# Patient Record
Sex: FEMALE | Race: OTHER | NOT HISPANIC OR LATINO | URBAN - METROPOLITAN AREA
[De-identification: names, ages, dates, MRNs, and addresses within clinical notes are randomized per-mention and may not be internally consistent; named-entity substitution may affect disease eponyms.]

---

## 2020-01-17 VITALS
HEART RATE: 85 BPM | OXYGEN SATURATION: 97 % | SYSTOLIC BLOOD PRESSURE: 211 MMHG | RESPIRATION RATE: 16 BRPM | WEIGHT: 152.34 LBS | DIASTOLIC BLOOD PRESSURE: 92 MMHG | HEIGHT: 61 IN | TEMPERATURE: 98 F

## 2020-01-17 RX ORDER — CHLORHEXIDINE GLUCONATE 213 G/1000ML
1 SOLUTION TOPICAL ONCE
Refills: 0 | Status: DISCONTINUED | OUTPATIENT
Start: 2020-01-21 | End: 2020-02-06

## 2020-01-17 NOTE — H&P ADULT - HISTORY OF PRESENT ILLNESS
***Poor Historian***  ***Pt to bring in all meds***    80 y/o F with PMHx significant for HTN, HLD, DM-II (on Insulin), CKD (pt is not sure of her Cr, was supposed to see a kidney specialist), prior hospitalizations @  in 8/2019 amd 10/2019 for hypertensive urgency, and syncopal episode on last admission as per pt.  Pt has been experiencing SSCP associated with dizziness and dyspnea on minimal exertion, just moving around the house.  Pt also admits to occasional LE edema and 2-3 pillows orthopnea.  Denies any n/v, diaphoresis, PND, palpitations.  Subsequently, she underwent a NST on 12/5/19 which revealed a small to moderate sized area of apical-septal and lateral wall ischemia, EF 58%.    In light of pt's risk factors, CCS class III anginal symptoms, and recent abnormal NST, she is referred for cardiac cath with possible intervention for suspected CAD. ***Poor Historian***      78 y/o F with PMHx significant for HTN, HLD, DM-II (on Insulin), CKD (her Cr varies from 4.8-5.2, has nephrologist outpatient), prior hospitalizations @  in 8/2019 and 10/2019 for hypertensive emergency, CHF exacerbation and syncopal episode on last admission as per pt. Pt 's BP was controlled and pt was diureses and discharged. Pt recently has been experiencing SSCP associated with dizziness and dyspnea on minimal exertion, just moving around the house.  Pt also admits to occasional LE edema and 2-3 pillows orthopnea but currently denies it. Denies any n/v, diaphoresis, PND, palpitations.  Subsequently, she underwent a NST on 12/5/19 which revealed a small to moderate sized area of apical-septal and lateral wall ischemia, EF 58%.    In light of pt's risk factors, CCS class III anginal symptoms, and recent abnormal NST, she is referred for cardiac cath with possible intervention for suspected CAD. ***Poor Historian***      78 y/o F with PMHx significant for HTN, HLD, DM-II (on Insulin), CKD (her Cr varies from 4.8-5.2, has nephrologist outpatient), prior hospitalizations @  in 8/2019 and 10/2019 for hypertensive emergency, CHF exacerbation and syncopal episode on last admission as per pt. Pt 's BP was controlled and pt was diureses and discharged. Pt recently has been experiencing SSCP associated with dizziness and dyspnea on minimal exertion, just moving around the house.  Pt also admits to occasional LE edema and 2-3 pillows orthopnea but currently denies it. Denies any n/v, diaphoresis, PND, palpitations.  Subsequently, she underwent a NST on 12/5/19 which revealed a small to moderate sized area of apical-septal and lateral wall ischemia, EF 58%.    In light of pt's risk factors, CCS class III anginal symptoms, and recent abnormal NST, she is referred for cardiac cath with possible intervention for suspected CAD.      Pt's Cr is 5.4 today, even though pt's nephrologist Dr. Merna Acosta cleared pt for cardiac cath with the plan of HD, pt wants to go for a second opinion with another nephrologist and if HD needed to arrange the placement of access site. Pt's BP on arrival is 220/97. Pt did not take her medications at home. Pt took in front of me her hydralazine 50 mg PO x1, amlodipine 5 mg PO x1, atenolol 25 mg PO x1. As per Dr. Kathleen the procedure was cancelled today due to pt's kidney function and her wish to go for second opinion. As per Dr. Kathleen pt will be sent home, pt was given additional 5 mg PO x1 of amlodipine and hydralazine 10 mg IV x1 with resolution of her BP. As per Dr. Kathleen pt will stop taking atenolol 25 mg bid and will start taking labetalol 300 mg bid. ***Poor Historian***      78 y/o F with PMHx significant for HTN, HLD, DM-II (on Insulin), CKD (her Cr varies from 4.8-5.2, has nephrologist outpatient), prior hospitalizations @  in 8/2019 and 10/2019 for hypertensive emergency, CHF exacerbation and syncopal episode on last admission as per pt. Pt 's BP was controlled and pt was diureses and discharged. Pt recently has been experiencing SSCP associated with dizziness and dyspnea on minimal exertion, just moving around the house.  Pt also admits to occasional LE edema and 2-3 pillows orthopnea but currently denies it. Denies any n/v, diaphoresis, PND, palpitations.  Subsequently, she underwent a NST on 12/5/19 which revealed a small to moderate sized area of apical-septal and lateral wall ischemia, EF 58%.    In light of pt's risk factors, CCS class III anginal symptoms, and recent abnormal NST, she is referred for cardiac cath with possible intervention for suspected CAD.      Pt's Cr is 5.4 today, even though pt's nephrologist Dr. Merna Acosta cleared pt for cardiac cath with the plan of HD, pt wants to go for a second opinion with another nephrologist and if HD needed to arrange the placement of access site. Pt's BP on arrival is 220/97. Pt did not take her medications at home. Pt took in front of me her hydralazine 50 mg PO x1, amlodipine 5 mg PO x1, atenolol 25 mg PO x1. As per Dr. Kathleen the procedure was cancelled today due to pt's kidney function and her wish to go for second opinion. As per Dr. Kathleen pt will be sent home, pt was given additional 5 mg PO x1 of amlodipine and hydralazine 10 mg IV x1 with resolution of her BP. As per Dr. Kathleen pt will stop taking atenolol 25 mg bid and will start taking labetalol 300 mg bid. On the discharge pt's BP is 170/80, pt additionally was instructed to stop taking bidil.

## 2020-01-17 NOTE — H&P ADULT - ASSESSMENT
78 y/o F with PMHx significant for HTN, HLD, DM-II (on Insulin), CKD (her Cr varies from 4.8-5.2, has nephrologist outpatient), prior hospitalizations @  in 8/2019 and 10/2019 for hypertensive emergency, CHF exacerbation and syncopal episode presented to [facility] for recommended cardiac cath in light of pt's risk factors, CCS 3 anginal symptoms and abnormal NST.    ASA III and Mallampati III    OF NOTE: 78 y/o F with PMHx significant for HTN, HLD, DM-II (on Insulin), CKD (her Cr varies from 4.8-5.2, has nephrologist outpatient), prior hospitalizations @  in 8/2019 and 10/2019 for hypertensive emergency, CHF exacerbation and syncopal episode presented to [facility] for recommended cardiac cath in light of pt's risk factors, CCS 3 anginal symptoms and abnormal NST.    ASA III and Mallampati III    OF NOTE:     Pt's Cr is 5.4 today, even though pt's nephrologist Dr. Merna Acosta cleared pt for cardiac cath with the plan of HD, pt wants to go for a second opinion with another nephrologist and if HD needed to arrange the placement of access site. Pt's BP on arrival is 220/97. Pt did not take her medications at home. Pt took in front of me her hydralazine 50 mg PO x1, amlodipine 5 mg PO x1, atenolol 25 mg PO x1. As per Dr. Kathleen the procedure was cancelled today due to pt's kidney function and her wish to go for second opinion. As per Dr. Kathleen pt will be sent home, pt was given additional 5 mg PO x1 of amlodipine and hydralazine 10 mg IV x1 with resolution of her BP. As per Dr. Kathleen pt will stop taking atenolol 25 mg bid and will start taking labetalol 300 mg bid. 78 y/o F with PMHx significant for HTN, HLD, DM-II (on Insulin), CKD (her Cr varies from 4.8-5.2, has nephrologist outpatient), prior hospitalizations @  in 8/2019 and 10/2019 for hypertensive emergency, CHF exacerbation and syncopal episode presented to [facility] for recommended cardiac cath in light of pt's risk factors, CCS 3 anginal symptoms and abnormal NST.    ASA III and Mallampati III    OF NOTE:     Pt's Cr is 5.4 today, even though pt's nephrologist Dr. Merna Acosta cleared pt for cardiac cath with the plan of HD, pt wants to go for a second opinion with another nephrologist and if HD needed to arrange the placement of access site. Pt's BP on arrival is 220/97. Pt did not take her medications at home. Pt took in front of me her hydralazine 50 mg PO x1, amlodipine 5 mg PO x1, atenolol 25 mg PO x1. As per Dr. Kathleen the procedure was cancelled today due to pt's kidney function and her wish to go for second opinion. As per Dr. Kathleen pt will be sent home, pt was given additional 5 mg PO x1 of amlodipine and hydralazine 10 mg IV x1 with resolution of her BP. As per Dr. Kathleen pt will stop taking atenolol 25 mg bid and will start taking labetalol 300 mg bid.  On the discharge pt's BP is 170/80, pt additionally was instructed to stop taking bidil.

## 2020-01-17 NOTE — H&P ADULT - NSICDXPASTMEDICALHX_GEN_ALL_CORE_FT
PAST MEDICAL HISTORY:  Asthma     CKD (chronic kidney disease)     DM type 2 (diabetes mellitus, type 2)     GERD (gastroesophageal reflux disease)     H/O vitamin D deficiency     HLD (hyperlipidemia)     HTN (hypertension)

## 2020-01-21 ENCOUNTER — OUTPATIENT (OUTPATIENT)
Dept: OUTPATIENT SERVICES | Facility: HOSPITAL | Age: 80
LOS: 1 days | End: 2020-01-21
Payer: MEDICARE

## 2020-01-21 DIAGNOSIS — Z98.49 CATARACT EXTRACTION STATUS, UNSPECIFIED EYE: Chronic | ICD-10-CM

## 2020-01-21 LAB
ALBUMIN SERPL ELPH-MCNC: 3.8 G/DL — SIGNIFICANT CHANGE UP (ref 3.3–5)
ALP SERPL-CCNC: 72 U/L — SIGNIFICANT CHANGE UP (ref 40–120)
ALT FLD-CCNC: 7 U/L — LOW (ref 10–45)
ANION GAP SERPL CALC-SCNC: 15 MMOL/L — SIGNIFICANT CHANGE UP (ref 5–17)
APTT BLD: 32.9 SEC — SIGNIFICANT CHANGE UP (ref 27.5–36.3)
AST SERPL-CCNC: 12 U/L — SIGNIFICANT CHANGE UP (ref 10–40)
BASOPHILS # BLD AUTO: 0.24 K/UL — HIGH (ref 0–0.2)
BASOPHILS NFR BLD AUTO: 1.7 % — SIGNIFICANT CHANGE UP (ref 0–2)
BILIRUB SERPL-MCNC: 0.3 MG/DL — SIGNIFICANT CHANGE UP (ref 0.2–1.2)
BUN SERPL-MCNC: 53 MG/DL — HIGH (ref 7–23)
CALCIUM SERPL-MCNC: 8.5 MG/DL — SIGNIFICANT CHANGE UP (ref 8.4–10.5)
CHLORIDE SERPL-SCNC: 105 MMOL/L — SIGNIFICANT CHANGE UP (ref 96–108)
CHOLEST SERPL-MCNC: 122 MG/DL — SIGNIFICANT CHANGE UP (ref 10–199)
CK MB CFR SERPL CALC: 2.2 NG/ML — SIGNIFICANT CHANGE UP (ref 0–6.7)
CK SERPL-CCNC: 74 U/L — SIGNIFICANT CHANGE UP (ref 25–170)
CO2 SERPL-SCNC: 20 MMOL/L — LOW (ref 22–31)
CREAT SERPL-MCNC: 5.15 MG/DL — HIGH (ref 0.5–1.3)
CRP SERPL-MCNC: 2.12 MG/DL — HIGH (ref 0–0.4)
EOSINOPHIL # BLD AUTO: 1.77 K/UL — HIGH (ref 0–0.5)
EOSINOPHIL NFR BLD AUTO: 12.3 % — HIGH (ref 0–6)
GLUCOSE SERPL-MCNC: 167 MG/DL — HIGH (ref 70–99)
HBA1C BLD-MCNC: 6.1 % — HIGH (ref 4–5.6)
HCT VFR BLD CALC: 28.2 % — LOW (ref 34.5–45)
HDLC SERPL-MCNC: 28 MG/DL — LOW
HGB BLD-MCNC: 8.8 G/DL — LOW (ref 11.5–15.5)
INR BLD: 1.03 — SIGNIFICANT CHANGE UP (ref 0.88–1.16)
LIPID PNL WITH DIRECT LDL SERPL: 72 MG/DL — SIGNIFICANT CHANGE UP
LYMPHOCYTES # BLD AUTO: 17.5 % — SIGNIFICANT CHANGE UP (ref 13–44)
LYMPHOCYTES # BLD AUTO: 2.51 K/UL — SIGNIFICANT CHANGE UP (ref 1–3.3)
MCHC RBC-ENTMCNC: 23.8 PG — LOW (ref 27–34)
MCHC RBC-ENTMCNC: 31.2 GM/DL — LOW (ref 32–36)
MCV RBC AUTO: 76.4 FL — LOW (ref 80–100)
MONOCYTES # BLD AUTO: 0.26 K/UL — SIGNIFICANT CHANGE UP (ref 0–0.9)
MONOCYTES NFR BLD AUTO: 1.8 % — LOW (ref 2–14)
NEUTROPHILS # BLD AUTO: 9.46 K/UL — HIGH (ref 1.8–7.4)
NEUTROPHILS NFR BLD AUTO: 65.8 % — SIGNIFICANT CHANGE UP (ref 43–77)
PLATELET # BLD AUTO: 334 K/UL — SIGNIFICANT CHANGE UP (ref 150–400)
POTASSIUM SERPL-MCNC: 5.4 MMOL/L — HIGH (ref 3.5–5.3)
POTASSIUM SERPL-SCNC: 5.4 MMOL/L — HIGH (ref 3.5–5.3)
PROT SERPL-MCNC: 7.3 G/DL — SIGNIFICANT CHANGE UP (ref 6–8.3)
PROTHROM AB SERPL-ACNC: 11.8 SEC — SIGNIFICANT CHANGE UP (ref 10–12.9)
RBC # BLD: 3.69 M/UL — LOW (ref 3.8–5.2)
RBC # FLD: 15.5 % — HIGH (ref 10.3–14.5)
SODIUM SERPL-SCNC: 140 MMOL/L — SIGNIFICANT CHANGE UP (ref 135–145)
TOTAL CHOLESTEROL/HDL RATIO MEASUREMENT: 4.4 RATIO — SIGNIFICANT CHANGE UP (ref 3.3–7.1)
TRIGL SERPL-MCNC: 111 MG/DL — SIGNIFICANT CHANGE UP (ref 10–149)
WBC # BLD: 14.37 K/UL — HIGH (ref 3.8–10.5)
WBC # FLD AUTO: 14.37 K/UL — HIGH (ref 3.8–10.5)

## 2020-01-21 PROCEDURE — 86140 C-REACTIVE PROTEIN: CPT

## 2020-01-21 PROCEDURE — 82248 BILIRUBIN DIRECT: CPT

## 2020-01-21 PROCEDURE — 85730 THROMBOPLASTIN TIME PARTIAL: CPT

## 2020-01-21 PROCEDURE — 85025 COMPLETE CBC W/AUTO DIFF WBC: CPT

## 2020-01-21 PROCEDURE — 93010 ELECTROCARDIOGRAM REPORT: CPT

## 2020-01-21 PROCEDURE — 80053 COMPREHEN METABOLIC PANEL: CPT

## 2020-01-21 PROCEDURE — 85610 PROTHROMBIN TIME: CPT

## 2020-01-21 PROCEDURE — 82553 CREATINE MB FRACTION: CPT

## 2020-01-21 PROCEDURE — 83036 HEMOGLOBIN GLYCOSYLATED A1C: CPT

## 2020-01-21 PROCEDURE — 82550 ASSAY OF CK (CPK): CPT

## 2020-01-21 PROCEDURE — 93005 ELECTROCARDIOGRAM TRACING: CPT

## 2020-01-21 PROCEDURE — 80061 LIPID PANEL: CPT

## 2020-01-21 RX ORDER — ERGOCALCIFEROL 1.25 MG/1
1 CAPSULE ORAL
Qty: 0 | Refills: 0 | DISCHARGE

## 2020-01-21 RX ORDER — CARVEDILOL PHOSPHATE 80 MG/1
1 CAPSULE, EXTENDED RELEASE ORAL
Qty: 0 | Refills: 0 | DISCHARGE

## 2020-01-21 RX ORDER — DEXTROSE 50 % IN WATER 50 %
12.5 SYRINGE (ML) INTRAVENOUS ONCE
Refills: 0 | Status: DISCONTINUED | OUTPATIENT
Start: 2020-01-21 | End: 2020-02-06

## 2020-01-21 RX ORDER — HYDRALAZINE HCL 50 MG
10 TABLET ORAL ONCE
Refills: 0 | Status: COMPLETED | OUTPATIENT
Start: 2020-01-21 | End: 2020-01-21

## 2020-01-21 RX ORDER — LABETALOL HCL 100 MG
1 TABLET ORAL
Qty: 60 | Refills: 1
Start: 2020-01-21 | End: 2020-03-20

## 2020-01-21 RX ORDER — ESOMEPRAZOLE MAGNESIUM 40 MG/1
1 CAPSULE, DELAYED RELEASE ORAL
Qty: 0 | Refills: 0 | DISCHARGE

## 2020-01-21 RX ORDER — AMLODIPINE BESYLATE 2.5 MG/1
5 TABLET ORAL ONCE
Refills: 0 | Status: COMPLETED | OUTPATIENT
Start: 2020-01-21 | End: 2020-01-21

## 2020-01-21 RX ORDER — ISOSORBIDE DINITRATE 5 MG/1
1 TABLET ORAL
Qty: 0 | Refills: 0 | DISCHARGE

## 2020-01-21 RX ORDER — DEXTROSE 50 % IN WATER 50 %
25 SYRINGE (ML) INTRAVENOUS ONCE
Refills: 0 | Status: DISCONTINUED | OUTPATIENT
Start: 2020-01-21 | End: 2020-02-06

## 2020-01-21 RX ORDER — FERROUS GLUCONATE 100 %
1 POWDER (GRAM) MISCELLANEOUS
Qty: 0 | Refills: 0 | DISCHARGE

## 2020-01-21 RX ORDER — CALCIUM ACETATE 667 MG
1 TABLET ORAL
Qty: 0 | Refills: 0 | DISCHARGE

## 2020-01-21 RX ORDER — DEXTROSE 50 % IN WATER 50 %
15 SYRINGE (ML) INTRAVENOUS ONCE
Refills: 0 | Status: DISCONTINUED | OUTPATIENT
Start: 2020-01-21 | End: 2020-02-06

## 2020-01-21 RX ORDER — SODIUM CHLORIDE 9 MG/ML
1000 INJECTION, SOLUTION INTRAVENOUS
Refills: 0 | Status: DISCONTINUED | OUTPATIENT
Start: 2020-01-21 | End: 2020-02-06

## 2020-01-21 RX ORDER — GLUCAGON INJECTION, SOLUTION 0.5 MG/.1ML
1 INJECTION, SOLUTION SUBCUTANEOUS ONCE
Refills: 0 | Status: DISCONTINUED | OUTPATIENT
Start: 2020-01-21 | End: 2020-02-06

## 2020-01-21 RX ORDER — FUROSEMIDE 40 MG
1 TABLET ORAL
Qty: 0 | Refills: 0 | DISCHARGE

## 2020-01-21 RX ORDER — INSULIN LISPRO 100/ML
VIAL (ML) SUBCUTANEOUS ONCE
Refills: 0 | Status: DISCONTINUED | OUTPATIENT
Start: 2020-01-21 | End: 2020-02-06

## 2020-01-21 RX ADMIN — AMLODIPINE BESYLATE 5 MILLIGRAM(S): 2.5 TABLET ORAL at 11:12

## 2020-01-21 RX ADMIN — Medication 10 MILLIGRAM(S): at 11:12

## 2020-01-23 DIAGNOSIS — I25.10 ATHEROSCLEROTIC HEART DISEASE OF NATIVE CORONARY ARTERY WITHOUT ANGINA PECTORIS: ICD-10-CM

## 2020-06-16 ENCOUNTER — INPATIENT (INPATIENT)
Facility: HOSPITAL | Age: 80
LOS: 6 days | Discharge: HOME | DRG: 246 | End: 2020-06-23
Attending: INTERNAL MEDICINE | Admitting: INTERNAL MEDICINE
Payer: COMMERCIAL

## 2020-06-16 VITALS
HEART RATE: 75 BPM | RESPIRATION RATE: 18 BRPM | TEMPERATURE: 99 F | DIASTOLIC BLOOD PRESSURE: 69 MMHG | OXYGEN SATURATION: 96 % | HEIGHT: 61 IN | SYSTOLIC BLOOD PRESSURE: 160 MMHG

## 2020-06-16 DIAGNOSIS — N18.6 END STAGE RENAL DISEASE: ICD-10-CM

## 2020-06-16 DIAGNOSIS — Z29.9 ENCOUNTER FOR PROPHYLACTIC MEASURES, UNSPECIFIED: ICD-10-CM

## 2020-06-16 DIAGNOSIS — E87.5 HYPERKALEMIA: ICD-10-CM

## 2020-06-16 DIAGNOSIS — I20.0 UNSTABLE ANGINA: ICD-10-CM

## 2020-06-16 DIAGNOSIS — Z98.49 CATARACT EXTRACTION STATUS, UNSPECIFIED EYE: Chronic | ICD-10-CM

## 2020-06-16 DIAGNOSIS — I10 ESSENTIAL (PRIMARY) HYPERTENSION: ICD-10-CM

## 2020-06-16 DIAGNOSIS — R09.89 OTHER SPECIFIED SYMPTOMS AND SIGNS INVOLVING THE CIRCULATORY AND RESPIRATORY SYSTEMS: ICD-10-CM

## 2020-06-16 DIAGNOSIS — R07.9 CHEST PAIN, UNSPECIFIED: ICD-10-CM

## 2020-06-16 DIAGNOSIS — I50.22 CHRONIC SYSTOLIC (CONGESTIVE) HEART FAILURE: ICD-10-CM

## 2020-06-16 DIAGNOSIS — E11.69 TYPE 2 DIABETES MELLITUS WITH OTHER SPECIFIED COMPLICATION: ICD-10-CM

## 2020-06-16 PROBLEM — J45.909 UNSPECIFIED ASTHMA, UNCOMPLICATED: Chronic | Status: ACTIVE | Noted: 2020-01-17

## 2020-06-16 PROBLEM — K21.9 GASTRO-ESOPHAGEAL REFLUX DISEASE WITHOUT ESOPHAGITIS: Chronic | Status: ACTIVE | Noted: 2020-01-17

## 2020-06-16 PROBLEM — E11.9 TYPE 2 DIABETES MELLITUS WITHOUT COMPLICATIONS: Chronic | Status: ACTIVE | Noted: 2020-01-17

## 2020-06-16 PROBLEM — E78.5 HYPERLIPIDEMIA, UNSPECIFIED: Chronic | Status: ACTIVE | Noted: 2020-01-17

## 2020-06-16 PROBLEM — Z86.39 PERSONAL HISTORY OF OTHER ENDOCRINE, NUTRITIONAL AND METABOLIC DISEASE: Chronic | Status: ACTIVE | Noted: 2020-01-17

## 2020-06-16 PROBLEM — N18.9 CHRONIC KIDNEY DISEASE, UNSPECIFIED: Chronic | Status: ACTIVE | Noted: 2020-01-17

## 2020-06-16 LAB
ANION GAP SERPL CALC-SCNC: 14 MMOL/L — SIGNIFICANT CHANGE UP (ref 5–17)
APPEARANCE UR: CLEAR — SIGNIFICANT CHANGE UP
APTT BLD: 28.1 SEC — SIGNIFICANT CHANGE UP (ref 27.5–36.3)
APTT BLD: >200 SEC — CRITICAL HIGH (ref 27.5–36.3)
BACTERIA # UR AUTO: ABNORMAL
BASE EXCESS BLDV CALC-SCNC: -0.7 MMOL/L — SIGNIFICANT CHANGE UP (ref -2–2)
BASOPHILS # BLD AUTO: 0 K/UL — SIGNIFICANT CHANGE UP (ref 0–0.2)
BASOPHILS NFR BLD AUTO: 0 % — SIGNIFICANT CHANGE UP (ref 0–2)
BILIRUB UR-MCNC: NEGATIVE — SIGNIFICANT CHANGE UP
BUN SERPL-MCNC: 45 MG/DL — HIGH (ref 7–23)
CA-I SERPL-SCNC: 1.13 MMOL/L — SIGNIFICANT CHANGE UP (ref 1.12–1.3)
CALCIUM SERPL-MCNC: 8.8 MG/DL — SIGNIFICANT CHANGE UP (ref 8.4–10.5)
CHLORIDE BLDV-SCNC: 106 MMOL/L — SIGNIFICANT CHANGE UP (ref 96–108)
CHLORIDE SERPL-SCNC: 102 MMOL/L — SIGNIFICANT CHANGE UP (ref 96–108)
CO2 BLDV-SCNC: 27 MMOL/L — SIGNIFICANT CHANGE UP (ref 22–30)
CO2 SERPL-SCNC: 20 MMOL/L — LOW (ref 22–31)
COLOR SPEC: SIGNIFICANT CHANGE UP
CREAT SERPL-MCNC: 5.84 MG/DL — HIGH (ref 0.5–1.3)
CRP SERPL-MCNC: 1.09 MG/DL — HIGH (ref 0–0.4)
D DIMER BLD IA.RAPID-MCNC: 981 NG/ML DDU — HIGH
DIFF PNL FLD: ABNORMAL
EOSINOPHIL # BLD AUTO: 1.19 K/UL — HIGH (ref 0–0.5)
EOSINOPHIL NFR BLD AUTO: 8 % — HIGH (ref 0–6)
EPI CELLS # UR: 4 /HPF — SIGNIFICANT CHANGE UP
FERRITIN SERPL-MCNC: 420 NG/ML — HIGH (ref 15–150)
GAS PNL BLDV: 133 MMOL/L — LOW (ref 135–145)
GAS PNL BLDV: SIGNIFICANT CHANGE UP
GAS PNL BLDV: SIGNIFICANT CHANGE UP
GIANT PLATELETS BLD QL SMEAR: PRESENT — SIGNIFICANT CHANGE UP
GLUCOSE BLDV-MCNC: 160 MG/DL — HIGH (ref 70–99)
GLUCOSE SERPL-MCNC: 116 MG/DL — HIGH (ref 70–99)
GLUCOSE UR QL: ABNORMAL
HCO3 BLDV-SCNC: 26 MMOL/L — SIGNIFICANT CHANGE UP (ref 21–29)
HCT VFR BLD CALC: 40.5 % — SIGNIFICANT CHANGE UP (ref 34.5–45)
HCT VFR BLDA CALC: 42 % — SIGNIFICANT CHANGE UP (ref 39–50)
HGB BLD CALC-MCNC: 13.5 G/DL — SIGNIFICANT CHANGE UP (ref 11.5–15.5)
HGB BLD-MCNC: 12.7 G/DL — SIGNIFICANT CHANGE UP (ref 11.5–15.5)
HYALINE CASTS # UR AUTO: 0 /LPF — SIGNIFICANT CHANGE UP (ref 0–2)
INR BLD: 0.99 RATIO — SIGNIFICANT CHANGE UP (ref 0.88–1.16)
INR BLD: 3.08 RATIO — HIGH (ref 0.88–1.16)
KETONES UR-MCNC: NEGATIVE — SIGNIFICANT CHANGE UP
LACTATE BLDV-MCNC: 1.1 MMOL/L — SIGNIFICANT CHANGE UP (ref 0.7–2)
LEUKOCYTE ESTERASE UR-ACNC: NEGATIVE — SIGNIFICANT CHANGE UP
LIDOCAIN IGE QN: 130 U/L — HIGH (ref 7–60)
LYMPHOCYTES # BLD AUTO: 17.7 % — SIGNIFICANT CHANGE UP (ref 13–44)
LYMPHOCYTES # BLD AUTO: 2.63 K/UL — SIGNIFICANT CHANGE UP (ref 1–3.3)
MANUAL SMEAR VERIFICATION: SIGNIFICANT CHANGE UP
MCHC RBC-ENTMCNC: 25.1 PG — LOW (ref 27–34)
MCHC RBC-ENTMCNC: 31.4 GM/DL — LOW (ref 32–36)
MCV RBC AUTO: 80 FL — SIGNIFICANT CHANGE UP (ref 80–100)
MONOCYTES # BLD AUTO: 0.65 K/UL — SIGNIFICANT CHANGE UP (ref 0–0.9)
MONOCYTES NFR BLD AUTO: 4.4 % — SIGNIFICANT CHANGE UP (ref 2–14)
NEUTROPHILS # BLD AUTO: 10.25 K/UL — HIGH (ref 1.8–7.4)
NEUTROPHILS NFR BLD AUTO: 69 % — SIGNIFICANT CHANGE UP (ref 43–77)
NITRITE UR-MCNC: NEGATIVE — SIGNIFICANT CHANGE UP
NT-PROBNP SERPL-SCNC: HIGH PG/ML (ref 0–300)
PCO2 BLDV: 52 MMHG — HIGH (ref 35–50)
PH BLDV: 7.31 — LOW (ref 7.35–7.45)
PH UR: 8 — SIGNIFICANT CHANGE UP (ref 5–8)
PLAT MORPH BLD: NORMAL — SIGNIFICANT CHANGE UP
PLATELET # BLD AUTO: 219 K/UL — SIGNIFICANT CHANGE UP (ref 150–400)
PO2 BLDV: 23 MMHG — LOW (ref 25–45)
POTASSIUM BLDV-SCNC: 5.7 MMOL/L — HIGH (ref 3.5–5.3)
POTASSIUM SERPL-MCNC: 5.5 MMOL/L — HIGH (ref 3.5–5.3)
POTASSIUM SERPL-SCNC: 5.5 MMOL/L — HIGH (ref 3.5–5.3)
PROCALCITONIN SERPL-MCNC: 0.32 NG/ML — HIGH (ref 0.02–0.1)
PROT UR-MCNC: ABNORMAL
PROTHROM AB SERPL-ACNC: 11.3 SEC — SIGNIFICANT CHANGE UP (ref 10–12.9)
PROTHROM AB SERPL-ACNC: 36.3 SEC — HIGH (ref 10–12.9)
RBC # BLD: 5.06 M/UL — SIGNIFICANT CHANGE UP (ref 3.8–5.2)
RBC # FLD: 14.9 % — HIGH (ref 10.3–14.5)
RBC BLD AUTO: SIGNIFICANT CHANGE UP
RBC CASTS # UR COMP ASSIST: 4 /HPF — SIGNIFICANT CHANGE UP (ref 0–4)
SAO2 % BLDV: 28 % — LOW (ref 67–88)
SARS-COV-2 RNA SPEC QL NAA+PROBE: SIGNIFICANT CHANGE UP
SODIUM SERPL-SCNC: 136 MMOL/L — SIGNIFICANT CHANGE UP (ref 135–145)
SP GR SPEC: 1.01 — SIGNIFICANT CHANGE UP (ref 1.01–1.02)
TROPONIN T, HIGH SENSITIVITY RESULT: 726 NG/L — HIGH (ref 0–51)
TROPONIN T, HIGH SENSITIVITY RESULT: 729 NG/L — HIGH (ref 0–51)
UROBILINOGEN FLD QL: NEGATIVE — SIGNIFICANT CHANGE UP
VARIANT LYMPHS # BLD: 0.9 % — SIGNIFICANT CHANGE UP (ref 0–6)
WBC # BLD: 14.86 K/UL — HIGH (ref 3.8–10.5)
WBC # FLD AUTO: 14.86 K/UL — HIGH (ref 3.8–10.5)
WBC UR QL: 4 /HPF — SIGNIFICANT CHANGE UP (ref 0–5)

## 2020-06-16 PROCEDURE — 93010 ELECTROCARDIOGRAM REPORT: CPT

## 2020-06-16 PROCEDURE — 99223 1ST HOSP IP/OBS HIGH 75: CPT

## 2020-06-16 PROCEDURE — 99285 EMERGENCY DEPT VISIT HI MDM: CPT

## 2020-06-16 PROCEDURE — 71045 X-RAY EXAM CHEST 1 VIEW: CPT | Mod: 26

## 2020-06-16 RX ORDER — GLUCAGON INJECTION, SOLUTION 0.5 MG/.1ML
1 INJECTION, SOLUTION SUBCUTANEOUS ONCE
Refills: 0 | Status: DISCONTINUED | OUTPATIENT
Start: 2020-06-16 | End: 2020-06-23

## 2020-06-16 RX ORDER — HEPARIN SODIUM 5000 [USP'U]/ML
3800 INJECTION INTRAVENOUS; SUBCUTANEOUS ONCE
Refills: 0 | Status: COMPLETED | OUTPATIENT
Start: 2020-06-16 | End: 2020-06-17

## 2020-06-16 RX ORDER — CHLORHEXIDINE GLUCONATE 213 G/1000ML
1 SOLUTION TOPICAL DAILY
Refills: 0 | Status: DISCONTINUED | OUTPATIENT
Start: 2020-06-16 | End: 2020-06-23

## 2020-06-16 RX ORDER — SODIUM ZIRCONIUM CYCLOSILICATE 10 G/10G
5 POWDER, FOR SUSPENSION ORAL ONCE
Refills: 0 | Status: COMPLETED | OUTPATIENT
Start: 2020-06-16 | End: 2020-06-16

## 2020-06-16 RX ORDER — DEXTROSE 50 % IN WATER 50 %
15 SYRINGE (ML) INTRAVENOUS ONCE
Refills: 0 | Status: DISCONTINUED | OUTPATIENT
Start: 2020-06-16 | End: 2020-06-23

## 2020-06-16 RX ORDER — DEXTROSE 50 % IN WATER 50 %
12.5 SYRINGE (ML) INTRAVENOUS ONCE
Refills: 0 | Status: DISCONTINUED | OUTPATIENT
Start: 2020-06-16 | End: 2020-06-23

## 2020-06-16 RX ORDER — CARVEDILOL PHOSPHATE 80 MG/1
25 CAPSULE, EXTENDED RELEASE ORAL EVERY 12 HOURS
Refills: 0 | Status: DISCONTINUED | OUTPATIENT
Start: 2020-06-16 | End: 2020-06-23

## 2020-06-16 RX ORDER — CALCIUM GLUCONATE 100 MG/ML
1 VIAL (ML) INTRAVENOUS ONCE
Refills: 0 | Status: COMPLETED | OUTPATIENT
Start: 2020-06-16 | End: 2020-06-16

## 2020-06-16 RX ORDER — DEXTROSE 50 % IN WATER 50 %
25 SYRINGE (ML) INTRAVENOUS ONCE
Refills: 0 | Status: DISCONTINUED | OUTPATIENT
Start: 2020-06-16 | End: 2020-06-23

## 2020-06-16 RX ORDER — ATENOLOL 25 MG/1
1 TABLET ORAL
Qty: 0 | Refills: 0 | DISCHARGE

## 2020-06-16 RX ORDER — HEPARIN SODIUM 5000 [USP'U]/ML
3800 INJECTION INTRAVENOUS; SUBCUTANEOUS EVERY 6 HOURS
Refills: 0 | Status: DISCONTINUED | OUTPATIENT
Start: 2020-06-16 | End: 2020-06-17

## 2020-06-16 RX ORDER — INSULIN HUMAN 100 [IU]/ML
5 INJECTION, SOLUTION SUBCUTANEOUS ONCE
Refills: 0 | Status: COMPLETED | OUTPATIENT
Start: 2020-06-16 | End: 2020-06-16

## 2020-06-16 RX ORDER — ATORVASTATIN CALCIUM 80 MG/1
80 TABLET, FILM COATED ORAL AT BEDTIME
Refills: 0 | Status: DISCONTINUED | OUTPATIENT
Start: 2020-06-16 | End: 2020-06-23

## 2020-06-16 RX ORDER — OLOPATADINE HYDROCHLORIDE 1 MG/ML
1 SOLUTION/ DROPS OPHTHALMIC
Qty: 0 | Refills: 0 | DISCHARGE

## 2020-06-16 RX ORDER — ASPIRIN/CALCIUM CARB/MAGNESIUM 324 MG
324 TABLET ORAL ONCE
Refills: 0 | Status: COMPLETED | OUTPATIENT
Start: 2020-06-16 | End: 2020-06-16

## 2020-06-16 RX ORDER — PIOGLITAZONE HYDROCHLORIDE 15 MG/1
1 TABLET ORAL
Qty: 0 | Refills: 0 | DISCHARGE

## 2020-06-16 RX ORDER — INSULIN GLARGINE 100 [IU]/ML
8 INJECTION, SOLUTION SUBCUTANEOUS
Qty: 0 | Refills: 0 | DISCHARGE

## 2020-06-16 RX ORDER — DEXTROSE 50 % IN WATER 50 %
50 SYRINGE (ML) INTRAVENOUS ONCE
Refills: 0 | Status: COMPLETED | OUTPATIENT
Start: 2020-06-16 | End: 2020-06-16

## 2020-06-16 RX ORDER — HEPARIN SODIUM 5000 [USP'U]/ML
5000 INJECTION INTRAVENOUS; SUBCUTANEOUS EVERY 12 HOURS
Refills: 0 | Status: DISCONTINUED | OUTPATIENT
Start: 2020-06-16 | End: 2020-06-16

## 2020-06-16 RX ORDER — HYDRALAZINE HYDROCHLORIDE AND ISOSORBIDE DINITRATE 37.5; 2 MG/1; MG/1
2 TABLET, FILM COATED ORAL
Qty: 0 | Refills: 0 | DISCHARGE

## 2020-06-16 RX ORDER — INSULIN LISPRO 100/ML
VIAL (ML) SUBCUTANEOUS
Refills: 0 | Status: DISCONTINUED | OUTPATIENT
Start: 2020-06-16 | End: 2020-06-23

## 2020-06-16 RX ORDER — HEPARIN SODIUM 5000 [USP'U]/ML
INJECTION INTRAVENOUS; SUBCUTANEOUS
Qty: 25000 | Refills: 0 | Status: DISCONTINUED | OUTPATIENT
Start: 2020-06-16 | End: 2020-06-17

## 2020-06-16 RX ORDER — SIMVASTATIN 20 MG/1
1 TABLET, FILM COATED ORAL
Qty: 0 | Refills: 0 | DISCHARGE

## 2020-06-16 RX ORDER — ASPIRIN/CALCIUM CARB/MAGNESIUM 324 MG
81 TABLET ORAL DAILY
Refills: 0 | Status: DISCONTINUED | OUTPATIENT
Start: 2020-06-16 | End: 2020-06-23

## 2020-06-16 RX ORDER — INSULIN LISPRO 100/ML
VIAL (ML) SUBCUTANEOUS AT BEDTIME
Refills: 0 | Status: DISCONTINUED | OUTPATIENT
Start: 2020-06-16 | End: 2020-06-23

## 2020-06-16 RX ORDER — SEVELAMER CARBONATE 2400 MG/1
800 POWDER, FOR SUSPENSION ORAL THREE TIMES A DAY
Refills: 0 | Status: DISCONTINUED | OUTPATIENT
Start: 2020-06-16 | End: 2020-06-17

## 2020-06-16 RX ORDER — SODIUM CHLORIDE 9 MG/ML
1000 INJECTION, SOLUTION INTRAVENOUS
Refills: 0 | Status: DISCONTINUED | OUTPATIENT
Start: 2020-06-16 | End: 2020-06-23

## 2020-06-16 RX ORDER — FERROUS GLUCONATE 100 %
1 POWDER (GRAM) MISCELLANEOUS
Qty: 0 | Refills: 0 | DISCHARGE

## 2020-06-16 RX ORDER — DOXERCALCIFEROL 2.5 UG/1
2 CAPSULE ORAL
Refills: 0 | Status: DISCONTINUED | OUTPATIENT
Start: 2020-06-16 | End: 2020-06-23

## 2020-06-16 RX ADMIN — Medication 50 MILLILITER(S): at 13:26

## 2020-06-16 RX ADMIN — DOXERCALCIFEROL 2 MICROGRAM(S): 2.5 CAPSULE ORAL at 19:44

## 2020-06-16 RX ADMIN — Medication 100 GRAM(S): at 13:26

## 2020-06-16 RX ADMIN — SODIUM ZIRCONIUM CYCLOSILICATE 5 GRAM(S): 10 POWDER, FOR SUSPENSION ORAL at 13:26

## 2020-06-16 RX ADMIN — SEVELAMER CARBONATE 800 MILLIGRAM(S): 2400 POWDER, FOR SUSPENSION ORAL at 21:43

## 2020-06-16 RX ADMIN — CARVEDILOL PHOSPHATE 25 MILLIGRAM(S): 80 CAPSULE, EXTENDED RELEASE ORAL at 21:44

## 2020-06-16 RX ADMIN — Medication 324 MILLIGRAM(S): at 13:47

## 2020-06-16 RX ADMIN — INSULIN HUMAN 5 UNIT(S): 100 INJECTION, SOLUTION SUBCUTANEOUS at 13:30

## 2020-06-16 NOTE — ED PROVIDER NOTE - CARE PLAN
Principal Discharge DX:	Chest pain  Secondary Diagnosis:	OHRN (dyspnea on exertion)  Secondary Diagnosis:	Hyperkalemia  Secondary Diagnosis:	Dialysis patient

## 2020-06-16 NOTE — H&P ADULT - PROBLEM SELECTOR PLAN 4
Reportedly mildly reduced EF. Based on E-Rxs, pt was prescribed coreg and lisinopril in June  -Cont. Coreg with hold parameters for now  -Will hold lisinopril for now  -Consider TTE  -Unclear if on diuretic  -Need med rec [facility] pharmacy in [place] [phone]

## 2020-06-16 NOTE — CONSULT NOTE ADULT - SUBJECTIVE AND OBJECTIVE BOX
PATIENT SEEN AND EXAMINED. FULL CONSULT TO FOLLOW. [facility] NEPHROLOGY- CONSULTATION NOTE    79y Female with history of below presents with chest pain. Nephrology consulted for ESRD status.    Patient known to our practice for h/o ESRD on HD TTS at [facility] on [facility]. Last HD on  as an outpatient via [facility]. Patient reports chest pain started during HD on Saturday and continued until today for which patient presented to ER for further management.     REVIEW OF SYSTEMS:  Gen: no changes in weight  HEENT: no rhinorrhea  Neck: no sore throat  Cards: + chest pain  Resp: + dyspnea  GI: no nausea or vomiting or diarrhea  : no dysuria or hematuria  Vascular: no LE edema  Derm: no rashes  Neuro: no numbness/tingling    No Known Allergies      Home Medications Reviewed  Hospital Medications:   MEDICATIONS  (STANDING):  chlorhexidine 2% Cloths 1 Application(s) Topical daily  doxercalciferol Injectable 2 MICROGram(s) IV Push <User Schedule>      PAST MEDICAL & SURGICAL HISTORY:  GERD (gastroesophageal reflux disease)  H/O vitamin D deficiency  Asthma  CKD (chronic kidney disease)  DM type 2 (diabetes mellitus, type 2)  HLD (hyperlipidemia)  HTN (hypertension)  History of cataract surgery      FAMILY HISTORY:  No pertinent family history in first degree relatives      SOCIAL HISTORY:  Denies toxic substance use     VITALS:  T(F): 98.6 (20 @ 11:14), Max: 98.6 (20 @ 10:35)  HR: 76 (20 @ 11:14)  BP: 171/79 (20 @ 11:14)  RR: 18 (20 @ 11:14)  SpO2: 97% (20 @ 11:14)  Wt(kg): --    Height (cm): 154.94 ( @ 10:35)  Weight (kg): 62.8 ( @ 14:30)  BMI (kg/m2): 26.2 ( @ 14:30)  BSA (m2): 1.62 ( @ 14:30)    PHYSICAL EXAM:  Gen: NAD, calm  HEENT: MMM  Neck: no JVD  Cards: RRR, +S1/S2, no M/G/R  Resp: Decreased BS @ bases B/L  GI: soft, NT/ND, NABS  : no CVA tenderness  Vascular: no LE edema B/L, RIJ TDC C/D/I  Derm: no rashes  Neuro: non-focal    LABS:      135  |  101  |  45<H>  ----------------------------<  165<H>  6.1<H>   |  19<L>  |  5.69<H>    Ca    9.0      2020 11:14    TPro  7.5  /  Alb  3.8  /  TBili  0.5  /  DBili      /  AST  29  /  ALT  9<L>  /  AlkPhos  83      Creatinine Trend: 5.69 <--                        12.7   14.86 )-----------( 219      ( 2020 11:15 )             40.5     Urine Studies:  Urinalysis Basic - ( 2020 12:46 )    Color: Light Yellow / Appearance: Clear / S.011 / pH:   Gluc:  / Ketone: Negative  / Bili: Negative / Urobili: Negative   Blood:  / Protein: 300 mg/dL / Nitrite: Negative   Leuk Esterase: Negative / RBC: 4 /hpf / WBC 4 /HPF   Sq Epi:  / Non Sq Epi: 4 /hpf / Bacteria: Few          RADIOLOGY & ADDITIONAL STUDIES:    < from: Xray Chest 1 View-PORTABLE IMMEDIATE (20 @ 11:16) >  IMPRESSION:   Lines and tubes as above.    Mild pulmonary edema with small bilateral pleural effusions.    < end of copied text >

## 2020-06-16 NOTE — CONSULT NOTE ADULT - SUBJECTIVE AND OBJECTIVE BOX
[facility] DIVISION OF KIDNEY DISEASES AND HYPERTENSION -- INITIAL CONSULT NOTE  --------------------------------------------------------------------------------  Darrell Baptiste   Nephrology Fellow  Pager NS: [phone]/ [facility]: [phone]  (After 5 pm or on weekends please page the on-call fellow)    HPI: Patient is a 79y old F ESRD on HD ( TTS), HTN, HLD, DM-II (on Insulin) presenting from home with CP/SOB. Patient gets HD at [facility] in [place], last HD was on 6/14. Has been experiencing intermittent, exertional, substernal chest pressure x 4 days, occasionally radiates to back, and associated w/ HORN and lightheadedness. Nephrologist: Dr. Merna Acosta. Nephrology consulted for ESRD management, labs reviewed, K 6.1, BNP 35k         PAST HISTORY  --------------------------------------------------------------------------------  PAST MEDICAL & SURGICAL HISTORY:  GERD (gastroesophageal reflux disease)  H/O vitamin D deficiency  Asthma  CKD (chronic kidney disease)  DM type 2 (diabetes mellitus, type 2)  HLD (hyperlipidemia)  HTN (hypertension)  History of cataract surgery    FAMILY HISTORY:  No pertinent family history in first degree relatives    PAST SOCIAL HISTORY:    ALLERGIES & MEDICATIONS  --------------------------------------------------------------------------------  Allergies    No Known Allergies    Intolerances      Standing Inpatient Medications    PRN Inpatient Medications      REVIEW OF SYSTEMS  --------------------------------------------------------------------------------  Gen: No lethargy  Respiratory: + dyspnea  CV: + chest pain  GI: No abdominal pain/ diarrhea   MSK: + LE edema  Neuro: No dizziness  Heme: No bleeding    All other systems were reviewed and are negative, except as noted.      VITALS/PHYSICAL EXAM  --------------------------------------------------------------------------------  T(C): 37 (06-16-20 @ 11:14), Max: 37 (06-16-20 @ 10:35)  HR: 76 (06-16-20 @ 11:14) (75 - 76)  BP: 171/79 (06-16-20 @ 11:14) (160/69 - 171/79)  RR: 18 (06-16-20 @ 11:14) (18 - 18)  SpO2: 97% (06-16-20 @ 11:14) (96% - 97%)  Wt(kg): --  Height (cm): 154.94 (06-16-20 @ 10:35)      Physical Exam:    	Gen: NAD, well-appearing  	HEENT: Anicteric   	Pulm: CTA B/L  	CV: RRR, S1S2; no rub  	Abd: +BS, soft, nontender/nondistended       	: No suprapubic tenderness  	MSK: Warm, no clubbing, intact strength; no edema  	Neuro: No focal deficits, AOX3, no asterixis       	Vascular Access:      LABS/STUDIES  --------------------------------------------------------------------------------              12.7   14.86 >-----------<  219      [06-16-20 @ 11:15]              40.5     135  |  101  |  45  ----------------------------<  165      [06-16-20 @ 11:14]  6.1   |  19  |  5.69        Ca     9.0     [06-16-20 @ 11:14]    TPro  7.5  /  Alb  3.8  /  TBili  0.5  /  DBili  x   /  AST  29  /  ALT  9   /  AlkPhos  83  [06-16-20 @ 11:14]    PT/INR: PT 10.9 , INR 0.96       [06-16-20 @ 11:15]  PTT: 30.4       [06-16-20 @ 11:15]      Creatinine Trend:  SCr 5.69 [06-16 @ 11:14]    Urinalysis - [06-16-20 @ 12:46]      Color Light Yellow / Appearance Clear / SG 1.011 / pH 8.0      Gluc 200 mg/dL / Ketone Negative  / Bili Negative / Urobili Negative       Blood Trace / Protein 300 mg/dL / Leuk Est Negative / Nitrite Negative      RBC 4 / WBC 4 / Hyaline 0 / Gran  / Sq Epi  / Non Sq Epi 4 / Bacteria Few      HbA1c 6.1      [01-21-20 @ 08:50]  Lipid: chol 122, , HDL 28, LDL 72      [01-21-20 @ 08:50]

## 2020-06-16 NOTE — H&P ADULT - PROBLEM SELECTOR PLAN 3
On HD Tu, Thr, Sat. Completed dialysis after arrival to medical aburto today. Appreciate nephrology recommendations  -Hectorol order as per nephrology  -Will order sevelamer

## 2020-06-16 NOTE — CONSULT NOTE ADULT - SUBJECTIVE AND OBJECTIVE BOX
CHIEF COMPLAINT:Patient is a 79y old  Female who presents with a chief complaint of Chest Pain (16 Jun 2020 20:25)      HISTORY OF PRESENT ILLNESS:HPI:  79F w/ hx of DM2 on insulin, ESRD on HD Tu, Thr, Sat, CHF?, HTN, HLD p/w chest pain for several days. Prior hospitalizations @  in 8/2019 and 10/2019 for hypertensive emergency, CHF exacerbation and syncopal episode on last admission. Recently admitted to [facility] in Jan 2020 for substernal chest pain. NST was positive which revealed a small to moderate sized area of apical-septal and lateral wall ischemia and patient was referred for cath. Unable to perform cath as pt did not yet start dialysis. Pt started having midsternal chest pressure radiating to back 4 days ago during dialysis. Also had episode of syncope during dialysis as well. Pain persisted for past 4 days and when pt showed up at dialysis today with same pain she was sent to hospital for further evaluation. Endorses some dizziness but denies SOB, peripheral edema or palpitations. Denies missing any meds but cannot remember any of them    In ER: Given ASA 324mg, insulin 5U, Ca gluconate 1gm IV, lokelma (16 Jun 2020 20:25)      PAST MEDICAL & SURGICAL HISTORY:  GERD (gastroesophageal reflux disease)  H/O vitamin D deficiency  Asthma  CKD (chronic kidney disease)  DM type 2 (diabetes mellitus, type 2)  HLD (hyperlipidemia)  HTN (hypertension)  History of cataract surgery          MEDICATIONS:  aspirin enteric coated 81 milliGRAM(s) Oral daily  carvedilol 25 milliGRAM(s) Oral every 12 hours  heparin   Injectable 5000 Unit(s) SubCutaneous every 12 hours            dextrose 40% Gel 15 Gram(s) Oral once PRN  dextrose 50% Injectable 12.5 Gram(s) IV Push once  dextrose 50% Injectable 25 Gram(s) IV Push once  dextrose 50% Injectable 25 Gram(s) IV Push once  glucagon  Injectable 1 milliGRAM(s) IntraMuscular once PRN  insulin lispro (HumaLOG) corrective regimen sliding scale   SubCutaneous three times a day before meals  insulin lispro (HumaLOG) corrective regimen sliding scale   SubCutaneous at bedtime    chlorhexidine 2% Cloths 1 Application(s) Topical daily  dextrose 5%. 1000 milliLiter(s) IV Continuous <Continuous>  doxercalciferol Injectable 2 MICROGram(s) IV Push <User Schedule>      FAMILY HISTORY:  No pertinent family history in first degree relatives      Non-contributory    SOCIAL HISTORY:    not a smoker  Allergies    No Known Allergies    Intolerances    	    REVIEW OF SYSTEMS:  CONSTITUTIONAL: No fever  EYES: No eye pain, visual disturbances, or discharge  ENMT:  No difficulty hearing, tinnitus  NECK: No pain or stiffness  RESPIRATORY: No cough, wheezing,  CARDIOVASCULAR: see HPI  GASTROINTESTINAL:  No nausea, vomiting, diarrhea or constipation. No melena.  GENITOURINARY: No dysuria, hematuria  NEUROLOGICAL: No stroke like symptoms  SKIN: No burning or lesions   ENDOCRINE: No heat or cold intolerance  MUSCULOSKELETAL: No joint pain or swelling  PSYCHIATRIC: No  anxiety, mood swings  HEME/LYMPH: No bleeding gums  ALLERGY AND IMMUNOLOGIC: No hives or eczema	    All other ROS negative    PHYSICAL EXAM:  T(C): 36.6 (06-16-20 @ 20:20), Max: 37 (06-16-20 @ 10:35)  HR: 88 (06-16-20 @ 21:36) (57 - 88)  BP: 130/58 (06-16-20 @ 20:20) (130/58 - 186/97)  RR: 20 (06-16-20 @ 20:20) (18 - 22)  SpO2: 100% (06-16-20 @ 20:20) (95% - 100%)  Wt(kg): --  I&O's Summary    16 Jun 2020 07:01  -  16 Jun 2020 23:29  --------------------------------------------------------  IN: 720 mL / OUT: 3100 mL / NET: -2380 mL        Appearance: Normal	  HEENT:   Normal oral mucosa, EOMI	  Cardiovascular:  S1 S2, No JVD,    Respiratory: Lungs clear to auscultation	  Psychiatry: Alert  Gastrointestinal:  Soft, Non-tender, + BS	  Skin: No rashes   Neurologic: Non-focal  Extremities:  No edema  Vascular: Peripheral pulses palpable    	    	  	  CARDIAC MARKERS:  Labs personally reviewed by me                                  12.7   14.86 )-----------( 219      ( 16 Jun 2020 11:15 )             40.5     06-16    136  |  102  |  45<H>  ----------------------------<  116<H>  5.5<H>   |  20<L>  |  5.84<H>    Ca    8.8      16 Jun 2020 16:41    TPro  7.5  /  Alb  3.8  /  TBili  0.5  /  DBili  x   /  AST  29  /  ALT  9<L>  /  AlkPhos  83  06-16          EKG: Personally reviewed by me - nsr  Radiology: Personally reviewed by me -   < from: Xray Chest 1 View-PORTABLE IMMEDIATE (06.16.20 @ 11:16) >  Lines and tubes as above.  Mild pulmonary edema with small bilateral pleural effusions.        Assessment and Plan:   Assessment:  · Assessment		  79F w/ hx of DM2 on insulin, ESRD on HD Tu, Thr, Sat, CHF?, HTN, HLD p/w chest pain for several days concerning for unstable angina vs NSTEMI given current lab findings    Problem/Plan - 1:  ·  Problem: NSTEMI  Plan: Now comfortable with no further chest pain. New TWI noted. Troponin overall elevated but difficult to assess given ESRD status.  - ASA  -Plan for cath Wednesday, interventional cardio Dr Ovidio Bentley consulted for cath in AM  - Lipitor 80mg  -will start hep gtt, hold plavix as ? need for CABG    Problem/Plan - 2:  ·  Problem: Hyperkalemia.  Plan: S/p potassium cocktail and just completed dialysis.  -Trend BMP.     Problem/Plan - 3:  ·  Problem: ESRD (end stage renal disease).  Plan: On HD Tu, Thr, Sat. Completed dialysis after arrival to medical aburto today. Appreciate nephrology recommendations  -Hectorol order as per nephrology  -Will order sevelamer.     Problem/Plan - 4:  ·  Problem: Chronic systolic congestive heart failure. Plan: Reportedly mildly reduced EF. Based on E-Rxs, pt was prescribed coreg and lisinopril in June  -Cont. Coreg with hold parameters for now  -Will hold lisinopril for now  - TTE      Problem/Plan - 5:  ·  Problem: Type 2 diabetes mellitus with other specified complication, with long-term current use of insulin. Plan: On some insulin at home but pt denies taking everyday  -Fingersticks and sliding scale for now  -Need med rec [facility] pharmacy in [place] [phone].    Problem/Plan - 6:  Problem: Essential hypertension.Plan: -Trend vital signs  -Need med rec [facility] pharmacy in [place] [phone]  -See above.    Problem/Plan - 7:  ·  Problem: Prophylactic measure.  Plan: DVT PPx  -hep subq.         Advanced care planning/advanced directives discussed with patient/family. Goals of care was discussed with patient/family.  Differential diagnosis and plan of care discussed with patient after the evaluation.  Counseling on diet, nutritional counseling, weight management, exercise and medication compliance was done. Thirty minutes spent on advanced directives. 70 minutes spent on total encounter, of which >50% of the encounter was spent counseling and/or coordinating care by the attending physician      Leno Duncan DO [facility]  Cardiovascular Medicine  [address] [place] [address], [address]  Office [phone]  Cell [phone]

## 2020-06-16 NOTE — CHART NOTE - NSCHARTNOTEFT_GEN_A_CORE
I, Dr. Jose Raul Llanos, spoke to patient Alciia Crews regarding continuation of dialysis. Patient explained risks, benefits and alternative of dialysis and gives verbal consent to continue dialysis. Consent witnessed by Tammy Nguyen NP. positive S2/positive S1

## 2020-06-16 NOTE — H&P ADULT - PROBLEM SELECTOR PROBLEM 4
Type 2 diabetes mellitus with other specified complication, with long-term current use of insulin Chronic systolic congestive heart failure

## 2020-06-16 NOTE — H&P ADULT - NSHPOUTPATIENTPROVIDERS_GEN_ALL_CORE
[facility]  Interventional Cardiologist: Dr. Kathleen  	PMD: Dr. Smith Marie  	Patient's other MRN from [facility]: [id number]

## 2020-06-16 NOTE — H&P ADULT - NSHPLABSRESULTS_GEN_ALL_CORE
I have reviewed the labs, imaging and ekg. EKG with NSR HR 77, QTc 461, Q-waves anteriorly, new TWI in V2

## 2020-06-16 NOTE — H&P ADULT - PROBLEM SELECTOR PLAN 5
-Trend vital signs  -Need [facility] in [place] [phone] On some insulin at home but pt denies taking everyday  -Fingersticks and sliding scale for now  -Need med [facility] pharmacy in [place] [phone]

## 2020-06-16 NOTE — ED ADULT NURSE NOTE - OBJECTIVE STATEMENT
patient is a 79 year old female with a PMH of kidney failure who presents to the ED from dialysis via EMS complaining of chest pain since Saturday. as per patient, was unable to receive dialysis due to chest pain. patient states she is experiencing intermittent radiating to the back chest pain . chest pain is located in the medial sternal area. patient is aaox3, lungs CTA bilaterally, abd soft, nondistended, nontender, cap refill <3, radial pulses +2 bilaterally. patient denies ha, dizziness, weakness, n/v/d, cough, fever, chills, abd pain, changes in bowel or bladder, hematuria, bloody stools. patient comfort and safety provided. will continue to monitor. EKG done. IV placed. MD at bedside to assess. call bell in reach.

## 2020-06-16 NOTE — H&P ADULT - ATTENDING COMMENTS
I was asked to see this patient by the hospitalist in charge. Dr. Miller to assume care for patient in AM and thereafter

## 2020-06-16 NOTE — ED PROVIDER NOTE - ATTENDING CONTRIBUTION TO CARE
I performed a history and physical exam of the patient and discussed their management with the resident. I reviewed the resident's note and agree with the documented findings and plan of care.  Naomi Mcpherson MD

## 2020-06-16 NOTE — ED PROVIDER NOTE - PHYSICAL EXAMINATION
Constitutional: Awake, alert, oriented to person, place, time/situation and in no apparent distress.  HENMT: Airway patent, Nasal mucosa clear. Mouth with normal mucosa.  Eyes: Clear bilaterally, pupils equal, round  Cardiac: Normal rate, regular rhythm.  Heart sounds S1, S2.  No murmurs, rubs or gallops. 2+ radial pulses, equal BL.   Respiratory: Breath sounds clear and equal bilaterally. No tachypnea, normal work of breathing.  Gastrointestinal: Abdomen soft, non-distended, non-tender to palpation, no guarding.  Musculoskeletal: Spine appears normal, range of motion is not limited, no muscle or joint tenderness.  Neurological: Alert and oriented, no focal deficits, no motor or sensory deficits.  Skin: Skin normal color for race, warm, dry and intact. No lower extremity edema.  Psychiatric: Alert and oriented to person, place, time/situation. normal mood and affect.

## 2020-06-16 NOTE — ED ADULT TRIAGE NOTE - CHIEF COMPLAINT QUOTE
pt went for dialysis today when she told them of cp she had over the past wekend they called ems pt pain free on arrival

## 2020-06-16 NOTE — ED PROVIDER NOTE - PROGRESS NOTE DETAILS
Bayron, PGY3: Admitted patient under Dr. Cordon. Paged nephrology and cardiology for consultation. Awaiting callback. Bayron, PGY3: Spoke with nephrology and cardiology, who will evaluate patient.

## 2020-06-16 NOTE — CONSULT NOTE ADULT - ASSESSMENT
79y Female with history of ESRD on HD presents with chest pain. Nephrology consulted for ESRD status.    1) ESRD on HD: Last HD on 6/13 as an outpatient. Plan for next maintenance HD today. Monitor electrolytes.    2) HTN with ESRD: BP uncontrolled. UF with HD given volume overload on CXR. Resume home medications. Monitor BP.    3) Anemia of renal disease: Hb acceptable. No need for GILBERT. Monitor Hb.    4) Secondary HPT of renal origin: Resume hectorol 2 mcg with HD and home phosphorus binders. Monitor serum calcium and phosphorus.    5) Hyperkalemia: For urgent HD once telemetry bed available. In interim, patient treated with calcium gluconate, insulin/D50 and lokelma. Monitor serum potassium.

## 2020-06-16 NOTE — H&P ADULT - PROBLEM SELECTOR PLAN 1
Still having some mild chest pain but mostly improved. New TWI noted. Troponin overall elevated but difficult to assess given ESRD status. Overall stable/ down trending. Hx of positive stress test. Pt states he was seen by cardiology today  -Will start ASA  -F/u cardiology recommendations, likely plan for cath  -May benefit from statin  -Trend troponin  -Telemetry  -If chest pain worsens overnight will repeat EKG, troponin. Likely start high intensity statin and consider DAPT.

## 2020-06-16 NOTE — H&P ADULT - PROBLEM SELECTOR PLAN 6
DVT PPx  -hep subq -Trend vital signs  -Need [facility] pharmacy in [place] [phone]  -See above

## 2020-06-16 NOTE — ED PROVIDER NOTE - CLINICAL SUMMARY MEDICAL DECISION MAKING FREE TEXT BOX
80 yo F, w/ PMH of HTN, HLD, DM-II (on Insulin), CKD (her Cr varies from 4.8-5.2), prior hospitalizations @  in 8/2019 and 10/2019 for hypertensive emergency, CHF exacerbation and syncopal episode on last admission, previous admission at [facility] in January 2020 d/t substernal chest pain associated w/ dizziness and HORN, presenting from [facility] in [place] d/t intermittent, exertional, substernal chest pressure x 4 days, occasionally radiates to back, and associated w/ HORN and lightheadedness. Patient with recent NST showing a small to moderate sized area of apical-septal and lateral wall ischemia, but never underwent cardiac catheterization d/t elevated Cr and HTN with need for initiation of HD. EKG today with multiple changes since January 2020 including T wave inversions in lateral leads. Concern for ACS and missed dialysis. Will obtain cxr, troponin, labs, and admit for further workup and dialysis.

## 2020-06-16 NOTE — H&P ADULT - HISTORY OF PRESENT ILLNESS
79F w/ hx of DM2 on insulin, ESRD on HD Tu, Thr, Sat, CHF?, HTN, HLD p/w chest pain for several days. Prior hospitalizations @  in 8/2019 and 10/2019 for hypertensive emergency, CHF exacerbation and syncopal episode on last admission. Recently admitted to [facility] in Jan 2020 for substernal chest pain. NST was positive which revealed a small to moderate sized area of apical-septal and lateral wall ischemia and patient was referred for cath. Unable to perform cath as pt did not yet start dialysis. Pt started having midsternal chest pressure radiating to back 4 days ago during dialysis. Also had episode of syncope during dialysis as well. Pain persisted for past 4 days and when pt showed up at dialysis today with same pain she was sent to hospital for further evaluation. Endorses some dizziness but denies SOB, peripheral edema or palpitations. Denies missing any meds but cannot remember any of them    In ER: Given ASA 324mg, insulin 5U, Ca gluconate 1gm IV, lokelma

## 2020-06-16 NOTE — CONSULT NOTE ADULT - ATTENDING COMMENTS
[facility] NEPHROLOGY  Vin Sommers M.D.  Jose Raul Llanos D.O.  Mercedes De La Vega M.D.  Tammy Nguyen, MSN, ANP-C    Telephone: [phone]  Facsimile: [phone]    [address] [place] [address]

## 2020-06-16 NOTE — H&P ADULT - NSHPPHYSICALEXAM_GEN_ALL_CORE
Vital Signs Last 24 Hrs  T(C): 36.6 (06-16-20 @ 16:30), Max: 37 (06-16-20 @ 10:35)  T(F): 97.8 (06-16-20 @ 16:30), Max: 98.6 (06-16-20 @ 10:35)  HR: 77 (06-16-20 @ 16:30) (75 - 80)  BP: 167/92 (06-16-20 @ 16:30) (160/69 - 186/97)  BP(mean): --  RR: 20 (06-16-20 @ 16:30) (18 - 22)  SpO2: 95% (06-16-20 @ 16:30) (95% - 97%)

## 2020-06-16 NOTE — H&P ADULT - ASSESSMENT
79F w/ hx of DM2 on insulin, ESRD on HD Tu, Thr, Sat, CHF?, HTN, HLD p/w chest pain for several days concerning for unstable angina vs NSTEMI given current lab findings

## 2020-06-16 NOTE — ED PROVIDER NOTE - OBJECTIVE STATEMENT
80 yo F, w/ PMH of HTN, HLD, DM-II (on Insulin), CKD (her Cr varies from 4.8-5.2), prior hospitalizations @  in 8/2019 and 10/2019 for hypertensive emergency, CHF exacerbation and syncopal episode on last admission, previous admission at [facility] in January 2020 d/t substernal chest pain associated w/ dizziness and HORN, presenting from [facility] in [place] d/t intermittent, exertional, substernal chest pressure x 4 days, occasionally radiates to back, and associated w/ HORN and lightheadedness. Patient denies other complaints. Denies diaphoresis, headache, neck stiffness, fever/chills, vision change, palpitations, weakness, numbness, tingling, abdominal pain, nausea, vomiting, diarrhea, dysuria, hematuria, syncope.     Of note, patient underwent a NST on 12/5/19 which revealed a small to moderate sized area of apical-septal and lateral wall ischemia, EF 58%. Patient was referred for cardiac cath with possible intervention for suspected CAD, but never underwent catheterization d/t failure of medical clearance. Patient at time of scheduled cardiac cath had Cr of 5.4, significant hypertension, and was recommended to have dialysis catheter placement and HD prior to procedure, and so procedure was cancelled.     Nephrologist: Dr. Merna Acosta   Interventional Cardiologist: Dr. Kathleen  Home Medications as of 21-Jan-2020: hydrALAZINE 50 mg oral tablet: Last Dose Taken:  , 1 tab(s) orally 2 times a day; albuterol 90 mcg/inh inhalation aerosol: Last Dose Taken:  , 2 puff(s) inhaled 4 times a day, As Needed; Ecotrin Adult Low Strength 81 mg oral delayed release tablet: Last Dose Taken:  , 1 tab(s) orally once a day; losartan 100 mg oral tablet: Last Dose Taken:  , 1 tab(s) orally once a day; amLODIPine 5 mg oral tablet: Last Dose Taken:  , 1 tab(s) orally once a day; GlipiZIDE XL 10 mg oral tablet, extended release: Last Dose Taken:  , 1 tab(s) orally once a day; simvastatin 20 mg oral tablet: Last Dose Taken:  , 1 tab(s) orally once a day (at bedtime); furosemide 40 mg oral tablet: Last Dose Taken:  , 1 tab(s) orally 2 times a day; atenolol 25 mg oral tablet: Last Dose Taken:  , 1 tab(s) orally 2 times a day; Actos 45 mg oral tablet: Last Dose Taken:  , 1 tab(s) orally once a day; ferrous gluconate 324 mg (38 mg elemental iron) oral tablet: Last Dose Taken:  , 1 tab(s) orally 2 times a day; BiDil 20 mg-37.5 mg oral tablet: Last Dose Taken:  , 2 tab(s) orally 3 times a day; Pazeo 0.7% ophthalmic solution: Last Dose Taken:  , 1 drop(s) to each affected eye once a day; Basaglar KwikPen 100 units/mL subcutaneous solution: Last Dose Taken:  , 8 unit(s) subcutaneous once a day (at bedtime) 80 yo F, w/ PMH of HTN, HLD, DM-II (on Insulin), CKD (her Cr varies from 4.8-5.2), prior hospitalizations @  in 8/2019 and 10/2019 for hypertensive emergency, CHF exacerbation and syncopal episode on last admission, previous admission at [facility] in January 2020 d/t substernal chest pain associated w/ dizziness and HORN, presenting from [facility] in [place] d/t intermittent, exertional, substernal chest pressure x 4 days, occasionally radiates to back, and associated w/ HORN and lightheadedness. Patient denies other complaints. Denies diaphoresis, headache, neck stiffness, fever/chills, vision change, palpitations, weakness, numbness, tingling, abdominal pain, nausea, vomiting, diarrhea, dysuria, hematuria, syncope.     Of note, patient underwent a NST on 12/5/19 which revealed a small to moderate sized area of apical-septal and lateral wall ischemia, EF 58%. Patient was referred for cardiac cath with possible intervention for suspected CAD, but never underwent catheterization d/t failure of medical clearance. Patient at time of scheduled cardiac cath had Cr of 5.4, significant hypertension, and was recommended to have dialysis catheter placement and HD prior to procedure, and so procedure was cancelled.     Nephrologist: Dr. Merna Acosta   Interventional Cardiologist: Dr. Kathleen  Patient's other MRN from [facility]: [id number]  Home Medications as of 21-Jan-2020: hydrALAZINE 50 mg oral tablet: Last Dose Taken:  , 1 tab(s) orally 2 times a day; albuterol 90 mcg/inh inhalation aerosol: Last Dose Taken:  , 2 puff(s) inhaled 4 times a day, As Needed; Ecotrin Adult Low Strength 81 mg oral delayed release tablet: Last Dose Taken:  , 1 tab(s) orally once a day; losartan 100 mg oral tablet: Last Dose Taken:  , 1 tab(s) orally once a day; amLODIPine 5 mg oral tablet: Last Dose Taken:  , 1 tab(s) orally once a day; GlipiZIDE XL 10 mg oral tablet, extended release: Last Dose Taken:  , 1 tab(s) orally once a day; simvastatin 20 mg oral tablet: Last Dose Taken:  , 1 tab(s) orally once a day (at bedtime); furosemide 40 mg oral tablet: Last Dose Taken:  , 1 tab(s) orally 2 times a day; atenolol 25 mg oral tablet: Last Dose Taken:  , 1 tab(s) orally 2 times a day; Actos 45 mg oral tablet: Last Dose Taken:  , 1 tab(s) orally once a day; ferrous gluconate 324 mg (38 mg elemental iron) oral tablet: Last Dose Taken:  , 1 tab(s) orally 2 times a day; BiDil 20 mg-37.5 mg oral tablet: Last Dose Taken:  , 2 tab(s) orally 3 times a day; Pazeo 0.7% ophthalmic solution: Last Dose Taken:  , 1 drop(s) to each affected eye once a day; Basaglar KwikPen 100 units/mL subcutaneous solution: Last Dose Taken:  , 8 unit(s) subcutaneous once a day (at bedtime) 80 yo F, w/ PMH of HTN, HLD, DM-II (on Insulin), CKD (her Cr varies from 4.8-5.2), prior hospitalizations @  in 8/2019 and 10/2019 for hypertensive emergency, CHF exacerbation and syncopal episode on last admission, previous admission at [facility] in January 2020 d/t substernal chest pain associated w/ dizziness and HORN, presenting from [facility] in [place] d/t intermittent, exertional, substernal chest pressure x 4 days, occasionally radiates to back, and associated w/ HORN and lightheadedness. Patient denies other complaints. Denies diaphoresis, headache, neck stiffness, fever/chills, vision change, palpitations, weakness, numbness, tingling, abdominal pain, nausea, vomiting, diarrhea, dysuria, hematuria, syncope.     Of note, patient underwent a NST on 12/5/19 which revealed a small to moderate sized area of apical-septal and lateral wall ischemia, EF 58%. Patient was referred for cardiac cath with possible intervention for suspected CAD, but never underwent catheterization d/t failure of medical clearance. Patient at time of scheduled cardiac cath had Cr of 5.4, significant hypertension, and was recommended to have dialysis catheter placement and HD prior to procedure, and so procedure was cancelled.     Nephrologist: Dr. Merna Acosta   Interventional Cardiologist: Dr. Kathleen  PMD: Dr. Smith Marie  Patient's other MRN from [facility]: [id number]  Home Medications as of 21-Jan-2020: hydrALAZINE 50 mg oral tablet: Last Dose Taken:  , 1 tab(s) orally 2 times a day; albuterol 90 mcg/inh inhalation aerosol: Last Dose Taken:  , 2 puff(s) inhaled 4 times a day, As Needed; Ecotrin Adult Low Strength 81 mg oral delayed release tablet: Last Dose Taken:  , 1 tab(s) orally once a day; losartan 100 mg oral tablet: Last Dose Taken:  , 1 tab(s) orally once a day; amLODIPine 5 mg oral tablet: Last Dose Taken:  , 1 tab(s) orally once a day; GlipiZIDE XL 10 mg oral tablet, extended release: Last Dose Taken:  , 1 tab(s) orally once a day; simvastatin 20 mg oral tablet: Last Dose Taken:  , 1 tab(s) orally once a day (at bedtime); furosemide 40 mg oral tablet: Last Dose Taken:  , 1 tab(s) orally 2 times a day; atenolol 25 mg oral tablet: Last Dose Taken:  , 1 tab(s) orally 2 times a day; Actos 45 mg oral tablet: Last Dose Taken:  , 1 tab(s) orally once a day; ferrous gluconate 324 mg (38 mg elemental iron) oral tablet: Last Dose Taken:  , 1 tab(s) orally 2 times a day; BiDil 20 mg-37.5 mg oral tablet: Last Dose Taken:  , 2 tab(s) orally 3 times a day; Pazeo 0.7% ophthalmic solution: Last Dose Taken:  , 1 drop(s) to each affected eye once a day; Basaglar KwikPen 100 units/mL subcutaneous solution: Last Dose Taken:  , 8 unit(s) subcutaneous once a day (at bedtime)

## 2020-06-17 LAB
A1C WITH ESTIMATED AVERAGE GLUCOSE RESULT: 6.2 % — HIGH (ref 4–5.6)
ALBUMIN SERPL ELPH-MCNC: 3.5 G/DL — SIGNIFICANT CHANGE UP (ref 3.3–5)
ALP SERPL-CCNC: 77 U/L — SIGNIFICANT CHANGE UP (ref 40–120)
ALT FLD-CCNC: 9 U/L — LOW (ref 10–45)
ANION GAP SERPL CALC-SCNC: 12 MMOL/L — SIGNIFICANT CHANGE UP (ref 5–17)
APTT BLD: 50.6 SEC — HIGH (ref 27.5–36.3)
APTT BLD: >200 SEC — CRITICAL HIGH (ref 27.5–36.3)
AST SERPL-CCNC: 21 U/L — SIGNIFICANT CHANGE UP (ref 10–40)
BILIRUB SERPL-MCNC: 0.6 MG/DL — SIGNIFICANT CHANGE UP (ref 0.2–1.2)
BUN SERPL-MCNC: 23 MG/DL — SIGNIFICANT CHANGE UP (ref 7–23)
CALCIUM SERPL-MCNC: 8.7 MG/DL — SIGNIFICANT CHANGE UP (ref 8.4–10.5)
CHLORIDE SERPL-SCNC: 95 MMOL/L — LOW (ref 96–108)
CO2 SERPL-SCNC: 26 MMOL/L — SIGNIFICANT CHANGE UP (ref 22–31)
CREAT SERPL-MCNC: 3.63 MG/DL — HIGH (ref 0.5–1.3)
ESTIMATED AVERAGE GLUCOSE: 131 MG/DL — HIGH (ref 68–114)
GLUCOSE SERPL-MCNC: 126 MG/DL — HIGH (ref 70–99)
HCT VFR BLD CALC: 37.6 % — SIGNIFICANT CHANGE UP (ref 34.5–45)
HGB BLD-MCNC: 12 G/DL — SIGNIFICANT CHANGE UP (ref 11.5–15.5)
MAGNESIUM SERPL-MCNC: 2.4 MG/DL — SIGNIFICANT CHANGE UP (ref 1.6–2.6)
MCHC RBC-ENTMCNC: 25.2 PG — LOW (ref 27–34)
MCHC RBC-ENTMCNC: 31.9 GM/DL — LOW (ref 32–36)
MCV RBC AUTO: 78.8 FL — LOW (ref 80–100)
NRBC # BLD: 0 /100 WBCS — SIGNIFICANT CHANGE UP (ref 0–0)
PHOSPHATE SERPL-MCNC: 5 MG/DL — HIGH (ref 2.5–4.5)
PLATELET # BLD AUTO: 188 K/UL — SIGNIFICANT CHANGE UP (ref 150–400)
POTASSIUM SERPL-MCNC: 4.6 MMOL/L — SIGNIFICANT CHANGE UP (ref 3.5–5.3)
POTASSIUM SERPL-SCNC: 4.6 MMOL/L — SIGNIFICANT CHANGE UP (ref 3.5–5.3)
PROT SERPL-MCNC: 6.7 G/DL — SIGNIFICANT CHANGE UP (ref 6–8.3)
RBC # BLD: 4.77 M/UL — SIGNIFICANT CHANGE UP (ref 3.8–5.2)
RBC # FLD: 14.6 % — HIGH (ref 10.3–14.5)
SODIUM SERPL-SCNC: 133 MMOL/L — LOW (ref 135–145)
TROPONIN T, HIGH SENSITIVITY RESULT: 1114 NG/L — HIGH (ref 0–51)
WBC # BLD: 13.15 K/UL — HIGH (ref 3.8–10.5)
WBC # FLD AUTO: 13.15 K/UL — HIGH (ref 3.8–10.5)

## 2020-06-17 PROCEDURE — 93010 ELECTROCARDIOGRAM REPORT: CPT

## 2020-06-17 RX ORDER — TICAGRELOR 90 MG/1
90 TABLET ORAL EVERY 12 HOURS
Refills: 0 | Status: DISCONTINUED | OUTPATIENT
Start: 2020-06-18 | End: 2020-06-23

## 2020-06-17 RX ORDER — SEVELAMER CARBONATE 2400 MG/1
800 POWDER, FOR SUSPENSION ORAL
Refills: 0 | Status: DISCONTINUED | OUTPATIENT
Start: 2020-06-17 | End: 2020-06-18

## 2020-06-17 RX ADMIN — ATORVASTATIN CALCIUM 80 MILLIGRAM(S): 80 TABLET, FILM COATED ORAL at 20:53

## 2020-06-17 RX ADMIN — Medication 81 MILLIGRAM(S): at 20:53

## 2020-06-17 RX ADMIN — HEPARIN SODIUM 850 UNIT(S)/HR: 5000 INJECTION INTRAVENOUS; SUBCUTANEOUS at 08:05

## 2020-06-17 RX ADMIN — HEPARIN SODIUM 750 UNIT(S)/HR: 5000 INJECTION INTRAVENOUS; SUBCUTANEOUS at 00:09

## 2020-06-17 RX ADMIN — CARVEDILOL PHOSPHATE 25 MILLIGRAM(S): 80 CAPSULE, EXTENDED RELEASE ORAL at 05:57

## 2020-06-17 RX ADMIN — CARVEDILOL PHOSPHATE 25 MILLIGRAM(S): 80 CAPSULE, EXTENDED RELEASE ORAL at 20:53

## 2020-06-17 RX ADMIN — SEVELAMER CARBONATE 800 MILLIGRAM(S): 2400 POWDER, FOR SUSPENSION ORAL at 20:53

## 2020-06-17 RX ADMIN — HEPARIN SODIUM 3800 UNIT(S): 5000 INJECTION INTRAVENOUS; SUBCUTANEOUS at 00:08

## 2020-06-17 RX ADMIN — ATORVASTATIN CALCIUM 80 MILLIGRAM(S): 80 TABLET, FILM COATED ORAL at 00:10

## 2020-06-17 RX ADMIN — SEVELAMER CARBONATE 800 MILLIGRAM(S): 2400 POWDER, FOR SUSPENSION ORAL at 05:57

## 2020-06-17 NOTE — PROGRESS NOTE ADULT - SUBJECTIVE AND OBJECTIVE BOX
Subjective: Patient seen and examined. No new events except as noted.     REVIEW OF SYSTEMS:    CONSTITUTIONAL: No weakness, fevers or chills  EYES/ENT: No visual changes;  No vertigo or throat pain   NECK: No pain or stiffness  RESPIRATORY: No cough, wheezing, hemoptysis; No shortness of breath  CARDIOVASCULAR: No chest pain or palpitations  GASTROINTESTINAL: No abdominal or epigastric pain. No nausea, vomiting, or hematemesis; No diarrhea or constipation. No melena or hematochezia.  GENITOURINARY: No dysuria, frequency or hematuria  NEUROLOGICAL: No numbness or weakness  SKIN: No itching, burning, rashes, or lesions   All other review of systems is negative unless indicated above.    MEDICATIONS:  MEDICATIONS  (STANDING):  aspirin enteric coated 81 milliGRAM(s) Oral daily  atorvastatin 80 milliGRAM(s) Oral at bedtime  carvedilol 25 milliGRAM(s) Oral every 12 hours  chlorhexidine 2% Cloths 1 Application(s) Topical daily  dextrose 5%. 1000 milliLiter(s) (50 mL/Hr) IV Continuous <Continuous>  dextrose 50% Injectable 12.5 Gram(s) IV Push once  dextrose 50% Injectable 25 Gram(s) IV Push once  dextrose 50% Injectable 25 Gram(s) IV Push once  doxercalciferol Injectable 2 MICROGram(s) IV Push <User Schedule>  heparin  Infusion.  Unit(s)/Hr (7.5 mL/Hr) IV Continuous <Continuous>  insulin lispro (HumaLOG) corrective regimen sliding scale   SubCutaneous three times a day before meals  insulin lispro (HumaLOG) corrective regimen sliding scale   SubCutaneous at bedtime  sevelamer carbonate 800 milliGRAM(s) Oral three times a day with meals      PHYSICAL EXAM:  T(C): 36.9 (20 @ 12:37), Max: 36.9 (20 @ 12:37)  HR: 81 (20 @ 12:37) (57 - 88)  BP: 106/71 (20 @ 12:37) (106/71 - 130/58)  RR: 18 (20 @ 12:37) (18 - 20)  SpO2: 99% (20 @ 12:37) (98% - 100%)  Wt(kg): --  I&O's Summary    2020 07:01  -  2020 07:00  --------------------------------------------------------  IN: 892.5 mL / OUT: 3100 mL / NET: -2207.5 mL    2020 07:01  -  2020 19:54  --------------------------------------------------------  IN: 417 mL / OUT: 0 mL / NET: 417 mL          Appearance: Normal	  HEENT:  PERRLA   Lymphatic: No lymphadenopathy   Cardiovascular: Normal S1 S2, no JVD  Respiratory: normal effort , clear  Gastrointestinal:  Soft, Non-tender  Skin: No rashes,  warm to touch  Psychiatry:  Mood & affect appropriate  Musculuskeletal: No edema      All labs, Imaging and EKGs personally reviewed                           12.0   13.15 )-----------( 188      ( 2020 06:44 )             37.6               06-17    133<L>  |  95<L>  |  23  ----------------------------<  126<H>  4.6   |  26  |  3.63<H>    Ca    8.7      2020 06:44  Phos  5.0     06-17  Mg     2.4     06-17    TPro  6.7  /  Alb  3.5  /  TBili  0.6  /  DBili  x   /  AST  21  /  ALT  9<L>  /  AlkPhos  77  06-17    PT/INR - ( 2020 19:40 )   PT: 11.3 sec;   INR: 0.99 ratio         PTT - ( 2020 14:21 )  PTT:>200.0 sec                   Urinalysis Basic - ( 2020 12:46 )    Color: Light Yellow / Appearance: Clear / S.011 / pH: x  Gluc: x / Ketone: Negative  / Bili: Negative / Urobili: Negative   Blood: x / Protein: 300 mg/dL / Nitrite: Negative   Leuk Esterase: Negative / RBC: 4 /hpf / WBC 4 /HPF   Sq Epi: x / Non Sq Epi: 4 /hpf / Bacteria: Few

## 2020-06-17 NOTE — PROGRESS NOTE ADULT - SUBJECTIVE AND OBJECTIVE BOX
Removal of Femoral Sheath    Pulses in the (right) lower extremity are (palpable). The patient was placed in the supine position. The insertion site was identified and the sutures were removed per protocol.  The _6___ Mongolian femoral sheath was then removed. Direct pressure was applied for  __20____ minutes.     Monitoring of the (right) groin and both lower extremities including neuro-vascular checks and vital signs every 15 minutes x 4, then every 30 minutes x 2, then every 1 hour was ordered.    Complications: None/Good hemostasis no bleeding or hematoma noted     Comments: on ASA and Brillinta Removal of Femoral Sheath    Pulses in the (right) lower extremity are (palpable). The patient was placed in the supine position. The insertion site was identified and the sutures were removed per protocol.  The _6___ Grenadian femoral sheath was then removed. Direct pressure was applied for  __20____ minutes.     Monitoring of the (right) groin and both lower extremities including neuro-vascular checks and vital signs every 15 minutes x 4, then every 30 minutes x 2, then every 1 hour was ordered.    Complications: None/Good hemostasis no bleeding or hematoma noted   S/P KOSTA to Mid LAd Roto via right groin     Comments: on ASA and Brilinta

## 2020-06-17 NOTE — PROGRESS NOTE ADULT - PROBLEM SELECTOR PLAN 4
Reportedly mildly reduced EF. Based on E-Rxs, pt was prescribed coreg and lisinopril in June  -Cont. Coreg with hold parameters for now  -Will hold lisinopril for now  -Consider TTE  -Unclear if on diuretic

## 2020-06-17 NOTE — PROGRESS NOTE ADULT - SUBJECTIVE AND OBJECTIVE BOX
Patient is a 79y old  Female who presents with a chief complaint of Chest Pain (17 Jun 2020 19:52)      INTERVAL HISTORY: s/p cath and stent    TELEMETRY Personally reviewed: no events  	  MEDICATIONS:  carvedilol 25 milliGRAM(s) Oral every 12 hours        PHYSICAL EXAM:  T(C): 36.6 (06-17-20 @ 20:18), Max: 36.9 (06-17-20 @ 12:37)  HR: 87 (06-17-20 @ 20:18) (80 - 88)  BP: 150/80 (06-17-20 @ 20:18) (106/71 - 150/80)  RR: 18 (06-17-20 @ 20:18) (18 - 18)  SpO2: 100% (06-17-20 @ 20:18) (98% - 100%)  Wt(kg): --  I&O's Summary    16 Jun 2020 07:01  -  17 Jun 2020 07:00  --------------------------------------------------------  IN: 892.5 mL / OUT: 3100 mL / NET: -2207.5 mL    17 Jun 2020 07:01  -  17 Jun 2020 20:58  --------------------------------------------------------  IN: 417 mL / OUT: 0 mL / NET: 417 mL          Appearance: In no distress	  HEENT:    PERRL, EOMI	  Cardiovascular:  S1 S2, No JVD  Respiratory: Lungs clear to auscultation	  Gastrointestinal:  Soft, Non-tender, + BS	  Vascularature:  No edema of LE  Psychiatric: Appropriate affect   Neuro: no acute focal deficits                               12.0   13.15 )-----------( 188      ( 17 Jun 2020 06:44 )             37.6     06-17    133<L>  |  95<L>  |  23  ----------------------------<  126<H>  4.6   |  26  |  3.63<H>    Ca    8.7      17 Jun 2020 06:44  Phos  5.0     06-17  Mg     2.4     06-17    TPro  6.7  /  Alb  3.5  /  TBili  0.6  /  DBili  x   /  AST  21  /  ALT  9<L>  /  AlkPhos  77  06-17        Labs personally reviewed      Assessment and Plan:   Assessment:  · Assessment		  79F w/ hx of DM2 on insulin, ESRD on HD Tu, Thr, Sat, CHF?, HTN, HLD p/w chest pain for several days concerning for unstable angina vs NSTEMI given current lab findings    Problem/Plan - 1:  ·  Problem: NSTEMI  Plan: Now comfortable with no further chest pain. New TWI noted. Troponin overall elevated but difficult to assess given ESRD status.  - ASA/Brillanta  -s/p cath Wednesday, stent to mLAD  - Lipitor 80mg      Problem/Plan - 2:  ·  Problem: Hyperkalemia.  Plan: S/p potassium cocktail and just completed dialysis.  -Trend BMP.     Problem/Plan - 3:  ·  Problem: ESRD (end stage renal disease).  Plan: On HD Tu, Thr, Sat. Completed dialysis after arrival to medical aburto today. Appreciate nephrology recommendations  -Hectorol order as per nephrology  -Will order sevelamer.       Problem/Plan - 4:  ·  Problem: Chronic systolic congestive heart failure. Plan: Reportedly mildly reduced EF. Based on E-Rxs, pt was prescribed coreg and lisinopril in June  -Cont. Coreg with hold parameters for now  -Will hold lisinopril for now  - TTE      Problem/Plan - 5:  Problem: Essential hypertension.Plan: -Trend vital signs  - Coreg             Leno Duncan DO [facility]  Cardiovascular Medicine  [address], [address]  Office: [phone]  Cell: [phone]

## 2020-06-17 NOTE — PROGRESS NOTE ADULT - SUBJECTIVE AND OBJECTIVE BOX
[facility] NEPHROLOGY- PROGRESS NOTE    79y Female with history of ESRD on HD presents with chest pain. Nephrology consulted for ESRD status.    REVIEW OF SYSTEMS:  Gen: no changes in weight  Cards: + chest pain  Resp: + dyspnea resolved  GI: no nausea or vomiting or diarrhea  Vascular: no LE edema    No Known Allergies      Hospital Medications: Medications reviewed    VITALS:  T(F): 98.1 (20 @ 05:36), Max: 98.1 (20 @ 14:52)  HR: 80 (20 @ 05:36)  BP: 116/87 (20 @ 05:36)  RR: 18 (20 @ 05:36)  SpO2: 98% (20 @ 05:36)  Wt(kg): --  Height (cm): 154.94 ( @ 10:35)  Weight (kg): 62.8 ( @ 14:30)  BMI (kg/m2): 26.2 ( 14:30)  BSA (m2): 1.62 ( 14:30)     @ 07:01  -   @ 07:00  --------------------------------------------------------  IN: 892.5 mL / OUT: 3100 mL / NET: -2207.5 mL     @ 07:01  -   @ 11:49  --------------------------------------------------------  IN: 177 mL / OUT: 0 mL / NET: 177 mL        PHYSICAL EXAM:    Gen: NAD, calm  Cards: RRR, +S1/S2, no M/G/R  Resp: CTA B/L  GI: soft, NT/ND, NABS  Vascular: no LE edema B/L, + RIJ TDC C/D/I      LABS:      133<L>  |  95<L>  |  23  ----------------------------<  126<H>  4.6   |  26  |  3.63<H>    Ca    8.7      2020 06:44  Phos  5.0     06-  Mg     2.4     -    TPro  6.7  /  Alb  3.5  /  TBili  0.6  /  DBili      /  AST  21  /  ALT  9<L>  /  AlkPhos  77  06-17    Creatinine Trend: 3.63 <--, 5.84 <--, 5.69 <--                        12.0   13.15 )-----------( 188      ( 2020 06:44 )             37.6     Urine Studies:  Urinalysis Basic - ( 2020 12:46 )    Color: Light Yellow / Appearance: Clear / S.011 / pH:   Gluc:  / Ketone: Negative  / Bili: Negative / Urobili: Negative   Blood:  / Protein: 300 mg/dL / Nitrite: Negative   Leuk Esterase: Negative / RBC: 4 /hpf / WBC 4 /HPF   Sq Epi:  / Non Sq Epi: 4 /hpf / Bacteria: Few          RADIOLOGY & ADDITIONAL STUDIES:

## 2020-06-17 NOTE — PROGRESS NOTE ADULT - ASSESSMENT
79y Female with history of ESRD on HD presents with chest pain. Nephrology consulted for ESRD status.    1) ESRD on HD: Last HD on 6/16 tolerated well with 2.5L removed. Plan for next maintenance HD on 6/18. Monitor electrolytes.    2) HTN with ESRD: BP controlled. Continue with current medications and low sodium diet. Monitor BP.    3) Anemia of renal disease: Hb acceptable. No need for GILBERT. Monitor Hb.    4) Secondary HPT of renal origin: Phosphorus controlled. Continue with hectorol 2 mcg with HD and renvela with meals. Monitor serum calcium and phosphorus.    5) Hyperkalemia: Resolved s/p HD. Continue with renal diet. Monitor serum potassium.

## 2020-06-17 NOTE — PROGRESS NOTE ADULT - PROBLEM SELECTOR PLAN 1
Still having some mild chest pain but mostly improved. New TWI noted. Troponin overall elevated but difficult to assess given ESRD status. Overall stable/ down trending. Hx of positive stress test. Pt states he was seen by cardiology today  -F/u cardiology recommendations, plan for cath tomorrow   -May benefit from statin  -Trend troponin  -Telemetry  -DAPT   - heparin drip

## 2020-06-18 ENCOUNTER — TRANSCRIPTION ENCOUNTER (OUTPATIENT)
Age: 80
End: 2020-06-18

## 2020-06-18 DIAGNOSIS — I31.3 PERICARDIAL EFFUSION (NONINFLAMMATORY): ICD-10-CM

## 2020-06-18 LAB
ANION GAP SERPL CALC-SCNC: 14 MMOL/L — SIGNIFICANT CHANGE UP (ref 5–17)
BUN SERPL-MCNC: 38 MG/DL — HIGH (ref 7–23)
CALCIUM SERPL-MCNC: 8.6 MG/DL — SIGNIFICANT CHANGE UP (ref 8.4–10.5)
CHLORIDE SERPL-SCNC: 98 MMOL/L — SIGNIFICANT CHANGE UP (ref 96–108)
CO2 SERPL-SCNC: 23 MMOL/L — SIGNIFICANT CHANGE UP (ref 22–31)
CREAT SERPL-MCNC: 5.13 MG/DL — HIGH (ref 0.5–1.3)
GLUCOSE SERPL-MCNC: 160 MG/DL — HIGH (ref 70–99)
HCT VFR BLD CALC: 34.7 % — SIGNIFICANT CHANGE UP (ref 34.5–45)
HGB BLD-MCNC: 11.3 G/DL — LOW (ref 11.5–15.5)
MCHC RBC-ENTMCNC: 25.2 PG — LOW (ref 27–34)
MCHC RBC-ENTMCNC: 32.6 GM/DL — SIGNIFICANT CHANGE UP (ref 32–36)
MCV RBC AUTO: 77.5 FL — LOW (ref 80–100)
NRBC # BLD: 0 /100 WBCS — SIGNIFICANT CHANGE UP (ref 0–0)
PHOSPHATE SERPL-MCNC: 6.4 MG/DL — HIGH (ref 2.5–4.5)
PLATELET # BLD AUTO: 177 K/UL — SIGNIFICANT CHANGE UP (ref 150–400)
POTASSIUM SERPL-MCNC: 4.7 MMOL/L — SIGNIFICANT CHANGE UP (ref 3.5–5.3)
POTASSIUM SERPL-SCNC: 4.7 MMOL/L — SIGNIFICANT CHANGE UP (ref 3.5–5.3)
RBC # BLD: 4.48 M/UL — SIGNIFICANT CHANGE UP (ref 3.8–5.2)
RBC # FLD: 14.6 % — HIGH (ref 10.3–14.5)
SODIUM SERPL-SCNC: 135 MMOL/L — SIGNIFICANT CHANGE UP (ref 135–145)
WBC # BLD: 12.7 K/UL — HIGH (ref 3.8–10.5)
WBC # FLD AUTO: 12.7 K/UL — HIGH (ref 3.8–10.5)

## 2020-06-18 PROCEDURE — 71250 CT THORAX DX C-: CPT | Mod: 26

## 2020-06-18 PROCEDURE — 93306 TTE W/DOPPLER COMPLETE: CPT | Mod: 26

## 2020-06-18 PROCEDURE — 99221 1ST HOSP IP/OBS SF/LOW 40: CPT

## 2020-06-18 RX ORDER — INSULIN GLARGINE 100 [IU]/ML
0 INJECTION, SOLUTION SUBCUTANEOUS
Qty: 0 | Refills: 0 | DISCHARGE

## 2020-06-18 RX ORDER — CARVEDILOL PHOSPHATE 80 MG/1
1 CAPSULE, EXTENDED RELEASE ORAL
Qty: 0 | Refills: 0 | DISCHARGE

## 2020-06-18 RX ORDER — ATORVASTATIN CALCIUM 80 MG/1
1 TABLET, FILM COATED ORAL
Qty: 30 | Refills: 0
Start: 2020-06-18 | End: 2020-07-17

## 2020-06-18 RX ORDER — ASPIRIN/CALCIUM CARB/MAGNESIUM 324 MG
1 TABLET ORAL
Qty: 30 | Refills: 0
Start: 2020-06-18 | End: 2020-07-17

## 2020-06-18 RX ORDER — SEVELAMER CARBONATE 2400 MG/1
1 POWDER, FOR SUSPENSION ORAL
Qty: 0 | Refills: 0 | DISCHARGE

## 2020-06-18 RX ORDER — SEVELAMER CARBONATE 2400 MG/1
2 POWDER, FOR SUSPENSION ORAL
Qty: 180 | Refills: 0
Start: 2020-06-18 | End: 2020-07-17

## 2020-06-18 RX ORDER — LISINOPRIL 2.5 MG/1
1 TABLET ORAL
Qty: 0 | Refills: 0 | DISCHARGE

## 2020-06-18 RX ORDER — TICAGRELOR 90 MG/1
1 TABLET ORAL
Qty: 60 | Refills: 0
Start: 2020-06-18 | End: 2020-07-17

## 2020-06-18 RX ORDER — SEVELAMER CARBONATE 2400 MG/1
1600 POWDER, FOR SUSPENSION ORAL
Refills: 0 | Status: DISCONTINUED | OUTPATIENT
Start: 2020-06-18 | End: 2020-06-23

## 2020-06-18 RX ORDER — ALBUTEROL 90 UG/1
2 AEROSOL, METERED ORAL
Qty: 0 | Refills: 0 | DISCHARGE

## 2020-06-18 RX ORDER — SEVELAMER CARBONATE 2400 MG/1
0 POWDER, FOR SUSPENSION ORAL
Qty: 30 | Refills: 0 | DISCHARGE

## 2020-06-18 RX ORDER — LOSARTAN POTASSIUM 100 MG/1
1 TABLET, FILM COATED ORAL
Qty: 0 | Refills: 0 | DISCHARGE

## 2020-06-18 RX ORDER — FUROSEMIDE 40 MG
1 TABLET ORAL
Qty: 0 | Refills: 0 | DISCHARGE

## 2020-06-18 RX ORDER — AMLODIPINE BESYLATE 2.5 MG/1
1 TABLET ORAL
Qty: 0 | Refills: 0 | DISCHARGE

## 2020-06-18 RX ORDER — HYDRALAZINE HCL 50 MG
1 TABLET ORAL
Qty: 0 | Refills: 0 | DISCHARGE

## 2020-06-18 RX ORDER — ASPIRIN/CALCIUM CARB/MAGNESIUM 324 MG
1 TABLET ORAL
Qty: 0 | Refills: 0 | DISCHARGE

## 2020-06-18 RX ORDER — SITAGLIPTIN 50 MG/1
1 TABLET, FILM COATED ORAL
Qty: 0 | Refills: 0 | DISCHARGE

## 2020-06-18 RX ORDER — CARVEDILOL PHOSPHATE 80 MG/1
1 CAPSULE, EXTENDED RELEASE ORAL
Qty: 0 | Refills: 0 | DISCHARGE
Start: 2020-06-18

## 2020-06-18 RX ORDER — LISINOPRIL 2.5 MG/1
0 TABLET ORAL
Qty: 30 | Refills: 0 | DISCHARGE

## 2020-06-18 RX ADMIN — Medication 81 MILLIGRAM(S): at 14:32

## 2020-06-18 RX ADMIN — TICAGRELOR 90 MILLIGRAM(S): 90 TABLET ORAL at 17:12

## 2020-06-18 RX ADMIN — Medication 2: at 09:38

## 2020-06-18 RX ADMIN — CARVEDILOL PHOSPHATE 25 MILLIGRAM(S): 80 CAPSULE, EXTENDED RELEASE ORAL at 05:11

## 2020-06-18 RX ADMIN — Medication 2: at 17:11

## 2020-06-18 RX ADMIN — SEVELAMER CARBONATE 1600 MILLIGRAM(S): 2400 POWDER, FOR SUSPENSION ORAL at 17:11

## 2020-06-18 RX ADMIN — DOXERCALCIFEROL 2 MICROGRAM(S): 2.5 CAPSULE ORAL at 10:21

## 2020-06-18 RX ADMIN — SEVELAMER CARBONATE 800 MILLIGRAM(S): 2400 POWDER, FOR SUSPENSION ORAL at 09:39

## 2020-06-18 RX ADMIN — TICAGRELOR 90 MILLIGRAM(S): 90 TABLET ORAL at 05:11

## 2020-06-18 RX ADMIN — SEVELAMER CARBONATE 1600 MILLIGRAM(S): 2400 POWDER, FOR SUSPENSION ORAL at 14:32

## 2020-06-18 RX ADMIN — ATORVASTATIN CALCIUM 80 MILLIGRAM(S): 80 TABLET, FILM COATED ORAL at 22:58

## 2020-06-18 RX ADMIN — CHLORHEXIDINE GLUCONATE 1 APPLICATION(S): 213 SOLUTION TOPICAL at 14:37

## 2020-06-18 NOTE — DISCHARGE NOTE PROVIDER - NSDCMRMEDTOKEN_GEN_ALL_CORE_FT
albuterol 90 mcg/inh inhalation aerosol: 2 puff(s) inhaled 4 times a day, As Needed    aspirin 81 mg oral delayed release tablet: 1 tab(s) orally once a day  atorvastatin 80 mg oral tablet: 1 tab(s) orally once a day (at bedtime)   carvedilol 25 mg oral tablet: 1 tab(s) orally every 12 hours  sevelamer carbonate 800 mg oral tablet: 2 tab(s) orally 3 times a day (with meals)  ticagrelor 90 mg oral tablet: 1 tab(s) orally every 12 hours  Vitamin D2 50,000 intl units (1.25 mg) oral capsule: 1 cap(s) orally once a week    MED AS PER PHARMACY ONLY, PATIENT UNAWARE OF MED LIST albuterol 90 mcg/inh inhalation aerosol: 2 puff(s) inhaled 4 times a day, As Needed    aspirin 81 mg oral delayed release tablet: 1 tab(s) orally once a day  atorvastatin 80 mg oral tablet: 1 tab(s) orally once a day (at bedtime)   carvedilol 25 mg oral tablet: 1 tab(s) orally every 12 hours  Januvia 100 mg oral tablet: 1 tab(s) orally once a day  lisinopril 2.5 mg oral tablet: 1 tab(s) orally once a day  sevelamer carbonate 800 mg oral tablet: 2 tab(s) orally 3 times a day (with meals)  ticagrelor 90 mg oral tablet: 1 tab(s) orally every 12 hours  Vitamin D2 50,000 intl units (1.25 mg) oral capsule: 1 cap(s) orally once a week    MED AS PER PHARMACY ONLY, PATIENT UNAWARE OF MED LIST

## 2020-06-18 NOTE — CONSULT NOTE ADULT - SUBJECTIVE AND OBJECTIVE BOX
History of Present Illness:  79F w/ PMHx of DM2 on insulin, ESRD on HD TTS, CHF?, HTN, HLD p/w chest pain for several days. Pt described pain as midsternal chest pressure radiating to the back that started 4 days ago while at dialysis. Pt also had syncopal episode during dialysis. Prior hospitalizations @  in 8/2019 and 10/2019 for hypertensive emergency, CHF exacerbation and syncopal episode on last admission. Recently admitted to [facility] in Jan 2020 for substernal chest pain. NST was positive which revealed a small to moderate sized area of apical-septal and lateral wall ischemia and patient was referred for cath. Unable to perform cath as pt did not yet start dialysis. Endorses some dizziness but denies SOB, peripheral edema or palpitations. Denies missing any meds but cannot remember any of them    Past Medical History  GERD (gastroesophageal reflux disease)  H/O vitamin D deficiency  Asthma  CKD (chronic kidney disease)  DM type 2 (diabetes mellitus, type 2)  HLD (hyperlipidemia)  HTN (hypertension)      Past Surgical History  History of cataract surgery      MEDICATIONS  (STANDING):  aspirin enteric coated 81 milliGRAM(s) Oral daily  atorvastatin 80 milliGRAM(s) Oral at bedtime  carvedilol 25 milliGRAM(s) Oral every 12 hours  chlorhexidine 2% Cloths 1 Application(s) Topical daily  dextrose 5%. 1000 milliLiter(s) (50 mL/Hr) IV Continuous <Continuous>  dextrose 50% Injectable 12.5 Gram(s) IV Push once  dextrose 50% Injectable 25 Gram(s) IV Push once  dextrose 50% Injectable 25 Gram(s) IV Push once  doxercalciferol Injectable 2 MICROGram(s) IV Push <User Schedule>  insulin lispro (HumaLOG) corrective regimen sliding scale   SubCutaneous three times a day before meals  insulin lispro (HumaLOG) corrective regimen sliding scale   SubCutaneous at bedtime  sevelamer carbonate 1600 milliGRAM(s) Oral three times a day with meals  ticagrelor 90 milliGRAM(s) Oral every 12 hours    MEDICATIONS  (PRN):  dextrose 40% Gel 15 Gram(s) Oral once PRN Blood Glucose LESS THAN 70 milliGRAM(s)/deciliter  glucagon  Injectable 1 milliGRAM(s) IntraMuscular once PRN Glucose LESS THAN 70 milligrams/deciliter    Antiplatelet therapy:                           Last dose/amt:    Allergies: No Known Allergies      SOCIAL HISTORY:  Smoker: [ ] Yes  [ ] No        PACK YEARS:                         WHEN QUIT?  ETOH use: [ ] Yes  [ ] No              FREQUENCY / QUANTITY:  Ilicit Drug use:  [ ] Yes  [ ] No  Occupation:  Live with:  Assist device use:    Relevant Family History  FAMILY HISTORY:  No pertinent family history in first degree relatives      Review of Systems  GENERAL:  Fevers[] chills[] sweats[] fatigue[] weight loss[] weight gain []                                        NEURO:  parathesias[] seizures []  syncope []  confusion []                                                                                  EYES: glasses[]  blurry vision[]  discharge[] pain[] glaucoma []                                                                            ENMT:  difficulty hearing []  vertigo[]  dysphagia[] epistaxis[] recent dental work []                                      CV:  chest pain[] palpitations[] HORN [] diaphoresis [] edema[]                                                                                             RESPIRATORY:  wheezing[] SOB[] cough [] sputum[] hemoptysis[]                                                                    GI:  nausea[]  vomiting []  diarrhea[] constipation [] melena []                                                                        : hematuria[ ]  dysuria[ ] urgency[] incontinence[]                                                                                              MUSKULOSKELETAL:  arthritis[ ]  joint swelling [ ] muscle weakness [ ]                                                                  SKIN/BREAST:  rash[ ] itching [ ]  hair loss[ ] masses[ ]                                                                                                PSYCH:  dementia [ ] depresion [ ] anxiety[ ]                                                                                                                  HEME/LYMPH:  bruises easily[ ] enlarged lymph nodes[ ] tender lymph nodes[ ]                                                 ENDOCRINE:  cold intolerance[ ] heat intolerance[ ] polydipsia[ ]                                                                              PHYSICAL EXAM  Vital Signs Last 24 Hrs  T(C): 36.4 (18 Jun 2020 14:02), Max: 36.8 (18 Jun 2020 10:13)  T(F): 97.6 (18 Jun 2020 14:02), Max: 98.2 (18 Jun 2020 10:13)  HR: 98 (18 Jun 2020 17:15) (72 - 98)  BP: 102/71 (18 Jun 2020 17:15) (102/71 - 155/78)  BP(mean): --  RR: 17 (18 Jun 2020 14:02) (16 - 18)  SpO2: 98% (18 Jun 2020 14:02) (96% - 100%)    General: Well nourished, well developed, no acute distress.                                                         Neuro: Normal exam oriented to person/place & time with no focal motor or sensory  deficits.                    Eyes: Normal exam of conjunctiva & lids, pupils equally reactive.   ENT: Normal exam of nasal/oral mucosa with absence of cyanosis.   Neck: Normal exam of jugular veins, trachea & thyroid.   Chest: Normal lung exam with good air movement absence of wheezes, rales, or rhonchi:                                                                          CV:  Auscultation: normal [ ] S3[ ] S4[ ] Irregular [ ] Rub[ ] Clicks[ ]  Murmurs none:[ ]systolic [ ]  diastolic [ ] holosystolic [ ]  Carotids: No Bruits[ ] Other____________ Abdominal Aorta: normal [ ] nonpalpable[ ]                                                                         GI: Normal exam of abdomen, liver & spleen with no noted masses or tenderness.                                                                                              Extremities: Normal no evidence of cyanosis or deformity Edema: none[ ]trace[ ]1+[ ]2+[ ]3+[ ]4+[ ]  Lower Extremity Pulses: Right[ ] Left[ ]Varicosities[ ]  SKIN : Normal exam to inspection & palation.                                                           LABS:                        11.3   12.70 )-----------( 177      ( 18 Jun 2020 05:15 )             34.7     06-18    135  |  98  |  38<H>  ----------------------------<  160<H>  4.7   |  23  |  5.13<H>    Ca    8.6      18 Jun 2020 05:15  Phos  6.4     06-18  Mg     2.4     06-17    TPro  6.7  /  Alb  3.5  /  TBili  0.6  /  DBili  x   /  AST  21  /  ALT  9<L>  /  AlkPhos  77  06-17    PT/INR - ( 16 Jun 2020 19:40 )   PT: 11.3 sec;   INR: 0.99 ratio         PTT - ( 17 Jun 2020 14:21 )  PTT:>200.0 sec            Cardiac Cath:    TTE / SHARI:    Assessment:  79y Female presents with Chest pain      Plan: History of Present Illness:  79F w/ PMHx of DM2 on insulin, ESRD on HD TTS, CHF?, HTN, HLD p/w chest pain for several days. Pt described pain as midsternal chest pressure radiating to the back that started 4 days ago while at dialysis. Pt also had syncopal episode during dialysis. Prior hospitalizations @  in 8/2019 and 10/2019 for hypertensive emergency, CHF exacerbation and syncopal episode on last admission. Recently admitted to [facility] in Jan 2020 for substernal chest pain. NST was positive which revealed a small to moderate sized area of apical-septal and lateral wall ischemia and patient was referred for cath. Unable to perform cath as pt did not yet start dialysis. Endorses some dizziness but denies SOB, peripheral edema or palpitations. Denies missing any meds but cannot remember any of them. LHC on 6/17 w/ stent placed to pLAD. TTE 6/18 showed small pericardial effusion measuring 0.9cm posterior to the left ventricle and 1.1cm anterior to right ventricle as well as an echogenic mass adherent to the right ventricle that likely represents organized pericardial effusion or thrombus. No evidence of right atrial or right ventricular collapse. CT surgery consulted for evaluation of echogenic mass.       Past Medical History  GERD (gastroesophageal reflux disease)  H/O vitamin D deficiency  Asthma  CKD (chronic kidney disease)  DM type 2 (diabetes mellitus, type 2)  HLD (hyperlipidemia)  HTN (hypertension)      Past Surgical History  History of cataract surgery      MEDICATIONS  (STANDING):  aspirin enteric coated 81 milliGRAM(s) Oral daily  atorvastatin 80 milliGRAM(s) Oral at bedtime  carvedilol 25 milliGRAM(s) Oral every 12 hours  chlorhexidine 2% Cloths 1 Application(s) Topical daily  dextrose 5%. 1000 milliLiter(s) (50 mL/Hr) IV Continuous <Continuous>  dextrose 50% Injectable 12.5 Gram(s) IV Push once  dextrose 50% Injectable 25 Gram(s) IV Push once  dextrose 50% Injectable 25 Gram(s) IV Push once  doxercalciferol Injectable 2 MICROGram(s) IV Push <User Schedule>  insulin lispro (HumaLOG) corrective regimen sliding scale   SubCutaneous three times a day before meals  insulin lispro (HumaLOG) corrective regimen sliding scale   SubCutaneous at bedtime  sevelamer carbonate 1600 milliGRAM(s) Oral three times a day with meals  ticagrelor 90 milliGRAM(s) Oral every 12 hours    MEDICATIONS  (PRN):  dextrose 40% Gel 15 Gram(s) Oral once PRN Blood Glucose LESS THAN 70 milliGRAM(s)/deciliter  glucagon  Injectable 1 milliGRAM(s) IntraMuscular once PRN Glucose LESS THAN 70 milligrams/deciliter    Antiplatelet therapy: Brilinta 90 mg BID           Last dose/amt: 90 mg 6/18 at 1712    Allergies: No Known Allergies      SOCIAL HISTORY:  Smoker: No  ETOH use: No  Illicit Drug use:  No  Lives with: alone  Assist device use: cane    Relevant Family History  FAMILY HISTORY:  No pertinent family history in first degree relatives      Review of Systems  GENERAL:  Fevers[] chills[] sweats[] fatigue[] weight loss[] weight gain []                                        NEURO:  paresthesias[] seizures []  syncope [x]  confusion [] dizziness [x]                                                                                 EYES: glasses[]  blurry vision[]  discharge[] pain[] glaucoma []                                                                            ENMT:  difficulty hearing []  vertigo[]  dysphagia[] epistaxis[] recent dental work []                                      CV:  chest pain[x] palpitations[] HORN [] diaphoresis [] edema[]                                                                                             RESPIRATORY:  wheezing[] SOB[] cough [] sputum[] hemoptysis[]                                                                    GI:  nausea[]  vomiting []  diarrhea[] constipation [] melena []                                                                        : hematuria[ ]  dysuria[ ] urgency[] incontinence[]                                                                                              MUSCULOSKELETAL:  arthritis[ ]  joint swelling [ ] muscle weakness [ ]                                                                  SKIN/BREAST:  rash[ ] itching [ ]  hair loss[ ] masses[ ]                                                                                                PSYCH:  dementia [ ] depression [ ] anxiety[ ]                                                                                                                  HEME/LYMPH:  bruises easily[ ] enlarged lymph nodes[ ] tender lymph nodes[ ]                                                 ENDOCRINE:  cold intolerance[ ] heat intolerance[ ] polydipsia[ ]                                                                              PHYSICAL EXAM  Vital Signs Last 24 Hrs  T(C): 36.4 (18 Jun 2020 14:02), Max: 36.8 (18 Jun 2020 10:13)  T(F): 97.6 (18 Jun 2020 14:02), Max: 98.2 (18 Jun 2020 10:13)  HR: 98 (18 Jun 2020 17:15) (72 - 98)  BP: 102/71 (18 Jun 2020 17:15) (102/71 - 155/78)  RR: 17 (18 Jun 2020 14:02) (16 - 18)  SpO2: 98% (18 Jun 2020 14:02) (96% - 100%)    General: Well nourished, well developed, no acute distress.                                                         Neuro: Normal exam oriented to person/place & time with no focal motor or sensory deficits.                    Eyes: Normal exam of conjunctiva & lids, pupils equally reactive.   ENT: Normal exam of nasal/oral mucosa with absence of cyanosis.   Neck: Normal exam of jugular veins, trachea & thyroid.   Chest: R anterior chest wall w/ temporary dialysis catheter - dressing in place, CDI. Normal lung exam with good air movement absence of wheezes, rales, or rhonchi:                                                                          CV:  Auscultation: normal [ x ] S3[ ] S4[ ] Irregular [ ] Rub[ ] Clicks[ ]  Murmurs none:[ x ]systolic [ ]  diastolic [ ] holosystolic [ ]  Carotids: No Bruits[ x ] Abdominal Aorta: normal [ x ] nonpalpable[ ]                                                                         GI: Normal exam of abdomen, liver & spleen with no noted masses or tenderness.                                                                                              Extremities: Normal no evidence of cyanosis or deformity Edema: none[ x ]trace[ ]1+[ ]2+[ ]3+[ ]4+[ ]  Lower Extremity Pulses: Right[ x ] Left[ x ] Varicosities: none  SKIN : Warm and well perfused. Normal exam to inspection & palpation.                                                           LABS:                        11.3   12.70 )-----------( 177      ( 18 Jun 2020 05:15 )             34.7     06-18    135  |  98  |  38<H>  ----------------------------<  160<H>  4.7   |  23  |  5.13<H>    Ca    8.6      18 Jun 2020 05:15  Phos  6.4     06-18  Mg     2.4     06-17    TPro  6.7  /  Alb  3.5  /  TBili  0.6  /  DBili  x   /  AST  21  /  ALT  9<L>  /  AlkPhos  77  06-17    PT/INR - ( 16 Jun 2020 19:40 )   PT: 11.3 sec;   INR: 0.99 ratio      PTT - ( 17 Jun 2020 14:21 )  PTT:>200.0 sec      Cardiac Cath 6/17: stent placed to pLAD    TTE 6/18: 1. Posterior mitral annular calcification, otherwise normal mitral valve. Minimal mitral regurgitation. 2. Calcified trileaflet aortic valve with decreased opening. Peak transaortic valve gradient equals 6 mm Hg, estimated aortic valve area equals 1.4 sqcm (by continuity equation), aortic valve velocity time integral equals 19 cm, consistent with moderate aortic stenosis. Minimal aortic regurgitation. 3. Endocardial visualization enhanced with intravenous injection of Ultrasonic Enhancing Agent (Definity). Severe segmental left ventricular systolic dysfunction. No left ventricular thrombus. The apical lateral wall, and the mid anterior wall are hypokinetic. The mid anterior septum, the apical septum, and the mid inferoseptum are akinetic. 4. Severe reversible diastolic dysfunction (Stage III). 5. Normal right ventricular size and function. 6. Thickened pericardium with small pericardial effusion measuring 0.9cm posterior to the left ventricle and 1.1cm anterior to right ventricle.  There is an echogenic mass adherent to the right ventricle that likely represents organized pericardial effusion or thrombus.  No evidence of right atrial or right ventricular collapse.

## 2020-06-18 NOTE — PROGRESS NOTE ADULT - SUBJECTIVE AND OBJECTIVE BOX
Subjective: Patient seen and examined. No new events except as noted.     REVIEW OF SYSTEMS:    CONSTITUTIONAL: No weakness, fevers or chills  EYES/ENT: No visual changes;  No vertigo or throat pain   NECK: No pain or stiffness  RESPIRATORY: No cough, wheezing, hemoptysis; No shortness of breath  CARDIOVASCULAR: No chest pain or palpitations  GASTROINTESTINAL: No abdominal or epigastric pain. No nausea, vomiting, or hematemesis; No diarrhea or constipation. No melena or hematochezia.  GENITOURINARY: No dysuria, frequency or hematuria  NEUROLOGICAL: No numbness or weakness  SKIN: No itching, burning, rashes, or lesions   All other review of systems is negative unless indicated above.    MEDICATIONS:  MEDICATIONS  (STANDING):  aspirin enteric coated 81 milliGRAM(s) Oral daily  atorvastatin 80 milliGRAM(s) Oral at bedtime  carvedilol 25 milliGRAM(s) Oral every 12 hours  chlorhexidine 2% Cloths 1 Application(s) Topical daily  dextrose 5%. 1000 milliLiter(s) (50 mL/Hr) IV Continuous <Continuous>  dextrose 50% Injectable 12.5 Gram(s) IV Push once  dextrose 50% Injectable 25 Gram(s) IV Push once  dextrose 50% Injectable 25 Gram(s) IV Push once  doxercalciferol Injectable 2 MICROGram(s) IV Push <User Schedule>  insulin lispro (HumaLOG) corrective regimen sliding scale   SubCutaneous three times a day before meals  insulin lispro (HumaLOG) corrective regimen sliding scale   SubCutaneous at bedtime  sevelamer carbonate 1600 milliGRAM(s) Oral three times a day with meals  ticagrelor 90 milliGRAM(s) Oral every 12 hours      PHYSICAL EXAM:  T(C): 36.4 (06-18-20 @ 14:02), Max: 36.8 (06-18-20 @ 10:13)  HR: 98 (06-18-20 @ 17:15) (72 - 98)  BP: 102/71 (06-18-20 @ 17:15) (102/71 - 155/78)  RR: 17 (06-18-20 @ 14:02) (16 - 18)  SpO2: 98% (06-18-20 @ 14:02) (96% - 100%)  Wt(kg): --  I&O's Summary    17 Jun 2020 07:01  -  18 Jun 2020 07:00  --------------------------------------------------------  IN: 597 mL / OUT: 0 mL / NET: 597 mL    18 Jun 2020 07:01  -  18 Jun 2020 19:21  --------------------------------------------------------  IN: 1500 mL / OUT: 2900 mL / NET: -1400 mL          Appearance: Normal	  HEENT:  PERRLA   Lymphatic: No lymphadenopathy   Cardiovascular: Normal S1 S2, no JVD  Respiratory: normal effort , clear  Gastrointestinal:  Soft, Non-tender  Skin: No rashes,  warm to touch  Psychiatry:  Mood & affect appropriate  Musculuskeletal: No edema      All labs, Imaging and EKGs personally reviewed                           11.3   12.70 )-----------( 177      ( 18 Jun 2020 05:15 )             34.7               06-18    135  |  98  |  38<H>  ----------------------------<  160<H>  4.7   |  23  |  5.13<H>    Ca    8.6      18 Jun 2020 05:15  Phos  6.4     06-18  Mg     2.4     06-17    TPro  6.7  /  Alb  3.5  /  TBili  0.6  /  DBili  x   /  AST  21  /  ALT  9<L>  /  AlkPhos  77  06-17    PT/INR - ( 16 Jun 2020 19:40 )   PT: 11.3 sec;   INR: 0.99 ratio         PTT - ( 17 Jun 2020 14:21 )  PTT:>200.0 sec                     < from: Cardiac Cath Lab - Adult (06.17.20 @ 15:17) >  CORONARY VESSELS: The coronary circulation is right dominant.  LM:   --  LM: Normal.  LAD:   --  Mid LAD: There was a 95 % stenosis. The lesion was heavily  calcified.  CX:   --  Circumflex: Angiography showed minor luminal irregularities with  no flow limiting lesions.  RCA:   --  Mid RCA: There was a 40 % stenosis.  COMPLICATIONS: There were no complications.  DIAGNOSTIC IMPRESSIONS: Initial NSTEMI presentation.  Severe calcified stenosis of the proximal-mid LAD.  Pt s/p successful rotational athertectomy followed by KOSTA placement to the  LAD.  DIAGNOSTIC RECOMMENDATIONS: Continue aspirin and brilinta daily.  Continue routine post-cath care.  Resume hemodialysis per nephrology.  INTERVENTIONAL IMPRESSIONS: Initial NSTEMI presentation.  Severe calcified stenosis of the proximal-mid LAD.  Pt s/p successful rotational athertectomy followed by KOSTA placement to the  LAD.  INTERVENTIONAL RECOMMENDATIONS: Continue aspirin and brilinta daily.  Continue routine post-cath care.  Resume hemodialysis per nephrology.  DISPOSITION: The patient left the catheterization laboratory in stable  condition.

## 2020-06-18 NOTE — DISCHARGE NOTE PROVIDER - HOSPITAL COURSE
79F w/ hx of DM2 on insulin, ESRD on HD Tu, Thr, Sat, CHF?, HTN, HLD p/w chest pain for several days concerning for unstable angina vs NSTEMI given current lab findings        ·  Problem: Unstable angina.  Plan: Still having some mild chest pain but mostly improved. New TWI noted. Troponin overall elevated but difficult to assess given ESRD status. Overall stable/ down trending. Hx of positive stress test. Pt states he was seen by cardiology today    -s/p Cath with PCI.            ·  Problem: Hyperkalemia.  Plan: S/p potassium cocktail and just completed dialysis.    -Trend BMP.         ·  Problem: ESRD (end stage renal disease).  Plan: On HD Tu, Thr, Sat. Completed dialysis after arrival to [facility] today. Appreciate nephrology recommendations                ·  Problem: Chronic systolic congestive heart failure.  Plan: Reportedly mildly reduced EF. Based on E-Rxs, pt was prescribed coreg and lisinopril in June    -Cont. Coreg with hold parameters for now    -Will hold lisinopril for now    .         ·  Problem: Type 2 diabetes mellitus with other specified complication, with long-term current use of insulin.  Plan: On some insulin at home but pt denies taking everyday    -Fingersticks and sliding scale for now.             Problem: Essential hypertension. Plan: -Trend vital signs.        Pt stable dc home with outpatient follow up with PMD and Cardiologist. 79F w/ hx of DM2 on insulin, ESRD on HD Tu, Thr, Sat, CHF?, HTN, HLD p/w chest pain for several days concerning for unstable angina vs NSTEMI given current lab findings. EKG with new TWI. Seen and evaluated by Cards. s/p LHC, s/p PCI to mLAD. Echo shows severe segmental LV dysfunction with pericardial effusion with early signs of tamponade. Repeated echo shows small pericardial effusion. Per CTSx, no plan for tap. Of note, patient is positive for orthostatics. Volume removal with HD and Cardiac meds adjusted. Now resolved. Patient remains stable for DC home with close follow up with PCP and Cards as per Dr. Miller

## 2020-06-18 NOTE — DISCHARGE NOTE PROVIDER - NSDCCPCAREPLAN_GEN_ALL_CORE_FT
PRINCIPAL DISCHARGE DIAGNOSIS  Diagnosis: Chest pain  Assessment and Plan of Treatment:   HOME CARE INSTRUCTIONS  For the next few days, avoid physical activities that bring on chest pain. Continue physical activities as directed.  Do not smoke.  Avoid drinking alcohol.   Only take over-the-counter or prescription medicine for pain, discomfort, or fever as directed by your caregiver.  Follow your caregiver's suggestions for further testing if your chest pain does not go away.  Keep any follow-up appointments you made. If you do not go to an appointment, you could develop lasting (chronic) problems with pain. If there is any problem keeping an appointment, you must call to reschedule.   SEEK MEDICAL CARE IF:  You think you are having problems from the medicine you are taking. Read your medicine instructions carefully.  Your chest pain does not go away, even after treatment.  You develop a rash with blisters on your chest.  SEEK IMMEDIATE MEDICAL CARE IF:  You have increased chest pain or pain that spreads to your arm, neck, jaw, back, or abdomen.   You develop shortness of breath, an increasing cough, or you are coughing up blood.  You have severe back or abdominal pain, feel nauseous, or vomit.  You develop severe weakness, fainting, or chills.  You have a fever.  THIS IS AN EMERGENCY. Do not wait to see if the pain will go away. Get medical help at once. Call your local emergency services (_____________________). Do not drive yourself to the hospital.        SECONDARY DISCHARGE DIAGNOSES  Diagnosis: Dialysis patient  Assessment and Plan of Treatment: Avoid taking (NSAIDs) - (ex: Ibuprofen, Advil, Celebrex, Naprosyn)  Avoid taking any nephrotoxic agents (can harm kidneys) - Intravenous contrast for diagnostic testing, combination cold medications.  Have all medications adjusted for your renal function by your Health Care Provider.  Blood pressure control is important.  Take all medication as prescribed.      Diagnosis: Essential hypertension  Assessment and Plan of Treatment: Low salt diet  Activity as tolerated.  Take all medication as prescribed.  Follow up with your medical doctor for routine blood pressure monitoring at your next visit.  Notify your doctor if you have any of the following symptoms:   Dizziness, Lightheadedness, Blurry vision, Headache, Chest pain, Shortness of breath      Diagnosis: Chronic systolic congestive heart failure  Assessment and Plan of Treatment: Weigh yourself daily.  If you gain 3lbs in 3 days, or 5lbs in a week call your Health Care Provider.  Do not eat or drink foods containing more than 2000mg of salt (sodium) in your diet every day.  Call your Health Care Provider if you have any swelling or increased swelling in your feet, ankles, and/or stomach.  Take all of your medication as directed.  If you become dizzy call your Health Care Provider.      Diagnosis: Type 2 diabetes mellitus with other specified complication, with long-term current use of insulin  Assessment and Plan of Treatment: HgA1C this admission 6.2.  Make sure you get your HgA1c checked every three months.  If you take oral diabetes medications, check your blood glucose two times a day.  If you take insulin, check your blood glucose before meals and at bedtime.  It's important not to skip any meals.  Keep a log of your blood glucose results and always take it with you to your doctor appointments.  Keep a list of your current medications including injectables and over the counter medications and bring this medication list with you to all your doctor appointments.  If you have not seen your opthalmologist this year call for appointment.  Check your feet daily for redness, sores, or openings. Do not self treat. If no improvement in two days call your primary care physician for an appointment.  Low blood sugar (hypoglycemia) is a blood sugar below 70mg/dl. Check your blood sugar if you feel signs/symptoms of hypoglycemia. If your blood sugar is below 70 take 15 grams of carbohydrates (ex 4 oz of apple juice, 3-4 glucosr tablets, or 4-6 oz of regular soda) wait 15 minutes and repeat blood sugar to make sure it comes up above 70.  If your blood sugar is above 70 and you are due for a meal, have a meal.  If you are not due for a meal have a snack.  This snack helps keeps your blood sugar at a safe range. PRINCIPAL DISCHARGE DIAGNOSIS  Diagnosis: Chest pain  Assessment and Plan of Treatment: HOME CARE INSTRUCTIONS  For the next few days, avoid physical activities that bring on chest pain. Continue physical activities as directed.  Do not Informed pt of the various negative side effects of smoking including risk of COPD, Lung Ca etc  Strongly recommended that pt stops smoking and pt given various options of smoking cessasion tools such as NRT's and other pharmacotherapies.  Avoid drinking alcohol.   Only take over-the-counter or prescription medicine for pain, discomfort, or fever as directed by your caregiver.  Follow your caregiver's suggestions for further testing if your chest pain does not go away.  Keep any follow-up appointments you made. If you do not go to an appointment, you could develop lasting (chronic) problems with pain. If there is any problem keeping an appointment, you must call to reschedule.   SEEK MEDICAL CARE IF:  You think you are having problems from the medicine you are taking. Read your medicine instructions carefully.  Your chest pain does not go away, even after treatment.  You develop a rash with blisters on your chest.  SEEK IMMEDIATE MEDICAL CARE IF:  You have increased chest pain or pain that spreads to your arm, neck, jaw, back, or abdomen.   You develop shortness of breath, an increasing cough, or you are coughing up blood.  You have severe back or abdominal pain, feel nauseous, or vomit.  You develop severe weakness, fainting, or chills.  You have a fever.  THIS IS AN EMERGENCY. Do not wait to see if the pain will go away. Get medical help at once. Call your local emergency services . Do not drive yourself to the hospital.        SECONDARY DISCHARGE DIAGNOSES  Diagnosis: Pericardial effusion  Assessment and Plan of Treatment: Pericardial effusion, sometimes referred to as "fluid around the heart," is the abnormal build-up of excess fluid that develops between the pericardium, the lining of the heart, and the heart itself. The seriousness of the condition depends on the primary cause, size and rate of growth of the effusion — and whether it can be treated effectively. Causes that can be treated or controlled, such as an infection due to a virus or heart failure, allows the patient to be effectively treated and remain free of pericardial effusions.  .  Pericardial effusion caused by other conditions, such as cancer, is very serious and should be diagnosed and treated promptly.  Additionally, rapid fluid accumulation in the pericardium can cause cardiac tamponade, a severe compression of the heart that impairs its ability to function.  -  Symptoms of pericardial effusion include:   - Chest pressure or pain   - Shortness of breath   - Nausea   - Abdominal fullness   - Difficulty in swallowing  .  Symptoms that pericardial effusion is causing cardiac tamponade include:   - Blue tinge to the lips and skin   - Shock   - Change in mental status  .  Cardiac tamponade is a severe compression of the heart that impairs its ability to function. Cardiac tamponade resulting from pericardial effusion can be life-threatening and is a medical emergency, requiring urgent drainage of the fluid. Please follow up with Cardiology for further management.      Diagnosis: Type 2 diabetes mellitus with other specified complication, with long-term current use of insulin  Assessment and Plan of Treatment: HgA1C this admission 6.2.  Make sure you get your HgA1c checked every three months.  If you take oral diabetes medications, check your blood glucose two times a day.  If you take insulin, check your blood glucose before meals and at bedtime.  It's important not to skip any meals.  Keep a log of your blood glucose results and always take it with you to your doctor appointments.  Keep a list of your current medications including injectables and over the counter medications and bring this medication list with you to all your doctor appointments.  If you have not seen your opthalmologist this year call for appointment.  Check your feet daily for redness, sores, or openings. Do not self treat. If no improvement in two days call your primary care physician for an appointment.  Low blood sugar (hypoglycemia) is a blood sugar below 70mg/dl. Check your blood sugar if you feel signs/symptoms of hypoglycemia. If your blood sugar is below 70 take 15 grams of carbohydrates (ex 4 oz of apple juice, 3-4 glucosr tablets, or 4-6 oz of regular soda) wait 15 minutes and repeat blood sugar to make sure it comes up above 70.  If your blood sugar is above 70 and you are due for a meal, have a meal.  If you are not due for a meal have a snack.  This snack helps keeps your blood sugar at a safe range.      Diagnosis: Essential hypertension  Assessment and Plan of Treatment: Low salt diet  Activity as tolerated.  Take all medication as prescribed.  Follow up with your medical doctor for routine blood pressure monitoring at your next visit.  Notify your doctor if you have any of the following symptoms:   Dizziness, Lightheadedness, Blurry vision, Headache, Chest pain, Shortness of breath      Diagnosis: Chronic systolic congestive heart failure  Assessment and Plan of Treatment: Weigh yourself daily.  If you gain 3lbs in 3 days, or 5lbs in a week call your Health Care Provider.  Do not eat or drink foods containing more than 2000mg of salt (sodium) in your diet every day.  Call your Health Care Provider if you have any swelling or increased swelling in your feet, ankles, and/or stomach.  Take all of your medication as directed.  If you become dizzy call your Health Care Provider.      Diagnosis: Dialysis patient  Assessment and Plan of Treatment: Avoid taking (NSAIDs) - (ex: Ibuprofen, Advil, Celebrex, Naprosyn)  Avoid taking any nephrotoxic agents (can harm kidneys) - Intravenous contrast for diagnostic testing, combination cold medications.  Have all medications adjusted for your renal function by your Health Care Provider.  Blood pressure control is important.  Take all medication as prescribed.

## 2020-06-18 NOTE — PROGRESS NOTE ADULT - ASSESSMENT
79y Female with history of ESRD on HD presents with chest pain. Nephrology consulted for ESRD status.    1) ESRD on HD: Last HD on 6/16 tolerated well with 2.5L removed. Plan for next maintenance HD today. Monitor electrolytes.    2) HTN with ESRD: BP low normal. Continue with current medications and low sodium diet. Monitor BP.    3) Anemia of renal disease: Hb acceptable. No need for GILBERT. Monitor Hb.    4) Secondary HPT of renal origin: Phosphorus uncontrolled. Continue with hectorol 2 mcg with HD and increase renvela to 2 tabs with meals. Monitor serum calcium and phosphorus.    5) Hyperkalemia: Resolved s/p HD. Continue with renal diet. Monitor serum potassium.

## 2020-06-18 NOTE — PROGRESS NOTE ADULT - SUBJECTIVE AND OBJECTIVE BOX
Patient is a 79y old  Female who presents with a chief complaint of Chest Pain (18 Jun 2020 18:17)      INTERVAL HISTORY:  feels well    TELEMETRY Personally reviewed: no events  	  MEDICATIONS:  carvedilol 25 milliGRAM(s) Oral every 12 hours        PHYSICAL EXAM:  T(C): 37 (06-18-20 @ 19:35), Max: 37 (06-18-20 @ 19:35)  HR: 95 (06-18-20 @ 19:35) (72 - 98)  BP: 113/87 (06-18-20 @ 19:35) (102/71 - 129/74)  RR: 18 (06-18-20 @ 19:35) (16 - 18)  SpO2: 98% (06-18-20 @ 19:35) (97% - 99%)  Wt(kg): --  I&O's Summary    17 Jun 2020 07:01  -  18 Jun 2020 07:00  --------------------------------------------------------  IN: 597 mL / OUT: 0 mL / NET: 597 mL    18 Jun 2020 07:01  -  19 Jun 2020 00:10  --------------------------------------------------------  IN: 1740 mL / OUT: 2900 mL / NET: -1160 mL          Appearance: In no distress	  HEENT:    PERRL, EOMI	  Cardiovascular:  S1 S2, No JVD  Respiratory: Lungs clear to auscultation	  Gastrointestinal:  Soft, Non-tender, + BS	  Vascularature:  No edema of LE  Psychiatric: Appropriate affect   Neuro: no acute focal deficits                               11.3   12.70 )-----------( 177      ( 18 Jun 2020 05:15 )             34.7     06-18    135  |  98  |  38<H>  ----------------------------<  160<H>  4.7   |  23  |  5.13<H>    Ca    8.6      18 Jun 2020 05:15  Phos  6.4     06-18  Mg     2.4     06-17    TPro  6.7  /  Alb  3.5  /  TBili  0.6  /  DBili  x   /  AST  21  /  ALT  9<L>  /  AlkPhos  77  06-17        Labs personally reviewed      Assessment and Plan:   Assessment:  · Assessment		  79F w/ hx of DM2 on insulin, ESRD on HD Tu, Thr, Sat, CHF?, HTN, HLD p/w chest pain for several days concerning for unstable angina vs NSTEMI given current lab findings    Problem/Plan - 1:  ·  Problem: NSTEMI  Plan: Now comfortable with no further chest pain. New TWI noted. Troponin overall elevated but difficult to assess given ESRD status.  - ASA/Brillanta  -s/p cath Wednesday, stent to mLAD  - Lipitor 80mg      Problem/Plan - 2:  ·  Problem: Pericadial effusion with ?clot.  Plan: will obtain CT chest. Repeat limited TTE to assess for enlargement. CTS recs appreciated  -Trend BMP.     Problem/Plan - 3:  ·  Problem: ESRD (end stage renal disease).  Plan: On HD Tu, Thr, Sat. Completed dialysis after arrival to [facility] today. Appreciate nephrology recommendations  -Hectorol order as per nephrology  -Will order sevelamer.       Problem/Plan - 4:  ·  Problem: Chronic systolic congestive heart failure. Plan: Reportedly mildly reduced EF. Based on E-Rxs, pt was prescribed coreg and lisinopril in June  -Cont. Coreg with hold parameters for now  -Will hold lisinopril for now  - TTE      Problem/Plan - 5:  Problem: Essential hypertension. Plan: -Trend vital signs  - Coreg               Leno Duncan DO [facility]  Cardiovascular Medicine  [address], [address]  Office: [phone]  Cell: [phone]

## 2020-06-18 NOTE — PROGRESS NOTE ADULT - ASSESSMENT
79F w/ hx of DM2 on insulin, ESRD on HD Tu, Thr, Sat, CHF?, HTN, HLD p/w chest pain for several days concerning for unstable angina vs NSTEMI given current lab findings    Problem/Plan - 1:  ·  Problem: Unstable angina.  Plan: Still having some mild chest pain but mostly improved. New TWI noted.  -F/u cardiology recommendations, S/P Cath with stent placed  - pending echo   -Trend troponin  -Telemetry  -DAPT   - statin    Problem/Plan - 2:  ·  Problem: Hyperkalemia.  Plan: S/p potassium cocktail and just completed dialysis.  -Trend BMP.     Problem/Plan - 3:  ·  Problem: ESRD (end stage renal disease).  Plan: On HD Tu, Thr, Sat. Completed dialysis after arrival to [facility] today. Appreciate nephrology recommendations  -Hectorol order as per nephrology  -Will order sevelamer.     Problem/Plan - 4:  ·  Problem: Chronic systolic congestive heart failure.  Plan: Reportedly mildly reduced EF. Based on E-Rxs, pt was prescribed coreg and lisinopril in June  -Cont. Coreg with hold parameters for now  -Will hold lisinopril for now  -TTE      Problem/Plan - 5:  ·  Problem: Type 2 diabetes mellitus with other specified complication, with long-term current use of insulin.  Plan: On some insulin at home but pt denies taking everyday  -Fingersticks and sliding scale for now.     Problem/Plan - 6:  Problem: Essential hypertension. Plan: -Trend vital signs.    Problem/Plan - 7:  ·  Problem: Prophylactic measure.  Plan: DVT PPx  -hep subq.

## 2020-06-18 NOTE — DISCHARGE NOTE NURSING/CASE MANAGEMENT/SOCIAL WORK - PATIENT PORTAL LINK FT
You can access the FollowMyHealth Patient Portal offered by [facility] by registering at the following website: [email or web address]. By joining [employer]’s FollowMyHealth portal, you will also be able to view your health information using other applications (apps) compatible with our system.

## 2020-06-18 NOTE — DISCHARGE NOTE NURSING/CASE MANAGEMENT/SOCIAL WORK - NSDCCRNAME_GEN_ALL_CORE_FT
[employer] Medicaid Home Attendant [phone], requested to resume 6/19. [facility]- Medicaid Home Attendant [phone], requested to resume 6/19. Extended [facility]- Medicaid Home Attendant [phone], requested to resume 6/22. Extended [facility]- Medicaid Home Attendant [phone], requested to resume 6/24.

## 2020-06-18 NOTE — DISCHARGE NOTE NURSING/CASE MANAGEMENT/SOCIAL WORK - NSDCPEPT PROEDHF_GEN_ALL_CORE
Monitor weight daily/Low salt diet/Activities as tolerated/Report signs and symptoms to primary care provider/Call primary care provider for follow up after discharge

## 2020-06-18 NOTE — CONSULT NOTE ADULT - ASSESSMENT
79F w/ PMHx of DM2 on insulin, ESRD on HD TTS, CHF?, HTN, HLD p/w midsternal chest pressure radiating to the back x 4 days a/w syncopal episode during dialysis. LHC on 6/17 w/ stent placed to pLAD. TTE 6/18 showed small pericardial effusion measuring 0.9cm posterior to the left ventricle and 1.1cm anterior to right ventricle as well as an echogenic mass adherent to the right ventricle that likely represents organized pericardial effusion or thrombus. No evidence of right atrial or right ventricular collapse. CT surgery consulted for evaluation of echogenic mass.     6/18: Pt seen and evaluated - in NAD, warm and well perfused. Dr. Mcgowan to review all labs an radiographic imaging. Recommending CT chest w/o contrast to further assess pericardial effusion. Recommendations to follow.

## 2020-06-18 NOTE — PROGRESS NOTE ADULT - SUBJECTIVE AND OBJECTIVE BOX
[facility] NEPHROLOGY- PROGRESS NOTE    79y Female with history of ESRD on HD presents with chest pain. Nephrology consulted for ESRD status.    REVIEW OF SYSTEMS:  Gen: no changes in weight  Cards: + chest pain improved  Resp: + dyspnea resolved  GI: no nausea or vomiting or diarrhea  Vascular: no LE edema    No Known Allergies      Hospital Medications: Medications reviewed      VITALS:  T(F): 98.2 (20 @ 10:13), Max: 98.5 (20 @ 12:37)  HR: 78 (20 @ 10:13)  BP: 116/60 (20 @ 10:13)  RR: 17 (20 @ 10:13)  SpO2: 97% (20 @ 10:13)  Wt(kg): --     @ 07:01  -   @ 07:00  --------------------------------------------------------  IN: 597 mL / OUT: 0 mL / NET: 597 mL        PHYSICAL EXAM:    Gen: NAD, calm  Cards: RRR, +S1/S2, no M/G/R  Resp: CTA B/L  GI: soft, NT/ND, NABS  Vascular: no LE edema B/L, + RIJ TDC C/D/I        LABS:      135  |  98  |  38<H>  ----------------------------<  160<H>  4.7   |  23  |  5.13<H>    Ca    8.6      2020 05:15  Phos  6.4       Mg     2.4         TPro  6.7  /  Alb  3.5  /  TBili  0.6  /  DBili      /  AST  21  /  ALT  9<L>  /  AlkPhos  77      Creatinine Trend: 5.13 <--, 3.63 <--, 5.84 <--, 5.69 <--                        11.3   12.70 )-----------( 177      ( 2020 05:15 )             34.7     Urine Studies:  Urinalysis Basic - ( 2020 12:46 )    Color: Light Yellow / Appearance: Clear / S.011 / pH:   Gluc:  / Ketone: Negative  / Bili: Negative / Urobili: Negative   Blood:  / Protein: 300 mg/dL / Nitrite: Negative   Leuk Esterase: Negative / RBC: 4 /hpf / WBC 4 /HPF   Sq Epi:  / Non Sq Epi: 4 /hpf / Bacteria: Few

## 2020-06-18 NOTE — DISCHARGE NOTE PROVIDER - CARE PROVIDER_API CALL
Leno Duncan  CARDIOVASCULAR DISEASE  [address] [place] [address]  Phone: [phone]  Fax: [phone]  Follow Up Time:

## 2020-06-18 NOTE — CONSULT NOTE ADULT - PROBLEM SELECTOR RECOMMENDATION 9
TTE 6/18 showed small pericardial effusion  0.9cm posterior to LV and 1.1cm anterior to RV as well as an echogenic mass adherent to the RV that likely represents organized pericardial effusion or thrombus. No evidence of right atrial or right ventricular collapse.   Dr. Mcgowan to review all labs and pertinent radiographic imaging  CT chest w/o contrast pending to further assess pericardial effusion   Further recommendations to follow

## 2020-06-18 NOTE — DISCHARGE NOTE PROVIDER - NSDCCAREPROVSEEN_GEN_ALL_CORE_FT
Stef Miller Stef Miller, Leno  [facility], Team  [facility] Cardiology, CATH Service

## 2020-06-18 NOTE — DISCHARGE NOTE PROVIDER - NSDCFUADDAPPT_GEN_ALL_CORE_FT
Follow up with your PMD and Cardiologist as an outpatient.  Continue with your dialysis schedule as per your nephrologist. Follow up with your PMD and Cardiologist in a week as an outpatient for further management  Continue with your dialysis schedule as per your nephrologist. Follow up with your PMD and Cardiologist in a week as an outpatient for further management including repeat blood work. Continue your home diabetes medications and discuss with your PCP for further adjustment/management. Continue with your dialysis schedule as per your nephrologist.

## 2020-06-19 LAB
ANION GAP SERPL CALC-SCNC: 14 MMOL/L — SIGNIFICANT CHANGE UP (ref 5–17)
BUN SERPL-MCNC: 22 MG/DL — SIGNIFICANT CHANGE UP (ref 7–23)
CALCIUM SERPL-MCNC: 9.3 MG/DL — SIGNIFICANT CHANGE UP (ref 8.4–10.5)
CHLORIDE SERPL-SCNC: 97 MMOL/L — SIGNIFICANT CHANGE UP (ref 96–108)
CO2 SERPL-SCNC: 25 MMOL/L — SIGNIFICANT CHANGE UP (ref 22–31)
CREAT SERPL-MCNC: 3.78 MG/DL — HIGH (ref 0.5–1.3)
CULTURE RESULTS: SIGNIFICANT CHANGE UP
GLUCOSE SERPL-MCNC: 155 MG/DL — HIGH (ref 70–99)
HCT VFR BLD CALC: 39.2 % — SIGNIFICANT CHANGE UP (ref 34.5–45)
HGB BLD-MCNC: 12.2 G/DL — SIGNIFICANT CHANGE UP (ref 11.5–15.5)
MCHC RBC-ENTMCNC: 24.5 PG — LOW (ref 27–34)
MCHC RBC-ENTMCNC: 31.1 GM/DL — LOW (ref 32–36)
MCV RBC AUTO: 78.7 FL — LOW (ref 80–100)
NRBC # BLD: 0 /100 WBCS — SIGNIFICANT CHANGE UP (ref 0–0)
PLATELET # BLD AUTO: 202 K/UL — SIGNIFICANT CHANGE UP (ref 150–400)
POTASSIUM SERPL-MCNC: 4.1 MMOL/L — SIGNIFICANT CHANGE UP (ref 3.5–5.3)
POTASSIUM SERPL-SCNC: 4.1 MMOL/L — SIGNIFICANT CHANGE UP (ref 3.5–5.3)
RBC # BLD: 4.98 M/UL — SIGNIFICANT CHANGE UP (ref 3.8–5.2)
RBC # FLD: 14.5 % — SIGNIFICANT CHANGE UP (ref 10.3–14.5)
SODIUM SERPL-SCNC: 136 MMOL/L — SIGNIFICANT CHANGE UP (ref 135–145)
SPECIMEN SOURCE: SIGNIFICANT CHANGE UP
WBC # BLD: 12.71 K/UL — HIGH (ref 3.8–10.5)
WBC # FLD AUTO: 12.71 K/UL — HIGH (ref 3.8–10.5)

## 2020-06-19 PROCEDURE — 93306 TTE W/DOPPLER COMPLETE: CPT | Mod: 26

## 2020-06-19 RX ORDER — LISINOPRIL 2.5 MG/1
2.5 TABLET ORAL DAILY
Refills: 0 | Status: DISCONTINUED | OUTPATIENT
Start: 2020-06-19 | End: 2020-06-23

## 2020-06-19 RX ADMIN — Medication 81 MILLIGRAM(S): at 12:52

## 2020-06-19 RX ADMIN — Medication 2: at 17:17

## 2020-06-19 RX ADMIN — TICAGRELOR 90 MILLIGRAM(S): 90 TABLET ORAL at 05:20

## 2020-06-19 RX ADMIN — TICAGRELOR 90 MILLIGRAM(S): 90 TABLET ORAL at 17:17

## 2020-06-19 RX ADMIN — SEVELAMER CARBONATE 1600 MILLIGRAM(S): 2400 POWDER, FOR SUSPENSION ORAL at 12:52

## 2020-06-19 RX ADMIN — Medication 2: at 09:06

## 2020-06-19 RX ADMIN — SEVELAMER CARBONATE 1600 MILLIGRAM(S): 2400 POWDER, FOR SUSPENSION ORAL at 17:17

## 2020-06-19 RX ADMIN — SEVELAMER CARBONATE 1600 MILLIGRAM(S): 2400 POWDER, FOR SUSPENSION ORAL at 09:06

## 2020-06-19 RX ADMIN — ATORVASTATIN CALCIUM 80 MILLIGRAM(S): 80 TABLET, FILM COATED ORAL at 22:35

## 2020-06-19 RX ADMIN — CARVEDILOL PHOSPHATE 25 MILLIGRAM(S): 80 CAPSULE, EXTENDED RELEASE ORAL at 17:17

## 2020-06-19 RX ADMIN — LISINOPRIL 2.5 MILLIGRAM(S): 2.5 TABLET ORAL at 19:09

## 2020-06-19 RX ADMIN — Medication 2: at 12:52

## 2020-06-19 NOTE — PROGRESS NOTE ADULT - SUBJECTIVE AND OBJECTIVE BOX
Subjective: Patient seen and examined. No new events except as noted.   doing okay   no chest pain     REVIEW OF SYSTEMS:    CONSTITUTIONAL: No weakness, fevers or chills  EYES/ENT: No visual changes;  No vertigo or throat pain   NECK: No pain or stiffness  RESPIRATORY: No cough, wheezing, hemoptysis; No shortness of breath  CARDIOVASCULAR: No chest pain or palpitations  GASTROINTESTINAL: No abdominal or epigastric pain. No nausea, vomiting, or hematemesis; No diarrhea or constipation. No melena or hematochezia.  GENITOURINARY: No dysuria, frequency or hematuria  NEUROLOGICAL: No numbness or weakness  SKIN: No itching, burning, rashes, or lesions   All other review of systems is negative unless indicated above.    MEDICATIONS:  MEDICATIONS  (STANDING):  aspirin enteric coated 81 milliGRAM(s) Oral daily  atorvastatin 80 milliGRAM(s) Oral at bedtime  carvedilol 25 milliGRAM(s) Oral every 12 hours  chlorhexidine 2% Cloths 1 Application(s) Topical daily  dextrose 5%. 1000 milliLiter(s) (50 mL/Hr) IV Continuous <Continuous>  dextrose 50% Injectable 12.5 Gram(s) IV Push once  dextrose 50% Injectable 25 Gram(s) IV Push once  dextrose 50% Injectable 25 Gram(s) IV Push once  doxercalciferol Injectable 2 MICROGram(s) IV Push <User Schedule>  insulin lispro (HumaLOG) corrective regimen sliding scale   SubCutaneous three times a day before meals  insulin lispro (HumaLOG) corrective regimen sliding scale   SubCutaneous at bedtime  sevelamer carbonate 1600 milliGRAM(s) Oral three times a day with meals  ticagrelor 90 milliGRAM(s) Oral every 12 hours      PHYSICAL EXAM:  T(C): 36.8 (06-19-20 @ 04:47), Max: 37 (06-18-20 @ 19:35)  HR: 88 (06-19-20 @ 04:47) (72 - 98)  BP: 98/63 (06-19-20 @ 04:47) (98/63 - 117/73)  RR: 18 (06-19-20 @ 04:47) (16 - 18)  SpO2: 96% (06-19-20 @ 04:47) (96% - 99%)  Wt(kg): --  I&O's Summary    18 Jun 2020 07:01 - 19 Jun 2020 07:00  --------------------------------------------------------  IN: 1740 mL / OUT: 2900 mL / NET: -1160 mL          Appearance: Normal	  HEENT:  PERRLA   Lymphatic: No lymphadenopathy   Cardiovascular: Normal S1 S2, no JVD  Respiratory: normal effort , clear  Gastrointestinal:  Soft, Non-tender  Skin: No rashes,  warm to touch  Psychiatry:  Mood & affect appropriate  Musculuskeletal: No edema      All labs, Imaging and EKGs personally reviewed                           12.2   12.71 )-----------( 202      ( 19 Jun 2020 05:53 )             39.2               06-19    136  |  97  |  22  ----------------------------<  155<H>  4.1   |  25  |  3.78<H>    Ca    9.3      19 Jun 2020 05:53  Phos  6.4     06-18      PTT - ( 17 Jun 2020 14:21 )  PTT:>200.0 sec         < from: TTE with Doppler (w/Cont) (06.18.20 @ 14:44) >  Conclusions:  1. Posterior mitral annular calcification, otherwise normal  mitral valve. Minimal mitral regurgitation.  2. Calcified trileaflet aortic valve with decreased  opening. Peak transaortic valve gradient equals 6 mm Hg,  estimated aortic valve area equals 1.4 sqcm (by continuity  equation), aortic valve velocity time integral equals 19  cm, consistent with moderate aortic stenosis. Minimal  aortic regurgitation.  3. Endocardial visualization enhanced with intravenous  injection of Ultrasonic Enhancing Agent (Definity). Severe  segmental left ventricular systolic dysfunction. No left  ventricular thrombus. The apical lateral wall, and the mid  anterior wall are hypokinetic. The mid anterior septum, the  apical septum, and the mid inferoseptum are akinetic.  4. Severe reversible diastolic dysfunction (Stage III).  5. Normal right ventricular size and function.  6. Thickened pericardium with small pericardial effusion  measuring 0.9cm posterior to the left ventricle and 1.1cm  anterior to right ventricle.  There is an echogenic mass  adherent to the right ventricle that likely represents  organized pericardial effusion or thrombus.  No evidence of  right atrial or right ventricular collapse.    < from: CT Chest No Cont (06.18.20 @ 19:24) >  IMPRESSION:   Small pericardial effusion appears similar just above fluid density.    A few right lung nodules measure up to 4 mm in the right upper lobe.    Age indeterminate compression fracture L2 with retropulsion of fragmentswithin the canal.. MRI spine may be helpful for complete evaluation.    < end of copied text >

## 2020-06-19 NOTE — PROGRESS NOTE ADULT - ASSESSMENT
79F w/ hx of DM2 on insulin, ESRD on HD Tu, Thr, Sat, CHF?, HTN, HLD p/w chest pain for several days concerning for unstable angina vs NSTEMI given current lab findings    Problem/Plan - 1:  ·  Problem: Unstable angina.  Plan: Still having some mild chest pain but mostly improved. New TWI noted.  -F/u cardiology recommendations, S/P Cath with stent placed  - Echo noted   -Trend troponin  -Telemetry  -DAPT   - statin  - CT surg eval appreciated, for pericardia effusion and echogenic mass R ventricles   - CT chest nted, awaiting for CT surg recs   - F/U outpatient regarding pulmonary nodules seen on CT     Problem/Plan - 2:  ·  Problem: Hyperkalemia.  Plan: S/p potassium cocktail and just completed dialysis.  -Trend BMP.     Problem/Plan - 3:  ·  Problem: ESRD (end stage renal disease).  Plan: On HD Tu, Thr, Sat.    Appreciate nephrology recommendations  -Hectorol order as per nephrology      Problem/Plan - 4:  ·  Problem: Chronic systolic congestive heart failure.  Plan: Reportedly mildly reduced EF. Based on E-Rxs, pt was prescribed coreg and lisinopril in June  diastolic dysfunction  -Cont. Coreg with hold parameters for now  -TTE      Problem/Plan - 5:  ·  Problem: Type 2 diabetes mellitus with other specified complication, with long-term current use of insulin.  Plan: On some insulin at home but pt denies taking everyday  -Fingersticks and sliding scale for now.     Problem/Plan - 6:  Problem: Essential hypertension. Plan: -Trend vital signs.    Problem/Plan - 7:  ·  Problem: Prophylactic measure.  Plan: DVT PPx  -hep subq.

## 2020-06-19 NOTE — PROGRESS NOTE ADULT - SUBJECTIVE AND OBJECTIVE BOX
Patient is a 79y old  Female who presents with a chief complaint of Chest Pain (19 Jun 2020 14:52)      INTERVAL HISTORY: feels ok       	  MEDICATIONS:  carvedilol 25 milliGRAM(s) Oral every 12 hours        PHYSICAL EXAM:  T(C): 36.7 (06-19-20 @ 13:12), Max: 37 (06-18-20 @ 19:35)  HR: 86 (06-19-20 @ 13:12) (86 - 98)  BP: 135/83 (06-19-20 @ 13:12) (98/63 - 135/83)  RR: 18 (06-19-20 @ 13:12) (18 - 18)  SpO2: 96% (06-19-20 @ 13:12) (96% - 98%)  Wt(kg): --  I&O's Summary    18 Jun 2020 07:01  -  19 Jun 2020 07:00  --------------------------------------------------------  IN: 1740 mL / OUT: 2900 mL / NET: -1160 mL    19 Jun 2020 07:01  -  19 Jun 2020 16:32  --------------------------------------------------------  IN: 600 mL / OUT: 0 mL / NET: 600 mL          Appearance: In no distress	  HEENT:    PERRL, EOMI	  Cardiovascular:  S1 S2, No JVD  Respiratory: Lungs clear to auscultation	  Gastrointestinal:  Soft, Non-tender, + BS	  Vascularature:  No edema of LE  Psychiatric: Appropriate affect   Neuro: no acute focal deficits                               12.2   12.71 )-----------( 202      ( 19 Jun 2020 05:53 )             39.2     06-19    136  |  97  |  22  ----------------------------<  155<H>  4.1   |  25  |  3.78<H>    Ca    9.3      19 Jun 2020 05:53  Phos  6.4     06-18          Labs personally reviewed    < from: TTE with Doppler (w/Cont) (06.18.20 @ 14:44) >  Conclusions:  1. Posterior mitral annular calcification, otherwise normal  mitral valve. Minimal mitral regurgitation.  2. Calcified trileaflet aortic valve with decreased  opening. Peak transaortic valve gradient equals 6 mm Hg,  estimated aortic valve area equals 1.4 sqcm (by continuity  equation), aortic valve velocity time integral equals 19  cm, consistent with moderate aortic stenosis. Minimal  aortic regurgitation.  3. Endocardial visualization enhanced with intravenous  injection of Ultrasonic Enhancing Agent (Definity). Severe  segmental left ventricular systolic dysfunction. No left  ventricular thrombus. The apical lateral wall, and the mid  anterior wall are hypokinetic. The mid anterior septum, the  apical septum, and the mid inferoseptum are akinetic.  4. Severe reversible diastolic dysfunction (Stage III).  5. Normal right ventricular size and function.  6. Thickened pericardium with small pericardial effusion  measuring 0.9cm posterior to the left ventricle and 1.1cm  anterior to right ventricle.  There is an echogenic mass  adherent to the right ventricle that likely represents  organized pericardial effusion or thrombus.  No evidence of  right atrial or right ventricular collapse.  *** No previous Echo exam.    < end of copied text >      < from: Transthoracic Echocardiogram (06.19.20 @ 10:33) >  Conclusions:  Limited TTE to evaluate the pericardium.  1. Thickened pericardium with small-moderate pericardial  effusion predominantly seen adjacent to the right heart and  posterior and lateral to the left heart. The effusion  measures approximately 1.0 cm posterior to the LV and 1.1  cm inferior to the RA. On apical images, there is early  diastolic inversion of the RV apex and, on subcostal  images, there is inversion of the RA free wall in atrial  diastole. The IVC is small and collapsible. Findings  suggestive of echocardiographic evidence of increased  pericardial pressure/early pericardial tamponade  physiology.  *** Compared with echocardiogram of 6/18/2020, results are  similar on today's study.    < end of copied text >    Assessment and Plan:   Assessment:  · Assessment		  79F w/ hx of DM2 on insulin, ESRD on HD Tu, Thr, Sat, CHF?, HTN, HLD p/w chest pain for several days concerning for unstable angina vs NSTEMI given current lab findings    Problem/Plan - 1:  ·  Problem: NSTEMI  Plan: Now comfortable with no further chest pain. New TWI noted. Troponin overall elevated but difficult to assess given ESRD status.  - ASA/Brillanta  -s/p cath Wednesday, stent to mLAD  - Lipitor 80mg      Problem/Plan - 2:  ·  Problem: Pericadial effusion with ?clot.  Plan: will obtain CT chest. Repeat limited TTE with early signs of tamponade as per report. CTS recs appreciated  - Will await CTS further recs    Problem/Plan - 3:  ·  Problem: ESRD (end stage renal disease).  Plan: On HD Tu, Thr, Sat. Completed dialysis after arrival to medical [facility] today. Appreciate nephrology recommendations  -Hectorol order as per nephrology  -Will order sevelamer.       Problem/Plan - 4:  ·  Problem: Chronic systolic congestive heart failure. Plan:   -Cont. Coreg, add Lisinopril 2.5mg daily  - Severe segmental left ventricular systolic dysfunction on TTE here      Problem/Plan - 5:  Problem: Essential hypertension. Plan: -Trend vital signs  - Coreg, ACE                 Leno Duncan DO [facility]  Cardiovascular Medicine  [address] [place] [address], [address]  Office: [phone]  Cell: [phone]

## 2020-06-19 NOTE — PROGRESS NOTE ADULT - ASSESSMENT
79y Female with history of ESRD on HD presents with chest pain. Nephrology consulted for ESRD status.    1) ESRD on HD: Last HD on 6/18 tolerated well with 2L removed. Plan for next maintenance HD on 6/20. Monitor electrolytes.    2) HTN with ESRD: BP low normal. Continue with current medications and low sodium diet. Monitor BP.    3) Anemia of renal disease: Hb acceptable. No need for GILBERT. Monitor Hb.    4) Secondary HPT of renal origin: Phosphorus uncontrolled for which renvela increased to 2 tabs with meals. Continue with hectorol 2 mcg with HD. Monitor serum calcium and phosphorus.    5) Hyperkalemia: Resolved s/p HD. Continue with renal diet. Monitor serum potassium.

## 2020-06-19 NOTE — PROGRESS NOTE ADULT - SUBJECTIVE AND OBJECTIVE BOX
[facility] NEPHROLOGY- PROGRESS NOTE    79y Female with history of ESRD on HD presents with chest pain. Nephrology consulted for ESRD status.    REVIEW OF SYSTEMS:  Gen: no changes in weight  Cards: + chest pain resolved  Resp: + dyspnea resolved  GI: no nausea or vomiting or diarrhea  Vascular: no LE edema    No Known Allergies      Hospital Medications: Medications reviewed      VITALS:  T(F): 98 (20 @ 13:12), Max: 98.6 (20 @ 19:35)  HR: 86 (20 @ 13:12)  BP: 135/83 (20 @ 13:12)  RR: 18 (20 @ 13:12)  SpO2: 96% (20 @ 13:12)  Wt(kg): --     @ 07:01  -   @ 07:00  --------------------------------------------------------  IN: 1740 mL / OUT: 2900 mL / NET: -1160 mL     @ 07:01  -   @ 14:53  --------------------------------------------------------  IN: 600 mL / OUT: 0 mL / NET: 600 mL        PHYSICAL EXAM:    Gen: NAD, calm  Cards: RRR, +S1/S2, no M/G/R  Resp: CTA B/L  GI: soft, NT/ND, NABS  Vascular: no LE edema B/L, + RIJ TDC C/D/I        LABS:      136  |  97  |  22  ----------------------------<  155<H>  4.1   |  25  |  3.78<H>    Ca    9.3      2020 05:53  Phos  6.4     18      Creatinine Trend: 3.78 <--, 5.13 <--, 3.63 <--, 5.84 <--, 5.69 <--                        12.2   12.71 )-----------( 202      ( 2020 05:53 )             39.2     Urine Studies:  Urinalysis Basic - ( 2020 12:46 )    Color: Light Yellow / Appearance: Clear / S.011 / pH:   Gluc:  / Ketone: Negative  / Bili: Negative / Urobili: Negative   Blood:  / Protein: 300 mg/dL / Nitrite: Negative   Leuk Esterase: Negative / RBC: 4 /hpf / WBC 4 /HPF   Sq Epi:  / Non Sq Epi: 4 /hpf / Bacteria: Few          < from: CT Chest No Cont (20 @ 19:24) >  IMPRESSION:   Small pericardial effusion appears similar just above fluid density.    A few right lung nodules measure up to 4 mm in the right upper lobe.    Age indeterminate compression fracture L2 with retropulsion of fragmentswithin the canal.. MRI spine may be helpful for complete evaluation.    < end of copied text >

## 2020-06-19 NOTE — CHART NOTE - NSCHARTNOTEFT_GEN_A_CORE
R femoral access site : dressing c/d/i , palpable pulse, no hematoma noted.    Rest of cardiac plan per primary cardiology team

## 2020-06-20 LAB
ANION GAP SERPL CALC-SCNC: 15 MMOL/L — SIGNIFICANT CHANGE UP (ref 5–17)
BUN SERPL-MCNC: 41 MG/DL — HIGH (ref 7–23)
CALCIUM SERPL-MCNC: 8.7 MG/DL — SIGNIFICANT CHANGE UP (ref 8.4–10.5)
CHLORIDE SERPL-SCNC: 96 MMOL/L — SIGNIFICANT CHANGE UP (ref 96–108)
CO2 SERPL-SCNC: 24 MMOL/L — SIGNIFICANT CHANGE UP (ref 22–31)
CREAT SERPL-MCNC: 5.39 MG/DL — HIGH (ref 0.5–1.3)
GLUCOSE SERPL-MCNC: 180 MG/DL — HIGH (ref 70–99)
HCT VFR BLD CALC: 34.5 % — SIGNIFICANT CHANGE UP (ref 34.5–45)
HGB BLD-MCNC: 11.1 G/DL — LOW (ref 11.5–15.5)
MCHC RBC-ENTMCNC: 25.1 PG — LOW (ref 27–34)
MCHC RBC-ENTMCNC: 32.2 GM/DL — SIGNIFICANT CHANGE UP (ref 32–36)
MCV RBC AUTO: 78.1 FL — LOW (ref 80–100)
NRBC # BLD: 0 /100 WBCS — SIGNIFICANT CHANGE UP (ref 0–0)
PHOSPHATE SERPL-MCNC: 5.3 MG/DL — HIGH (ref 2.5–4.5)
PLATELET # BLD AUTO: 211 K/UL — SIGNIFICANT CHANGE UP (ref 150–400)
POTASSIUM SERPL-MCNC: 4.4 MMOL/L — SIGNIFICANT CHANGE UP (ref 3.5–5.3)
POTASSIUM SERPL-SCNC: 4.4 MMOL/L — SIGNIFICANT CHANGE UP (ref 3.5–5.3)
RBC # BLD: 4.42 M/UL — SIGNIFICANT CHANGE UP (ref 3.8–5.2)
RBC # FLD: 14.3 % — SIGNIFICANT CHANGE UP (ref 10.3–14.5)
SODIUM SERPL-SCNC: 135 MMOL/L — SIGNIFICANT CHANGE UP (ref 135–145)
WBC # BLD: 12.36 K/UL — HIGH (ref 3.8–10.5)
WBC # FLD AUTO: 12.36 K/UL — HIGH (ref 3.8–10.5)

## 2020-06-20 RX ADMIN — DOXERCALCIFEROL 2 MICROGRAM(S): 2.5 CAPSULE ORAL at 11:10

## 2020-06-20 RX ADMIN — Medication 81 MILLIGRAM(S): at 12:47

## 2020-06-20 RX ADMIN — TICAGRELOR 90 MILLIGRAM(S): 90 TABLET ORAL at 05:41

## 2020-06-20 RX ADMIN — LISINOPRIL 2.5 MILLIGRAM(S): 2.5 TABLET ORAL at 05:41

## 2020-06-20 RX ADMIN — SEVELAMER CARBONATE 1600 MILLIGRAM(S): 2400 POWDER, FOR SUSPENSION ORAL at 09:00

## 2020-06-20 RX ADMIN — SEVELAMER CARBONATE 1600 MILLIGRAM(S): 2400 POWDER, FOR SUSPENSION ORAL at 18:12

## 2020-06-20 RX ADMIN — TICAGRELOR 90 MILLIGRAM(S): 90 TABLET ORAL at 17:49

## 2020-06-20 RX ADMIN — CARVEDILOL PHOSPHATE 25 MILLIGRAM(S): 80 CAPSULE, EXTENDED RELEASE ORAL at 05:41

## 2020-06-20 RX ADMIN — SEVELAMER CARBONATE 1600 MILLIGRAM(S): 2400 POWDER, FOR SUSPENSION ORAL at 12:47

## 2020-06-20 RX ADMIN — CHLORHEXIDINE GLUCONATE 1 APPLICATION(S): 213 SOLUTION TOPICAL at 12:48

## 2020-06-20 RX ADMIN — CARVEDILOL PHOSPHATE 25 MILLIGRAM(S): 80 CAPSULE, EXTENDED RELEASE ORAL at 17:49

## 2020-06-20 RX ADMIN — Medication 2: at 08:59

## 2020-06-20 RX ADMIN — ATORVASTATIN CALCIUM 80 MILLIGRAM(S): 80 TABLET, FILM COATED ORAL at 20:33

## 2020-06-20 NOTE — PROGRESS NOTE ADULT - SUBJECTIVE AND OBJECTIVE BOX
[facility] NEPHROLOGY- PROGRESS NOTE    79y Female with history of ESRD on HD presents with chest pain. Nephrology consulted for ESRD status.    Pt seen on dialysis.  Hypotension today limiting UF with HD.    REVIEW OF SYSTEMS:  Gen: no changes in weight  Cards: + chest pain resolved  Resp: + dyspnea resolved  GI: no nausea or vomiting or diarrhea  Vascular: no LE edema    No Known Allergies      Hospital Medications: Medications reviewed      VITALS:  T(F): 98 (20 @ 09:10), Max: 98.5 (20 @ 20:33)  HR: 84 (20 @ 09:10)  BP: 124/75 (20 @ 09:10)  RR: 18 (20 @ 09:10)  SpO2: 98% (20 @ 09:10)  Wt(kg): --     @ 07:01  -   @ 07:00  --------------------------------------------------------  IN: 960 mL / OUT: 0 mL / NET: 960 mL        PHYSICAL EXAM:  Gen: NAD, calm  Cards: RRR, +S1/S2, no M/G/R  Resp: CTA B/L  GI: soft, NT/ND, NABS  Vascular: no LE edema B/L, + RIJ TDC C/D/I      LABS:      135  |  96  |  41<H>  ----------------------------<  180<H>  4.4   |  24  |  5.39<H>    Ca    8.7      2020 05:26  Phos  5.3     06-20      Creatinine Trend: 5.39 <--, 3.78 <--, 5.13 <--, 3.63 <--, 5.84 <--, 5.69 <--                        11.1   12.36 )-----------( 211      ( 2020 05:26 )             34.5     Urine Studies:  Urinalysis Basic - ( 2020 12:46 )    Color: Light Yellow / Appearance: Clear / S.011 / pH:   Gluc:  / Ketone: Negative  / Bili: Negative / Urobili: Negative   Blood:  / Protein: 300 mg/dL / Nitrite: Negative   Leuk Esterase: Negative / RBC: 4 /hpf / WBC 4 /HPF   Sq Epi:  / Non Sq Epi: 4 /hpf / Bacteria: Few

## 2020-06-20 NOTE — PROGRESS NOTE ADULT - ASSESSMENT
79F w/ hx of DM2 on insulin, ESRD on HD Tu, Thr, Sat, CHF?, HTN, HLD p/w chest pain for several days concerning for unstable angina vs NSTEMI given current lab findings    Problem/Plan - 1:  ·  Problem: Unstable angina.  Plan: Still having some mild chest pain but mostly improved. New TWI noted.  -F/u cardiology recommendations, S/P Cath with stent placed  - Echo noted   -Trend troponin  -Telemetry  -DAPT   - statin  - CT surg eval appreciated, for pericardia effusion and echogenic mass R ventricles   - CT chest nted, awaiting for CT surg recs   - F/U outpatient regarding pulmonary nodules seen on CT     Problem/Plan - 2:  ·  Problem: Hyperkalemia.  Plan: S/p potassium cocktail and just completed dialysis.  -Trend BMP.     Problem/Plan - 3:  ·  Problem: ESRD (end stage renal disease).  Plan: On HD Tu, Thr, Sat.    Appreciate nephrology recommendations  -Hectorol order as per nephrology      Problem/Plan - 4:  ·  Problem: Chronic systolic congestive heart failure.  Plan: Reportedly mildly reduced EF. Based on E-Rxs, pt was prescribed coreg and lisinopril in June  diastolic dysfunction  -Cont. Coreg with hold parameters for now  -TTE noted  - CT surg eval apprecoated   repeat Echo tomorrow then D/C planing if stable       Problem/Plan - 5:  ·  Problem: Type 2 diabetes mellitus with other specified complication, with long-term current use of insulin.  Plan: On some insulin at home but pt denies taking everyday  -Fingersticks and sliding scale for now.     Problem/Plan - 6:  Problem: Essential hypertension. Plan: -Trend vital signs.    Problem/Plan - 7:  ·  Problem: Prophylactic measure.  Plan: DVT PPx  -hep subq.

## 2020-06-20 NOTE — PROGRESS NOTE ADULT - SUBJECTIVE AND OBJECTIVE BOX
Subjective: Patient seen and examined. No new events except as noted.   doing okay  HD today     REVIEW OF SYSTEMS:    CONSTITUTIONAL: No weakness, fevers or chills  EYES/ENT: No visual changes;  No vertigo or throat pain   NECK: No pain or stiffness  RESPIRATORY: No cough, wheezing, hemoptysis; No shortness of breath  CARDIOVASCULAR: No chest pain or palpitations  GASTROINTESTINAL: No abdominal or epigastric pain. No nausea, vomiting, or hematemesis; No diarrhea or constipation. No melena or hematochezia.  GENITOURINARY: No dysuria, frequency or hematuria  NEUROLOGICAL: No numbness or weakness  SKIN: No itching, burning, rashes, or lesions   All other review of systems is negative unless indicated above.    MEDICATIONS:  MEDICATIONS  (STANDING):  aspirin enteric coated 81 milliGRAM(s) Oral daily  atorvastatin 80 milliGRAM(s) Oral at bedtime  carvedilol 25 milliGRAM(s) Oral every 12 hours  chlorhexidine 2% Cloths 1 Application(s) Topical daily  dextrose 5%. 1000 milliLiter(s) (50 mL/Hr) IV Continuous <Continuous>  dextrose 50% Injectable 12.5 Gram(s) IV Push once  dextrose 50% Injectable 25 Gram(s) IV Push once  dextrose 50% Injectable 25 Gram(s) IV Push once  doxercalciferol Injectable 2 MICROGram(s) IV Push <User Schedule>  insulin lispro (HumaLOG) corrective regimen sliding scale   SubCutaneous three times a day before meals  insulin lispro (HumaLOG) corrective regimen sliding scale   SubCutaneous at bedtime  lisinopril 2.5 milliGRAM(s) Oral daily  sevelamer carbonate 1600 milliGRAM(s) Oral three times a day with meals  ticagrelor 90 milliGRAM(s) Oral every 12 hours      PHYSICAL EXAM:  T(C): 36.3 (06-20-20 @ 12:45), Max: 36.9 (06-19-20 @ 20:33)  HR: 69 (06-20-20 @ 12:45) (69 - 85)  BP: 148/79 (06-20-20 @ 12:45) (91/54 - 148/79)  RR: 18 (06-20-20 @ 12:45) (16 - 18)  SpO2: 100% (06-20-20 @ 12:45) (98% - 100%)  Wt(kg): --  I&O's Summary    19 Jun 2020 07:01  -  20 Jun 2020 07:00  --------------------------------------------------------  IN: 960 mL / OUT: 0 mL / NET: 960 mL    20 Jun 2020 07:01  -  20 Jun 2020 16:19  --------------------------------------------------------  IN: 900 mL / OUT: 2400 mL / NET: -1500 mL          Appearance: Normal	  HEENT:  PERRLA   Lymphatic: No lymphadenopathy   Cardiovascular: Normal S1 S2, no JVD  Respiratory: normal effort , clear  Gastrointestinal:  Soft, Non-tender  Skin: No rashes,  warm to touch  Psychiatry:  Mood & affect appropriate  Musculuskeletal: No edema      All labs, Imaging and EKGs personally reviewed                           11.1 12.36 )-----------( 211      ( 20 Jun 2020 05:26 )             34.5               06-20    135  |  96  |  41<H>  ----------------------------<  180<H>  4.4   |  24  |  5.39<H>    Ca    8.7      20 Jun 2020 05:26  Phos  5.3     06-20    < from: Transthoracic Echocardiogram (06.19.20 @ 10:33) >  Conclusions:  Limited TTE to evaluate the pericardium.  1. Thickened pericardium with small-moderate pericardial  effusion predominantly seen adjacent to the right heart and  posterior and lateral to the left heart. The effusion  measures approximately 1.0 cm posterior to the LV and 1.1  cm inferior to the RA. On apical images, there is early  diastolic inversion of the RV apex and, on subcostal  images, there is inversion of the RA free wall in atrial  diastole. The IVC is small and collapsible. Findings  suggestive of echocardiographic evidence of increased  pericardial pressure/early pericardial tamponade  physiology.

## 2020-06-20 NOTE — PROGRESS NOTE ADULT - ASSESSMENT
79y Female with history of ESRD on HD presents with chest pain. Nephrology consulted for ESRD status.    1) ESRD on HD: Last HD on 6/18 tolerated well with 2L removed.  Today, hypotension on dialysis limiting UF.  Pt appears euvolemic, however.  Will reduce UF goal. Monitor electrolytes.    2) HTN with ESRD: BP low normal. Continue with current medications and low sodium diet. Monitor BP.    3) Anemia of renal disease: Hb acceptable. No need for GILBERT. Monitor Hb.    4) Secondary HPT of renal origin: Phosphorus uncontrolled for which renvela increased to 2 tabs with meals. Continue with hectorol 2 mcg with HD. Monitor serum calcium and phosphorus.    5) Hyperkalemia: Resolved s/p HD. Continue with renal diet. Monitor serum potassium.

## 2020-06-20 NOTE — CHART NOTE - NSCHARTNOTEFT_GEN_A_CORE
Cardiothoracic Surgery    Films and echo reviewed by Dr Mcgowan.  Echo stable pericardial effusion.  Hemodynamically stable.  No CTS intervention at this time.  FU with cardiology  Reconsult prn

## 2020-06-21 LAB
ANION GAP SERPL CALC-SCNC: 14 MMOL/L — SIGNIFICANT CHANGE UP (ref 5–17)
BUN SERPL-MCNC: 26 MG/DL — HIGH (ref 7–23)
CALCIUM SERPL-MCNC: 8.6 MG/DL — SIGNIFICANT CHANGE UP (ref 8.4–10.5)
CHLORIDE SERPL-SCNC: 93 MMOL/L — LOW (ref 96–108)
CO2 SERPL-SCNC: 24 MMOL/L — SIGNIFICANT CHANGE UP (ref 22–31)
CREAT SERPL-MCNC: 4.16 MG/DL — HIGH (ref 0.5–1.3)
GLUCOSE SERPL-MCNC: 169 MG/DL — HIGH (ref 70–99)
HCT VFR BLD CALC: 33.5 % — LOW (ref 34.5–45)
HGB BLD-MCNC: 11.1 G/DL — LOW (ref 11.5–15.5)
MCHC RBC-ENTMCNC: 25.5 PG — LOW (ref 27–34)
MCHC RBC-ENTMCNC: 33.1 GM/DL — SIGNIFICANT CHANGE UP (ref 32–36)
MCV RBC AUTO: 76.8 FL — LOW (ref 80–100)
NRBC # BLD: 0 /100 WBCS — SIGNIFICANT CHANGE UP (ref 0–0)
PLATELET # BLD AUTO: 191 K/UL — SIGNIFICANT CHANGE UP (ref 150–400)
POTASSIUM SERPL-MCNC: 4.2 MMOL/L — SIGNIFICANT CHANGE UP (ref 3.5–5.3)
POTASSIUM SERPL-SCNC: 4.2 MMOL/L — SIGNIFICANT CHANGE UP (ref 3.5–5.3)
RBC # BLD: 4.36 M/UL — SIGNIFICANT CHANGE UP (ref 3.8–5.2)
RBC # FLD: 14.1 % — SIGNIFICANT CHANGE UP (ref 10.3–14.5)
SODIUM SERPL-SCNC: 131 MMOL/L — LOW (ref 135–145)
WBC # BLD: 11.18 K/UL — HIGH (ref 3.8–10.5)
WBC # FLD AUTO: 11.18 K/UL — HIGH (ref 3.8–10.5)

## 2020-06-21 PROCEDURE — 93306 TTE W/DOPPLER COMPLETE: CPT | Mod: 26

## 2020-06-21 RX ADMIN — Medication 81 MILLIGRAM(S): at 12:53

## 2020-06-21 RX ADMIN — ATORVASTATIN CALCIUM 80 MILLIGRAM(S): 80 TABLET, FILM COATED ORAL at 19:13

## 2020-06-21 RX ADMIN — SEVELAMER CARBONATE 1600 MILLIGRAM(S): 2400 POWDER, FOR SUSPENSION ORAL at 07:58

## 2020-06-21 RX ADMIN — LISINOPRIL 2.5 MILLIGRAM(S): 2.5 TABLET ORAL at 04:47

## 2020-06-21 RX ADMIN — TICAGRELOR 90 MILLIGRAM(S): 90 TABLET ORAL at 04:47

## 2020-06-21 RX ADMIN — CARVEDILOL PHOSPHATE 25 MILLIGRAM(S): 80 CAPSULE, EXTENDED RELEASE ORAL at 16:36

## 2020-06-21 RX ADMIN — TICAGRELOR 90 MILLIGRAM(S): 90 TABLET ORAL at 16:36

## 2020-06-21 RX ADMIN — SEVELAMER CARBONATE 1600 MILLIGRAM(S): 2400 POWDER, FOR SUSPENSION ORAL at 12:53

## 2020-06-21 RX ADMIN — CARVEDILOL PHOSPHATE 25 MILLIGRAM(S): 80 CAPSULE, EXTENDED RELEASE ORAL at 04:47

## 2020-06-21 RX ADMIN — Medication 4: at 07:58

## 2020-06-21 RX ADMIN — SEVELAMER CARBONATE 1600 MILLIGRAM(S): 2400 POWDER, FOR SUSPENSION ORAL at 16:36

## 2020-06-21 RX ADMIN — CHLORHEXIDINE GLUCONATE 1 APPLICATION(S): 213 SOLUTION TOPICAL at 10:48

## 2020-06-21 NOTE — PROGRESS NOTE ADULT - SUBJECTIVE AND OBJECTIVE BOX
[facility] NEPHROLOGY- PROGRESS NOTE    79y Female with history of ESRD on HD presents with chest pain. Nephrology consulted for ESRD status.    Pt feels well, no complaints.    She reports having felt well at dialysis yesterday, despite hypotension.    REVIEW OF SYSTEMS:  Gen: no changes in weight  Cards: no further CP  Resp: no further SOB   GI: no nausea or vomiting or diarrhea  Vascular: no LE edema    No Known Allergies      Hospital Medications: Medications reviewed      VITALS:  T(F): 98.4 (20 @ 04:44), Max: 98.4 (20 @ 04:44)  HR: 76 (20 @ 04:44)  BP: 113/70 (20 @ 04:44)  RR: 18 (20 @ 04:44)  SpO2: 98% (20 @ 04:44)  Wt(kg): --     @ 07:01  -   @ 07:00  --------------------------------------------------------  IN: 1140 mL / OUT: 2400 mL / NET: -1260 mL      PHYSICAL EXAM:  Gen: NAD, calm  Cards: RRR, +S1/S2, no M/G/R  Resp: CTA B/L  GI: soft, NT/ND, NABS  Vascular: no LE edema B/L, + RIJ TDC C/D/I      LABS:      131<L>  |  93<L>  |  26<H>  ----------------------------<  169<H>  4.2   |  24  |  4.16<H>    Ca    8.6      2020 06:00  Phos  5.3     20      Creatinine Trend: 4.16 <--, 5.39 <--, 3.78 <--, 5.13 <--, 3.63 <--, 5.84 <--, 5.69 <--                        11.1   11.18 )-----------( 191      ( 2020 06:00 )             33.5     Urine Studies:  Urinalysis Basic - ( 2020 12:46 )    Color: Light Yellow / Appearance: Clear / S.011 / pH:   Gluc:  / Ketone: Negative  / Bili: Negative / Urobili: Negative   Blood:  / Protein: 300 mg/dL / Nitrite: Negative   Leuk Esterase: Negative / RBC: 4 /hpf / WBC 4 /HPF   Sq Epi:  / Non Sq Epi: 4 /hpf / Bacteria: Few

## 2020-06-21 NOTE — PROGRESS NOTE ADULT - SUBJECTIVE AND OBJECTIVE BOX
Patient is a 79y old  Female who presents with a chief complaint of Chest Pain (21 Jun 2020 13:49)      INTERVAL HISTORY: feels ok    TELEMETRY Personally reviewed: no events  	  MEDICATIONS:  carvedilol 25 milliGRAM(s) Oral every 12 hours  lisinopril 2.5 milliGRAM(s) Oral daily        PHYSICAL EXAM:  T(C): 36.9 (06-21-20 @ 04:44), Max: 36.9 (06-21-20 @ 04:44)  HR: 76 (06-21-20 @ 04:44) (76 - 78)  BP: 113/70 (06-21-20 @ 04:44) (113/70 - 135/75)  RR: 18 (06-21-20 @ 04:44) (18 - 18)  SpO2: 98% (06-21-20 @ 04:44) (98% - 99%)  Wt(kg): --  I&O's Summary    20 Jun 2020 07:01  -  21 Jun 2020 07:00  --------------------------------------------------------  IN: 1140 mL / OUT: 2400 mL / NET: -1260 mL    21 Jun 2020 07:01  -  21 Jun 2020 15:27  --------------------------------------------------------  IN: 360 mL / OUT: 0 mL / NET: 360 mL          Appearance: In no distress	  HEENT:    PERRL, EOMI	  Cardiovascular:  S1 S2, No JVD  Respiratory: Lungs clear to auscultation	  Gastrointestinal:  Soft, Non-tender, + BS	  Vascularature:  No edema of LE  Psychiatric: Appropriate affect   Neuro: no acute focal deficits                               11.1   11.18 )-----------( 191      ( 21 Jun 2020 06:00 )             33.5     06-21    131<L>  |  93<L>  |  26<H>  ----------------------------<  169<H>  4.2   |  24  |  4.16<H>    Ca    8.6      21 Jun 2020 06:00  Phos  5.3     06-20          Labs personally reviewed      from: Transthoracic Echocardiogram (06.19.20 @ 10:33) >  Conclusions:  Limited TTE to evaluate the pericardium.  1. Thickened pericardium with small-moderate pericardial  effusion predominantly seen adjacent to the right heart and  posterior and lateral to the left heart. The effusion  measures approximately 1.0 cm posterior to the LV and 1.1  cm inferior to the RA. On apical images, there is early  diastolic inversion of the RV apex and, on subcostal  images, there is inversion of the RA free wall in atrial  diastole. The IVC is small and collapsible. Findings  suggestive of echocardiographic evidence of increased  pericardial pressure/early pericardial tamponade  physiology.  *** Compared with echocardiogram of 6/18/2020, results are  similar on today's study.    < end of copied text >    Assessment and Plan:   Assessment:  · Assessment		  79F w/ hx of DM2 on insulin, ESRD on HD Tu, Thr, Sat, CHF?, HTN, HLD p/w chest pain for several days concerning for unstable angina vs NSTEMI given current lab findings    Problem/Plan - 1:  ·  Problem: NSTEMI  Plan: Now comfortable with no further chest pain. New TWI noted. Troponin overall elevated but difficult to assess given ESRD status.  - ASA/Brillanta  -s/p cath Wednesday, stent to mLAD  - Lipitor 80mg      Problem/Plan - 2:  ·  Problem: Pericadial effusion with ?clot.  Plan: will obtain CT chest. Repeat limited TTE with early signs of tamponade as per report. CTS recs appreciated  - Repeat TTE and if no change will d/c home with outpt follow up     Problem/Plan - 3:  ·  Problem: ESRD (end stage renal disease).  Plan: On HD Tu, Thr, Sat. Completed dialysis after arrival to medical [facility] today. Appreciate nephrology recommendations        Problem/Plan - 4:  ·  Problem: Chronic systolic congestive heart failure. Plan:   -Cont. Coreg, add Lisinopril 2.5mg daily  - Severe segmental left ventricular systolic dysfunction on TTE here      Problem/Plan - 5:  Problem: Essential hypertension. Plan: -Trend vital signs  - Coreg, ACE           Leno Duncan DO [facility]  Cardiovascular Medicine  [address], [address]  Office: [phone]  Cell: [phone]

## 2020-06-21 NOTE — PROGRESS NOTE ADULT - ASSESSMENT
79y Female with history of ESRD on HD presents with chest pain. Nephrology consulted for ESRD status.    1) ESRD on HD: Last HD on 6/19, hypotension on dialysis limiting UF.  Pt appears euvolemic, however.  Will reduce UF goal. Next HD 6/23. Monitor electrolytes.    2) HTN with ESRD: BP low normal. Continue with current medications and low sodium diet. Monitor BP.    3) Anemia of renal disease: Hb acceptable. No need for GILBERT. Monitor Hb.    4) Secondary HPT of renal origin: Phosphorus uncontrolled for which renvela increased to 2 tabs with meals. Continue with hectorol 2 mcg with HD. Monitor serum calcium and phosphorus.    5) Hyperkalemia: Resolved s/p HD. Continue with renal diet. Monitor serum potassium.

## 2020-06-21 NOTE — PROGRESS NOTE ADULT - ASSESSMENT
79F w/ hx of DM2 on insulin, ESRD on HD Tu, Thr, Sat, CHF?, HTN, HLD p/w chest pain for several days concerning for unstable angina vs NSTEMI given current lab findings    Problem/Plan - 1:  ·  Problem: Unstable angina.  Plan: Still having some mild chest pain but mostly improved. New TWI noted.  -F/u cardiology recommendations, S/P Cath with stent placed  - Echo noted   -Trend troponin  -Telemetry  -DAPT   - statin  - CT surg eval appreciated, for pericardia effusion and echogenic mass R ventricles   - CT chest nted, awaiting for CT surg recs   - F/U outpatient regarding pulmonary nodules seen on CT   - positive orthostatics   - Volume control     Problem/Plan - 2:  ·  Problem: Hyperkalemia.  Plan: S/p potassium cocktail and just completed dialysis.  -Trend BMP.     Problem/Plan - 3:  ·  Problem: ESRD (end stage renal disease).  Plan: On HD Tu, Thr, Sat.    Appreciate nephrology recommendations  -Hectorol order as per nephrology      Problem/Plan - 4:  ·  Problem: Chronic systolic congestive heart failure.  Plan: Reportedly mildly reduced EF. Based on E-Rxs, pt was prescribed coreg and lisinopril in June  diastolic dysfunction  -Cont. Coreg with hold parameters for now  -TTE noted  - CT surg eval apprecoated   - Repeat echo noted       Problem/Plan - 5:  ·  Problem: Type 2 diabetes mellitus with other specified complication, with long-term current use of insulin.  Plan: On some insulin at home but pt denies taking everyday  -Fingersticks and sliding scale for now.     Problem/Plan - 6:  Problem: Essential hypertension. Plan: -Trend vital signs.    Problem/Plan - 7:  ·  Problem: Prophylactic measure.  Plan: DVT PPx  -hep subq.

## 2020-06-21 NOTE — PROGRESS NOTE ADULT - SUBJECTIVE AND OBJECTIVE BOX
Subjective: Patient seen and examined. No new events except as noted.   dizziness on stanidng     REVIEW OF SYSTEMS:    CONSTITUTIONAL: No weakness, fevers or chills  EYES/ENT: No visual changes;  No vertigo or throat pain   NECK: No pain or stiffness  RESPIRATORY: No cough, wheezing, hemoptysis; No shortness of breath  CARDIOVASCULAR: No chest pain or palpitations  GASTROINTESTINAL: No abdominal or epigastric pain. No nausea, vomiting, or hematemesis; No diarrhea or constipation. No melena or hematochezia.  GENITOURINARY: No dysuria, frequency or hematuria  NEUROLOGICAL: No numbness or weakness  SKIN: No itching, burning, rashes, or lesions   All other review of systems is negative unless indicated above.    MEDICATIONS:  MEDICATIONS  (STANDING):  aspirin enteric coated 81 milliGRAM(s) Oral daily  atorvastatin 80 milliGRAM(s) Oral at bedtime  carvedilol 25 milliGRAM(s) Oral every 12 hours  chlorhexidine 2% Cloths 1 Application(s) Topical daily  dextrose 5%. 1000 milliLiter(s) (50 mL/Hr) IV Continuous <Continuous>  dextrose 50% Injectable 12.5 Gram(s) IV Push once  dextrose 50% Injectable 25 Gram(s) IV Push once  dextrose 50% Injectable 25 Gram(s) IV Push once  doxercalciferol Injectable 2 MICROGram(s) IV Push <User Schedule>  insulin lispro (HumaLOG) corrective regimen sliding scale   SubCutaneous three times a day before meals  insulin lispro (HumaLOG) corrective regimen sliding scale   SubCutaneous at bedtime  lisinopril 2.5 milliGRAM(s) Oral daily  sevelamer carbonate 1600 milliGRAM(s) Oral three times a day with meals  ticagrelor 90 milliGRAM(s) Oral every 12 hours      PHYSICAL EXAM:  T(C): 36.6 (06-21-20 @ 21:44), Max: 36.9 (06-21-20 @ 04:44)  HR: 75 (06-21-20 @ 16:35) (75 - 76)  BP: 144/80 (06-21-20 @ 16:35) (113/70 - 144/80)  RR: 18 (06-21-20 @ 21:44) (18 - 18)  SpO2: 96% (06-21-20 @ 21:44) (96% - 98%)  Wt(kg): --  I&O's Summary    20 Jun 2020 07:01  -  21 Jun 2020 07:00  --------------------------------------------------------  IN: 1140 mL / OUT: 2400 mL / NET: -1260 mL    21 Jun 2020 07:01  -  21 Jun 2020 22:39  --------------------------------------------------------  IN: 600 mL / OUT: 0 mL / NET: 600 mL          Appearance: Normal	  HEENT:  PERRLA   Lymphatic: No lymphadenopathy   Cardiovascular: Normal S1 S2, no JVD  Respiratory: normal effort , clear  Gastrointestinal:  Soft, Non-tender  Skin: No rashes,  warm to touch  Psychiatry:  Mood & affect appropriate  Musculuskeletal: No edema      All labs, Imaging and EKGs personally reviewed                           11.1 11.18 )-----------( 191      ( 21 Jun 2020 06:00 )             33.5               06-21    131<L>  |  93<L>  |  26<H>  ----------------------------<  169<H>  4.2   |  24  |  4.16<H>    Ca    8.6      21 Jun 2020 06:00  Phos  5.3     06-20           < from: Transthoracic Echocardiogram (06.21.20 @ 10:02) >  CONCLUSIONS:  Limited repeat study to re-evaluate pericardial effusion.  Small pericardial effusion posterior to the left ventricle,  lateral to the left ventricle, and adjacent to the RV free  wall.

## 2020-06-22 LAB
ANION GAP SERPL CALC-SCNC: 15 MMOL/L — SIGNIFICANT CHANGE UP (ref 5–17)
BUN SERPL-MCNC: 46 MG/DL — HIGH (ref 7–23)
CALCIUM SERPL-MCNC: 8.6 MG/DL — SIGNIFICANT CHANGE UP (ref 8.4–10.5)
CHLORIDE SERPL-SCNC: 92 MMOL/L — LOW (ref 96–108)
CO2 SERPL-SCNC: 22 MMOL/L — SIGNIFICANT CHANGE UP (ref 22–31)
CREAT SERPL-MCNC: 5.51 MG/DL — HIGH (ref 0.5–1.3)
GLUCOSE SERPL-MCNC: 160 MG/DL — HIGH (ref 70–99)
HCT VFR BLD CALC: 32.1 % — LOW (ref 34.5–45)
HGB BLD-MCNC: 10.4 G/DL — LOW (ref 11.5–15.5)
MCHC RBC-ENTMCNC: 24.5 PG — LOW (ref 27–34)
MCHC RBC-ENTMCNC: 32.4 GM/DL — SIGNIFICANT CHANGE UP (ref 32–36)
MCV RBC AUTO: 75.7 FL — LOW (ref 80–100)
NRBC # BLD: 0 /100 WBCS — SIGNIFICANT CHANGE UP (ref 0–0)
PLATELET # BLD AUTO: 207 K/UL — SIGNIFICANT CHANGE UP (ref 150–400)
POTASSIUM SERPL-MCNC: 4.1 MMOL/L — SIGNIFICANT CHANGE UP (ref 3.5–5.3)
POTASSIUM SERPL-SCNC: 4.1 MMOL/L — SIGNIFICANT CHANGE UP (ref 3.5–5.3)
RBC # BLD: 4.24 M/UL — SIGNIFICANT CHANGE UP (ref 3.8–5.2)
RBC # FLD: 13.7 % — SIGNIFICANT CHANGE UP (ref 10.3–14.5)
SODIUM SERPL-SCNC: 129 MMOL/L — LOW (ref 135–145)
WBC # BLD: 10.77 K/UL — HIGH (ref 3.8–10.5)
WBC # FLD AUTO: 10.77 K/UL — HIGH (ref 3.8–10.5)

## 2020-06-22 RX ADMIN — TICAGRELOR 90 MILLIGRAM(S): 90 TABLET ORAL at 05:25

## 2020-06-22 RX ADMIN — SEVELAMER CARBONATE 1600 MILLIGRAM(S): 2400 POWDER, FOR SUSPENSION ORAL at 12:13

## 2020-06-22 RX ADMIN — Medication 2: at 08:04

## 2020-06-22 RX ADMIN — Medication 2: at 12:13

## 2020-06-22 RX ADMIN — TICAGRELOR 90 MILLIGRAM(S): 90 TABLET ORAL at 16:53

## 2020-06-22 RX ADMIN — CHLORHEXIDINE GLUCONATE 1 APPLICATION(S): 213 SOLUTION TOPICAL at 10:42

## 2020-06-22 RX ADMIN — SEVELAMER CARBONATE 1600 MILLIGRAM(S): 2400 POWDER, FOR SUSPENSION ORAL at 16:53

## 2020-06-22 RX ADMIN — SEVELAMER CARBONATE 1600 MILLIGRAM(S): 2400 POWDER, FOR SUSPENSION ORAL at 08:04

## 2020-06-22 RX ADMIN — Medication 2: at 17:25

## 2020-06-22 RX ADMIN — CARVEDILOL PHOSPHATE 25 MILLIGRAM(S): 80 CAPSULE, EXTENDED RELEASE ORAL at 16:53

## 2020-06-22 RX ADMIN — CARVEDILOL PHOSPHATE 25 MILLIGRAM(S): 80 CAPSULE, EXTENDED RELEASE ORAL at 05:25

## 2020-06-22 RX ADMIN — ATORVASTATIN CALCIUM 80 MILLIGRAM(S): 80 TABLET, FILM COATED ORAL at 19:28

## 2020-06-22 RX ADMIN — Medication 81 MILLIGRAM(S): at 12:13

## 2020-06-22 RX ADMIN — LISINOPRIL 2.5 MILLIGRAM(S): 2.5 TABLET ORAL at 05:25

## 2020-06-22 NOTE — PROGRESS NOTE ADULT - SUBJECTIVE AND OBJECTIVE BOX
[facility] NEPHROLOGY- PROGRESS NOTE    79y Female with history of ESRD on HD presents with chest pain. Nephrology consulted for ESRD status.    REVIEW OF SYSTEMS:  Gen: no changes in weight  Cards: no chest pain  Resp: no dyspnea  GI: no nausea or vomiting or diarrhea  Vascular: no LE edema    No Known Allergies      Hospital Medications: Medications reviewed      VITALS:  T(F): 98.2 (20 @ 12:49), Max: 98.2 (20 @ 12:49)  HR: 70 (20 @ 12:49)  BP: 127/75 (20 @ 12:49)  RR: 15 (20 @ 12:49)  SpO2: 98% (20 @ 12:49)  Wt(kg): --     @ 07:01  -   @ 07:00  --------------------------------------------------------  IN: 600 mL / OUT: 0 mL / NET: 600 mL      PHYSICAL EXAM:  Gen: NAD, calm  Cards: RRR, +S1/S2, no M/G/R  Resp: CTA B/L  GI: soft, NT/ND, NABS  Vascular: no LE edema B/L, + RIJ TDC C/D/I      LABS:      129<L>  |  92<L>  |  46<H>  ----------------------------<  160<H>  4.1   |  22  |  5.51<H>    Ca    8.6      2020 05:08      Creatinine Trend: 5.51 <--, 4.16 <--, 5.39 <--, 3.78 <--, 5.13 <--, 3.63 <--, 5.84 <--, 5.69 <--                        10.4   10.77 )-----------( 207      ( 2020 05:07 )             32.1     Urine Studies:  Urinalysis Basic - ( 2020 12:46 )    Color: Light Yellow / Appearance: Clear / S.011 / pH:   Gluc:  / Ketone: Negative  / Bili: Negative / Urobili: Negative   Blood:  / Protein: 300 mg/dL / Nitrite: Negative   Leuk Esterase: Negative / RBC: 4 /hpf / WBC 4 /HPF   Sq Epi:  / Non Sq Epi: 4 /hpf / Bacteria: Few

## 2020-06-22 NOTE — DIETITIAN INITIAL EVALUATION ADULT. - PERTINENT MEDS FT
Home Medications:  albuterol 90 mcg/inh inhalation aerosol: 2 puff(s) inhaled 4 times a day, As Needed   (18 Jun 2020 11:00)  carvedilol 25 mg oral tablet: 1 tab(s) orally every 12 hours (18 Jun 2020 11:00)  Vitamin D2 50,000 intl units (1.25 mg) oral capsule: 1 cap(s) orally once a week    MED AS PER PHARMACY ONLY, PATIENT UNAWARE OF MED LIST (16 Jun 2020 15:36)  MEDICATIONS  (STANDING):  aspirin enteric coated 81 milliGRAM(s) Oral daily  atorvastatin 80 milliGRAM(s) Oral at bedtime  carvedilol 25 milliGRAM(s) Oral every 12 hours  chlorhexidine 2% Cloths 1 Application(s) Topical daily  dextrose 5%. 1000 milliLiter(s) (50 mL/Hr) IV Continuous <Continuous>  dextrose 50% Injectable 12.5 Gram(s) IV Push once  dextrose 50% Injectable 25 Gram(s) IV Push once  dextrose 50% Injectable 25 Gram(s) IV Push once  doxercalciferol Injectable 2 MICROGram(s) IV Push <User Schedule>  insulin lispro (HumaLOG) corrective regimen sliding scale   SubCutaneous three times a day before meals  insulin lispro (HumaLOG) corrective regimen sliding scale   SubCutaneous at bedtime  lisinopril 2.5 milliGRAM(s) Oral daily  sevelamer carbonate 1600 milliGRAM(s) Oral three times a day with meals  ticagrelor 90 milliGRAM(s) Oral every 12 hours

## 2020-06-22 NOTE — PROGRESS NOTE ADULT - ASSESSMENT
79y Female with history of ESRD on HD presents with chest pain. Nephrology consulted for ESRD status.    1) ESRD on HD: Last HD on 6/20 with intradialytic hypotension and 1.5L removed. Plan for next maintenance HD on 6/23. Monitor electrolytes.    2) HTN with ESRD: BP controlled. Continue with current medications and low sodium diet. Monitor BP.    3) Anemia of renal disease: Hb acceptable. GILBERT if Hb decreases further. Monitor Hb.    4) Secondary HPT of renal origin: Phosphorus uncontrolled for which renvela increased to 2 tabs with meals. Continue with hectorol 2 mcg with HD. Monitor serum calcium and phosphorus.    5) Hyperkalemia: Resolved s/p HD. Continue with renal diet. Monitor serum potassium.

## 2020-06-22 NOTE — DIETITIAN INITIAL EVALUATION ADULT. - PROBLEM SELECTOR PLAN 5
On some insulin at home but pt denies taking everyday  -Fingersticks and sliding scale for now  -[place] [facility] [place] [phone]

## 2020-06-22 NOTE — DIETITIAN INITIAL EVALUATION ADULT. - PROBLEM SELECTOR PLAN 4
Reportedly mildly reduced EF. Based on E-Rxs, pt was prescribed coreg and lisinopril in June  -Cont. Coreg with hold parameters for now  -Will hold lisinopril for now  -Consider TTE  -Unclear if on diuretic  -Need med rec [facility] in [place] [phone]

## 2020-06-22 NOTE — PROGRESS NOTE ADULT - SUBJECTIVE AND OBJECTIVE BOX
Subjective: Patient seen and examined. No new events except as noted.   positive orthostatics   symptomatic durign HD and standing up     REVIEW OF SYSTEMS:    CONSTITUTIONAL: No weakness, fevers or chills  EYES/ENT: No visual changes;  No vertigo or throat pain   NECK: No pain or stiffness  RESPIRATORY: No cough, wheezing, hemoptysis; No shortness of breath  CARDIOVASCULAR: No chest pain or palpitations  GASTROINTESTINAL: No abdominal or epigastric pain. No nausea, vomiting, or hematemesis; No diarrhea or constipation. No melena or hematochezia.  GENITOURINARY: No dysuria, frequency or hematuria  NEUROLOGICAL: No numbness or weakness  SKIN: No itching, burning, rashes, or lesions   All other review of systems is negative unless indicated above.    MEDICATIONS:  MEDICATIONS  (STANDING):  aspirin enteric coated 81 milliGRAM(s) Oral daily  atorvastatin 80 milliGRAM(s) Oral at bedtime  carvedilol 25 milliGRAM(s) Oral every 12 hours  chlorhexidine 2% Cloths 1 Application(s) Topical daily  dextrose 5%. 1000 milliLiter(s) (50 mL/Hr) IV Continuous <Continuous>  dextrose 50% Injectable 12.5 Gram(s) IV Push once  dextrose 50% Injectable 25 Gram(s) IV Push once  dextrose 50% Injectable 25 Gram(s) IV Push once  doxercalciferol Injectable 2 MICROGram(s) IV Push <User Schedule>  insulin lispro (HumaLOG) corrective regimen sliding scale   SubCutaneous three times a day before meals  insulin lispro (HumaLOG) corrective regimen sliding scale   SubCutaneous at bedtime  lisinopril 2.5 milliGRAM(s) Oral daily  sevelamer carbonate 1600 milliGRAM(s) Oral three times a day with meals  ticagrelor 90 milliGRAM(s) Oral every 12 hours      PHYSICAL EXAM:  T(C): 36.7 (06-22-20 @ 04:19), Max: 36.7 (06-22-20 @ 04:19)  HR: 70 (06-22-20 @ 04:19) (70 - 75)  BP: 126/76 (06-22-20 @ 04:19) (126/76 - 144/80)  RR: 16 (06-22-20 @ 04:19) (16 - 18)  SpO2: 99% (06-22-20 @ 04:19) (96% - 99%)  Wt(kg): --  I&O's Summary    21 Jun 2020 07:01  -  22 Jun 2020 07:00  --------------------------------------------------------  IN: 600 mL / OUT: 0 mL / NET: 600 mL          Appearance: Normal	  HEENT:  PERRLA   Lymphatic: No lymphadenopathy   Cardiovascular: Normal S1 S2, no JVD  Respiratory: normal effort , clear  Gastrointestinal:  Soft, Non-tender  Skin: No rashes,  warm to touch  Psychiatry:  Mood & affect appropriate  Musculuskeletal: No edema      All labs, Imaging and EKGs personally reviewed                           10.4   10.77 )-----------( 207      ( 22 Jun 2020 05:07 )             32.1               06-22    129<L>  |  92<L>  |  46<H>  ----------------------------<  160<H>  4.1   |  22  |  5.51<H>    Ca    8.6      22 Jun 2020 05:08    < from: Transthoracic Echocardiogram (06.21.20 @ 10:02) >  CONCLUSIONS:  Limited repeat study to re-evaluate pericardial effusion.  Small pericardial effusion posterior to the left ventricle,  lateral to the left ventricle, and adjacent to the RV free  wall.

## 2020-06-22 NOTE — DIETITIAN INITIAL EVALUATION ADULT. - OTHER INFO
Patient seen for routine length of stay assessment.  Patient found resting in bed, pleasant.  Complains of no appetite.  Admits that she is a small eater in generally.  Patient reports that she started HD in April.  Was hospitalized in [facility] two times and then started HD.  Since being in hospital and starting HD, lost about 11 pounds.  Visits with RDN at HD and able to tell this RDN what she needs to avoid.  Watches intake of potassium, phosphorous, and sodium.  Also avoids sweets due to hx of diabetes.  Current A1c 6.2 and very well controlled.  Supplements with Vit D for deficiency.

## 2020-06-22 NOTE — PROGRESS NOTE ADULT - ASSESSMENT
79F w/ hx of DM2 on insulin, ESRD on HD Tu, Thr, Sat, CHF?, HTN, HLD p/w chest pain for several days concerning for unstable angina vs NSTEMI given current lab findings    Problem/Plan - 1:  ·  Problem: Unstable angina.  Plan: Still having some mild chest pain but mostly improved. New TWI noted.  -F/u cardiology recommendations, S/P Cath with stent placed  - Echo noted   -Trend troponin  -Telemetry  -DAPT   - statin  - CT surg eval appreciated, for pericardia effusion and echogenic mass R ventricles   - CT chest nted, awaiting for CT surg recs   - F/U outpatient regarding pulmonary nodules seen on CT   - positive orthostatics, may benefit from low dose Midodrine  - Volume control       Problem/Plan - 2:  ·  Problem: Hyperkalemia.  Plan: S/p potassium cocktail and just completed dialysis.  -Trend BMP.     Problem/Plan - 3:  ·  Problem: ESRD (end stage renal disease).  Plan: On HD Tu, Thr, Sat.    Appreciate nephrology recommendations  -Hectorol order as per nephrology      Problem/Plan - 4:  ·  Problem: Chronic systolic congestive heart failure.  Plan: Reportedly mildly reduced EF. Based on E-Rxs, pt was prescribed coreg and lisinopril in June  diastolic dysfunction  -Cont. Coreg with hold parameters for now  -TTE noted  - CT surg eval apprecoated   - Repeat echo noted       Problem/Plan - 5:  ·  Problem: Type 2 diabetes mellitus with other specified complication, with long-term current use of insulin.  Plan: On some insulin at home but pt denies taking everyday  -Fingersticks and sliding scale for now.     Problem/Plan - 6:  Problem: Essential hypertension. Plan: -Trend vital signs.    Problem/Plan - 7:  ·  Problem: Prophylactic measure.  Plan: DVT PPx  -hep subq.

## 2020-06-22 NOTE — DIETITIAN INITIAL EVALUATION ADULT. - ADD RECOMMEND
Consider adding Consistent CHO to (renal) diet, Monitor diet tolerance, po intake, GI tolerance, weight trends, labs, and skin integrity, add and Vit D3 RDN remains available for follow up

## 2020-06-22 NOTE — PROGRESS NOTE ADULT - SUBJECTIVE AND OBJECTIVE BOX
Patient is a 79y old  Female who presents with a chief complaint of Chest Pain (22 Jun 2020 12:58)      INTERVAL HISTORY: feels ok    TELEMETRY Personally reviewed: no events  	  MEDICATIONS:  carvedilol 25 milliGRAM(s) Oral every 12 hours  lisinopril 2.5 milliGRAM(s) Oral daily        PHYSICAL EXAM:  T(C): 36.8 (06-22-20 @ 20:37), Max: 36.8 (06-22-20 @ 12:49)  HR: 67 (06-22-20 @ 20:37) (67 - 71)  BP: 138/78 (06-22-20 @ 20:37) (126/76 - 154/82)  RR: 18 (06-22-20 @ 20:37) (15 - 18)  SpO2: 97% (06-22-20 @ 20:37) (97% - 99%)  Wt(kg): --  I&O's Summary    21 Jun 2020 07:01  -  22 Jun 2020 07:00  --------------------------------------------------------  IN: 600 mL / OUT: 0 mL / NET: 600 mL    22 Jun 2020 07:01  -  22 Jun 2020 22:30  --------------------------------------------------------  IN: 960 mL / OUT: 0 mL / NET: 960 mL          Appearance: In no distress	  HEENT:    PERRL, EOMI	  Cardiovascular:  S1 S2, No JVD  Respiratory: Lungs clear to auscultation	  Gastrointestinal:  Soft, Non-tender, + BS	  Vascularature:  No edema of LE  Psychiatric: Appropriate affect   Neuro: no acute focal deficits                               10.4   10.77 )-----------( 207      ( 22 Jun 2020 05:07 )             32.1     06-22    129<L>  |  92<L>  |  46<H>  ----------------------------<  160<H>  4.1   |  22  |  5.51<H>    Ca    8.6      22 Jun 2020 05:08          Labs personally reviewed      from: Transthoracic Echocardiogram (06.19.20 @ 10:33) >  Conclusions:  Limited TTE to evaluate the pericardium.  1. Thickened pericardium with small-moderate pericardial  effusion predominantly seen adjacent to the right heart and  posterior and lateral to the left heart. The effusion  measures approximately 1.0 cm posterior to the LV and 1.1  cm inferior to the RA. On apical images, there is early  diastolic inversion of the RV apex and, on subcostal  images, there is inversion of the RA free wall in atrial  diastole. The IVC is small and collapsible. Findings  suggestive of echocardiographic evidence of increased  pericardial pressure/early pericardial tamponade  physiology.  *** Compared with echocardiogram of 6/18/2020, results are  similar on today's study.    < end of copied text >    Assessment and Plan:   Assessment:  · Assessment		  79F w/ hx of DM2 on insulin, ESRD on HD Tu, Thr, Sat, CHF?, HTN, HLD p/w chest pain for several days concerning for unstable angina vs NSTEMI given current lab findings    Problem/Plan - 1:  ·  Problem: NSTEMI  Plan: Now comfortable with no further chest pain. New TWI noted. Troponin overall elevated but difficult to assess given ESRD status.  - ASA/Brillanta  -s/p cath Wednesday, stent to mLAD  - Lipitor 80mg      Problem/Plan - 2:  ·  Problem: Pericadial effusion with ?clot.  Plan: will obtain CT chest. Repeat limited TTE with early signs of tamponade as per report. CTS recs appreciated  - Repeat TTE and if no change will d/c home with outpt follow up     Problem/Plan - 3:  ·  Problem: ESRD (end stage renal disease).  Plan: On HD Tu, Thr, Sat. Completed dialysis after arrival to medical abur[facility] today. Appreciate nephrology recommendations        Problem/Plan - 4:  ·  Problem: Chronic systolic congestive heart failure. Plan:   -Cont. Coreg, add Lisinopril 2.5mg daily  - Severe segmental left ventricular systolic dysfunction on TTE here      Problem/Plan - 5:  Problem: Essential hypertension. Plan: -Trend vital signs  - Coreg, ACE     Discharge planning with close outpt follow up next week       Leno Duncan DO [facility]  Cardiovascular Medicine  [address], [address]  Office: [phone]  Cell: [phone]          Leno Duncan DO [facility]  [address], [address]  Office: [phone]  Cell: [phone]

## 2020-06-22 NOTE — DIETITIAN INITIAL EVALUATION ADULT. - ENERGY NEEDS
HT 61 inches, .7 pounds,  pounds, BMI 26.2,  pounds.  Dxd with Unstable angina vs. NSTEM, CHF  Skin intact.  Well nourished. HT 61 inches, .7 pounds,  pounds, BMI 26.2,  pounds.  Dxd with Unstable angina vs. NSTEM, CHF  Skin intact.  Well nourished.  Fluids per team

## 2020-06-23 VITALS
TEMPERATURE: 98 F | WEIGHT: 132.28 LBS | SYSTOLIC BLOOD PRESSURE: 104 MMHG | OXYGEN SATURATION: 98 % | HEART RATE: 71 BPM | RESPIRATION RATE: 18 BRPM | DIASTOLIC BLOOD PRESSURE: 40 MMHG

## 2020-06-23 LAB
ANION GAP SERPL CALC-SCNC: 18 MMOL/L — HIGH (ref 5–17)
BASOPHILS # BLD AUTO: 0.1 K/UL — SIGNIFICANT CHANGE UP (ref 0–0.2)
BASOPHILS NFR BLD AUTO: 0.9 % — SIGNIFICANT CHANGE UP (ref 0–2)
BUN SERPL-MCNC: 56 MG/DL — HIGH (ref 7–23)
CALCIUM SERPL-MCNC: 8.6 MG/DL — SIGNIFICANT CHANGE UP (ref 8.4–10.5)
CHLORIDE SERPL-SCNC: 93 MMOL/L — LOW (ref 96–108)
CO2 SERPL-SCNC: 21 MMOL/L — LOW (ref 22–31)
CREAT SERPL-MCNC: 6.42 MG/DL — HIGH (ref 0.5–1.3)
EOSINOPHIL # BLD AUTO: 1.76 K/UL — HIGH (ref 0–0.5)
EOSINOPHIL NFR BLD AUTO: 16.4 % — HIGH (ref 0–6)
GLUCOSE SERPL-MCNC: 166 MG/DL — HIGH (ref 70–99)
HCT VFR BLD CALC: 33.5 % — LOW (ref 34.5–45)
HGB BLD-MCNC: 10.9 G/DL — LOW (ref 11.5–15.5)
IMM GRANULOCYTES NFR BLD AUTO: 0.3 % — SIGNIFICANT CHANGE UP (ref 0–1.5)
LYMPHOCYTES # BLD AUTO: 2.52 K/UL — SIGNIFICANT CHANGE UP (ref 1–3.3)
LYMPHOCYTES # BLD AUTO: 23.4 % — SIGNIFICANT CHANGE UP (ref 13–44)
MCHC RBC-ENTMCNC: 24.9 PG — LOW (ref 27–34)
MCHC RBC-ENTMCNC: 32.5 GM/DL — SIGNIFICANT CHANGE UP (ref 32–36)
MCV RBC AUTO: 76.7 FL — LOW (ref 80–100)
MONOCYTES # BLD AUTO: 0.84 K/UL — SIGNIFICANT CHANGE UP (ref 0–0.9)
MONOCYTES NFR BLD AUTO: 7.8 % — SIGNIFICANT CHANGE UP (ref 2–14)
NEUTROPHILS # BLD AUTO: 5.51 K/UL — SIGNIFICANT CHANGE UP (ref 1.8–7.4)
NEUTROPHILS NFR BLD AUTO: 51.2 % — SIGNIFICANT CHANGE UP (ref 43–77)
NRBC # BLD: 0 /100 WBCS — SIGNIFICANT CHANGE UP (ref 0–0)
PHOSPHATE SERPL-MCNC: 5.6 MG/DL — HIGH (ref 2.5–4.5)
PLATELET # BLD AUTO: 221 K/UL — SIGNIFICANT CHANGE UP (ref 150–400)
POTASSIUM SERPL-MCNC: 4.5 MMOL/L — SIGNIFICANT CHANGE UP (ref 3.5–5.3)
POTASSIUM SERPL-SCNC: 4.5 MMOL/L — SIGNIFICANT CHANGE UP (ref 3.5–5.3)
RBC # BLD: 4.37 M/UL — SIGNIFICANT CHANGE UP (ref 3.8–5.2)
RBC # FLD: 13.7 % — SIGNIFICANT CHANGE UP (ref 10.3–14.5)
SODIUM SERPL-SCNC: 132 MMOL/L — LOW (ref 135–145)
WBC # BLD: 10.76 K/UL — HIGH (ref 3.8–10.5)
WBC # FLD AUTO: 10.76 K/UL — HIGH (ref 3.8–10.5)

## 2020-06-23 PROCEDURE — 87070 CULTURE OTHR SPECIMN AEROBIC: CPT

## 2020-06-23 PROCEDURE — 99152 MOD SED SAME PHYS/QHP 5/>YRS: CPT

## 2020-06-23 PROCEDURE — C9606: CPT | Mod: LD

## 2020-06-23 PROCEDURE — 83690 ASSAY OF LIPASE: CPT

## 2020-06-23 PROCEDURE — C1887: CPT

## 2020-06-23 PROCEDURE — 83036 HEMOGLOBIN GLYCOSYLATED A1C: CPT

## 2020-06-23 PROCEDURE — 85014 HEMATOCRIT: CPT

## 2020-06-23 PROCEDURE — 82947 ASSAY GLUCOSE BLOOD QUANT: CPT

## 2020-06-23 PROCEDURE — 85610 PROTHROMBIN TIME: CPT

## 2020-06-23 PROCEDURE — 84132 ASSAY OF SERUM POTASSIUM: CPT

## 2020-06-23 PROCEDURE — 93005 ELECTROCARDIOGRAM TRACING: CPT

## 2020-06-23 PROCEDURE — C1725: CPT

## 2020-06-23 PROCEDURE — C1894: CPT

## 2020-06-23 PROCEDURE — 80048 BASIC METABOLIC PNL TOTAL CA: CPT

## 2020-06-23 PROCEDURE — 96374 THER/PROPH/DIAG INJ IV PUSH: CPT

## 2020-06-23 PROCEDURE — 99285 EMERGENCY DEPT VISIT HI MDM: CPT | Mod: 25

## 2020-06-23 PROCEDURE — C1769: CPT

## 2020-06-23 PROCEDURE — 84295 ASSAY OF SERUM SODIUM: CPT

## 2020-06-23 PROCEDURE — 82435 ASSAY OF BLOOD CHLORIDE: CPT

## 2020-06-23 PROCEDURE — 83605 ASSAY OF LACTIC ACID: CPT

## 2020-06-23 PROCEDURE — 93458 L HRT ARTERY/VENTRICLE ANGIO: CPT | Mod: 59

## 2020-06-23 PROCEDURE — 99153 MOD SED SAME PHYS/QHP EA: CPT

## 2020-06-23 PROCEDURE — C1753: CPT

## 2020-06-23 PROCEDURE — 71250 CT THORAX DX C-: CPT

## 2020-06-23 PROCEDURE — 83735 ASSAY OF MAGNESIUM: CPT

## 2020-06-23 PROCEDURE — 81001 URINALYSIS AUTO W/SCOPE: CPT

## 2020-06-23 PROCEDURE — 85379 FIBRIN DEGRADATION QUANT: CPT

## 2020-06-23 PROCEDURE — 83880 ASSAY OF NATRIURETIC PEPTIDE: CPT

## 2020-06-23 PROCEDURE — 93306 TTE W/DOPPLER COMPLETE: CPT

## 2020-06-23 PROCEDURE — 82728 ASSAY OF FERRITIN: CPT

## 2020-06-23 PROCEDURE — 84484 ASSAY OF TROPONIN QUANT: CPT

## 2020-06-23 PROCEDURE — 85730 THROMBOPLASTIN TIME PARTIAL: CPT

## 2020-06-23 PROCEDURE — 99261: CPT

## 2020-06-23 PROCEDURE — C1724: CPT

## 2020-06-23 PROCEDURE — 85027 COMPLETE CBC AUTOMATED: CPT

## 2020-06-23 PROCEDURE — C8929: CPT

## 2020-06-23 PROCEDURE — 84100 ASSAY OF PHOSPHORUS: CPT

## 2020-06-23 PROCEDURE — 84145 PROCALCITONIN (PCT): CPT

## 2020-06-23 PROCEDURE — C1874: CPT

## 2020-06-23 PROCEDURE — 92978 ENDOLUMINL IVUS OCT C 1ST: CPT | Mod: LD

## 2020-06-23 PROCEDURE — 82330 ASSAY OF CALCIUM: CPT

## 2020-06-23 PROCEDURE — 80053 COMPREHEN METABOLIC PANEL: CPT

## 2020-06-23 PROCEDURE — 71045 X-RAY EXAM CHEST 1 VIEW: CPT

## 2020-06-23 PROCEDURE — 87635 SARS-COV-2 COVID-19 AMP PRB: CPT

## 2020-06-23 PROCEDURE — 82803 BLOOD GASES ANY COMBINATION: CPT

## 2020-06-23 PROCEDURE — 86140 C-REACTIVE PROTEIN: CPT

## 2020-06-23 PROCEDURE — 96375 TX/PRO/DX INJ NEW DRUG ADDON: CPT

## 2020-06-23 PROCEDURE — 82962 GLUCOSE BLOOD TEST: CPT

## 2020-06-23 RX ORDER — LISINOPRIL 2.5 MG/1
1 TABLET ORAL
Qty: 0 | Refills: 0 | DISCHARGE
Start: 2020-06-23

## 2020-06-23 RX ADMIN — CHLORHEXIDINE GLUCONATE 1 APPLICATION(S): 213 SOLUTION TOPICAL at 11:53

## 2020-06-23 RX ADMIN — Medication 81 MILLIGRAM(S): at 11:48

## 2020-06-23 RX ADMIN — SEVELAMER CARBONATE 1600 MILLIGRAM(S): 2400 POWDER, FOR SUSPENSION ORAL at 08:43

## 2020-06-23 RX ADMIN — CARVEDILOL PHOSPHATE 25 MILLIGRAM(S): 80 CAPSULE, EXTENDED RELEASE ORAL at 05:05

## 2020-06-23 RX ADMIN — TICAGRELOR 90 MILLIGRAM(S): 90 TABLET ORAL at 05:05

## 2020-06-23 RX ADMIN — LISINOPRIL 2.5 MILLIGRAM(S): 2.5 TABLET ORAL at 05:05

## 2020-06-23 RX ADMIN — Medication 6: at 08:42

## 2020-06-23 RX ADMIN — SEVELAMER CARBONATE 1600 MILLIGRAM(S): 2400 POWDER, FOR SUSPENSION ORAL at 11:49

## 2020-06-23 RX ADMIN — DOXERCALCIFEROL 2 MICROGRAM(S): 2.5 CAPSULE ORAL at 12:33

## 2020-06-23 NOTE — PROGRESS NOTE ADULT - ASSESSMENT
79F w/ hx of DM2 on insulin, ESRD on HD Tu, Thr, Sat, CHF?, HTN, HLD p/w chest pain for several days concerning for unstable angina vs NSTEMI given current lab findings    Problem/Plan - 1:  ·  Problem: Unstable angina.  Plan: Still having some mild chest pain but mostly improved. New TWI noted.  -F/u cardiology recommendations, S/P Cath with stent placed  - Echo noted   -Trend troponin  -Telemetry  -DAPT   - statin  - CT surg eval appreciated, for pericardia effusion and echogenic mass R ventricles   - CT chest nted, awaiting for CT surg recs   - F/U outpatient regarding pulmonary nodules seen on CT   - positive orthostatics, improved  - Volume control   - D/C planing after HD        Problem/Plan - 2:  ·  Problem: Hyperkalemia.  Plan: S/p potassium cocktail and just completed dialysis.  -Trend BMP.     Problem/Plan - 3:  ·  Problem: ESRD (end stage renal disease).  Plan: On HD Tu, Thr, Sat.    Appreciate nephrology recommendations  -Hectorol order as per nephrology      Problem/Plan - 4:  ·  Problem: Chronic systolic congestive heart failure.  Plan: Reportedly mildly reduced EF. Based on E-Rxs, pt was prescribed coreg and lisinopril in June  diastolic dysfunction  -Cont. Coreg with hold parameters for now  -TTE noted  - CT surg eval apprecoated   - Repeat echo noted       Problem/Plan - 5:  ·  Problem: Type 2 diabetes mellitus with other specified complication, with long-term current use of insulin.  Plan: On some insulin at home but pt denies taking everyday  -Fingersticks and sliding scale for now.     Problem/Plan - 6:  Problem: Essential hypertension. Plan: -Trend vital signs.    Problem/Plan - 7:  ·  Problem: Prophylactic measure.  Plan: DVT PPx  -hep subq.

## 2020-06-23 NOTE — PROGRESS NOTE ADULT - SUBJECTIVE AND OBJECTIVE BOX
Subjective: Patient seen and examined. No new events except as noted.   doing well   orthostatics positive     REVIEW OF SYSTEMS:    CONSTITUTIONAL: No weakness, fevers or chills  EYES/ENT: No visual changes;  No vertigo or throat pain   NECK: No pain or stiffness  RESPIRATORY: No cough, wheezing, hemoptysis; No shortness of breath  CARDIOVASCULAR: No chest pain or palpitations  GASTROINTESTINAL: No abdominal or epigastric pain. No nausea, vomiting, or hematemesis; No diarrhea or constipation. No melena or hematochezia.  GENITOURINARY: No dysuria, frequency or hematuria  NEUROLOGICAL: No numbness or weakness  SKIN: No itching, burning, rashes, or lesions   All other review of systems is negative unless indicated above.    MEDICATIONS:  MEDICATIONS  (STANDING):  aspirin enteric coated 81 milliGRAM(s) Oral daily  atorvastatin 80 milliGRAM(s) Oral at bedtime  carvedilol 25 milliGRAM(s) Oral every 12 hours  chlorhexidine 2% Cloths 1 Application(s) Topical daily  dextrose 5%. 1000 milliLiter(s) (50 mL/Hr) IV Continuous <Continuous>  dextrose 50% Injectable 12.5 Gram(s) IV Push once  dextrose 50% Injectable 25 Gram(s) IV Push once  dextrose 50% Injectable 25 Gram(s) IV Push once  doxercalciferol Injectable 2 MICROGram(s) IV Push <User Schedule>  insulin lispro (HumaLOG) corrective regimen sliding scale   SubCutaneous three times a day before meals  insulin lispro (HumaLOG) corrective regimen sliding scale   SubCutaneous at bedtime  lisinopril 2.5 milliGRAM(s) Oral daily  sevelamer carbonate 1600 milliGRAM(s) Oral three times a day with meals  ticagrelor 90 milliGRAM(s) Oral every 12 hours      PHYSICAL EXAM:  T(C): 36.4 (06-23-20 @ 04:20), Max: 36.8 (06-22-20 @ 12:49)  HR: 72 (06-23-20 @ 04:20) (67 - 72)  BP: 145/81 (06-23-20 @ 04:20) (127/75 - 154/82)  RR: 16 (06-23-20 @ 04:20) (15 - 18)  SpO2: 98% (06-23-20 @ 04:20) (97% - 98%)  Wt(kg): --  I&O's Summary    22 Jun 2020 07:01  -  23 Jun 2020 07:00  --------------------------------------------------------  IN: 960 mL / OUT: 0 mL / NET: 960 mL    23 Jun 2020 07:01  -  23 Jun 2020 12:24  --------------------------------------------------------  IN: 600 mL / OUT: 0 mL / NET: 600 mL          Appearance: Normal	  HEENT:  PERRLA   Lymphatic: No lymphadenopathy   Cardiovascular: Normal S1 S2, no JVD  Respiratory: normal effort , clear  Gastrointestinal:  Soft, Non-tender  Skin: No rashes,  warm to touch  Psychiatry:  Mood & affect appropriate  Musculuskeletal: No edema      All labs, Imaging and EKGs personally reviewed                             10.9   10.76 )-----------( 221      ( 23 Jun 2020 05:39 )             33.5               06-23    132<L>  |  93<L>  |  56<H>  ----------------------------<  166<H>  4.5   |  21<L>  |  6.42<H>    Ca    8.6      23 Jun 2020 05:39  Phos  5.6     06-23

## 2020-06-23 NOTE — PROGRESS NOTE ADULT - ATTENDING COMMENTS
[facility] NEPHROLOGY  Vin Sommers M.D.  Jose Raul Llanos D.O.  Mercedes De La Vega M.D.  Tammy Nguyen, MSN, ANP-C    Telephone: [phone]  Facsimile: [phone]    [address] [place] [address]
[facility] NEPHROLOGY  Vin Sommers M.D.  Jose Raul Llanos D.O.  Mercedes De La Vega M.D.  Tammy Nguyen, MSN, ANP-C    Telephone: [phone]  Facsimile: [phone]    [address] [place] [address]
[facility] NEPHROLOGY  Vin Sommers M.D.  Jose Raul Llanos D.O.  Mercedes De La Vega M.D.  Tammy Nguyen, MSN, ANP-C    Telephone: [phone]  Facsimile: [phone]    [address] [place] [address]
[facility] NEPHROLOGY  Vin Sommers M.D.  Jose Raul Llanos D.O.  Mercedes De La Vega M.D.  Tammy Nguyen, MSN, ANP-C    Telephone: [phone]  Facsimile: [phone]    [address] [place] [address]
[facility] NEPHROLOGY  Vin Sommers M.D.  Jose Raul Llanos D.O.  Mercedes De La Vega M.D.  Tammy Nguyen, MSN, ANP-C    Telephone: [phone]  Facsimile: [phone]    [address] [place] [address]
[facility] NEPHROLOGY  Vin Sommers M.D.  Jose Raul Llanos D.O.  Mercedes De La Vega M.D.  Tammy Nguyen, MSN, ANP-C    Telephone: [phone]  Facsimile: [phone]    [address] [place] [address]
[facility] NEPHROLOGY  Vin Sommers M.D.  Jose Raul Llanos D.O.  Mercedes De La Vega M.D.  Tammy Nguyen, MSN, ANP-C    Telephone: [phone]  Facsimile: [phone]    [address] [place] [address]
Differential diagnosis and plan of care discussed with patient after the evaluation.   Advanced care planning was discussed with patient for total of thirty mins.  Advanced care planning forms were reviewed and discussed.  Pain assessed and judicious use of narcotics when appropriate was discussed.  Importance of Fall prevention discussed.  Counseling on Smoking and Alcohol cessation was offered when appropriate.  Counseling on Diet, exercise, and medication compliance was done.

## 2020-06-23 NOTE — PROGRESS NOTE ADULT - SUBJECTIVE AND OBJECTIVE BOX
Patient is a 79y old  Female who presents with a chief complaint of Chest Pain (23 Jun 2020 14:37)      INTERVAL HISTORY: feels ok    TELEMETRY Personally reviewed: no events  	  MEDICATIONS:  carvedilol 25 milliGRAM(s) Oral every 12 hours  lisinopril 2.5 milliGRAM(s) Oral daily        PHYSICAL EXAM:  T(C): 36.7 (06-23-20 @ 15:30), Max: 36.8 (06-23-20 @ 12:00)  HR: 71 (06-23-20 @ 15:30) (69 - 72)  BP: 104/40 (06-23-20 @ 15:30) (104/40 - 151/87)  RR: 18 (06-23-20 @ 15:30) (16 - 18)  SpO2: 98% (06-23-20 @ 15:30) (98% - 98%)  Wt(kg): --  I&O's Summary    22 Jun 2020 07:01  -  23 Jun 2020 07:00  --------------------------------------------------------  IN: 960 mL / OUT: 0 mL / NET: 960 mL    23 Jun 2020 07:01  -  23 Jun 2020 21:21  --------------------------------------------------------  IN: 600 mL / OUT: 1500 mL / NET: -900 mL          Appearance: In no distress	  HEENT:    PERRL, EOMI	  Cardiovascular:  S1 S2, No JVD  Respiratory: Lungs clear to auscultation	  Gastrointestinal:  Soft, Non-tender, + BS	  Vascularature:  No edema of LE  Psychiatric: Appropriate affect   Neuro: no acute focal deficits                               10.9   10.76 )-----------( 221      ( 23 Jun 2020 05:39 )             33.5     06-23    132<L>  |  93<L>  |  56<H>  ----------------------------<  166<H>  4.5   |  21<L>  |  6.42<H>    Ca    8.6      23 Jun 2020 05:39  Phos  5.6     06-23          Labs personally reviewed      Assessment and Plan:   Assessment:  · Assessment		  79F w/ hx of DM2 on insulin, ESRD on HD Tu, Thr, Sat, CHF?, HTN, HLD p/w chest pain for several days concerning for unstable angina vs NSTEMI given current lab findings    Problem/Plan - 1:  ·  Problem: NSTEMI  Plan: Now comfortable with no further chest pain. New TWI noted. Troponin overall elevated but difficult to assess given ESRD status.  - ASA/Brillanta  -s/p cath Wednesday, stent to mLAD  - Lipitor 80mg      Problem/Plan - 2:  ·  Problem: Pericadial effusion with ?clot.  Plan: will obtain CT chest. Repeat limited TTE with early signs of tamponade as per report. CTS recs appreciated  - Repeat TTE and if no change will d/c home with outpt follow up     Problem/Plan - 3:  ·  Problem: ESRD (end stage renal disease).  Plan: On HD Tu, Thr, Sat. Completed dialysis after arrival to medical aburto today. Appreciate nephrology recommendations    Problem/Plan - 4:  ·  Problem: Chronic systolic congestive heart failure. Plan:   -Cont. Coreg, add Lisinopril 2.5mg daily  - Severe segmental left ventricular systolic dysfunction on TTE here      Problem/Plan - 5:  Problem: Essential hypertension. Plan: -Trend vital signs  - Coreg, ACE     Discharge planning with close outpt follow up next week       Leno Duncan DO [facility]  Cardiovascular Medicine  [address] [facility], [address]  Office: [phone]  Cell: [phone]        Leno Duncan DO [facility]  Cardiovascular Medicine  [address], [address]  Office: [phone]  Cell: [phone]

## 2020-06-23 NOTE — PROGRESS NOTE ADULT - SUBJECTIVE AND OBJECTIVE BOX
[facility] NEPHROLOGY- PROGRESS NOTE    79y Female with history of ESRD on HD presents with chest pain. Nephrology consulted for ESRD status.    REVIEW OF SYSTEMS:  Gen: no changes in weight  Cards: no chest pain  Resp: no dyspnea  GI: no nausea or vomiting or diarrhea  Vascular: no LE edema    No Known Allergies      Hospital Medications: Medications reviewed      VITALS:  T(F): 97.7 (06-23-20 @ 12:48), Max: 98.3 (06-23-20 @ 12:00)  HR: 69 (06-23-20 @ 12:48)  BP: 135/78 (06-23-20 @ 12:48)  RR: 18 (06-23-20 @ 12:48)  SpO2: 98% (06-23-20 @ 12:48)  Wt(kg): --    06-22 @ 07:01  -  06-23 @ 07:00  --------------------------------------------------------  IN: 960 mL / OUT: 0 mL / NET: 960 mL    06-23 @ 07:01  -  06-23 @ 14:38  --------------------------------------------------------  IN: 600 mL / OUT: 0 mL / NET: 600 mL        PHYSICAL EXAM:  Gen: NAD, calm  Cards: RRR, +S1/S2, no M/G/R  Resp: CTA B/L  GI: soft, NT/ND, NABS  Vascular: no LE edema B/L, + RIJ TDC C/D/I      LABS:  06-23    132<L>  |  93<L>  |  56<H>  ----------------------------<  166<H>  4.5   |  21<L>  |  6.42<H>    Ca    8.6      23 Jun 2020 05:39  Phos  5.6     06-23      Creatinine Trend: 6.42 <--, 5.51 <--, 4.16 <--, 5.39 <--, 3.78 <--, 5.13 <--, 3.63 <--, 5.84 <--                        10.9   10.76 )-----------( 221      ( 23 Jun 2020 05:39 )             33.5     Urine Studies:

## 2020-06-23 NOTE — PROGRESS NOTE ADULT - REASON FOR ADMISSION
Chest Pain

## 2020-06-23 NOTE — PROGRESS NOTE ADULT - ASSESSMENT
79y Female with history of ESRD on HD presents with chest pain. Nephrology consulted for ESRD status.    1) ESRD on HD: Last HD on 6/20 with intradialytic hypotension and 1.5L removed. Plan for next maintenance HD today prior to discharge. Monitor electrolytes.    2) HTN with ESRD: BP controlled. Continue with current medications and low sodium diet. Monitor BP.    3) Anemia of renal disease: Hb acceptable. No need for GILBERT. Monitor Hb.    4) Secondary HPT of renal origin: Phosphorus improving. Continue with renvela 2 tabs with meals and hectorol 2 mcg with HD. Monitor serum calcium and phosphorus.    5) Hyperkalemia: Resolved s/p HD. Continue with renal diet. Monitor serum potassium.

## 2020-07-21 PROBLEM — Z00.00 ENCOUNTER FOR PREVENTIVE HEALTH EXAMINATION: Status: ACTIVE | Noted: 2020-07-21

## 2020-08-05 ENCOUNTER — APPOINTMENT (OUTPATIENT)
Dept: CV DIAGNOSITCS | Facility: HOSPITAL | Age: 80
End: 2020-08-05

## 2020-08-05 ENCOUNTER — OUTPATIENT (OUTPATIENT)
Dept: OUTPATIENT SERVICES | Facility: HOSPITAL | Age: 80
LOS: 1 days | End: 2020-08-05
Payer: COMMERCIAL

## 2020-08-05 DIAGNOSIS — Z98.49 CATARACT EXTRACTION STATUS, UNSPECIFIED EYE: Chronic | ICD-10-CM

## 2020-08-05 DIAGNOSIS — I31.3 PERICARDIAL EFFUSION (NONINFLAMMATORY): ICD-10-CM

## 2020-08-05 PROCEDURE — 93306 TTE W/DOPPLER COMPLETE: CPT | Mod: 26

## 2020-08-05 PROCEDURE — 93306 TTE W/DOPPLER COMPLETE: CPT

## 2022-08-31 NOTE — H&P ADULT - NEGATIVE RESPIRATORY AND THORAX SYMPTOMS
Regimen: IV venofer    Dr. Gibson is supervising provider today.    Nursing Assessment: A focused nursing assessment  was performed and the patient reports the following: Fatigue/Weakness: YES    Pre-Treatment: - Treatment consent signed  - Patient has valid pre-authorization  - VS completed  - Height and weight verified  - Premed orders are verified prior to administration  - Treatment parameters verified in patient protocol  - Patient is identified by first & last name, Date of birth that has been verified with the patient chairside.    Treatment: Refer to [facility] and MAR for line assessment and medication administration, Blood return confirmed before, during and after treatment administered and Infusion pump used for non-vesicant drugs    Post Treatment: Treatment tolerated well; no adverse reaction    Education: No new instructions needed    Next appointment scheduled:   Future Appointments   Date Time Provider Department Center   9/13/2022  1:15 PM Timothy Zelaya MD [id number] [facility]   10/3/2022 10:30 AM [facility]   10/5/2022  1:00 PM TIFFANI Mejia [id number] [facility]   11/29/2022 10:40 AM Tanner Amaro MD NBNEUR NB   3/29/2023  1:30 PM [facility]       Patient instructed to call the office with any questions or concerns.    Patient Discharged: patient discharged to home per self, ambulatory      
no cough/no pleuritic chest pain/no wheezing/no dyspnea

## 2023-05-30 ENCOUNTER — INPATIENT (INPATIENT)
Facility: HOSPITAL | Age: 83
LOS: 5 days | Discharge: HOME | DRG: 420 | End: 2023-06-05
Attending: INTERNAL MEDICINE | Admitting: INTERNAL MEDICINE
Payer: COMMERCIAL

## 2023-05-30 VITALS
TEMPERATURE: 98 F | SYSTOLIC BLOOD PRESSURE: 195 MMHG | RESPIRATION RATE: 20 BRPM | WEIGHT: 127.87 LBS | DIASTOLIC BLOOD PRESSURE: 73 MMHG | HEART RATE: 60 BPM | OXYGEN SATURATION: 97 % | HEIGHT: 61 IN

## 2023-05-30 DIAGNOSIS — R17 UNSPECIFIED JAUNDICE: ICD-10-CM

## 2023-05-30 LAB
ALBUMIN SERPL ELPH-MCNC: 2.5 G/DL — LOW (ref 3.5–5)
ALP SERPL-CCNC: 277 U/L — HIGH (ref 40–120)
ALT FLD-CCNC: 217 U/L DA — HIGH (ref 10–60)
ANION GAP SERPL CALC-SCNC: 3 MMOL/L — LOW (ref 5–17)
APTT BLD: 37 SEC — HIGH (ref 27.5–35.5)
AST SERPL-CCNC: 287 U/L — HIGH (ref 10–40)
BASOPHILS # BLD AUTO: 0.1 K/UL — SIGNIFICANT CHANGE UP (ref 0–0.2)
BASOPHILS NFR BLD AUTO: 1.2 % — SIGNIFICANT CHANGE UP (ref 0–2)
BILIRUB DIRECT SERPL-MCNC: 5.2 MG/DL — HIGH (ref 0–0.3)
BILIRUB SERPL-MCNC: 6.4 MG/DL — HIGH (ref 0.2–1.2)
BUN SERPL-MCNC: 25 MG/DL — HIGH (ref 7–18)
CALCIUM SERPL-MCNC: 8.9 MG/DL — SIGNIFICANT CHANGE UP (ref 8.4–10.5)
CHLORIDE SERPL-SCNC: 99 MMOL/L — SIGNIFICANT CHANGE UP (ref 96–108)
CO2 SERPL-SCNC: 35 MMOL/L — HIGH (ref 22–31)
CREAT SERPL-MCNC: 3.2 MG/DL — HIGH (ref 0.5–1.3)
EGFR: 14 ML/MIN/1.73M2 — LOW
EOSINOPHIL # BLD AUTO: 1.17 K/UL — HIGH (ref 0–0.5)
EOSINOPHIL NFR BLD AUTO: 13.7 % — HIGH (ref 0–6)
GLUCOSE SERPL-MCNC: 138 MG/DL — HIGH (ref 70–99)
HCT VFR BLD CALC: 28.8 % — LOW (ref 34.5–45)
HGB BLD-MCNC: 10.2 G/DL — LOW (ref 11.5–15.5)
IMM GRANULOCYTES NFR BLD AUTO: 0.2 % — SIGNIFICANT CHANGE UP (ref 0–0.9)
INR BLD: 1.12 RATIO — SIGNIFICANT CHANGE UP (ref 0.88–1.16)
LYMPHOCYTES # BLD AUTO: 2.11 K/UL — SIGNIFICANT CHANGE UP (ref 1–3.3)
LYMPHOCYTES # BLD AUTO: 24.6 % — SIGNIFICANT CHANGE UP (ref 13–44)
MCHC RBC-ENTMCNC: 27.3 PG — SIGNIFICANT CHANGE UP (ref 27–34)
MCHC RBC-ENTMCNC: 35.4 GM/DL — SIGNIFICANT CHANGE UP (ref 32–36)
MCV RBC AUTO: 77 FL — LOW (ref 80–100)
MONOCYTES # BLD AUTO: 1.13 K/UL — HIGH (ref 0–0.9)
MONOCYTES NFR BLD AUTO: 13.2 % — SIGNIFICANT CHANGE UP (ref 2–14)
NEUTROPHILS # BLD AUTO: 4.04 K/UL — SIGNIFICANT CHANGE UP (ref 1.8–7.4)
NEUTROPHILS NFR BLD AUTO: 47.1 % — SIGNIFICANT CHANGE UP (ref 43–77)
NRBC # BLD: 0 /100 WBCS — SIGNIFICANT CHANGE UP (ref 0–0)
PLATELET # BLD AUTO: 132 K/UL — LOW (ref 150–400)
POTASSIUM SERPL-MCNC: 4.2 MMOL/L — SIGNIFICANT CHANGE UP (ref 3.5–5.3)
POTASSIUM SERPL-SCNC: 4.2 MMOL/L — SIGNIFICANT CHANGE UP (ref 3.5–5.3)
PROT SERPL-MCNC: 6.9 G/DL — SIGNIFICANT CHANGE UP (ref 6–8.3)
PROTHROM AB SERPL-ACNC: 13.3 SEC — SIGNIFICANT CHANGE UP (ref 10.5–13.4)
RBC # BLD: 3.74 M/UL — LOW (ref 3.8–5.2)
RBC # FLD: 17.9 % — HIGH (ref 10.3–14.5)
SODIUM SERPL-SCNC: 137 MMOL/L — SIGNIFICANT CHANGE UP (ref 135–145)
WBC # BLD: 8.57 K/UL — SIGNIFICANT CHANGE UP (ref 3.8–10.5)
WBC # FLD AUTO: 8.57 K/UL — SIGNIFICANT CHANGE UP (ref 3.8–10.5)

## 2023-05-30 PROCEDURE — 99223 1ST HOSP IP/OBS HIGH 75: CPT

## 2023-05-30 PROCEDURE — 74176 CT ABD & PELVIS W/O CONTRAST: CPT | Mod: 26,MA

## 2023-05-30 PROCEDURE — 76705 ECHO EXAM OF ABDOMEN: CPT | Mod: 26

## 2023-05-30 PROCEDURE — 99285 EMERGENCY DEPT VISIT HI MDM: CPT

## 2023-05-30 NOTE — H&P ADULT - NSHPPHYSICALEXAM_GEN_ALL_CORE
LOS: 1d    VITALS:   T(C): 36.7 (05-30-23 @ 22:07), Max: 36.7 (05-30-23 @ 12:56)  HR: 69 (05-30-23 @ 22:07) (60 - 70)  BP: 164/62 (05-30-23 @ 22:07) (164/62 - 195/73)  RR: 18 (05-30-23 @ 22:07) (18 - 20)  SpO2: 94% (05-30-23 @ 22:07) (94% - 97%)    GENERAL: NAD, lying in bed comfortably  HEAD:  Atraumatic, Normocephalic  EYES: +scleral icterus  ENT: Moist mucous membranes  NECK: Supple, No JVD  CHEST/LUNG: Clear to auscultation bilaterally; No rales, rhonchi, wheezing, or rubs. Unlabored respirations  HEART: Regular rate and rhythm; No murmurs, rubs, or gallops  ABDOMEN: BSx4; Soft, nontender, nondistended  EXTREMITIES:  2+ Peripheral Pulses, brisk capillary refill. No clubbing, cyanosis, or edema  NERVOUS SYSTEM:  A&Ox3, no focal deficits   SKIN: No rashes or lesions

## 2023-05-30 NOTE — PATIENT PROFILE ADULT - FALL HARM RISK - HARM RISK INTERVENTIONS

## 2023-05-30 NOTE — H&P ADULT - PROBLEM SELECTOR PLAN 2
ESRD on HD (Tue, Th, Sat)  Last HD session on Tuesday  follows with Dr. Mercado, from [facility] on [address] [facility]  Nephro consult ESRD on HD (Tue, Th, Sat)  Last HD session on Tuesday  follows with Dr. Mercado, from [facility] on [address]  Nephro consulted Dr. Cano

## 2023-05-30 NOTE — ED ADULT NURSE NOTE - ED STAT RN HANDOFF DETAILS
Pt is alert and oriented x3. No sign of acute distress noted. Pt is transferred to ED holding stable condition.

## 2023-05-30 NOTE — ED ADULT NURSE NOTE - PRIMARY CARE PROVIDER
DIMENSION 6    Outgoing call to LIZETTE Cancino ([employer]  with [facility]).  and Barak discussed the tentative plan for the meeting.  pulled ct into call per request of Barak. Barak probed ct on who she wanted to attend the meeting, what she wanted to eat at the meeting, and what dates she felt would be appropriate for the meeting. Barak requested writer and ct identify 3-5 life domains to focus on for the meeting and email her the choices.   trena de la cruz

## 2023-05-30 NOTE — H&P ADULT - ATTENDING COMMENTS
82 year old woman with PMH of ESRD on HD- last session earlier today 82 year old woman with PMH of ESRD on HD- last session earlier today, sent in on account of yellow eyes noticed by her nephrologist in HD over the last 2 weeks.  Pt did have pruritus but denies abdominal pain,  pale colored stool nor dark urine but  barely makes urine now.    Labs   H- 10.2  MCV - 77    Elevated eosinophil - 1.17/ 14%  HCO3 - 35  BUN/Cr - 25/3.2  Alb - 2.5  TB - 6.4 / DB - 5.2  ALP /AST/ALT - 277/287/217    CT abd  Gallstones and gallbladder wall thickening. Correlation with sonography   recommended.    US RUQ  Cholelithiasis without evidence of cholecystitis or biliary obstruction.    Impression   - Painless obstructive jaundice   With elevated direct >> indirect bilirubin   reactive transaminases  Imaging studies suggest etiology likely from a "passed" gallstone   vs acute viral hepatitis  - Plan   Admit to medicine   Serial monitoring of  TB and DB and liver enzymes  GI consult   Hold off on additional imaging if bilirubin levels falls  Follow up viral hepatitis assay     - ESRD on HD   resume HD   Nephrology consult if he is still here by thursday     Med reconciliation and resume necessary meds; hold OHA, insulin therapy.    Other plans as above

## 2023-05-30 NOTE — ED PROVIDER NOTE - CLINICAL SUMMARY MEDICAL DECISION MAKING FREE TEXT BOX
Patient presenting for jaundice. will obtain lab, assess bili. ct and us. assess for pancreatic vs hepatic source of jaundice. ed obs and reassess

## 2023-05-30 NOTE — ED ADULT NURSE NOTE - NSFALLHARMRISKINTERV_ED_ALL_ED

## 2023-05-30 NOTE — H&P ADULT - PROBLEM SELECTOR PLAN 1
Pt w/ painless jaundice  CT A/P Gallstones and gallbladder wall thickening. Patchy opacities right middle lobe and left lower lobe may be infectious or inflammatory.  RUQ u/s: Cholelithiasis without evidence of cholecystitis or biliary obstruction.  , ,   T Bili 6.4, Direct bili 5.2  f/u hepatitis panel  f/u daily CMP  Hepatology consult Pt w/ painless jaundice  CT A/P Gallstones and gallbladder wall thickening. Patchy opacities right middle lobe and left lower lobe may be infectious or inflammatory.  RUQ u/s: Cholelithiasis without evidence of cholecystitis or biliary obstruction.  , ,   T Bili 6.4, Direct bili 5.2  f/u hepatitis panel  f/u daily CMP  GI consulted Pt w/ painless jaundice  CT A/P Gallstones and gallbladder wall thickening. Patchy opacities right middle lobe and left lower lobe may be infectious or inflammatory.  RUQ u/s: Cholelithiasis without evidence of cholecystitis or biliary obstruction.  , ,   T Bili 6.4, Direct bili 5.2  f/u hepatitis panel  f/u daily CMP  GI consulted Dr. Patel

## 2023-05-30 NOTE — ED PROVIDER NOTE - OBJECTIVE STATEMENT
82 y.o w/ pmh of esrd, completely dialysis today, told to come in for jaundice. patient endorses fatigue. denies abd pain, fever, n, v, cp, sob.

## 2023-05-30 NOTE — H&P ADULT - NSICDXPASTMEDICALHX_GEN_ALL_CORE_FT
PAST MEDICAL HISTORY:  DM (diabetes mellitus)     ESRD on dialysis     HLD (hyperlipidemia)     HTN (hypertension)

## 2023-05-30 NOTE — H&P ADULT - HISTORY OF PRESENT ILLNESS
This is a 81 yo F from home, ambulates with cane, with pmhx of ESRD on HD (T, Th, Sat), DM, HTN, HLD that presents to the ED after being sent from her nephrologist for jaundice. Patient states that she went for HD today, and when she saw her nephrologist, who recommended her to come to ED. She hasn't noticed her eyes being yellow, and thus not sure when it started. She endorses diffuse itching that occured a few  weeks ago, endorses fatigue, and unwanted weight loss, however, denies any night sweats, also mentions feeling fatigued.  Denies any chest pain, SOB, N/V/D or abdominal pain.

## 2023-05-30 NOTE — H&P ADULT - NSHPREVIEWOFSYSTEMS_GEN_ALL_CORE
REVIEW OF SYSTEMS:    CONSTITUTIONAL: +weakness. No fevers or chills  EYES/ENT: No visual changes;  No vertigo or throat pain   NECK: No pain or stiffness  RESPIRATORY: No cough, wheezing, hemoptysis; No shortness of breath  CARDIOVASCULAR: No chest pain or palpitations  GASTROINTESTINAL: No abdominal or epigastric pain. No nausea, vomiting, or hematemesis; No diarrhea or constipation. No melena or hematochezia.  GENITOURINARY: No dysuria, frequency or hematuria  NEUROLOGICAL: No numbness  SKIN: +diffuse itching

## 2023-05-30 NOTE — H&P ADULT - ASSESSMENT
This is a 81 yo F from home, ambulates with cane, with pmhx of ESRD on HD (T, Th, Sat), DM, HTN, HLD that presents to the ED after being sent from her nephrologist for jaundice, admitted for painless jaundice      PRIMARY TEAM TO CONFIRM MEDREC IN AM, MEDREC PERFORMED VIA SURESCRIPTS

## 2023-05-30 NOTE — ED ADULT NURSE NOTE - OBJECTIVE STATEMENT
pt is sent from the dialysis center for yellow screla , pt states she had her complete treatment today  left arm a/v fistula

## 2023-05-31 DIAGNOSIS — R17 UNSPECIFIED JAUNDICE: ICD-10-CM

## 2023-05-31 DIAGNOSIS — Z29.9 ENCOUNTER FOR PROPHYLACTIC MEASURES, UNSPECIFIED: ICD-10-CM

## 2023-05-31 DIAGNOSIS — E11.9 TYPE 2 DIABETES MELLITUS WITHOUT COMPLICATIONS: ICD-10-CM

## 2023-05-31 DIAGNOSIS — I10 ESSENTIAL (PRIMARY) HYPERTENSION: ICD-10-CM

## 2023-05-31 DIAGNOSIS — N18.6 END STAGE RENAL DISEASE: ICD-10-CM

## 2023-05-31 DIAGNOSIS — E78.5 HYPERLIPIDEMIA, UNSPECIFIED: ICD-10-CM

## 2023-05-31 LAB
ALBUMIN SERPL ELPH-MCNC: 2.3 G/DL — LOW (ref 3.5–5)
ALP SERPL-CCNC: 233 U/L — HIGH (ref 40–120)
ALT FLD-CCNC: 169 U/L DA — HIGH (ref 10–60)
ANION GAP SERPL CALC-SCNC: 6 MMOL/L — SIGNIFICANT CHANGE UP (ref 5–17)
AST SERPL-CCNC: 213 U/L — HIGH (ref 10–40)
BILIRUB DIRECT SERPL-MCNC: 4.3 MG/DL — HIGH (ref 0–0.3)
BILIRUB SERPL-MCNC: 5.3 MG/DL — HIGH (ref 0.2–1.2)
BUN SERPL-MCNC: 35 MG/DL — HIGH (ref 7–18)
CALCIUM SERPL-MCNC: 8.5 MG/DL — SIGNIFICANT CHANGE UP (ref 8.4–10.5)
CHLORIDE SERPL-SCNC: 100 MMOL/L — SIGNIFICANT CHANGE UP (ref 96–108)
CO2 SERPL-SCNC: 32 MMOL/L — HIGH (ref 22–31)
CREAT SERPL-MCNC: 4.19 MG/DL — HIGH (ref 0.5–1.3)
EGFR: 10 ML/MIN/1.73M2 — LOW
GGT SERPL-CCNC: 617 U/L — HIGH (ref 8–40)
GLUCOSE BLDC GLUCOMTR-MCNC: 117 MG/DL — HIGH (ref 70–99)
GLUCOSE BLDC GLUCOMTR-MCNC: 86 MG/DL — SIGNIFICANT CHANGE UP (ref 70–99)
GLUCOSE SERPL-MCNC: 83 MG/DL — SIGNIFICANT CHANGE UP (ref 70–99)
HAPTOGLOB SERPL-MCNC: <20 MG/DL — LOW (ref 34–200)
HAV IGM SER-ACNC: SIGNIFICANT CHANGE UP
HBV CORE IGM SER-ACNC: SIGNIFICANT CHANGE UP
HBV SURFACE AG SER-ACNC: SIGNIFICANT CHANGE UP
HCT VFR BLD CALC: 27.1 % — LOW (ref 34.5–45)
HCV AB S/CO SERPL IA: 12.07 S/CO — HIGH (ref 0–0.99)
HCV AB SERPL-IMP: REACTIVE
HCV RNA FLD QL NAA+PROBE: SIGNIFICANT CHANGE UP
HCV RNA SPEC QL PROBE+SIG AMP: DETECTED
HGB BLD-MCNC: 9.3 G/DL — LOW (ref 11.5–15.5)
LDH SERPL L TO P-CCNC: 234 U/L — HIGH (ref 120–225)
MAGNESIUM SERPL-MCNC: 2.7 MG/DL — HIGH (ref 1.6–2.6)
MCHC RBC-ENTMCNC: 26.8 PG — LOW (ref 27–34)
MCHC RBC-ENTMCNC: 34.3 GM/DL — SIGNIFICANT CHANGE UP (ref 32–36)
MCV RBC AUTO: 78.1 FL — LOW (ref 80–100)
NRBC # BLD: 0 /100 WBCS — SIGNIFICANT CHANGE UP (ref 0–0)
PHOSPHATE SERPL-MCNC: 5 MG/DL — HIGH (ref 2.5–4.5)
PLATELET # BLD AUTO: 132 K/UL — LOW (ref 150–400)
POTASSIUM SERPL-MCNC: 4.1 MMOL/L — SIGNIFICANT CHANGE UP (ref 3.5–5.3)
POTASSIUM SERPL-SCNC: 4.1 MMOL/L — SIGNIFICANT CHANGE UP (ref 3.5–5.3)
PROT SERPL-MCNC: 6.1 G/DL — SIGNIFICANT CHANGE UP (ref 6–8.3)
RBC # BLD: 3.47 M/UL — LOW (ref 3.8–5.2)
RBC # FLD: 16.9 % — HIGH (ref 10.3–14.5)
SODIUM SERPL-SCNC: 138 MMOL/L — SIGNIFICANT CHANGE UP (ref 135–145)
WBC # BLD: 9.02 K/UL — SIGNIFICANT CHANGE UP (ref 3.8–10.5)
WBC # FLD AUTO: 9.02 K/UL — SIGNIFICANT CHANGE UP (ref 3.8–10.5)

## 2023-05-31 PROCEDURE — 99233 SBSQ HOSP IP/OBS HIGH 50: CPT

## 2023-05-31 PROCEDURE — 74181 MRI ABDOMEN W/O CONTRAST: CPT | Mod: 26

## 2023-05-31 PROCEDURE — 99222 1ST HOSP IP/OBS MODERATE 55: CPT

## 2023-05-31 RX ORDER — DEXTROSE 50 % IN WATER 50 %
15 SYRINGE (ML) INTRAVENOUS ONCE
Refills: 0 | Status: DISCONTINUED | OUTPATIENT
Start: 2023-05-31 | End: 2023-06-05

## 2023-05-31 RX ORDER — SODIUM CHLORIDE 9 MG/ML
1000 INJECTION, SOLUTION INTRAVENOUS
Refills: 0 | Status: DISCONTINUED | OUTPATIENT
Start: 2023-05-31 | End: 2023-06-05

## 2023-05-31 RX ORDER — CARVEDILOL PHOSPHATE 80 MG/1
25 CAPSULE, EXTENDED RELEASE ORAL EVERY 12 HOURS
Refills: 0 | Status: DISCONTINUED | OUTPATIENT
Start: 2023-05-31 | End: 2023-06-01

## 2023-05-31 RX ORDER — DEXTROSE 50 % IN WATER 50 %
25 SYRINGE (ML) INTRAVENOUS ONCE
Refills: 0 | Status: DISCONTINUED | OUTPATIENT
Start: 2023-05-31 | End: 2023-06-05

## 2023-05-31 RX ORDER — ERYTHROPOIETIN 10000 [IU]/ML
10000 INJECTION, SOLUTION INTRAVENOUS; SUBCUTANEOUS ONCE
Refills: 0 | Status: DISCONTINUED | OUTPATIENT
Start: 2023-06-01 | End: 2023-06-02

## 2023-05-31 RX ORDER — SEVELAMER CARBONATE 2400 MG/1
800 POWDER, FOR SUSPENSION ORAL THREE TIMES A DAY
Refills: 0 | Status: DISCONTINUED | OUTPATIENT
Start: 2023-05-31 | End: 2023-05-31

## 2023-05-31 RX ORDER — ATORVASTATIN CALCIUM 80 MG/1
80 TABLET, FILM COATED ORAL AT BEDTIME
Refills: 0 | Status: DISCONTINUED | OUTPATIENT
Start: 2023-05-31 | End: 2023-05-31

## 2023-05-31 RX ORDER — MIRTAZAPINE 45 MG/1
7.5 TABLET, ORALLY DISINTEGRATING ORAL DAILY
Refills: 0 | Status: DISCONTINUED | OUTPATIENT
Start: 2023-05-31 | End: 2023-06-05

## 2023-05-31 RX ORDER — INSULIN LISPRO 100/ML
VIAL (ML) SUBCUTANEOUS
Refills: 0 | Status: DISCONTINUED | OUTPATIENT
Start: 2023-05-31 | End: 2023-06-05

## 2023-05-31 RX ORDER — HEPARIN SODIUM 5000 [USP'U]/ML
5000 INJECTION INTRAVENOUS; SUBCUTANEOUS EVERY 8 HOURS
Refills: 0 | Status: DISCONTINUED | OUTPATIENT
Start: 2023-05-31 | End: 2023-06-01

## 2023-05-31 RX ORDER — GLUCAGON INJECTION, SOLUTION 0.5 MG/.1ML
1 INJECTION, SOLUTION SUBCUTANEOUS ONCE
Refills: 0 | Status: DISCONTINUED | OUTPATIENT
Start: 2023-05-31 | End: 2023-06-05

## 2023-05-31 RX ORDER — LOSARTAN POTASSIUM 100 MG/1
100 TABLET, FILM COATED ORAL DAILY
Refills: 0 | Status: DISCONTINUED | OUTPATIENT
Start: 2023-05-31 | End: 2023-06-05

## 2023-05-31 RX ORDER — CARVEDILOL PHOSPHATE 80 MG/1
25 CAPSULE, EXTENDED RELEASE ORAL EVERY 12 HOURS
Refills: 0 | Status: DISCONTINUED | OUTPATIENT
Start: 2023-05-31 | End: 2023-05-31

## 2023-05-31 RX ORDER — DEXTROSE 50 % IN WATER 50 %
12.5 SYRINGE (ML) INTRAVENOUS ONCE
Refills: 0 | Status: DISCONTINUED | OUTPATIENT
Start: 2023-05-31 | End: 2023-06-05

## 2023-05-31 RX ORDER — SEVELAMER CARBONATE 2400 MG/1
800 POWDER, FOR SUSPENSION ORAL
Refills: 0 | Status: DISCONTINUED | OUTPATIENT
Start: 2023-05-31 | End: 2023-06-02

## 2023-05-31 RX ORDER — INSULIN LISPRO 100/ML
VIAL (ML) SUBCUTANEOUS AT BEDTIME
Refills: 0 | Status: DISCONTINUED | OUTPATIENT
Start: 2023-05-31 | End: 2023-06-05

## 2023-05-31 RX ADMIN — CARVEDILOL PHOSPHATE 25 MILLIGRAM(S): 80 CAPSULE, EXTENDED RELEASE ORAL at 17:37

## 2023-05-31 RX ADMIN — LOSARTAN POTASSIUM 100 MILLIGRAM(S): 100 TABLET, FILM COATED ORAL at 05:33

## 2023-05-31 RX ADMIN — CARVEDILOL PHOSPHATE 25 MILLIGRAM(S): 80 CAPSULE, EXTENDED RELEASE ORAL at 05:34

## 2023-05-31 RX ADMIN — MIRTAZAPINE 7.5 MILLIGRAM(S): 45 TABLET, ORALLY DISINTEGRATING ORAL at 13:40

## 2023-05-31 RX ADMIN — HEPARIN SODIUM 5000 UNIT(S): 5000 INJECTION INTRAVENOUS; SUBCUTANEOUS at 22:46

## 2023-05-31 RX ADMIN — HEPARIN SODIUM 5000 UNIT(S): 5000 INJECTION INTRAVENOUS; SUBCUTANEOUS at 13:40

## 2023-05-31 RX ADMIN — SEVELAMER CARBONATE 800 MILLIGRAM(S): 2400 POWDER, FOR SUSPENSION ORAL at 05:34

## 2023-05-31 RX ADMIN — SEVELAMER CARBONATE 800 MILLIGRAM(S): 2400 POWDER, FOR SUSPENSION ORAL at 17:37

## 2023-05-31 RX ADMIN — HEPARIN SODIUM 5000 UNIT(S): 5000 INJECTION INTRAVENOUS; SUBCUTANEOUS at 05:33

## 2023-05-31 NOTE — CONSULT NOTE ADULT - ASSESSMENT
This is a 83 yo F from home, ambulates with cane, with pmhx of ESRD on HD (T, Th, Sat), DM, HTN, HLD that presents to the ED after being sent from her nephrologist for jaundice. Nephrology consult called for ESRD This is a 83 yo F from home, ambulates with cane, with pmhx of ESRD on HD (T, Th, Sat), DM, HTN, HLD that presents to the ED after being sent from her nephrologist for jaundice. Nephrology consult called for ESRD    Assessment:  1) ESRD on HD TTS Schedule via LUE AVF  2) Anemia of chronic kidney disease  3) Renal osteodystrophy  4) Hypertensive Nephropathy  5) Dm type II with chronic nephropathy/neuropathy  6) Jaundice    Recommend:  Strict I/o  Avoid nephrotoxic agents  Last HD on 5/30 at her HD unit   Defer HD today  Next anticipated IHD on 6/1  Work up for jaundice per primary/GI/hepatology team  Monitor LFTs  Adjust antibiotics as per renal dose adjustment with e GFR <10 ml/min  Check PTH, Vitamin D 1,25 level, Phos, calcium level  Continue Phos binders  Check iron studies/Hgb/Hct  EPO/Venofer with HD as per ordered  Volume status and electrolytes noted  No urgent need for RRT/HD today  BP medications with holding parameters  Optimal DM control with insulin therapy  Will follow

## 2023-05-31 NOTE — CONSULT NOTE ADULT - ASSESSMENT
82 YOF sent to ED by nephrologist s/p HD yesterday for jaundice.  Denies pain or anorexia.  No history of EGD or colonoscopy.  Last abdominal surgery was 52 years ago.    ·	MRCP  ·	Tentative ERCP on 6/2  Clear liquid diet  Hold AC/VTE 24H prior to procedure  NPO p MN  Preop AM labs     This note and its recommendations herein are preliminary until such time as cosigned by an attending.    GI will continue to follow.  Thank you for the consult!  82 YOF sent to ED by nephrologist s/p HD yesterday for jaundice.  Denies pain or anorexia.  No history of EGD or colonoscopy.  Last abdominal surgery was 52 years ago.    ·	CA-19  ·	MRCP  ·	Tentative ERCP on 6/2  NPO pMN  Hold AC/VTE 24H prior to procedure  Preop AM labs   100 mg Indomethacin on call to Endo  400 mg Cipro on call to Endo    This note and its recommendations herein are preliminary until such time as cosigned by an attending.    GI will continue to follow.  Thank you for the consult!

## 2023-05-31 NOTE — CONSULT NOTE ADULT - SUBJECTIVE AND OBJECTIVE BOX
Cedric Cano MD (Nephrology)  [address], [facility],  [address],  [facility]  TEl: [phone]  Cell: [phone]    Patient is a 82y old  Female who presents with a chief complaint of Jaundice (30 May 2023 22:51)      HPI:  This is a 81 yo F from home, ambulates with cane, with pmhx of ESRD on HD (T, Th, Sat), DM, HTN, HLD that presents to the ED after being sent from her nephrologist for jaundice. Patient states that she went for HD today, and when she saw her nephrologist, who recommended her to come to ED. She hasn't noticed her eyes being yellow, and thus not sure when it started. She endorses diffuse itching that occured a few  weeks ago, endorses fatigue, and unwanted weight loss, however, denies any night sweats, also mentions feeling fatigued.  Denies any chest pain, SOB, N/V/D or abdominal pain. (30 May 2023 22:51)      PAST MEDICAL & SURGICAL HISTORY:  ESRD on dialysis      HLD (hyperlipidemia)      DM (diabetes mellitus)      HTN (hypertension)            Allergies:  No Known Allergies      Home Medications:   atorvastatin 80 milliGRAM(s) Oral at bedtime  carvedilol 25 milliGRAM(s) Oral every 12 hours  heparin   Injectable 5000 Unit(s) SubCutaneous every 8 hours  losartan 100 milliGRAM(s) Oral daily  mirtazapine 7.5 milliGRAM(s) Oral daily  sevelamer carbonate 800 milliGRAM(s) Oral three times a day      Hospital Medications:   MEDICATIONS  (STANDING):  atorvastatin 80 milliGRAM(s) Oral at bedtime  carvedilol 25 milliGRAM(s) Oral every 12 hours  heparin   Injectable 5000 Unit(s) SubCutaneous every 8 hours  losartan 100 milliGRAM(s) Oral daily  mirtazapine 7.5 milliGRAM(s) Oral daily  sevelamer carbonate 800 milliGRAM(s) Oral three times a day      SOCIAL HISTORY:  Denies ETOh, Smoking,     Family History:  FAMILY HISTORY:      ROS:  CONSTITUTIONAL: No fever or chills.  HEENT: No headche, visual disturbances, hearing impairment.  RESPIRATORY: No shortness of breath, cough, wheezing or hemoptysis  CARDIOVASCULAR: No Chest pain, shortness of breath, palpitations, dizziness, syncope, orthopnea, PND or leg swelling.  GASTROINTESTINAL: No abdominal pain, nausea, vomiting, diarrhea, hematemesis or blood per rectum.  GENITOURINARY: No urinary frequency, urgency, gross hematuria, dysuria, foamy urine, flank or supra pubic pain, no prior history of kidney stones.  NEUROLOGICAL: No headache,  focal limb weakness, tingling or numbness sensation or seizure like activity  SKIN: No rash or skin lesion  MUSCULOSKELETAL: No leg pain, calf pain or swelling  Psych: Denies suicidal or homicidal ideation    VITALS:  T(F): 97.8 (05-31-23 @ 07:35), Max: 98.1 (05-30-23 @ 22:07)  HR: 55 (05-31-23 @ 07:35)  BP: 159/61 (05-31-23 @ 07:35)  RR: 17 (05-31-23 @ 07:35)  SpO2: 96% (05-31-23 @ 07:35)  Wt(kg): --    Height (cm): 154.9 (05-30 @ 12:56)  Weight (kg): 58 (05-30 @ 12:56)  BMI (kg/m2): 24.2 (05-30 @ 12:56)  BSA (m2): 1.56 (05-30 @ 12:56)  CAPILLARY BLOOD GLUCOSE            PHYSICAL EXAM:  GENERAL: Alert, awake, oriented x3   HEENT: DONNIE, EOMI, neck supple, no JVP, no carotid bruits, no palpable thyromegaly or lymphadenopathy, no carotid bruits  CHEST/LUNG: Bilateral clear breath sounds, no rales, no crepitations, no wheezing  HEART: Regular rate and rhythm, BRIANA II/VI at LPSB, no gallops, no rub   ABDOMEN: Soft, nontender, non distended, bowel sounds present, no palpable organomegaly, no guarding, no rigidity, no rebound tenderness.  : No flank or supra pubic tenderness.  EXTREMITIES: Peripheral pulses are palpable, no pedal edema, no calf tenderness  Musculoskeletal: No joint or spinal tenderness  Neurology: AAOx3, no focal neurological deficit, Motor and sensory systems are intact.  SKIN: No rash or skin lesion    LABS:  05-31    138  |  100  |  35<H>  ----------------------------<  83  4.1   |  32<H>  |  4.19<H>    Ca    8.5      31 May 2023 05:12  Phos  5.0     05-31  Mg     2.7     05-31    TPro  6.1  /  Alb  2.3<L>  /  TBili  5.3<H>  /  DBili  4.3<H>  /  AST  213<H>  /  ALT  169<H>  /  AlkPhos  233<H>  05-31    Creatinine Trend: 4.19 <--, 3.20 <--                        9.3    9.02  )-----------( 132      ( 31 May 2023 05:12 )             27.1     Urine Studies:        RADIOLOGY & ADDITIONAL STUDIES:  rad< from: US Hepatic & Pancreatic (05.30.23 @ 19:39) >    ACC: [id number] EXAM:  US LIVER AND PANCREAS   ORDERED BY: DOMINIQUE DE LA ROSA     PROCEDURE DATE:  05/30/2023          INTERPRETATION:  CLINICAL INFORMATION: Hyperbilirubinemia. Abnormal   appearance of gallbladder on CT.    COMPARISON: CT abdomen pelvis dated 5/30/2023    TECHNIQUE: Sonography of the right upper quadrant.    FINDINGS:    Liver: Within normal limits.  Bile ducts: Normal caliber. Common bile duct measures 4 mm.  Gallbladder: Gallstones. No mural thickening. No pericholecystic fluid.   No sonographic Butler sign.  Pancreas: Visualized portions are within normal limits.  Right kidney: 6.9 cm. Thin echogenic cortex. No hydronephrosis..  Ascites: None.  IVC: Visualized portions are within normal limits.    IMPRESSION:    Cholelithiasis without evidence of cholecystitis or biliary obstruction.    Atrophic right kidney.        --- End of Report ---            JASPER SOMERS MD; Attending Radiologist  This document has been electronically signed. May 30 2023  7:51PM    < end of copied text >    EKG findings reviewed.    Imaging Personally Reviewed:  [x] YES  [ ] NO    Consultant(s) and primary physician Notes Reviewed:  [x] YES  [ ] NO    Care Discussed with Primary team/ Consultants/Other Providers [x] YES  [ ] NO           Cedric Cano MD (Nephrology)  [address], [facility],  [address],  [facility]  TEl: [phone]  Cell: [phone]    Patient is a 82y old  Female who presents with a chief complaint of Jaundice (30 May 2023 22:51)      HPI:  This is a 83 yo F from home, ambulates with cane, with pmhx of ESRD on HD (T, Th, Sat), DM, HTN, HLD that presents to the ED after being sent from her nephrologist for jaundice. Patient states that she went for HD today, and when she saw her nephrologist, who recommended her to come to ED. She hasn't noticed her eyes being yellow, and thus not sure when it started. She endorses diffuse itching that occurred a few  weeks ago, endorses fatigue, and unwanted weight loss, however, denies any night sweats, also mentions feeling fatigued.  Denies any chest pain, SOB, N/V/D or abdominal pain. (30 May 2023 22:51)      PAST MEDICAL & SURGICAL HISTORY:  ESRD on dialysis      HLD (hyperlipidemia)      DM (diabetes mellitus)      HTN (hypertension)            Allergies:  No Known Allergies      Home Medications:   atorvastatin 80 milliGRAM(s) Oral at bedtime  carvedilol 25 milliGRAM(s) Oral every 12 hours  heparin   Injectable 5000 Unit(s) SubCutaneous every 8 hours  losartan 100 milliGRAM(s) Oral daily  mirtazapine 7.5 milliGRAM(s) Oral daily  sevelamer carbonate 800 milliGRAM(s) Oral three times a day      Hospital Medications:   MEDICATIONS  (STANDING):  atorvastatin 80 milliGRAM(s) Oral at bedtime  carvedilol 25 milliGRAM(s) Oral every 12 hours  heparin   Injectable 5000 Unit(s) SubCutaneous every 8 hours  losartan 100 milliGRAM(s) Oral daily  mirtazapine 7.5 milliGRAM(s) Oral daily  sevelamer carbonate 800 milliGRAM(s) Oral three times a day      SOCIAL HISTORY:  Denies ETOh, Smoking,     Family History:  FAMILY HISTORY:      ROS:  CONSTITUTIONAL: No fever or chills.  HEENT: No headache or dizziness, Yellow eyes  RESPIRATORY: No shortness of breath, cough, wheezing or hemoptysis  CARDIOVASCULAR: No Chest pain or SOB  GASTROINTESTINAL: No abdominal pain, nausea, vomiting, diarrhea, hematemesis or blood per rectum.  GENITOURINARY: No flank or supra pubic pain  NEUROLOGICAL: No headache,  focal limb weakness, tingling or numbness sensation  SKIN: No rash or skin lesion  MUSCULOSKELETAL: No leg pain, calf pain or swelling  Psych: Denies suicidal or homicidal ideation    VITALS:  T(F): 97.8 (05-31-23 @ 07:35), Max: 98.1 (05-30-23 @ 22:07)  HR: 55 (05-31-23 @ 07:35)  BP: 159/61 (05-31-23 @ 07:35)  RR: 17 (05-31-23 @ 07:35)  SpO2: 96% (05-31-23 @ 07:35)  Wt(kg): --    Height (cm): 154.9 (05-30 @ 12:56)  Weight (kg): 58 (05-30 @ 12:56)  BMI (kg/m2): 24.2 (05-30 @ 12:56)  BSA (m2): 1.56 (05-30 @ 12:56)  CAPILLARY BLOOD GLUCOSE            PHYSICAL EXAM:  GENERAL: Alert, awake, oriented x3   HEENT: DONNIE, EOMI, neck supple, no JVP, bilateral scleral icterus  CHEST/LUNG: Bilateral clear breath sounds, no rales, no crepitations, no wheezing  HEART: Regular rate and rhythm, BRIANA II/VI at LPSB, no gallops, no rub   ABDOMEN: Soft, nontender, non distended, bowel sounds present, no palpable organomegaly, no guarding, no rigidity, no rebound tenderness.  : No flank or supra pubic tenderness.  EXTREMITIES: Peripheral pulses are palpable, no pedal edema, no calf tenderness  Musculoskeletal: No joint or spinal tenderness, LUE AVF present  Neurology: AAOx3, no focal neurological deficit  SKIN: No rash or skin lesion    LABS:  05-31    138  |  100  |  35<H>  ----------------------------<  83  4.1   |  32<H>  |  4.19<H>    Ca    8.5      31 May 2023 05:12  Phos  5.0     05-31  Mg     2.7     05-31    TPro  6.1  /  Alb  2.3<L>  /  TBili  5.3<H>  /  DBili  4.3<H>  /  AST  213<H>  /  ALT  169<H>  /  AlkPhos  233<H>  05-31    Creatinine Trend: 4.19 <--, 3.20 <--                        9.3    9.02  )-----------( 132      ( 31 May 2023 05:12 )             27.1     Urine Studies:        RADIOLOGY & ADDITIONAL STUDIES:  rad< from: US Hepatic & Pancreatic (05.30.23 @ 19:39) >    ACC: [id number] EXAM:  US LIVER AND PANCREAS   ORDERED BY: DOMINIQUE DE LA ROSA     PROCEDURE DATE:  05/30/2023          INTERPRETATION:  CLINICAL INFORMATION: Hyperbilirubinemia. Abnormal   appearance of gallbladder on CT.    COMPARISON: CT abdomen pelvis dated 5/30/2023    TECHNIQUE: Sonography of the right upper quadrant.    FINDINGS:    Liver: Within normal limits.  Bile ducts: Normal caliber. Common bile duct measures 4 mm.  Gallbladder: Gallstones. No mural thickening. No pericholecystic fluid.   No sonographic Butler sign.  Pancreas: Visualized portions are within normal limits.  Right kidney: 6.9 cm. Thin echogenic cortex. No hydronephrosis..  Ascites: None.  IVC: Visualized portions are within normal limits.    IMPRESSION:    Cholelithiasis without evidence of cholecystitis or biliary obstruction.    Atrophic right kidney.        --- End of Report ---            JASPER SOMERS MD; Attending Radiologist  This document has been electronically signed. May 30 2023  7:51PM    < end of copied text >    EKG findings reviewed.    Imaging Personally Reviewed:  [x] YES  [ ] NO    Consultant(s) and primary physician Notes Reviewed:  [x] YES  [ ] NO    Care Discussed with Primary team/ Consultants/Other Providers [x] YES  [ ] NO

## 2023-05-31 NOTE — CONSULT NOTE ADULT - SUBJECTIVE AND OBJECTIVE BOX
[facility]    Patient is a 82y old  Female who presents with a chief complaint of Jaundice (30 May 2023 22:51)    HPI:  This is a 83 yo F from home, ambulates with cane, with pmhx of ESRD on HD (T, Th, Sat), DM, HTN, HLD that presents to the ED after being sent from her nephrologist for jaundice. Patient states that she went for HD today, and when she saw her nephrologist, who recommended her to come to ED. She hasn't noticed her eyes being yellow, and thus not sure when it started. She endorses diffuse itching that occured a few  weeks ago, endorses fatigue, and unwanted weight loss, however, denies any night sweats, also mentions feeling fatigued.  Denies any chest pain, SOB, N/V/D or abdominal pain. (30 May 2023 22:51)      GI HPI:  Resting comfortably in bed.  Pt states she had HD yesterday prior to transport to ED.  Had normal BM last night.  Tolerating PO diet, denies pain.  No history of EGD or colonoscopy.  Last abdominal surgery was 52 years ago.        PMH/PSH:  PAST MEDICAL & SURGICAL HISTORY:  ESRD on dialysis      HLD (hyperlipidemia)      DM (diabetes mellitus)      HTN (hypertension)        FH:  FAMILY HISTORY:      MEDS:  MEDICATIONS  (STANDING):  atorvastatin 80 milliGRAM(s) Oral at bedtime  carvedilol 25 milliGRAM(s) Oral every 12 hours  heparin   Injectable 5000 Unit(s) SubCutaneous every 8 hours  losartan 100 milliGRAM(s) Oral daily  mirtazapine 7.5 milliGRAM(s) Oral daily  sevelamer carbonate 800 milliGRAM(s) Oral three times a day    MEDICATIONS  (PRN):    Allergies    No Known Allergies    Intolerances            CONSTITUTIONAL:  No weight loss, fever, chills, weakness or fatigue.  HEENT:  Eyes:  No visual loss, blurred vision, double vision or yellow sclerae. Ears, Nose, Throat:  No hearing loss, sneezing, congestion, runny nose or sore throat.  SKIN:  No rash or itching.  CARDIOVASCULAR:  No chest pain, chest pressure or chest discomfort. No palpitations or edema.  RESPIRATORY:  No shortness of breath, cough or sputum.  GASTROINTESTINAL:  SEE HPI  GENITOURINARY:  No dysuria, hematuria, urinary frequency  NEUROLOGICAL:  No headache, dizziness, syncope, paralysis, ataxia, numbness or tingling in the extremities. No change in bowel or bladder control.  MUSCULOSKELETAL:  No muscle, back pain, joint pain or stiffness.  HEMATOLOGIC:  No anemia, bleeding or bruising.  LYMPHATICS:  No enlarged nodes. No history of splenectomy.  PSYCHIATRIC:  No history of depression or anxiety.  ENDOCRINOLOGIC:  No reports of sweating, cold or heat intolerance. No polyuria or polydipsia.      ______________________________________________________________________  PHYSICAL EXAM:  T(C): 36.6 (05-31-23 @ 07:35), Max: 36.7 (05-30-23 @ 12:56)  HR: 55 (05-31-23 @ 07:35)  BP: 159/61 (05-31-23 @ 07:35)  RR: 17 (05-31-23 @ 07:35)  SpO2: 96% (05-31-23 @ 07:35)  Wt(kg): --      GEN: NAD, normocephalic  CVS: S1S2+  CHEST: clear to auscultation  ABD: soft , nontender, nondistended, bowel sounds present  EXTR: no cyanosis, no clubbing, no edema  NEURO: Awake and alert; oriented x3  SKIN:  warm;  no icteric    ______________________________________________________________________  LABS:                        9.3    9.02  )-----------( 132      ( 31 May 2023 05:12 )             27.1     05-31    138  |  100  |  35<H>  ----------------------------<  83  4.1   |  32<H>  |  4.19<H>    Ca    8.5      31 May 2023 05:12  Phos  5.0     05-31  Mg     2.7     05-31    TPro  6.1  /  Alb  2.3<L>  /  TBili  5.3<H>  /  DBili  4.3<H>  /  AST  213<H>  /  ALT  169<H>  /  AlkPhos  233<H>  05-31    LIVER FUNCTIONS - ( 31 May 2023 05:12 )  Alb: 2.3 g/dL / Pro: 6.1 g/dL / ALK PHOS: 233 U/L / ALT: 169 U/L DA / AST: 213 U/L / GGT: x           PT/INR - ( 30 May 2023 15:02 )   PT: 13.3 sec;   INR: 1.12 ratio         PTT - ( 30 May 2023 15:02 )  PTT:37.0 sec  ____________________________________________    IMAGING:    < from: CT Abdomen and Pelvis No Cont (05.30.23 @ 18:23) >  IMPRESSION:  Limited evaluation of the pancreas without intravenous contrast.    Gallstones and gallbladder wall thickening. Correlation with sonography   recommended.    Patchy opacities rightmiddle lobe and left lower lobe may be infectious   or inflammatory.    < end of copied text >    < from: US Hepatic & Pancreatic (05.30.23 @ 19:39) >  IMPRESSION:    Cholelithiasis without evidence of cholecystitis or biliary obstruction.    Atrophic right kidney.    < end of copied text >           [facility]    Patient is a 82y old  Female who presents with a chief complaint of Jaundice (30 May 2023 22:51)    HPI:  This is a 83 yo F from home, ambulates with cane, with pmhx of ESRD on HD (T, Th, Sat), DM, HTN, HLD that presents to the ED after being sent from her nephrologist for jaundice. Patient states that she went for HD today, and when she saw her nephrologist, who recommended her to come to ED. She hasn't noticed her eyes being yellow, and thus not sure when it started. She endorses diffuse itching that occured a few  weeks ago, endorses fatigue, and unwanted weight loss, however, denies any night sweats, also mentions feeling fatigued.  Denies any chest pain, SOB, N/V/D or abdominal pain. (30 May 2023 22:51)      GI HPI:  Resting comfortably in bed.  Pt states she had HD yesterday prior to transport to ED.  Had normal BM last night.  Tolerating PO diet, denies pain.  No history of EGD or colonoscopy.  Last abdominal surgery was 52 years ago.        PMH/PSH:  PAST MEDICAL & SURGICAL HISTORY:  ESRD on dialysis      HLD (hyperlipidemia)      DM (diabetes mellitus)      HTN (hypertension)        FH:  FAMILY HISTORY:      MEDS:  MEDICATIONS  (STANDING):  atorvastatin 80 milliGRAM(s) Oral at bedtime  carvedilol 25 milliGRAM(s) Oral every 12 hours  heparin   Injectable 5000 Unit(s) SubCutaneous every 8 hours  losartan 100 milliGRAM(s) Oral daily  mirtazapine 7.5 milliGRAM(s) Oral daily  sevelamer carbonate 800 milliGRAM(s) Oral three times a day    MEDICATIONS  (PRN):    Allergies    No Known Allergies    Intolerances            CONSTITUTIONAL:  No weight loss, fever, chills, weakness or fatigue.  HEENT:  Eyes:  No visual loss, blurred vision, double vision or yellow sclerae. Ears, Nose, Throat:  No hearing loss, sneezing, congestion, runny nose or sore throat.  SKIN:  No rash or itching.  CARDIOVASCULAR:  No chest pain, chest pressure or chest discomfort. No palpitations or edema.  RESPIRATORY:  No shortness of breath, cough or sputum.  GASTROINTESTINAL:  SEE HPI  GENITOURINARY:  No dysuria, hematuria, urinary frequency  NEUROLOGICAL:  No headache, dizziness, syncope, paralysis, ataxia, numbness or tingling in the extremities. No change in bowel or bladder control.  MUSCULOSKELETAL:  No muscle, back pain, joint pain or stiffness.  HEMATOLOGIC:  No anemia, bleeding or bruising.  LYMPHATICS:  No enlarged nodes. No history of splenectomy.  PSYCHIATRIC:  No history of depression or anxiety.  ENDOCRINOLOGIC:  No reports of sweating, cold or heat intolerance. No polyuria or polydipsia.      ______________________________________________________________________  PHYSICAL EXAM:  T(C): 36.6 (05-31-23 @ 07:35), Max: 36.7 (05-30-23 @ 12:56)  HR: 55 (05-31-23 @ 07:35)  BP: 159/61 (05-31-23 @ 07:35)  RR: 17 (05-31-23 @ 07:35)  SpO2: 96% (05-31-23 @ 07:35)  Wt(kg): --      GEN: NAD, normocephalic  CVS: S1S2+  CHEST: clear to auscultation  ABD: soft , nontender, nondistended, bowel sounds present  EXTR: no cyanosis, no clubbing, no edema  NEURO: Awake and alert; oriented x3  SKIN:  warm; + icteric    ______________________________________________________________________  LABS:                        9.3    9.02  )-----------( 132      ( 31 May 2023 05:12 )             27.1     05-31    138  |  100  |  35<H>  ----------------------------<  83  4.1   |  32<H>  |  4.19<H>    Ca    8.5      31 May 2023 05:12  Phos  5.0     05-31  Mg     2.7     05-31    TPro  6.1  /  Alb  2.3<L>  /  TBili  5.3<H>  /  DBili  4.3<H>  /  AST  213<H>  /  ALT  169<H>  /  AlkPhos  233<H>  05-31    LIVER FUNCTIONS - ( 31 May 2023 05:12 )  Alb: 2.3 g/dL / Pro: 6.1 g/dL / ALK PHOS: 233 U/L / ALT: 169 U/L DA / AST: 213 U/L / GGT: x           PT/INR - ( 30 May 2023 15:02 )   PT: 13.3 sec;   INR: 1.12 ratio         PTT - ( 30 May 2023 15:02 )  PTT:37.0 sec  ____________________________________________    IMAGING:    < from: CT Abdomen and Pelvis No Cont (05.30.23 @ 18:23) >  IMPRESSION:  Limited evaluation of the pancreas without intravenous contrast.    Gallstones and gallbladder wall thickening. Correlation with sonography   recommended.    Patchy opacities rightmiddle lobe and left lower lobe may be infectious   or inflammatory.    < end of copied text >    < from: US Hepatic & Pancreatic (05.30.23 @ 19:39) >  IMPRESSION:    Cholelithiasis without evidence of cholecystitis or biliary obstruction.    Atrophic right kidney.    < end of copied text >

## 2023-05-31 NOTE — PROGRESS NOTE ADULT - ASSESSMENT
Patient is a 82 year old Female from home, ambulates with cane, with PMHx of ESRD on HD (T, Th, Sat), DM, HTN, HLD Patient presented to the ED after being sent from her nephrologist for jaundice. CT abdomen       Patient is a 82 year old Female from home, ambulates with cane, with PMHx of ESRD on HD (T, Th, Sat), DM, HTN, HLD Patient presented to the ED after being sent from her nephrologist for jaundice. CT abdomen and pelvis resulting gallstones and gallbladder wall thickening. GI consulted recommending MRCP and possible ERCP to follow. pending MRCP.

## 2023-05-31 NOTE — PROGRESS NOTE ADULT - PROBLEM SELECTOR PLAN 3
On lisinopril, losartan, and coreg  c/w losartan and coreg for now On lisinopril, losartan, and coreg  c/w losartan and coreg

## 2023-05-31 NOTE — PHARMACOTHERAPY INTERVENTION NOTE - COMMENTS
Patient's primary pharmacy on record ([facility], [place] [phone]) was contacted and the outside medication list was updated based on their prescription history.

## 2023-05-31 NOTE — PROGRESS NOTE ADULT - PROBLEM SELECTOR PLAN 1
Pt w/ painless jaundice  CT A/P Gallstones and gallbladder wall thickening. Patchy opacities right middle lobe and left lower lobe may be infectious or inflammatory.  RUQ u/s: Cholelithiasis without evidence of cholecystitis or biliary obstruction.  , ,   T Bili 6.4, Direct bili 5.2  f/u hepatitis panel  f/u daily CMP  GI consulted Dr. Patel Pt w/ painless jaundice  CT A/P Gallstones and gallbladder wall thickening. Patchy opacities right middle lobe and left lower lobe may be infectious or inflammatory.  RUQ u/s: Cholelithiasis without evidence of cholecystitis or biliary obstruction.  pending MRCP  start clear liquid diet 6/1  ERCP 6/2- pre-op labd, NPO after midnight  GI Dr. Patel Pt w/ painless jaundice  CT A/P Gallstones and gallbladder wall thickening. Patchy opacities right middle lobe and left lower lobe may be infectious or inflammatory.  RUQ u/s: Cholelithiasis without evidence of cholecystitis or biliary obstruction.  pending MRCP  start clear liquid diet 6/1  ERCP 6/2- pre-op labs, NPO after midnight  GI Dr. Patel

## 2023-05-31 NOTE — PROGRESS NOTE ADULT - SUBJECTIVE AND OBJECTIVE BOX
NP Note discussed with  Primary Attending    Patient is a 82y old  Female who presents with a chief complaint of Jaundice (31 May 2023 10:39)      INTERVAL HPI/OVERNIGHT EVENTS: no new complaints    MEDICATIONS  (STANDING):  atorvastatin 80 milliGRAM(s) Oral at bedtime  carvedilol 25 milliGRAM(s) Oral every 12 hours  heparin   Injectable 5000 Unit(s) SubCutaneous every 8 hours  losartan 100 milliGRAM(s) Oral daily  mirtazapine 7.5 milliGRAM(s) Oral daily  sevelamer carbonate 800 milliGRAM(s) Oral three times a day with meals    MEDICATIONS  (PRN):      __________________________________________________  REVIEW OF SYSTEMS:    CONSTITUTIONAL: No fever,   EYES: no acute visual disturbances  NECK: No pain or stiffness  RESPIRATORY: No cough; No shortness of breath  CARDIOVASCULAR: No chest pain, no palpitations  GASTROINTESTINAL: No pain. No nausea or vomiting; No diarrhea   NEUROLOGICAL: No headache or numbness, no tremors  MUSCULOSKELETAL: No joint pain, no muscle pain  GENITOURINARY: no dysuria, no frequency, no hesitancy  PSYCHIATRY: no depression , no anxiety  ALL OTHER  ROS negative        Vital Signs Last 24 Hrs  T(C): 36.9 (31 May 2023 11:00), Max: 36.9 (31 May 2023 11:00)  T(F): 98.5 (31 May 2023 11:00), Max: 98.5 (31 May 2023 11:00)  HR: 64 (31 May 2023 11:00) (55 - 70)  BP: 161/63 (31 May 2023 11:00) (159/61 - 188/63)  BP(mean): 91 (31 May 2023 05:14) (91 - 105)  RR: 17 (31 May 2023 11:00) (17 - 18)  SpO2: 96% (31 May 2023 11:00) (94% - 96%)    Parameters below as of 31 May 2023 11:00  Patient On (Oxygen Delivery Method): room air        ________________________________________________  PHYSICAL EXAM:  GENERAL: NAD  HEENT: icteric, Normocephalic;  conjunctivae and sclerae clear; moist mucous membranes;   NECK : supple  CHEST/LUNG: Clear to auscultation bilaterally with good air entry   HEART: S1 S2  regular; no murmurs, gallops or rubs  ABDOMEN: Soft, Nontender, Nondistended; Bowel sounds present  EXTREMITIES: no cyanosis; no edema; no calf tenderness  SKIN: warm and dry; no rash  NERVOUS SYSTEM:  Awake and alert; Oriented  to place, person and time ; no new deficits    _________________________________________________  LABS:                        9.3    9.02  )-----------( 132      ( 31 May 2023 05:12 )             27.1     05-31    138  |  100  |  35<H>  ----------------------------<  83  4.1   |  32<H>  |  4.19<H>    Ca    8.5      31 May 2023 05:12  Phos  5.0     05-31  Mg     2.7     05-31    TPro  6.1  /  Alb  2.3<L>  /  TBili  5.3<H>  /  DBili  4.3<H>  /  AST  213<H>  /  ALT  169<H>  /  AlkPhos  233<H>  05-31    PT/INR - ( 30 May 2023 15:02 )   PT: 13.3 sec;   INR: 1.12 ratio         PTT - ( 30 May 2023 15:02 )  PTT:37.0 sec    CAPILLARY BLOOD GLUCOSE      RADIOLOGY & ADDITIONAL TESTS:  < from: US Hepatic & Pancreatic (05.30.23 @ 19:39) >    ACC: [id number] EXAM:  US LIVER AND PANCREAS   ORDERED BY: DOMINIQUE DE LA ROSA     PROCEDURE DATE:  05/30/2023          INTERPRETATION:  CLINICAL INFORMATION: Hyperbilirubinemia. Abnormal   appearance of gallbladder on CT.    COMPARISON: CT abdomen pelvis dated 5/30/2023    TECHNIQUE: Sonography of the right upper quadrant.    FINDINGS:    Liver: Within normal limits.  Bile ducts: Normal caliber. Common bile duct measures 4 mm.  Gallbladder: Gallstones. No mural thickening. No pericholecystic fluid.   No sonographic Butler sign.  Pancreas: Visualized portions are within normal limits.  Right kidney: 6.9 cm. Thin echogenic cortex. No hydronephrosis..  Ascites: None.  IVC: Visualized portions are within normal limits.    IMPRESSION:    Cholelithiasis without evidence of cholecystitis or biliary obstruction.    Atrophic right kidney.        --- End of Report ---    < end of copied text >  < from: CT Abdomen and Pelvis No Cont (05.30.23 @ 18:23) >  FINDINGS:  LOWER CHEST: Faint patchy opacities right middle lobe may be infectious   or inflammatory. Cardiomegaly. 4 mm nodule right lower lobe. Patchy   opacities left lower lobe may be infectious or inflammatory.    LIVER: Enlargement of the lateral segment relative to the medial segment   which is nonspecific but may be seen in the setting of early cirrhosis.  BILE DUCTS: Normal caliber.  GALLBLADDER: Gallstones and gallbladder wall thickening.  SPLEEN: Within normal limits.  PANCREAS: Within normal limits.  ADRENALS: Within normal limits.  KIDNEYS/URETERS: Mildly atrophic kidneys.    BLADDER: Relatively collapsed but may be nonetheless thickened.  REPRODUCTIVE ORGANS: Uterus and adnexa within normal limits.    BOWEL: No bowel obstruction. Appendix is normal. Colonic diverticulosis.  PERITONEUM: Trace amount of perihepatic ascites and pelvic ascites.  VESSELS: Atherosclerotic changes.  RETROPERITONEUM/LYMPH NODES: There are peripancreatic, periceliac and   gastrohepatic ligament lymph nodes measuring up to 0.9 cm.  ABDOMINAL WALL: Within normal limits.  BONES: Severe compression deformity of the L1 vertebral body.    IMPRESSION:  Limited evaluation of the pancreas without intravenous contrast.    Gallstones and gallbladder wall thickening. Correlation with sonography   recommended.    Patchy opacities rightmiddle lobe and left lower lobe may be infectious   or inflammatory.        --- End of Report ---    < end of copied text >    Imaging  Reviewed:  YES    Consultant(s) Notes Reviewed:   YES      Plan of care was discussed with patient and /or primary care giver; all questions and concerns were addressed

## 2023-05-31 NOTE — PROGRESS NOTE ADULT - NS ATTEND AMEND GEN_ALL_CORE FT
Patient seen and examined earlier this afternoon in ED with daughter at bedside      82 year old woman with PMH of ESRD on HD- last session earlier today, sent in on account of yellow eyes noticed by her nephrologist in HD over the last 2 weeks.  Pt did have pruritus but denies abdominal pain,  pale colored stool nor dark urine but  barely makes urine now.    Vital Signs Last 24 Hrs  T(C): 37.1 (31 May 2023 15:54), Max: 37.1 (31 May 2023 15:54)  T(F): 98.7 (31 May 2023 15:54), Max: 98.7 (31 May 2023 15:54)  HR: 61 (31 May 2023 15:54) (55 - 70)  BP: 176/67 (31 May 2023 15:54) (159/61 - 177/62)  BP(mean): 91 (31 May 2023 05:14) (91 - 105)  RR: 18 (31 May 2023 15:54) (17 - 18)  SpO2: 96% (31 May 2023 15:54) (94% - 96%): room air    P/E:  Psych: AAO x3  Neuro: No gross focal deficits; Power and sensation intact  CVS: S1S2 present, regular, no edema  Resp: BLAE+, No wheeze or Rhonchi  GI: Soft, BS+, Non tender, non distended  Extr: No  calf tenderness B/L Lower extremities  Skin: Warm and moist without any rashes          Labs                         9.3    9.02  )-----------( 132      ( 31 May 2023 05:12 )             27.1   05-31  138  |  100  |  35<H>  ----------------------------<  83  4.1   |  32<H>  |  4.19<H>    Ca    8.5      31 May 2023 05:12  Phos  5.0     05-31  Mg     2.7     05-31  TPro  6.1  /  Alb  2.3<L>  /  TBili  5.3<H>  /  DBili  4.3<H>  /  AST  213<H>  /  ALT  169<H>  /  AlkPhos  233<H>  05-31 5/30/33  TB - 6.4 / DB - 5.2  ALP /AST/ALT - 277/287/217    CT Abdomen and Pelvis No Cont (05.30.23 @ 18:23)     IMPRESSION: Limited evaluation of the pancreas without intravenous contrast.  Gallstones and gallbladder wall thickening. Correlation with sonography recommended.  Patchy opacities right middle lobe and left lower lobe may be infectious or inflammatory.    US Hepatic & Pancreatic (05.30.23 @ 19:39)   IMPRESSION: Cholelithiasis without evidence of cholecystitis or biliary obstruction. Atrophic right kidney.    A/P:  Obstructive Jaundice with Cholelithiasis concern for Choledocholithiasis  Less likely concern for Malignancy  Possible Viral Hepatitis although less likely  Transaminitis due to Hepatobiliary obstruction  ESRD on HD  Type 2 DM  HTN    Plan:  IV Fluid hydration  GI consult appreciated; Agree with MRCP  I spoke with MRI tech to see if we can get MRCP withoout contast tonight as patient needs HD tomorrow   If MRCP shows evidence of stone Patient may neeed ERCP which likely neeed to be scheduled Friday  Nephrology Consult: Discussed with Dr. Cano for HD in AM Patient seen and examined earlier this afternoon in ED with daughter at bedside      82 year old woman with PMH of ESRD on HD- last session earlier today, sent in on account of yellow eyes noticed by her nephrologist in HD over the last 2 weeks.  Pt did have pruritus but denies abdominal pain,  pale colored stool nor dark urine but  barely makes urine now.    Vital Signs Last 24 Hrs  T(C): 37.1 (31 May 2023 15:54), Max: 37.1 (31 May 2023 15:54)  T(F): 98.7 (31 May 2023 15:54), Max: 98.7 (31 May 2023 15:54)  HR: 61 (31 May 2023 15:54) (55 - 70)  BP: 176/67 (31 May 2023 15:54) (159/61 - 177/62)  BP(mean): 91 (31 May 2023 05:14) (91 - 105)  RR: 18 (31 May 2023 15:54) (17 - 18)  SpO2: 96% (31 May 2023 15:54) (94% - 96%): room air    P/E:  Psych: AAO x3  Neuro: No gross focal deficits; Power and sensation intact  CVS: S1S2 present, regular, no edema  Resp: BLAE+, No wheeze or Rhonchi  GI: Soft, BS+, Non tender, non distended  Extr: No  calf tenderness B/L Lower extremities  Skin: Warm and moist without any rashes          Labs                         9.3    9.02  )-----------( 132      ( 31 May 2023 05:12 )             27.1   05-31  138  |  100  |  35<H>  ----------------------------<  83  4.1   |  32<H>  |  4.19<H>    Ca    8.5      31 May 2023 05:12  Phos  5.0     05-31  Mg     2.7     05-31  TPro  6.1  /  Alb  2.3<L>  /  TBili  5.3<H>  /  DBili  4.3<H>  /  AST  213<H>  /  ALT  169<H>  /  AlkPhos  233<H>  05-31 5/30/33  TB - 6.4 / DB - 5.2  ALP /AST/ALT - 277/287/217    CT Abdomen and Pelvis No Cont (05.30.23 @ 18:23)     IMPRESSION: Limited evaluation of the pancreas without intravenous contrast.  Gallstones and gallbladder wall thickening. Correlation with sonography recommended.  Patchy opacities right middle lobe and left lower lobe may be infectious or inflammatory.    US Hepatic & Pancreatic (05.30.23 @ 19:39)   IMPRESSION: Cholelithiasis without evidence of cholecystitis or biliary obstruction. Atrophic right kidney.    A/P:  Obstructive Jaundice with Cholelithiasis concern for Choledocholithiasis  Less likely concern for Malignancy  Possible Viral Hepatitis although less likely  Transaminitis due to Hepatobiliary obstruction  ESRD on HD  Type 2 DM  HTN    Plan:  IV Fluid hydration  GI consult appreciated; Agree with MRCP  I spoke with MRI tech to see if we can get MRCP without contrast tonight as patient needs HD tomorrow   If MRCP shows evidence of stone Patient may need ERCP which likely need to be scheduled Friday  Nephrology Consult: Discussed with Dr. Cano for HD in AM  Continue anti HTN: Losartan and Coreg  DVT ppx    Discussed with Daughter at bedside  Discussed with ACP and ED Hold RN

## 2023-05-31 NOTE — PROGRESS NOTE ADULT - PROBLEM SELECTOR PLAN 2
ESRD on HD (Tue, Th, Sat)  Last HD session on Tuesday  follows with Dr. Mercado, from [facility] on [id number]  Nephro consulted Dr. Cano ESRD on HD (Tue, Th, Sat)  Last HD session on 5/30  follows with Dr. Mercado, from [facility] on [id number] [address]  Nephro consulted Dr. Cano ESRD on HD (Tue, Th, Sat)  Last HD session on 5/30  follows with Dr. Mercado, from [facility] on [id number] [address]  Nephro Dr. Cano

## 2023-06-01 LAB
A1C WITH ESTIMATED AVERAGE GLUCOSE RESULT: 4.5 % — SIGNIFICANT CHANGE UP (ref 4–5.6)
ALBUMIN SERPL ELPH-MCNC: 2.4 G/DL — LOW (ref 3.5–5)
ALP SERPL-CCNC: 208 U/L — HIGH (ref 40–120)
ALT FLD-CCNC: 144 U/L DA — HIGH (ref 10–60)
ANION GAP SERPL CALC-SCNC: 9 MMOL/L — SIGNIFICANT CHANGE UP (ref 5–17)
AST SERPL-CCNC: 147 U/L — HIGH (ref 10–40)
BILIRUB SERPL-MCNC: 4.9 MG/DL — HIGH (ref 0.2–1.2)
BUN SERPL-MCNC: 47 MG/DL — HIGH (ref 7–18)
CALCIUM SERPL-MCNC: 8.4 MG/DL — SIGNIFICANT CHANGE UP (ref 8.4–10.5)
CALCIUM SERPL-MCNC: 9 MG/DL — SIGNIFICANT CHANGE UP (ref 8.4–10.5)
CANCER AG19-9 SERPL-ACNC: 2274 U/ML — HIGH
CHLORIDE SERPL-SCNC: 98 MMOL/L — SIGNIFICANT CHANGE UP (ref 96–108)
CO2 SERPL-SCNC: 27 MMOL/L — SIGNIFICANT CHANGE UP (ref 22–31)
CREAT SERPL-MCNC: 5.36 MG/DL — HIGH (ref 0.5–1.3)
EGFR: 8 ML/MIN/1.73M2 — LOW
ESTIMATED AVERAGE GLUCOSE: 82 MG/DL — SIGNIFICANT CHANGE UP (ref 68–114)
FERRITIN SERPL-MCNC: 3325 NG/ML — HIGH (ref 15–150)
GLUCOSE BLDC GLUCOMTR-MCNC: 103 MG/DL — HIGH (ref 70–99)
GLUCOSE BLDC GLUCOMTR-MCNC: 144 MG/DL — HIGH (ref 70–99)
GLUCOSE BLDC GLUCOMTR-MCNC: 75 MG/DL — SIGNIFICANT CHANGE UP (ref 70–99)
GLUCOSE BLDC GLUCOMTR-MCNC: 90 MG/DL — SIGNIFICANT CHANGE UP (ref 70–99)
GLUCOSE SERPL-MCNC: 91 MG/DL — SIGNIFICANT CHANGE UP (ref 70–99)
HCT VFR BLD CALC: 27.7 % — LOW (ref 34.5–45)
HGB BLD-MCNC: 9.4 G/DL — LOW (ref 11.5–15.5)
IRON SATN MFR SERPL: 182 UG/DL — HIGH (ref 40–150)
IRON SATN MFR SERPL: 97 % — HIGH (ref 15–50)
MAGNESIUM SERPL-MCNC: 2.8 MG/DL — HIGH (ref 1.6–2.6)
MCHC RBC-ENTMCNC: 27.2 PG — SIGNIFICANT CHANGE UP (ref 27–34)
MCHC RBC-ENTMCNC: 33.9 GM/DL — SIGNIFICANT CHANGE UP (ref 32–36)
MCV RBC AUTO: 80.1 FL — SIGNIFICANT CHANGE UP (ref 80–100)
MRSA PCR RESULT.: SIGNIFICANT CHANGE UP
NRBC # BLD: 0 /100 WBCS — SIGNIFICANT CHANGE UP (ref 0–0)
PHOSPHATE SERPL-MCNC: 6 MG/DL — HIGH (ref 2.5–4.5)
PLATELET # BLD AUTO: 125 K/UL — LOW (ref 150–400)
POTASSIUM SERPL-MCNC: 4.6 MMOL/L — SIGNIFICANT CHANGE UP (ref 3.5–5.3)
POTASSIUM SERPL-SCNC: 4.6 MMOL/L — SIGNIFICANT CHANGE UP (ref 3.5–5.3)
PROT SERPL-MCNC: 6.3 G/DL — SIGNIFICANT CHANGE UP (ref 6–8.3)
PTH-INTACT FLD-MCNC: 216 PG/ML — HIGH (ref 15–65)
RBC # BLD: 3.46 M/UL — LOW (ref 3.8–5.2)
RBC # FLD: 15.9 % — HIGH (ref 10.3–14.5)
S AUREUS DNA NOSE QL NAA+PROBE: DETECTED
SODIUM SERPL-SCNC: 134 MMOL/L — LOW (ref 135–145)
TIBC SERPL-MCNC: 187 UG/DL — LOW (ref 250–450)
UIBC SERPL-MCNC: 5 UG/DL — LOW (ref 110–370)
VIT D25+D1,25 OH+D1,25 PNL SERPL-MCNC: 12.7 PG/ML — LOW (ref 19.9–79.3)
WBC # BLD: 8.77 K/UL — SIGNIFICANT CHANGE UP (ref 3.8–10.5)
WBC # FLD AUTO: 8.77 K/UL — SIGNIFICANT CHANGE UP (ref 3.8–10.5)

## 2023-06-01 PROCEDURE — 99233 SBSQ HOSP IP/OBS HIGH 50: CPT

## 2023-06-01 PROCEDURE — 99223 1ST HOSP IP/OBS HIGH 75: CPT

## 2023-06-01 PROCEDURE — 99232 SBSQ HOSP IP/OBS MODERATE 35: CPT

## 2023-06-01 RX ORDER — CHLORHEXIDINE GLUCONATE 213 G/1000ML
1 SOLUTION TOPICAL
Refills: 0 | Status: DISCONTINUED | OUTPATIENT
Start: 2023-06-01 | End: 2023-06-05

## 2023-06-01 RX ORDER — CARVEDILOL PHOSPHATE 80 MG/1
12.5 CAPSULE, EXTENDED RELEASE ORAL EVERY 12 HOURS
Refills: 0 | Status: DISCONTINUED | OUTPATIENT
Start: 2023-06-01 | End: 2023-06-03

## 2023-06-01 RX ADMIN — HEPARIN SODIUM 5000 UNIT(S): 5000 INJECTION INTRAVENOUS; SUBCUTANEOUS at 06:22

## 2023-06-01 RX ADMIN — CARVEDILOL PHOSPHATE 12.5 MILLIGRAM(S): 80 CAPSULE, EXTENDED RELEASE ORAL at 17:58

## 2023-06-01 RX ADMIN — SEVELAMER CARBONATE 800 MILLIGRAM(S): 2400 POWDER, FOR SUSPENSION ORAL at 18:29

## 2023-06-01 RX ADMIN — CHLORHEXIDINE GLUCONATE 1 APPLICATION(S): 213 SOLUTION TOPICAL at 17:59

## 2023-06-01 RX ADMIN — MIRTAZAPINE 7.5 MILLIGRAM(S): 45 TABLET, ORALLY DISINTEGRATING ORAL at 12:06

## 2023-06-01 RX ADMIN — SEVELAMER CARBONATE 800 MILLIGRAM(S): 2400 POWDER, FOR SUSPENSION ORAL at 14:29

## 2023-06-01 NOTE — PROGRESS NOTE ADULT - PROBLEM SELECTOR PLAN 1
Pt w/ painless jaundice  CT A/P Gallstones and gallbladder wall thickening. Patchy opacities right middle lobe and left lower lobe may be infectious or inflammatory.  RUQ u/s: Cholelithiasis without evidence of cholecystitis or biliary obstruction.  MRCP noted above  full liquid diet  ERCP 6/2- pre-op labs, NPO after midnight  GI Dr. Patel

## 2023-06-01 NOTE — PROGRESS NOTE ADULT - PROBLEM SELECTOR PLAN 2
ESRD on HD (Tue, Th, Sat)  Last HD session on 5/30  follows with Dr. Mercado, from [facility] on [id number] [address]  Nephro Dr. Cano

## 2023-06-01 NOTE — PROGRESS NOTE ADULT - SUBJECTIVE AND OBJECTIVE BOX
GI PROGRESS NOTE    Patient is a 82y old  Female who presents with a chief complaint of Jaundice (01 Jun 2023 09:48)      HPI:  This is a 83 yo F from home, ambulates with cane, with pmhx of ESRD on HD (T, Th, Sat), DM, HTN, HLD that presents to the ED after being sent from her nephrologist for jaundice. Patient states that she went for HD today, and when she saw her nephrologist, who recommended her to come to ED. She hasn't noticed her eyes being yellow, and thus not sure when it started. She endorses diffuse itching that occured a few  weeks ago, endorses fatigue, and unwanted weight loss, however, denies any night sweats, also mentions feeling fatigued.  Denies any chest pain, SOB, N/V/D or abdominal pain. (30 May 2023 22:51)      GI HPI:      ______________________________________________________________________  PMH/PSH:  PAST MEDICAL & SURGICAL HISTORY:  ESRD on dialysis      HLD (hyperlipidemia)      DM (diabetes mellitus)      HTN (hypertension)        ______________________________________________________________________  MEDS:  MEDICATIONS  (STANDING):  carvedilol 12.5 milliGRAM(s) Oral every 12 hours  chlorhexidine 2% Cloths 1 Application(s) Topical <User Schedule>  dextrose 5%. 1000 milliLiter(s) (50 mL/Hr) IV Continuous <Continuous>  dextrose 5%. 1000 milliLiter(s) (100 mL/Hr) IV Continuous <Continuous>  dextrose 50% Injectable 25 Gram(s) IV Push once  dextrose 50% Injectable 25 Gram(s) IV Push once  dextrose 50% Injectable 12.5 Gram(s) IV Push once  epoetin ranjan-epbx (RETACRIT) Injectable 68641 Unit(s) IV Push once  glucagon  Injectable 1 milliGRAM(s) IntraMuscular once  insulin lispro (ADMELOG) corrective regimen sliding scale   SubCutaneous three times a day before meals  insulin lispro (ADMELOG) corrective regimen sliding scale   SubCutaneous at bedtime  losartan 100 milliGRAM(s) Oral daily  mirtazapine 7.5 milliGRAM(s) Oral daily  sevelamer carbonate 800 milliGRAM(s) Oral three times a day with meals    MEDICATIONS  (PRN):  dextrose Oral Gel 15 Gram(s) Oral once PRN Blood Glucose LESS THAN 70 milliGRAM(s)/deciliter    ______________________________________________________________________  ALL:   Allergies    No Known Allergies    Intolerances      ______________________________________________________________________  SH: Social History:    ______________________________________________________________________  FH:  FAMILY HISTORY:    ______________________________________________________________________  ROS:    CONSTITUTIONAL:  No weight loss, fever, chills, weakness or fatigue.    HEENT:  Eyes:  No visual loss, blurred vision, double vision or yellow sclerae. Ears, Nose, Throat:  No hearing loss, sneezing, congestion, runny nose or sore throat.    SKIN:  No rash or itching.    CARDIOVASCULAR:  No chest pain, chest pressure or chest discomfort. No palpitations or edema.    RESPIRATORY:  No shortness of breath, cough or sputum.    GASTROINTESTINAL:  SEE HPI    GENITOURINARY:  No dysuria, hematuria, urinary frequency    NEUROLOGICAL:  No headache, dizziness, syncope, paralysis, ataxia, numbness or tingling in the extremities. No change in bowel or bladder control.    MUSCULOSKELETAL:  No muscle, back pain, joint pain or stiffness.    HEMATOLOGIC:  No anemia, bleeding or bruising.    LYMPHATICS:  No enlarged nodes. No history of splenectomy.    PSYCHIATRIC:  No history of depression or anxiety.    ENDOCRINOLOGIC:  No reports of sweating, cold or heat intolerance. No polyuria or polydipsia.    ALLERGIES:  No history of asthma, hives, eczema or rhinitis.  ______________________________________________________________________  PHYSICAL EXAM:  T(C): 36.5 (06-01-23 @ 11:49), Max: 37.1 (05-31-23 @ 15:54)  HR: 53 (06-01-23 @ 11:49)  BP: 145/57 (06-01-23 @ 11:49)  RR: 18 (06-01-23 @ 11:49)  SpO2: 98% (06-01-23 @ 11:49)  Wt(kg): --      GEN: NAD   CVS- S1 S2  ABD: soft nontender, non distended, bowel sounds+  LUNGS: clear to auscultation  NEURO:  AAO x 3  Extremities: no cyanosis, no calf tenderness, no edema, no clubbing      ______________________________________________________________________  LABS:                        9.4    8.77  )-----------( 125      ( 01 Jun 2023 08:00 )             27.7     06-01    134<L>  |  98  |  47<H>  ----------------------------<  91  4.6   |  27  |  5.36<H>    Ca    8.4      01 Jun 2023 08:00  Phos  6.0     06-01  Mg     2.8     06-01    TPro  6.3  /  Alb  2.4<L>  /  TBili  4.9<H>  /  DBili  x   /  AST  147<H>  /  ALT  144<H>  /  AlkPhos  208<H>  06-01    LIVER FUNCTIONS - ( 01 Jun 2023 08:00 )  Alb: 2.4 g/dL / Pro: 6.3 g/dL / ALK PHOS: 208 U/L / ALT: 144 U/L DA / AST: 147 U/L / GGT: x           ______________________________________________________________________  IMAGING:     GI PROGRESS NOTE    Patient is a 82y old  Female who presents with a chief complaint of Jaundice (01 Jun 2023 09:48)      HPI:  This is a 81 yo F from home, ambulates with cane, with pmhx of ESRD on HD (T, Th, Sat), DM, HTN, HLD that presents to the ED after being sent from her nephrologist for jaundice. Patient states that she went for HD today, and when she saw her nephrologist, who recommended her to come to ED. She hasn't noticed her eyes being yellow, and thus not sure when it started. She endorses diffuse itching that occured a few  weeks ago, endorses fatigue, and unwanted weight loss, however, denies any night sweats, also mentions feeling fatigued.  Denies any chest pain, SOB, N/V/D or abdominal pain. (30 May 2023 22:51)      GI HPI:  Resting comfortably in bed.  Experience an episode of nausea and vomiting this morning in HD s/p breakfast. Denies pain.    ______________________________________________________________________  PMH/PSH:  PAST MEDICAL & SURGICAL HISTORY:  ESRD on dialysis      HLD (hyperlipidemia)      DM (diabetes mellitus)      HTN (hypertension)        ______________________________________________________________________  MEDS:  MEDICATIONS  (STANDING):  carvedilol 12.5 milliGRAM(s) Oral every 12 hours  chlorhexidine 2% Cloths 1 Application(s) Topical <User Schedule>  dextrose 5%. 1000 milliLiter(s) (50 mL/Hr) IV Continuous <Continuous>  dextrose 5%. 1000 milliLiter(s) (100 mL/Hr) IV Continuous <Continuous>  dextrose 50% Injectable 25 Gram(s) IV Push once  dextrose 50% Injectable 25 Gram(s) IV Push once  dextrose 50% Injectable 12.5 Gram(s) IV Push once  epoetin ranjan-epbx (RETACRIT) Injectable 23447 Unit(s) IV Push once  glucagon  Injectable 1 milliGRAM(s) IntraMuscular once  insulin lispro (ADMELOG) corrective regimen sliding scale   SubCutaneous three times a day before meals  insulin lispro (ADMELOG) corrective regimen sliding scale   SubCutaneous at bedtime  losartan 100 milliGRAM(s) Oral daily  mirtazapine 7.5 milliGRAM(s) Oral daily  sevelamer carbonate 800 milliGRAM(s) Oral three times a day with meals    MEDICATIONS  (PRN):  dextrose Oral Gel 15 Gram(s) Oral once PRN Blood Glucose LESS THAN 70 milliGRAM(s)/deciliter    ______________________________________________________________________  ALL:   Allergies    No Known Allergies    Intolerances      ______________________________________________________________________  SH: Social History:    ______________________________________________________________________  FH:  FAMILY HISTORY:    ______________________________________________________________________  ROS:    CONSTITUTIONAL:  No weight loss, fever, chills, weakness or fatigue.    HEENT:  Eyes:  No visual loss, blurred vision, double vision or yellow sclerae. Ears, Nose, Throat:  No hearing loss, sneezing, congestion, runny nose or sore throat.    SKIN:  No rash or itching.    CARDIOVASCULAR:  No chest pain, chest pressure or chest discomfort. No palpitations or edema.    RESPIRATORY:  No shortness of breath, cough or sputum.    GASTROINTESTINAL:  SEE HPI    GENITOURINARY:  No dysuria, hematuria, urinary frequency    NEUROLOGICAL:  No headache, dizziness, syncope, paralysis, ataxia, numbness or tingling in the extremities. No change in bowel or bladder control.    MUSCULOSKELETAL:  No muscle, back pain, joint pain or stiffness.    HEMATOLOGIC:  No anemia, bleeding or bruising.    LYMPHATICS:  No enlarged nodes. No history of splenectomy.    PSYCHIATRIC:  No history of depression or anxiety.    ENDOCRINOLOGIC:  No reports of sweating, cold or heat intolerance. No polyuria or polydipsia.    ALLERGIES:  No history of asthma, hives, eczema or rhinitis.  ______________________________________________________________________  PHYSICAL EXAM:  T(C): 36.5 (06-01-23 @ 11:49), Max: 37.1 (05-31-23 @ 15:54)  HR: 53 (06-01-23 @ 11:49)  BP: 145/57 (06-01-23 @ 11:49)  RR: 18 (06-01-23 @ 11:49)  SpO2: 98% (06-01-23 @ 11:49)  Wt(kg): --      GEN: NAD   CVS- S1 S2  ABD: soft nontender, non distended, bowel sounds+  LUNGS: clear to auscultation  NEURO:  AAO x 3  Extremities: no cyanosis, no calf tenderness, no edema, no clubbing      ______________________________________________________________________  LABS:                        9.4    8.77  )-----------( 125      ( 01 Jun 2023 08:00 )             27.7     06-01    134<L>  |  98  |  47<H>  ----------------------------<  91  4.6   |  27  |  5.36<H>    Ca    8.4      01 Jun 2023 08:00  Phos  6.0     06-01  Mg     2.8     06-01    TPro  6.3  /  Alb  2.4<L>  /  TBili  4.9<H>  /  DBili  x   /  AST  147<H>  /  ALT  144<H>  /  AlkPhos  208<H>  06-01    LIVER FUNCTIONS - ( 01 Jun 2023 08:00 )  Alb: 2.4 g/dL / Pro: 6.3 g/dL / ALK PHOS: 208 U/L / ALT: 144 U/L DA / AST: 147 U/L / GGT: x           ______________________________________________________________________  IMAGING:    < from: MR MRCP No Cont (05.31.23 @ 18:37) >  IMPRESSION:    No common bile duct stone.    Common bile duct upper limits of normal for caliber for age measuring 8   mm which tapers distally.    Contracted gallbladder with gallstones and small amount of   pericholecystic fluid; gallbladder wall thickening and edema which is a   nonspecific finding and can be secondary to underlying liver or   gallbladder disease; correlation is recommended with the patient's   symptoms.    Small amount of ascites.    Iron deposition in the liver and spleen.    < end of copied text >

## 2023-06-01 NOTE — PROGRESS NOTE ADULT - SUBJECTIVE AND OBJECTIVE BOX
Patient is a 82y old  Female who presents with a chief complaint of Jaundice (01 Jun 2023 14:02)      OVERNIGHT EVENTS:    REVIEW OF SYSTEMS:  RESPIRATORY: No cough, SOB  CARDIOVASCULAR: No chest pain, palpitations  GASTROINTESTINAL: No abdominal pain. No nausea, vomiting, or diarrhea  GENITOURINARY: No dysuria  NEUROLOGICAL: No HA  SKIN: jaundiced    T(C): 36.8 (06-01-23 @ 13:31), Max: 37.1 (05-31-23 @ 15:54)  HR: 59 (06-01-23 @ 13:31) (53 - 63)  BP: 158/42 (06-01-23 @ 13:31) (131/72 - 176/67)  RR: 18 (06-01-23 @ 13:31) (18 - 20)  SpO2: 96% (06-01-23 @ 13:31) (91% - 98%)  Wt(kg): --Vital Signs Last 24 Hrs  T(C): 36.8 (01 Jun 2023 13:31), Max: 37.1 (31 May 2023 15:54)  T(F): 98.3 (01 Jun 2023 13:31), Max: 98.7 (31 May 2023 15:54)  HR: 59 (01 Jun 2023 13:31) (53 - 63)  BP: 158/42 (01 Jun 2023 13:31) (131/72 - 176/67)  BP(mean): --  RR: 18 (01 Jun 2023 13:31) (18 - 20)  SpO2: 96% (01 Jun 2023 13:31) (91% - 98%)    Parameters below as of 01 Jun 2023 13:31  Patient On (Oxygen Delivery Method): room air          PHYSICAL EXAM:  GENERAL: NAD  CHEST/LUNG: Clear to auscultation bilaterally; No rales, rhonchi, wheezing, or rubs  HEART: S1, S2, Regular rate and rhythm  ABDOMEN: Soft, Nontender, Nondistended; Bowel sounds present  NEURO: Alert & Oriented X3    Consultant(s) Notes Reviewed:  [x ] YES  [ ] NO  Care Discussed with Consultants/Other Providers [ x] YES  [ ] NO  LABS:                        9.4    8.77  )-----------( 125      ( 01 Jun 2023 08:00 )             27.7     06-01    134<L>  |  98  |  47<H>  ----------------------------<  91  4.6   |  27  |  5.36<H>    Ca    8.4      01 Jun 2023 08:00  Phos  6.0     06-01  Mg     2.8     06-01    TPro  6.3  /  Alb  2.4<L>  /  TBili  4.9<H>  /  DBili  x   /  AST  147<H>  /  ALT  144<H>  /  AlkPhos  208<H>  06-01    PT/INR - ( 30 May 2023 15:02 )   PT: 13.3 sec;   INR: 1.12 ratio         PTT - ( 30 May 2023 15:02 )  PTT:37.0 sec  CAPILLARY BLOOD GLUCOSE      POCT Blood Glucose.: 144 mg/dL (01 Jun 2023 11:13)  POCT Blood Glucose.: 90 mg/dL (01 Jun 2023 07:36)  POCT Blood Glucose.: 86 mg/dL (31 May 2023 20:55)  POCT Blood Glucose.: 117 mg/dL (31 May 2023 16:53)          RADIOLOGY & ADDITIONAL TESTS:  Imaging Personally Reviewed:  [ ] YES  [ ] NO

## 2023-06-01 NOTE — PROGRESS NOTE ADULT - ATTENDING COMMENTS
Patient seen and examined this afternoon after return form HD      82 year old woman with PMH of ESRD on HD, sent in on account of yellow eyes noticed by her nephrologist in HD over the last 2 weeks with elevated Bilirubin on blood work. Pt did have pruritus but denies abdominal pain,  pale colored stool nor dark urine but  barely makes urine now.    Vital Signs Last 24 Hrs  T(C): 36.8 (01 Jun 2023 13:31), Max: 36.8 (31 May 2023 20:20)  T(F): 98.3 (01 Jun 2023 13:31), Max: 98.3 (31 May 2023 20:20)  HR: 59 (01 Jun 2023 13:31) (53 - 63)  BP: 158/42 (01 Jun 2023 13:31) (131/72 - 172/48)  RR: 18 (01 Jun 2023 13:31) (18 - 20)  SpO2: 96% (01 Jun 2023 13:31) (91% - 98%)  room air    P/E:  elderly female, slightly sleepy post HD  Psych: AAO x3  Neuro: No gross focal deficits; Power and sensation intact  CVS: S1S2 present, regular, no edema  Resp: BLAE+, No wheeze or Rhonchi  GI: Soft, BS+, Non tender, non distended  Extr: No  calf tenderness B/L Lower extremities  Skin: Warm and moist without any rashes    Labs:                        9.4    8.77  )-----------( 125      ( 01 Jun 2023 08:00 )             27.7   06-01    134<L>  |  98  |  47<H>  ----------------------------<  91  4.6   |  27  |  5.36<H>    Ca    8.4      01 Jun 2023 08:00  Phos  6.0     06-01  Mg     2.8     06-01    TPro  6.3  /  Alb  2.4<L>  /  TBili  4.9<H>  /  DBili  x   /  AST  147<H>  /  ALT  144<H>  /  AlkPhos  208<H>  06-01 5/30/33  TB - 6.4 / DB - 5.2  ALP /AST/ALT - 277/287/217    CT Abdomen and Pelvis No Cont (05.30.23 @ 18:23)     IMPRESSION: Limited evaluation of the pancreas without intravenous contrast.  Gallstones and gallbladder wall thickening. Correlation with sonography recommended.  Patchy opacities right middle lobe and left lower lobe may be infectious or inflammatory.    US Hepatic & Pancreatic (05.30.23 @ 19:39)   IMPRESSION: Cholelithiasis without evidence of cholecystitis or biliary obstruction. Atrophic right kidney.    A/P:  Obstructive Jaundice with Cholelithiasis concern for Choledocholithiasis  Less likely concern for Malignancy  Possible Viral Hepatitis although less likely  Transaminitis due to Hepatobiliary obstruction  Hepatitis C positive screen  ESRD on HD  Type 2 DM  HTN    Plan:  Continue IV Fluid hydration; resume while NPO tomorrow  GI consult appreciated; ERCP tomorrow; Indomethacin suppository 100mg and Cipro 400 mg IV once prior to Endo  Nephrology follow up: d/w Dr. De La Vega who will assume care AM  Get Hepatitis C Quantitative RNA; Hepatology consult Dr. Hay Patient seen and examined this afternoon after returning form HD    82 year old woman with PMH of ESRD on HD, sent in on account of yellow eyes noticed by her nephrologist in HD over the last 2 weeks with elevated Bilirubin on blood work. Pt reported having pruritus but denies abdominal pain, pale colored stool nor dark urine but  barely makes urine now.    Patient doing okay. was scheduled for ERCP tomorrow but noted to have Hepatitic C positive qualitative screening. Reportedly negative in HD labs last month. No fever, chest pain, SOB, nausea, vomit or abdominal pain presently.    Vital Signs Last 24 Hrs  T(C): 36.8 (01 Jun 2023 13:31), Max: 36.8 (31 May 2023 20:20)  T(F): 98.3 (01 Jun 2023 13:31), Max: 98.3 (31 May 2023 20:20)  HR: 59 (01 Jun 2023 13:31) (53 - 63)  BP: 158/42 (01 Jun 2023 13:31) (131/72 - 172/48)  RR: 18 (01 Jun 2023 13:31) (18 - 20)  SpO2: 96% (01 Jun 2023 13:31) (91% - 98%)  room air    P/E:  elderly female, slightly sleepy post HD, sclera icteric  Psych: AAO x3  Neuro: No gross focal deficits; Power and sensation intact  CVS: S1S2 present, regular, no edema  Resp: BLAE+, No wheeze or Rhonchi  GI: Soft, BS+, Non tender, non distended  Extr: No  calf tenderness B/L Lower extremities; Left arm AVF  Skin: Warm and moist without any rashes    Labs:                      9.4    8.77  )-----------( 125      ( 01 Jun 2023 08:00 )             27.7   06-01  134<L>  |  98  |  47<H>  ----------------------------<  91  4.6   |  27  |  5.36<H>    Ca    8.4      01 Jun 2023 08:00  Phos  6.0     06-01  Mg     2.8     06-01  TPro  6.3  /  Alb  2.4<L>  /  TBili  4.9<H>  /  DBili  x   /  AST  147<H>  /  ALT  144<H>  /  AlkPhos  208<H>  06-01 5/30/23  TB - 6.4 / DB - 5.2  ALP /AST/ALT - 277/287/217    CT Abdomen and Pelvis No Cont (05.30.23 @ 18:23)     IMPRESSION: Limited evaluation of the pancreas without intravenous contrast. Gallstones and gallbladder wall thickening. Correlation with sonography recommended. Patchy opacities right middle lobe and left lower lobe may be infectious or inflammatory.    US Hepatic & Pancreatic (05.30.23 @ 19:39)   IMPRESSION: Cholelithiasis without evidence of cholecystitis or biliary obstruction. Atrophic right kidney.    A/P:  Obstructive Jaundice with Cholelithiasis concern for Choledocholithiasis  Less likely concern for Malignancy  Hepatitis C positive screen  Possible Viral Hepatitis although less likely  Transaminitis due to Hepatobiliary obstruction resolving  ESRD on HD  Type 2 DM  HTN    Plan:  Continue IV Fluid hydration; resume while NPO tomorrow; Full Liquid diet today; NPO after Midnight  GI consult appreciated; ERCP was scheduled tomorrow; Indomethacin suppository 100mg and Cipro 400 mg IV once prior to Endo  Send HCV RNA quantitative  Hepatology Consult; d/w Dr. Hay; send genotype and markers as per GI/ Hepatology  Informed by NP late this evening, GI plan to do EUS with Liver biopsy given cirrhosis seen on CT as well as HCV positive  Nephrology follow up: d/w Dr. De La Vega who will assume care AM; Patient of Dr. De La Vega from HD unit  Send also AFP as well as Ca 19-9  Hold Heparin AM  Hold oral hypoglycemics; ISS; Fingerstick ACHS; Hold statin while LFTs elevated  Continue anti HTN Losartan/ Coreg with parameters  Labs AM    Discussed with patient  Discussed with Hepatology and GI  Discussed with JOEY Mosher who d/w family Patient seen and examined this afternoon after returning form HD    82 year old woman with PMH of ESRD on HD, sent in on account of yellow eyes noticed by her nephrologist in HD over the last 2 weeks with elevated Bilirubin on blood work. Pt reported having pruritus but denies abdominal pain, pale colored stool nor dark urine but  barely makes urine now.    Patient doing okay. was scheduled for ERCP tomorrow but noted to have Hepatitic C positive qualitative screening. Reportedly negative in HD labs last month. No fever, chest pain, SOB, nausea, vomit or abdominal pain presently.    Vital Signs Last 24 Hrs  T(C): 36.8 (01 Jun 2023 13:31), Max: 36.8 (31 May 2023 20:20)  T(F): 98.3 (01 Jun 2023 13:31), Max: 98.3 (31 May 2023 20:20)  HR: 59 (01 Jun 2023 13:31) (53 - 63)  BP: 158/42 (01 Jun 2023 13:31) (131/72 - 172/48)  RR: 18 (01 Jun 2023 13:31) (18 - 20)  SpO2: 96% (01 Jun 2023 13:31) (91% - 98%)  room air    P/E:  elderly female, slightly sleepy post HD, sclera icteric  Psych: AAO x3  Neuro: No gross focal deficits; Power and sensation intact  CVS: S1S2 present, regular, no edema  Resp: BLAE+, No wheeze or Rhonchi  GI: Soft, BS+, Non tender, non distended  Extr: No  calf tenderness B/L Lower extremities; Left arm AVF  Skin: Warm and moist without any rashes    Labs:                      9.4    8.77  )-----------( 125      ( 01 Jun 2023 08:00 )             27.7   06-01  134<L>  |  98  |  47<H>  ----------------------------<  91  4.6   |  27  |  5.36<H>    Ca    8.4      01 Jun 2023 08:00  Phos  6.0     06-01  Mg     2.8     06-01  TPro  6.3  /  Alb  2.4<L>  /  TBili  4.9<H>  /  DBili  x   /  AST  147<H>  /  ALT  144<H>  /  AlkPhos  208<H>  06-01 5/30/23  TB - 6.4 / DB - 5.2  ALP /AST/ALT - 277/287/217    CT Abdomen and Pelvis No Cont (05.30.23 @ 18:23)   IMPRESSION: Limited evaluation of the pancreas without intravenous contrast. Gallstones and gallbladder wall thickening. Correlation with sonography recommended. Patchy opacities right middle lobe and left lower lobe may be infectious or inflammatory.    US Hepatic & Pancreatic (05.30.23 @ 19:39)   IMPRESSION: Cholelithiasis without evidence of cholecystitis or biliary obstruction. Atrophic right kidney.    MR MRCP No Cont (05.31.23 @ 18:37)   IMPRESSION: No common bile duct stone. Common bile duct upper limits of normal for caliber for age measuring 8 mm which tapers distally.  Contracted gallbladder with gallstones and small amount of pericholecystic fluid; gallbladder wall thickening and edema which is a   nonspecific finding and can be secondary to underlying liver or gallbladder disease; correlation is recommended with the patient's symptoms.  Small amount of ascites. Iron deposition in the liver and spleen.    A/P:  Obstructive Jaundice with Cholelithiasis concern for Choledocholithiasis  Less likely concern for Malignancy  Hepatitis C positive screen  Possible Viral Hepatitis although less likely  Transaminitis due to Hepatobiliary obstruction resolving  ESRD on HD  Type 2 DM  HTN    Plan:  Continue IV Fluid hydration; resume while NPO tomorrow; Full Liquid diet today; NPO after Midnight  GI consult appreciated; ERCP was scheduled tomorrow; Indomethacin suppository 100mg and Cipro 400 mg IV once prior to Endo  Send HCV RNA quantitative  Hepatology Consult; d/w Dr. Hay; send genotype and markers as per GI/ Hepatology  Informed by NP late this evening, GI plan to do EUS with Liver biopsy given cirrhosis seen on CT as well as HCV positive  Nephrology follow up: d/w Dr. De La Vega who will assume care AM; Patient of Dr. De La Vega from HD unit  Send also AFP as well as Ca 19-9  Hold Heparin AM  Hold oral hypoglycemics; ISS; Fingerstick ACHS; Hold statin while LFTs elevated  Continue anti HTN Losartan/ Coreg with parameters  Labs AM    Discussed with patient  Discussed with Hepatology and GI  Discussed with JOEY Mosher who d/w family Patient seen and examined this afternoon after returning form HD    82 year old woman with PMH of ESRD on HD, sent in on account of yellow eyes noticed by her nephrologist in HD over the last 2 weeks with elevated Bilirubin on blood work. Pt reported having pruritus but denies abdominal pain, pale colored stool nor dark urine but  barely makes urine now.    Patient doing okay. was scheduled for ERCP tomorrow but noted to have Hepatitic C positive qualitative screening. Reportedly negative in HD labs last month. No fever, chest pain, SOB, nausea, vomit or abdominal pain presently.    Vital Signs Last 24 Hrs  T(C): 36.8 (01 Jun 2023 13:31), Max: 36.8 (31 May 2023 20:20)  T(F): 98.3 (01 Jun 2023 13:31), Max: 98.3 (31 May 2023 20:20)  HR: 59 (01 Jun 2023 13:31) (53 - 63)  BP: 158/42 (01 Jun 2023 13:31) (131/72 - 172/48)  RR: 18 (01 Jun 2023 13:31) (18 - 20)  SpO2: 96% (01 Jun 2023 13:31) (91% - 98%)  room air    P/E:  elderly female, slightly sleepy post HD, sclera icteric  Psych: AAO x3  Neuro: No gross focal deficits; Power and sensation intact  CVS: S1S2 present, regular, no edema  Resp: BLAE+, No wheeze or Rhonchi  GI: Soft, BS+, Non tender, non distended  Extr: No  calf tenderness B/L Lower extremities; Left arm AVF  Skin: Warm and moist without any rashes    Labs:                      9.4    8.77  )-----------( 125      ( 01 Jun 2023 08:00 )             27.7   06-01  134<L>  |  98  |  47<H>  ----------------------------<  91  4.6   |  27  |  5.36<H>    Ca    8.4      01 Jun 2023 08:00  Phos  6.0     06-01  Mg     2.8     06-01  TPro  6.3  /  Alb  2.4<L>  /  TBili  4.9<H>  /  DBili  x   /  AST  147<H>  /  ALT  144<H>  /  AlkPhos  208<H>  06-01 5/30/23  TB - 6.4 / DB - 5.2  ALP /AST/ALT - 277/287/217    CT Abdomen and Pelvis No Cont (05.30.23 @ 18:23)   IMPRESSION: Limited evaluation of the pancreas without intravenous contrast. Gallstones and gallbladder wall thickening. Correlation with sonography recommended. Patchy opacities right middle lobe and left lower lobe may be infectious or inflammatory.    US Hepatic & Pancreatic (05.30.23 @ 19:39)   IMPRESSION: Cholelithiasis without evidence of cholecystitis or biliary obstruction. Atrophic right kidney.    MR MRCP No Cont (05.31.23 @ 18:37)   IMPRESSION: No common bile duct stone. Common bile duct upper limits of normal for caliber for age measuring 8 mm which tapers distally.  Contracted gallbladder with gallstones and small amount of pericholecystic fluid; gallbladder wall thickening and edema which is a   nonspecific finding and can be secondary to underlying liver or gallbladder disease; correlation is recommended with the patient's symptoms.  Small amount of ascites. Iron deposition in the liver and spleen.    A/P:  Obstructive Jaundice with Cholelithiasis concern for Choledocholithiasis  Less likely concern for Malignancy  Hepatitis C positive screen  Possible Viral Hepatitis although less likely  Transaminitis due to Hepatobiliary obstruction resolving  ESRD on HD  Anemia Chronic disease plus Renal disease  Type 2 DM/ HTN controlled  Iron overload possibly related to iron with HD    Plan:  Continue IV Fluid hydration; resume while NPO tomorrow; Full Liquid diet today; NPO after Midnight  GI consult appreciated; ERCP was scheduled tomorrow; Indomethacin suppository 100mg and Cipro 400 mg IV once prior to Endo  Send HCV RNA quantitative  Hepatology Consult; d/w Dr. Hay; send genotype and markers as per GI/ Hepatology  Informed by NP late this evening, GI plan to do EUS with Liver biopsy given cirrhosis seen on CT as well as HCV positive  Nephrology follow up: d/w Dr. De La Vega who will assume care AM; Patient of Dr. De La Vega from HD unit  Send also AFP as well as Ca 19-9  Hold Heparin AM  Hold oral hypoglycemics; ISS; Fingerstick ACHS; Hold statin while LFTs elevated  Continue anti HTN Losartan/ Coreg with parameters  Labs AM    Discussed with patient  Discussed with Hepatology and GI  Discussed with JOEY Mosher who d/w family and updated plan

## 2023-06-01 NOTE — PROGRESS NOTE ADULT - ASSESSMENT
This is a 83 yo F from home, ambulates with cane, with pmhx of ESRD on HD (T, Th, Sat), DM, HTN, HLD that presents to the ED after being sent from her nephrologist for jaundice. Nephrology consult called for ESRD    Assessment:  1) ESRD on HD TTS Schedule via LUE AVF  2) Anemia of chronic kidney disease  3) Renal osteodystrophy  4) Hypertensive Nephropathy  5) Dm type II with chronic nephropathy/neuropathy  6) Jaundice/ABnormal LFTs likely biliary colic/CBD stone    Recommend:  Strict I/o  Avoid nephrotoxic agents  HD treatment as per her regular schedule  Await MRCP  Work up for jaundice per primary/GI/hepatology team  Monitor LFTs  NPO  Adjust antibiotics as per renal dose adjustment with e GFR <10 ml/min  PTH, Vitamin D 1,25 level, Phos, calcium level  Continue Phos binders  Iron studies/hgb/Hct reviewed  EPO/Venofer with HD as per ordered  Volume status and electrolytes noted  BP medications with holding parameters  Optimal DM control with insulin therapy  Will follow

## 2023-06-01 NOTE — PROGRESS NOTE ADULT - SUBJECTIVE AND OBJECTIVE BOX
Cedric Cano MD (Nephrology progress note)  [address], [facility],  [id number],  [id number]  TEl: [phone]  Cell: [phone]    Patient is a 82y Female seen and evaluated at bedside. Vital signs, laboratory data, imaging studies, consult notes reviewed done within past 24 hours. Overnight events noted. Patient seen on HD offers complains of mild epigastric/RUQ pain and nausea. LFTs elevated.     Allergies    No Known Allergies    Intolerances        ROS:  CONSTITUTIONAL: No fever or chills.  HEENT: No headaches or visual disturbances.  RESPIRATORY: No shortness of breath, cough, hemoptysis or wheezing  CARDIOVASCULAR: No Chest pain or SOB  GASTROINTESTINAL: Mild EPigasrtric/RUQ pain but denies nausea/vomiting  GENITOURINARY: No urinary frequency, urgency, gross hematuria, dysuria, flank or supra pubic pain, difficulty passing urine.  NEUROLOGICAL: No headache, focal limb weakness, tingling or numbness or seizure like activity  SKIN: No skin rash or lesion  MUSCULOSKELETAL: No leg pain, calf pain or swelling    VITALS:    T(C): 36.2 (06-01-23 @ 08:06), Max: 37.1 (05-31-23 @ 15:54)  HR: 55 (06-01-23 @ 08:06) (55 - 64)  BP: 131/72 (06-01-23 @ 08:06) (131/72 - 176/67)  RR: 18 (06-01-23 @ 08:06) (17 - 20)  SpO2: 98% (06-01-23 @ 08:06) (91% - 98%)  CAPILLARY BLOOD GLUCOSE      POCT Blood Glucose.: 90 mg/dL (01 Jun 2023 07:36)  POCT Blood Glucose.: 86 mg/dL (31 May 2023 20:55)  POCT Blood Glucose.: 117 mg/dL (31 May 2023 16:53)      MEDICATIONS  (STANDING):  carvedilol 12.5 milliGRAM(s) Oral every 12 hours  dextrose 5%. 1000 milliLiter(s) (50 mL/Hr) IV Continuous <Continuous>  dextrose 5%. 1000 milliLiter(s) (100 mL/Hr) IV Continuous <Continuous>  dextrose 50% Injectable 25 Gram(s) IV Push once  dextrose 50% Injectable 25 Gram(s) IV Push once  dextrose 50% Injectable 12.5 Gram(s) IV Push once  epoetin ranjan-epbx (RETACRIT) Injectable 05684 Unit(s) IV Push once  glucagon  Injectable 1 milliGRAM(s) IntraMuscular once  insulin lispro (ADMELOG) corrective regimen sliding scale   SubCutaneous three times a day before meals  insulin lispro (ADMELOG) corrective regimen sliding scale   SubCutaneous at bedtime  losartan 100 milliGRAM(s) Oral daily  mirtazapine 7.5 milliGRAM(s) Oral daily  sevelamer carbonate 800 milliGRAM(s) Oral three times a day with meals    MEDICATIONS  (PRN):  dextrose Oral Gel 15 Gram(s) Oral once PRN Blood Glucose LESS THAN 70 milliGRAM(s)/deciliter      PHYSICAL EXAM:  GENERAL: Alert, awake and oriented x3 in mild distress due to epigastric pain  HEENT: DONNIE, EOMI, neck supple, no JVP  CHEST/LUNG: Bilateral clear breath sounds, no rales, no crepitations, no rhonchi, no wheezing  HEART: Regular rate and rhythm, BRIANA II/VI at LPSB, no gallops, no rub   ABDOMEN: Soft, mild epigastric/RUQ tenderness, bowel sounds present, no palpable organomegaly  : No flank or supra pubic tenderness.  EXTREMITIES: Peripheral pulses are palpable, no pedal edema  Neurology: AAOx3, no focal neurological deficit  SKIN: No rash or skin lesion  Musculoskeletal: No joint deformity or spinal tenderness.      Vascular ACCESS: LUE AVF present    LABS:                        9.4    8.77  )-----------( 125      ( 01 Jun 2023 08:00 )             27.7     06-01    134<L>  |  98  |  47<H>  ----------------------------<  91  4.6   |  27  |  5.36<H>    Ca    8.4      01 Jun 2023 08:00  Phos  6.0     06-01  Mg     2.8     06-01    TPro  6.3  /  Alb  2.4<L>  /  TBili  4.9<H>  /  DBili  x   /  AST  147<H>  /  ALT  144<H>  /  AlkPhos  208<H>  06-01      PT/INR - ( 30 May 2023 15:02 )   PT: 13.3 sec;   INR: 1.12 ratio         PTT - ( 30 May 2023 15:02 )  PTT:37.0 sec          RADIOLOGY & ADDITIONAL STUDIES:  rad< from: US Hepatic & Pancreatic (05.30.23 @ 19:39) >    ACC: [id number] EXAM:  US LIVER AND PANCREAS   ORDERED BY: DOMINIQUE DE LA ROSA     PROCEDURE DATE:  05/30/2023          INTERPRETATION:  CLINICAL INFORMATION: Hyperbilirubinemia. Abnormal   appearance of gallbladder on CT.    COMPARISON: CT abdomen pelvis dated 5/30/2023    TECHNIQUE: Sonography of the right upper quadrant.    FINDINGS:    Liver: Within normal limits.  Bile ducts: Normal caliber. Common bile duct measures 4 mm.  Gallbladder: Gallstones. No mural thickening. No pericholecystic fluid.   No sonographic Butler sign.  Pancreas: Visualized portions are within normal limits.  Right kidney: 6.9 cm. Thin echogenic cortex. No hydronephrosis..  Ascites: None.  IVC: Visualized portions are within normal limits.    IMPRESSION:    Cholelithiasis without evidence of cholecystitis or biliary obstruction.    Atrophic right kidney.        --- End of Report ---            JASPER SOMERS MD; Attending Radiologist  This document has been electronically signed. May 30 2023  7:51PM    < end of copied text >    Imaging Personally Reviewed:  [x] YES  [ ] NO    Consultant(s) Notes Reviewed:  [x] YES  [ ] NO    Care Discussed with Primary team/ Other Providers [x] YES  [ ] NO

## 2023-06-01 NOTE — CONSULT NOTE ADULT - ASSESSMENT
82y Female with Hx of DM, HTN, HLD, and ESRD on HD (TTS, followed by Dr. De La Vega) was sent to [facility] ER on 5/30/23 b/o new onset jaundice, and being evaluated by GI and hepatology was consulted b/o newly diagnosed hepatitis C, with reported negative test in 9/2022 and w/o any risk factors beyond HD. She was also found to have cholelithiasis w/o evidence of obstruction or choledocholithiasis, but CBD at ULN. Mr/MRCP suggested hepatic /splenic iron overload. CT on admission farrukh the possibility of signs of early cirrhosis.     # Hepatitis C ab and RNA pos  - Duration? Reported neg test 9/2022.  On other hand CT showed subtle signs of early cirrhosis?  - Please, send Hep C genotype, viral load, Fibrosure.  - Please, send HBcAb total and HBsAb, HIV and Hep A IgG (to check immunity).  - Scheduled for EUS and EUS guided liver biopsy per GI for tomorrow.  - Will need to follow outpatient for treatment.   - Given the painless jaundice, the possibility of early cirrhosis (could be multifactorial, has also metabolic risk factors), would need HCC screening prior any DAA, as efficacy decreased in presence of active cancer. Prior CT and MRI were non contrast, would need to coordinate with her HD. Please, send AFP, CA 19-9, CEA.     # Hepatic and splenic iron overload?  - Quantification not available inpatient.   - Possible secondary iron overload in a HD patient  - Can send HFE mutation    # Jaundice  # Abnormal liver enzymes  # Cholelithiasis  - Pending EUS w/ liver biopsy   - F/u GI recommendations  - Hep viruses, iron studies as above. Can also send AI workup for completeness.    Thank you for consult  Will continue to monitor  D/w primary team          82y Female with Hx of DM, HTN, HLD, and ESRD on HD (TTS, followed by Dr. De La Vega) was sent to [facility] ER on 5/30/23 b/o new onset jaundice, and being evaluated by GI and hepatology was consulted b/o newly diagnosed hepatitis C, with reported negative test in 9/2022 and w/o any risk factors beyond HD. She was also found to have cholelithiasis w/o evidence of obstruction or choledocholithiasis, but CBD at ULN. Mr/MRCP suggested hepatic /splenic iron overload. CT on admission farrukh the possibility of signs of early cirrhosis.     # Hepatitis C ab and RNA pos  - Duration? Reported neg test 9/2022, but antibody is also positive this time, not only the RNA. On other hand CT showed subtle signs of early cirrhosis?  - Please, send Hep C genotype, viral load, Fibrosure.  - Please, send HBcAb total and HBsAb, HIV and Hep A IgG (to check immunity).  - Scheduled for EUS and EUS guided liver biopsy per GI for tomorrow.  - Will need to follow outpatient for treatment.   - Given the painless jaundice, the possibility of early cirrhosis (could be multifactorial, has also metabolic risk factors), would need HCC screening prior any DAA, as efficacy decreased in presence of active cancer. Prior CT and MRI were non contrast, would need to coordinate with her HD. Please, send AFP, CA 19-9, CEA.   - Discussed transmission modes, natural Hx, treatment options, meaning of SVR, potential complications. Advised that her partner needs to be tested too.     # Hepatic and splenic iron overload?  - Quantification not available inpatient.   - Possible secondary iron overload in a HD patient  - Can send HFE mutation    # Jaundice  # Abnormal liver enzymes  # Cholelithiasis  - Pending EUS w/ liver biopsy   - F/u GI recommendations  - Hep viruses, iron studies as above. Can also send AI workup for completeness.    Thank you for consult  Will continue to monitor  D/w primary team, and patient daughter

## 2023-06-01 NOTE — PROGRESS NOTE ADULT - SUBJECTIVE AND OBJECTIVE BOX
Cedric Cano MD (Nephrology dialysis Note)  [address], [facility],  [address],  [id number]  TEl: [phone]  Cell: [phone]      Patient was seen and evaluated on dialysis.   HR: 55 (06-01-23 @ 08:06)  BP: 131/72 (06-01-23 @ 08:06)  Wt(kg): --  06-01    134<L>  |  98  |  47<H>  ----------------------------<  91  4.6   |  27  |  5.36<H>    Ca    8.4      01 Jun 2023 08:00  Phos  6.0     06-01  Mg     2.8     06-01    TPro  6.3  /  Alb  2.4<L>  /  TBili  4.9<H>  /  DBili  x   /  AST  147<H>  /  ALT  144<H>  /  AlkPhos  208<H>  06-01      Continue dialysis:   Dialyzer:  Optiflux 180            QB: 400            QD: 500         K bath: 2K  Goal UF 2.5 kg over 3 Hours    Patient is tolerating the procedure well.   Continue full hemodialysis treatment as prescribed.

## 2023-06-01 NOTE — PROGRESS NOTE ADULT - ASSESSMENT
82 YOF sent to ED by nephrologist s/p HD yesterday for jaundice.  Denies pain or anorexia.  No history of EGD or colonoscopy.  Last abdominal surgery was 52 years ago.    FU CA-19   MRCP noted  Plan for ERCP on 6/2  Clear liquid diet  NPO p MN  Hold AC/VTE 24H prior to procedure  Preop AM labs (CBC, CMP, PTT/INR)  100 mg Indomethacin on call to Endo  400 mg Cipro on call to Endo  Fluoroscopy in OR w/Xray    This note and its recommendations herein are preliminary until such time as cosigned by an attending.    GI will continue to follow.  Thank you for the consult!  82 YOF sent to ED by nephrologist s/p HD yesterday for jaundice.  Denies pain or anorexia.  No history of EGD or colonoscopy.  Last abdominal surgery was 52 years ago.    FU CA-19   MRCP noted  Plan for ERCP on 6/2  Clear liquid diet  NPO p MN  Hold AC/VTE 24H prior to procedure  Preop AM labs (CBC, CMP, PTT/INR)  100 mg Indomethacin suppository on call to Endo  400 mg Cipro on call to Endo  Fluoroscopy in OR w/Xray    This note and its recommendations herein are preliminary until such time as cosigned by an attending.    GI will continue to follow.  Thank you for the consult!  82 YOF sent to ED by nephrologist s/p HD yesterday for jaundice.  Denies pain or anorexia.  No history of EGD or colonoscopy.  Last abdominal surgery was 52 years ago.    FU CA-19   Hemochromatosis  MRCP noted  Plan for EUS guided liver Bx  Clear liquid diet  NPO p MN  Hold AC/VTE 24H prior to procedure  Preop AM labs (CBC, CMP, PTT/INR)    This note and its recommendations herein are preliminary until such time as cosigned by an attending.    GI will continue to follow.  Thank you for the consult!  82 YOF sent to ED by nephrologist s/p HD yesterday for jaundice.  Denies pain or anorexia.  No history of EGD or colonoscopy.  Last abdominal surgery was 52 years ago.    Consult Hepatology  FU CA-19   Hemochromatosis  MRCP noted  Plan for EUS guided liver Bx  Clear liquid diet  NPO p MN  Hold AC/VTE 24H prior to procedure  Preop AM labs (CBC, CMP, PTT/INR)    This note and its recommendations herein are preliminary until such time as cosigned by an attending.    GI will continue to follow.  Thank you for the consult!

## 2023-06-01 NOTE — PROGRESS NOTE ADULT - ASSESSMENT
Patient is a 82 year old Female from home, ambulates with cane, with PMHx of ESRD on HD (T, Th, Sat), DM, HTN, HLD Patient presented to the ED after being sent from her nephrologist for jaundice. CT abdomen and pelvis resulting gallstones and gallbladder wall thickening. GI consulted recommending MRCP and possible ERCP to follow. MRCP did not show a common bile duct stone, common bile duct upper limits of normal for caliber for age measuring 8 mm which tapers distally. Contracted gallbladder with gallstones and small amount of pericholecystic fluid; gallbladder wall thickening and edema which is a nonspecific finding and can be secondary to underlying liver or   gallbladder disease; Small amount of ascites, Iron deposition in the liver and spleen.  ERCP pending tomorrow, keep pt NPO tonight, full liquid diet     Patient is a 82 year old Female from home, ambulates with cane, with PMHx of ESRD on HD (T, Th, Sat), DM, HTN, HLD Patient presented to the ED after being sent from her nephrologist for jaundice. CT abdomen and pelvis resulting gallstones and gallbladder wall thickening. GI consulted recommending MRCP and possible ERCP to follow. MRCP did not show a common bile duct stone, common bile duct upper limits of normal for caliber for age measuring 8 mm which tapers distally. Contracted gallbladder with gallstones and small amount of pericholecystic fluid; gallbladder wall thickening and edema which is a nonspecific finding and can be secondary to underlying liver or   gallbladder disease; Small amount of ascites, Iron deposition in the liver and spleen.  EUS guided liver biopsy pending tomorrow, keep pt NPO tonight, full liquid diet  Hepatology dr. garcia consulted, f/u

## 2023-06-01 NOTE — CONSULT NOTE ADULT - SUBJECTIVE AND OBJECTIVE BOX
Chief Complaint:  Patient is a 82y old  Female who presents with a chief complaint of Jaundice (01 Jun 2023 14:02)      HPI:  AMAURY MORENO is a 82y Female with Hx of DM, HTN, HLD, and ESRD on HD (TTS, followed by Dr. De La Vega) was sent to [facility] ER on 5/30/23 b/o new onset jaundice, and being evaluated by GI. Hepatology was consulted b/o newly diagnosed hepatitis C.   As per daughter patient has been told already about a month ago about jaundice but refused to go to ER till this admission. She reported fatigue, and itching, denied abdominal pain. Reported loose yellow stool in hospital. Still makes some urine, has not noticed change in that.  Patient was not aware of hepatitis C, and denies prior blood transfusion before 1992, denies IVDA, has been with same partner for about 10 years after her first marriage, no tattoos. Reportedly she tested negative in 9/2022.   Labs significant for normocytic anemia, thrombocytopenia, no coagulopathy, mixed  pattern liver enzyme elevation ( from 287,  from 217,  from 277) and hyperbilirubinemia (6/4->4.9) and hypoalbuminemia (2.3).   Rest of liver workup: Hep A IgM neg, HBsAg neg.     CT a/p w/o contrast suggested enlargement of the lateral segment of the liver (early cirrhosis?), gallstones, GB wall thickening, trace perihepatic ascites, peripancreatic, periceliac, gastrohepatic LNs (< 1 cm). and noted patchy opacities of the lung.     US abd 5/30/23 reported normal appearing liver, normal bile ducts, gallstones, no evidence of cholecystitis.     MR/MRCP w/o contrast 5/31/23 showed no focal hepatic lesion, possible iron deposition both in the liver and spleen (quantification not available), no intrahepatic biliary dilation, no choledocholithiasis, CBD 8 mm, contracted GB w/ gallstones, GB wall thickening and edema and small pericholecystic fluid, small ascites,  no LNs,       PMHX/PSHX:    ESRD on dialysis  HLD (hyperlipidemia)  DM (diabetes mellitus)  HTN (hypertension)      Allergies:  No Known Allergies      Home Medications: reviewed  Hospital Medications:  carvedilol 12.5 milliGRAM(s) Oral every 12 hours  chlorhexidine 2% Cloths 1 Application(s) Topical <User Schedule>  dextrose 5%. 1000 milliLiter(s) IV Continuous <Continuous>  dextrose 5%. 1000 milliLiter(s) IV Continuous <Continuous>  dextrose 50% Injectable 12.5 Gram(s) IV Push once  dextrose 50% Injectable 25 Gram(s) IV Push once  dextrose 50% Injectable 25 Gram(s) IV Push once  dextrose Oral Gel 15 Gram(s) Oral once PRN  epoetin ranjan-epbx (RETACRIT) Injectable 98022 Unit(s) IV Push once  glucagon  Injectable 1 milliGRAM(s) IntraMuscular once  insulin lispro (ADMELOG) corrective regimen sliding scale   SubCutaneous three times a day before meals  insulin lispro (ADMELOG) corrective regimen sliding scale   SubCutaneous at bedtime  losartan 100 milliGRAM(s) Oral daily  mirtazapine 7.5 milliGRAM(s) Oral daily  sevelamer carbonate 800 milliGRAM(s) Oral three times a day with meals      Social History:   Tob: Denies  EtOH: Denies  Illicit Drugs: Denies    Family history:    Denies family history of liver disease.    ROS:   General:  No  fevers, chills, night sweats, fatigue  Eyes:  Good vision, no reported pain  ENT:  No sore throat, pain, runny nose  CV:  No pain, palpitations  Pulm:  No dyspnea, cough  GI:  See HPI, otherwise negative  :  No  incontinence, nocturia  Muscle:  No pain, weakness  Neuro:  No memory problems  Psych:  No insomnia, mood problems, depression  Endocrine:  No polyuria, polydipsia, cold/heat intolerance  Heme:  No petechiae, ecchymosis, easy bruisability  Skin:  No rash    PHYSICAL EXAM:   Vital Signs:  Vital Signs Last 24 Hrs  T(C): 36.8 (01 Jun 2023 13:31), Max: 36.8 (31 May 2023 20:20)  T(F): 98.3 (01 Jun 2023 13:31), Max: 98.3 (31 May 2023 20:20)  HR: 60 (01 Jun 2023 17:52) (53 - 63)  BP: 167/50 (01 Jun 2023 17:52) (131/72 - 172/48)  BP(mean): --  RR: 17 (01 Jun 2023 17:52) (17 - 20)  SpO2: 100% (01 Jun 2023 17:52) (91% - 100%)    Parameters below as of 01 Jun 2023 17:52  Patient On (Oxygen Delivery Method): room air      Daily     Daily     GENERAL: no acute distress  NEURO: AAOx3, no asterixis  HEENT: icteric sclera  CHEST: no respiratory distress, no accessory muscle use  CARDIAC: regular rate, rhythm  ABDOMEN: soft, non-tender, non-distended, no rebound or guarding, BS+  EXTREMITIES: warm, well perfused, no edema, LUE AVF  SKIN: no rash    LABS: reviewed                        9.4    8.77  )-----------( 125      ( 01 Jun 2023 08:00 )             27.7     06-01    134<L>  |  98  |  47<H>  ----------------------------<  91  4.6   |  27  |  5.36<H>    Ca    8.4      01 Jun 2023 08:00  Phos  6.0     06-01  Mg     2.8     06-01    TPro  6.3  /  Alb  2.4<L>  /  TBili  4.9<H>  /  DBili  x   /  AST  147<H>  /  ALT  144<H>  /  AlkPhos  208<H>  06-01    LIVER FUNCTIONS - ( 01 Jun 2023 08:00 )  Alb: 2.4 g/dL / Pro: 6.3 g/dL / ALK PHOS: 208 U/L / ALT: 144 U/L DA / AST: 147 U/L / GGT: x               Diagnostic Studies: see sunrise for full report

## 2023-06-02 ENCOUNTER — TRANSCRIPTION ENCOUNTER (OUTPATIENT)
Age: 83
End: 2023-06-02

## 2023-06-02 ENCOUNTER — RESULT REVIEW (OUTPATIENT)
Age: 83
End: 2023-06-02

## 2023-06-02 DIAGNOSIS — Z02.9 ENCOUNTER FOR ADMINISTRATIVE EXAMINATIONS, UNSPECIFIED: ICD-10-CM

## 2023-06-02 LAB
A1AT SERPL-MCNC: 152 MG/DL — SIGNIFICANT CHANGE UP (ref 90–200)
AFP-TM SERPL-MCNC: 10.3 NG/ML — HIGH
ALBUMIN SERPL ELPH-MCNC: 2.5 G/DL — LOW (ref 3.5–5)
ALP SERPL-CCNC: 197 U/L — HIGH (ref 40–120)
ALT FLD-CCNC: 127 U/L DA — HIGH (ref 10–60)
ANION GAP SERPL CALC-SCNC: 8 MMOL/L — SIGNIFICANT CHANGE UP (ref 5–17)
APTT BLD: 33.2 SEC — SIGNIFICANT CHANGE UP (ref 27.5–35.5)
AST SERPL-CCNC: 122 U/L — HIGH (ref 10–40)
BILIRUB SERPL-MCNC: 4.2 MG/DL — HIGH (ref 0.2–1.2)
BUN SERPL-MCNC: 23 MG/DL — HIGH (ref 7–18)
CALCIUM SERPL-MCNC: 8.4 MG/DL — SIGNIFICANT CHANGE UP (ref 8.4–10.5)
CANCER AG19-9 SERPL-ACNC: 1834 U/ML — HIGH
CEA SERPL-MCNC: 3.3 NG/ML — SIGNIFICANT CHANGE UP (ref 0–3.8)
CERULOPLASMIN SERPL-MCNC: 22 MG/DL — SIGNIFICANT CHANGE UP (ref 16–45)
CHLORIDE SERPL-SCNC: 97 MMOL/L — SIGNIFICANT CHANGE UP (ref 96–108)
CO2 SERPL-SCNC: 27 MMOL/L — SIGNIFICANT CHANGE UP (ref 22–31)
CREAT SERPL-MCNC: 3.68 MG/DL — HIGH (ref 0.5–1.3)
EGFR: 12 ML/MIN/1.73M2 — LOW
GLUCOSE BLDC GLUCOMTR-MCNC: 103 MG/DL — HIGH (ref 70–99)
GLUCOSE BLDC GLUCOMTR-MCNC: 105 MG/DL — HIGH (ref 70–99)
GLUCOSE BLDC GLUCOMTR-MCNC: 125 MG/DL — HIGH (ref 70–99)
GLUCOSE BLDC GLUCOMTR-MCNC: 166 MG/DL — HIGH (ref 70–99)
GLUCOSE BLDC GLUCOMTR-MCNC: 97 MG/DL — SIGNIFICANT CHANGE UP (ref 70–99)
GLUCOSE SERPL-MCNC: 91 MG/DL — SIGNIFICANT CHANGE UP (ref 70–99)
HAV IGG SER QL IA: REACTIVE
HBV CORE AB SER-ACNC: SIGNIFICANT CHANGE UP
HBV SURFACE AB SER-ACNC: REACTIVE
HCT VFR BLD CALC: 27.5 % — LOW (ref 34.5–45)
HCV RNA FLD QL NAA+PROBE: SIGNIFICANT CHANGE UP
HCV RNA SERPL NAA DL=5-ACNC: 2796 IU/ML — SIGNIFICANT CHANGE UP
HCV RNA SPEC NAA+PROBE-LOG IU: 3.45 LOGIU/ML — SIGNIFICANT CHANGE UP
HCV RNA SPEC NAA+PROBE-LOG IU: DETECTED
HCV RNA SPEC QL PROBE+SIG AMP: DETECTED
HGB BLD-MCNC: 9.2 G/DL — LOW (ref 11.5–15.5)
HIV 1+2 AB+HIV1 P24 AG SERPL QL IA: SIGNIFICANT CHANGE UP
IGA FLD-MCNC: 269 MG/DL — SIGNIFICANT CHANGE UP (ref 84–499)
IGG FLD-MCNC: 1484 MG/DL — SIGNIFICANT CHANGE UP (ref 610–1660)
IGM SERPL-MCNC: 35 MG/DL — SIGNIFICANT CHANGE UP (ref 35–242)
INR BLD: 1.07 RATIO — SIGNIFICANT CHANGE UP (ref 0.88–1.16)
KAPPA LC SER QL IFE: 26.48 MG/DL — HIGH (ref 0.33–1.94)
KAPPA/LAMBDA FREE LIGHT CHAIN RATIO, SERUM: 1.33 RATIO — SIGNIFICANT CHANGE UP (ref 0.26–1.65)
LAMBDA LC SER QL IFE: 19.95 MG/DL — HIGH (ref 0.57–2.63)
MCHC RBC-ENTMCNC: 27.3 PG — SIGNIFICANT CHANGE UP (ref 27–34)
MCHC RBC-ENTMCNC: 33.5 GM/DL — SIGNIFICANT CHANGE UP (ref 32–36)
MCV RBC AUTO: 81.6 FL — SIGNIFICANT CHANGE UP (ref 80–100)
NRBC # BLD: 0 /100 WBCS — SIGNIFICANT CHANGE UP (ref 0–0)
PLATELET # BLD AUTO: 130 K/UL — LOW (ref 150–400)
POTASSIUM SERPL-MCNC: 4.1 MMOL/L — SIGNIFICANT CHANGE UP (ref 3.5–5.3)
POTASSIUM SERPL-SCNC: 4.1 MMOL/L — SIGNIFICANT CHANGE UP (ref 3.5–5.3)
PROT SERPL-MCNC: 6.5 G/DL — SIGNIFICANT CHANGE UP (ref 6–8.3)
PROTHROM AB SERPL-ACNC: 12.8 SEC — SIGNIFICANT CHANGE UP (ref 10.5–13.4)
RBC # BLD: 3.37 M/UL — LOW (ref 3.8–5.2)
RBC # FLD: 15.3 % — HIGH (ref 10.3–14.5)
SODIUM SERPL-SCNC: 132 MMOL/L — LOW (ref 135–145)
WBC # BLD: 9.29 K/UL — SIGNIFICANT CHANGE UP (ref 3.8–10.5)
WBC # FLD AUTO: 9.29 K/UL — SIGNIFICANT CHANGE UP (ref 3.8–10.5)

## 2023-06-02 PROCEDURE — 99233 SBSQ HOSP IP/OBS HIGH 50: CPT

## 2023-06-02 PROCEDURE — 88305 TISSUE EXAM BY PATHOLOGIST: CPT | Mod: 26

## 2023-06-02 PROCEDURE — 88313 SPECIAL STAINS GROUP 2: CPT | Mod: 26

## 2023-06-02 PROCEDURE — 43242 EGD US FINE NEEDLE BX/ASPIR: CPT

## 2023-06-02 PROCEDURE — 88307 TISSUE EXAM BY PATHOLOGIST: CPT | Mod: 26

## 2023-06-02 PROCEDURE — G0452: CPT | Mod: 26

## 2023-06-02 PROCEDURE — 43239 EGD BIOPSY SINGLE/MULTIPLE: CPT | Mod: XU,59

## 2023-06-02 PROCEDURE — 88312 SPECIAL STAINS GROUP 1: CPT | Mod: 26

## 2023-06-02 RX ORDER — CIPROFLOXACIN LACTATE 400MG/40ML
400 VIAL (ML) INTRAVENOUS ONCE
Refills: 0 | Status: COMPLETED | OUTPATIENT
Start: 2023-06-02 | End: 2023-06-02

## 2023-06-02 RX ORDER — SODIUM CHLORIDE 9 MG/ML
1000 INJECTION, SOLUTION INTRAVENOUS
Refills: 0 | Status: DISCONTINUED | OUTPATIENT
Start: 2023-06-02 | End: 2023-06-02

## 2023-06-02 RX ORDER — SEVELAMER CARBONATE 2400 MG/1
1600 POWDER, FOR SUSPENSION ORAL
Refills: 0 | Status: DISCONTINUED | OUTPATIENT
Start: 2023-06-02 | End: 2023-06-05

## 2023-06-02 RX ORDER — ERYTHROPOIETIN 10000 [IU]/ML
6000 INJECTION, SOLUTION INTRAVENOUS; SUBCUTANEOUS
Refills: 0 | Status: DISCONTINUED | OUTPATIENT
Start: 2023-06-02 | End: 2023-06-05

## 2023-06-02 RX ORDER — MUPIROCIN 20 MG/G
1 OINTMENT TOPICAL
Refills: 0 | Status: DISCONTINUED | OUTPATIENT
Start: 2023-06-02 | End: 2023-06-05

## 2023-06-02 RX ORDER — ACETAMINOPHEN 500 MG
1000 TABLET ORAL ONCE
Refills: 0 | Status: COMPLETED | OUTPATIENT
Start: 2023-06-02 | End: 2023-06-02

## 2023-06-02 RX ADMIN — SEVELAMER CARBONATE 1600 MILLIGRAM(S): 2400 POWDER, FOR SUSPENSION ORAL at 17:38

## 2023-06-02 RX ADMIN — Medication 200 MILLIGRAM(S): at 14:24

## 2023-06-02 RX ADMIN — CHLORHEXIDINE GLUCONATE 1 APPLICATION(S): 213 SOLUTION TOPICAL at 06:02

## 2023-06-02 RX ADMIN — Medication 400 MILLIGRAM(S): at 14:24

## 2023-06-02 RX ADMIN — MUPIROCIN 1 APPLICATION(S): 20 OINTMENT TOPICAL at 17:38

## 2023-06-02 RX ADMIN — SODIUM CHLORIDE 50 MILLILITER(S): 9 INJECTION, SOLUTION INTRAVENOUS at 10:56

## 2023-06-02 RX ADMIN — LOSARTAN POTASSIUM 100 MILLIGRAM(S): 100 TABLET, FILM COATED ORAL at 06:00

## 2023-06-02 RX ADMIN — MIRTAZAPINE 7.5 MILLIGRAM(S): 45 TABLET, ORALLY DISINTEGRATING ORAL at 17:38

## 2023-06-02 RX ADMIN — CARVEDILOL PHOSPHATE 12.5 MILLIGRAM(S): 80 CAPSULE, EXTENDED RELEASE ORAL at 06:00

## 2023-06-02 RX ADMIN — Medication 1000 MILLIGRAM(S): at 15:51

## 2023-06-02 NOTE — PROGRESS NOTE ADULT - ASSESSMENT
Patient is a 82 year old Female from home, ambulates with cane, with PMHx of ESRD on HD (T, Th, Sat), DM, HTN, HLD Patient presented to the ED after being sent from her nephrologist for jaundice. CT abdomen and pelvis resulting gallstones and gallbladder wall thickening. GI consulted recommending MRCP and possible ERCP to follow. MRCP did not show a common bile duct stone, common bile duct upper limits of normal for caliber for age measuring 8 mm which tapers distally. Contracted gallbladder with gallstones and small amount of pericholecystic fluid; gallbladder wall thickening and edema which is a nonspecific finding and can be secondary to underlying liver or   gallbladder disease; Small amount of ascites, Iron deposition in the liver and spleen.  EUS guided liver biopsy pending tomorrow, keep pt NPO tonight, full liquid diet  Hepatology dr. garcia consulted, f/u     Patient is a 82 year old Female from home, ambulates with cane, with PMHx of ESRD on HD (T, Th, Sat), DM, HTN, HLD Patient presented to the ED after being sent from her nephrologist for jaundice. CT abdomen and pelvis resulting gallstones and gallbladder wall thickening. GI consulted recommending MRCP and possible ERCP to follow. MRCP did not show a common bile duct stone, common bile duct upper limits of normal for caliber for age measuring 8 mm which tapers distally. Contracted gallbladder with gallstones and small amount of pericholecystic fluid; gallbladder wall thickening and edema which is a nonspecific finding and can be secondary to underlying liver or   gallbladder disease; Small amount of ascites, Iron deposition in the liver and spleen.  6/1 EUS guided liver biopsy pending tomorrow, keep pt NPO tonight, full liquid diet  Hepatology dr. garcia consulted, f/u  6/2 status post liver biopsy;  Please do CT scan in the morning 6/3, as pt will do to dialysis in the afternoon.

## 2023-06-02 NOTE — PROGRESS NOTE ADULT - ASSESSMENT
Pt is a 81 yo F with history of ESRD on HD presents with painless jaundice. Nephrology consulted for ESRD status.     1) ESRD: Last HD on 6/1, tolerated well with net 1.8L removed. Plan for next maintenance HD 6/3. Monitor electrolytes.  2) HTN with ESRD: BP elevated. Start Hydralazine 25mg PO tid. c/w Losartan 100mg PO daily. c/w low sodium diet. Monitor BP.  3) Anemia of renal disease: Hb low. Check iron studies including ferritin in am. Will  give Epogen 6000 units IV tiw. Monitor Hb.  4) Hyperphosphatemia: Phosphorus elevated. Increase Sevelamer to 1600mg PO tid with meals. Recc low phos diet once on diet. Monitor serum calcium and phosphorus.      [facility]  Vin Sommers M.D.  Jose Raul Llanos D.O.  Mercedes De La Vega M.D.  Tammy Nguyen, MSN, ANP-C  [phone]  Fax: [phone]    [address] [place] [address], [address]  [place] [address]     Pt is a 83 yo F with history of ESRD on HD presents with painless jaundice. Nephrology consulted for ESRD status.     1) ESRD: Last HD on 6/1, tolerated well with net 1.8L removed. Plan for next maintenance HD 6/3. Monitor electrolytes.  2) HTN with ESRD: BP elevated. Start Hydralazine 25mg PO tid. c/w Losartan 100mg PO daily. c/w low sodium diet. Monitor BP.  3) Anemia of renal disease: Hb low with elevated iron studies. Will  give Epogen 6000 units IV tiw. Monitor Hb.  4) Hyperphosphatemia: Phosphorus elevated. Increase Sevelamer to 1600mg PO tid with meals. Recc low phos diet once on diet. Monitor serum calcium and phosphorus.      [facility]  Vin Sommers M.D.  Jose Raul Llanos D.O.  Mercedes De La Vega M.D.  Tammy Nguyen, MSN, ANP-C  [phone]  Fax: [phone]    [facility] [place] [address], [address]  [place] [address]

## 2023-06-02 NOTE — PROGRESS NOTE ADULT - SUBJECTIVE AND OBJECTIVE BOX
Patient is a 82y old  Female who presents with a chief complaint of Jaundice (01 Jun 2023 19:36)      OVERNIGHT EVENTS: none noted     REVIEW OF SYSTEMS:  CONSTITUTIONAL: No fever, chills  RESPIRATORY: No cough, SOB  CARDIOVASCULAR: No chest pain, palpitations  GASTROINTESTINAL: No abdominal pain. No nausea, vomiting, or diarrhea  GENITOURINARY: No dysuria  NEUROLOGICAL: No HA  SKIN: jaundice noted; No itching, burning, rashes, or lesions       T(C): 36.7 (06-02-23 @ 08:00), Max: 37 (06-01-23 @ 20:23)  HR: 52 (06-02-23 @ 08:00) (52 - 69)  BP: 166/42 (06-02-23 @ 08:00) (145/57 - 184/69)  RR: 17 (06-02-23 @ 08:00) (17 - 18)  SpO2: 98% (06-02-23 @ 08:00) (95% - 100%)  Wt(kg): --Vital Signs Last 24 Hrs  T(C): 36.7 (02 Jun 2023 08:00), Max: 37 (01 Jun 2023 20:23)  T(F): 98 (02 Jun 2023 08:00), Max: 98.6 (01 Jun 2023 20:23)  HR: 52 (02 Jun 2023 08:00) (52 - 69)  BP: 166/42 (02 Jun 2023 08:00) (145/57 - 184/69)  BP(mean): --  RR: 17 (02 Jun 2023 08:00) (17 - 18)  SpO2: 98% (02 Jun 2023 08:00) (95% - 100%)    Parameters below as of 02 Jun 2023 08:00  Patient On (Oxygen Delivery Method): room air          PHYSICAL EXAM:  GENERAL: NAD  CHEST/LUNG: Clear to auscultation bilaterally; No rales, rhonchi, wheezing, or rubs  HEART: S1, S2, Regular rate and rhythm  ABDOMEN: Soft, Nontender, Nondistended; Bowel sounds present  NEURO: Alert & Oriented X3  EXTREMITIES: No LE edema, no calf tenderness  SKIN: No rashes or lesions    Consultant(s) Notes Reviewed:  [x ] YES  [ ] NO  Care Discussed with Consultants/Other Providers [ x] YES  [ ] NO  LABS:                        9.2    9.29  )-----------( 130      ( 02 Jun 2023 05:53 )             27.5     06-02    132<L>  |  97  |  23<H>  ----------------------------<  91  4.1   |  27  |  3.68<H>    Ca    8.4      02 Jun 2023 05:53  Phos  6.0     06-01  Mg     2.8     06-01    TPro  6.5  /  Alb  2.5<L>  /  TBili  4.2<H>  /  DBili  x   /  AST  122<H>  /  ALT  127<H>  /  AlkPhos  197<H>  06-02    PT/INR - ( 02 Jun 2023 05:53 )   PT: 12.8 sec;   INR: 1.07 ratio         PTT - ( 02 Jun 2023 05:53 )  PTT:33.2 sec  CAPILLARY BLOOD GLUCOSE      POCT Blood Glucose.: 105 mg/dL (02 Jun 2023 07:52)  POCT Blood Glucose.: 75 mg/dL (01 Jun 2023 21:01)  POCT Blood Glucose.: 103 mg/dL (01 Jun 2023 17:00)  POCT Blood Glucose.: 144 mg/dL (01 Jun 2023 11:13)          RADIOLOGY & ADDITIONAL TESTS:  Imaging Personally Reviewed:  [ ] YES  [ ] NO

## 2023-06-02 NOTE — PROGRESS NOTE ADULT - SUBJECTIVE AND OBJECTIVE BOX
[facility] NEPHROLOGY- PROGRESS NOTE    Patient is a 83yo Female with ESRD on HD (TTS @ [facility]; nephrologist myself) presents with painless jaundice. Nephrology consulted for ESRD status.   +HepC ab, elevated Ca 19-9, mildly elevated AFP  MRCP (non contrast) Contracted gallbladder with gallstones and small amount of  pericholecystic fluid; gallbladder wall thickening and edema   CT abd/pelvis with ?cirrhosis  Pt for EUS today.     Hospital Medications: Medications reviewed.  REVIEW OF SYSTEMS:  CONSTITUTIONAL: No fevers or chills  RESPIRATORY: No shortness of breath  CARDIOVASCULAR: No chest pain.  GASTROINTESTINAL: No nausea, vomiting, diarrhea or abdominal pain.   VASCULAR: No bilateral lower extremity edema.     VITALS:  T(F): 97.9 (06-02-23 @ 12:53), Max: 98.6 (06-01-23 @ 20:23)  HR: 64 (06-02-23 @ 14:30)  BP: 139/45 (06-02-23 @ 14:30)  RR: 17 (06-02-23 @ 14:30)  SpO2: 97% (06-02-23 @ 14:30)  Wt(kg): --  Height (cm): 154.9 (05-30 @ 12:56)  Weight (kg): 58 (05-30 @ 12:56)  BMI (kg/m2): 24.2 (05-30 @ 12:56)  BSA (m2): 1.56 (05-30 @ 12:56)    PHYSICAL EXAM:  Constitutional: NAD  HEENT: icteric sclera,   Respiratory: CTAB, no wheezes, rales or rhonchi  Cardiovascular: S1, S2, RRR  Gastrointestinal: BS+, soft, NT/ND  Extremities: No peripheral edema  Access: Left AVF +thrill +bruit    LABS:  06-02    132<L>  |  97  |  23<H>  ----------------------------<  91  4.1   |  27  |  3.68<H>    Ca    8.4      02 Jun 2023 05:53  Phos  6.0     06-01  Mg     2.8     06-01    TPro  6.5  /  Alb  2.5<L>  /  TBili  4.2<H>  /  DBili      /  AST  122<H>  /  ALT  127<H>  /  AlkPhos  197<H>  06-02    Creatinine Trend: 3.68 <--, 5.36 <--, 4.19 <--, 3.20 <--                        9.2    9.29  )-----------( 130      ( 02 Jun 2023 05:53 )             27.5     Urine Studies:      RADIOLOGY & ADDITIONAL STUDIES:    < from: MR MRCP No Cont (05.31.23 @ 18:37) >    ACC: [id number] EXAM:  MR MRCP   ORDERED BY: HAZEL MARCELINO     PROCEDURE DATE:  05/31/2023        < end of copied text >  < from: MR MRCP No Cont (05.31.23 @ 18:37) >  IMPRESSION:    No common bile duct stone.    Common bile duct upper limits of normal for caliber for age measuring 8   mm which tapers distally.    Contracted gallbladder with gallstones and small amount of   pericholecystic fluid; gallbladder wall thickening and edema which is a   nonspecific finding and can be secondary to underlying liver or   gallbladder disease; correlation is recommended with the patient's   symptoms.    Small amount of ascites.    Iron deposition in the liver and spleen.    --- End of Report ---    < end of copied text >     [facility] NEPHROLOGY- PROGRESS NOTE    Patient is a 81yo Female with ESRD on HD (TTS @ [facility]; nephrologist myself) presents with painless jaundice. Nephrology consulted for ESRD status.   +HepC ab, elevated Ca 19-9, mildly elevated AFP  MRCP (non contrast) Contracted gallbladder with gallstones and small amount of  pericholecystic fluid; gallbladder wall thickening and edema   CT abd/pelvis with ?cirrhosis  Pt for EUS today.     Hospital Medications: Medications reviewed.  REVIEW OF SYSTEMS:  CONSTITUTIONAL: No fevers or chills  RESPIRATORY: No shortness of breath  CARDIOVASCULAR: No chest pain.  GASTROINTESTINAL: No nausea, vomiting, diarrhea or abdominal pain.   VASCULAR: No bilateral lower extremity edema.     VITALS:  T(F): 97.9 (06-02-23 @ 12:53), Max: 98.6 (06-01-23 @ 20:23)  HR: 64 (06-02-23 @ 14:30)  BP: 139/45 (06-02-23 @ 14:30)  RR: 17 (06-02-23 @ 14:30)  SpO2: 97% (06-02-23 @ 14:30)  Wt(kg): --  Height (cm): 154.9 (05-30 @ 12:56)  Weight (kg): 58 (05-30 @ 12:56)  BMI (kg/m2): 24.2 (05-30 @ 12:56)  BSA (m2): 1.56 (05-30 @ 12:56)    PHYSICAL EXAM:  Constitutional: NAD  HEENT: icteric sclera,   Respiratory: CTAB, no wheezes, rales or rhonchi  Cardiovascular: S1, S2, RRR  Gastrointestinal: BS+, soft, NT/ND  Extremities: No peripheral edema  Access: Left AVF +thrill +bruit    LABS:  06-02    132<L>  |  97  |  23<H>  ----------------------------<  91  4.1   |  27  |  3.68<H>    Ca    8.4      02 Jun 2023 05:53  Phos  6.0     06-01  Mg     2.8     06-01    TPro  6.5  /  Alb  2.5<L>  /  TBili  4.2<H>  /  DBili      /  AST  122<H>  /  ALT  127<H>  /  AlkPhos  197<H>  06-02    Creatinine Trend: 3.68 <--, 5.36 <--, 4.19 <--, 3.20 <--                        9.2    9.29  )-----------( 130      ( 02 Jun 2023 05:53 )             27.5     Urine Studies:      RADIOLOGY & ADDITIONAL STUDIES:    < from: MR MRCP No Cont (05.31.23 @ 18:37) >    ACC: [id number] EXAM:  MR MRCP   ORDERED BY: HAZEL MARCELINO     PROCEDURE DATE:  05/31/2023        < end of copied text >  < from: MR MRCP No Cont (05.31.23 @ 18:37) >  IMPRESSION:    No common bile duct stone.    Common bile duct upper limits of normal for caliber for age measuring 8   mm which tapers distally.    Contracted gallbladder with gallstones and small amount of   pericholecystic fluid; gallbladder wall thickening and edema which is a   nonspecific finding and can be secondary to underlying liver or   gallbladder disease; correlation is recommended with the patient's   symptoms.    Small amount of ascites.    Iron deposition in the liver and spleen.    --- End of Report ---    < end of copied text >

## 2023-06-02 NOTE — PROGRESS NOTE ADULT - ATTENDING COMMENTS
Patient seen and examined this afternoon.     82 year old woman with PMH of ESRD on HD, sent in on account of yellowish discoloration of eyes noticed by her nephrologist in HD over the last 2 weeks with elevated Bilirubin on blood work. Pt reported having pruritus but denies abdominal pain, pale colored stool nor dark urine but  barely makes urine now.    Patient doing okay. was scheduled for ERCP tomorrow but noted to have Hepatitic C positive qualitative screening. Reportedly negative in HD labs last month. No fever, chest pain, SOB, nausea, vomit or abdominal pain presently.    Vital Signs Last 24 Hrs  T(C): 36.9 (02 Jun 2023 17:48), Max: 37 (01 Jun 2023 20:23)  T(F): 98.4 (02 Jun 2023 17:48), Max: 98.6 (01 Jun 2023 20:23)  HR: 51 (02 Jun 2023 17:48) (46 - 69)  BP: 149/44 (02 Jun 2023 17:48) (121/34 - 184/69)  RR: 18 (02 Jun 2023 17:48) (15 - 21)  SpO2: 97% (02 Jun 2023 17:48) (95% - 100%): room air      P/E:  elderly female, slightly sleepy post HD, sclera icteric  Psych: AAO x3  Neuro: No gross focal deficits; Power and sensation intact  CVS: S1S2 present, regular, no edema  Resp: BLAE+, No wheeze or Rhonchi  GI: Soft, BS+, Non tender, non distended  Extr: No  calf tenderness B/L Lower extremities; Left arm AVF  Skin: Warm and moist without any rashes    Labs:                               9.2    9.29  )-----------( 130      ( 02 Jun 2023 05:53 )             27.5   06-02  132<L>  |  97  |  23<H>  ----------------------------<  91  4.1   |  27  |  3.68<H>    Ca    8.4      02 Jun 2023 05:53  Phos  6.0     06-01  Mg     2.8     06-01  TPro  6.5  /  Alb  2.5<L>  /  TBili  4.2<H>  /  DBili  x   /  AST  122<H>  /  ALT  127<H>  /  AlkPhos  197<H>  06-02 5/30/23  TB - 6.4 / DB - 5.2  ALP /AST/ALT - 277/287/217    CT Abdomen and Pelvis No Cont (05.30.23 @ 18:23)   IMPRESSION: Limited evaluation of the pancreas without intravenous contrast. Gallstones and gallbladder wall thickening. Correlation with sonography recommended. Patchy opacities right middle lobe and left lower lobe may be infectious or inflammatory.    US Hepatic & Pancreatic (05.30.23 @ 19:39)   IMPRESSION: Cholelithiasis without evidence of cholecystitis or biliary obstruction. Atrophic right kidney.    MR MRCP No Cont (05.31.23 @ 18:37)   IMPRESSION: No common bile duct stone. Common bile duct upper limits of normal for caliber for age measuring 8 mm which tapers distally.  Contracted gallbladder with gallstones and small amount of pericholecystic fluid; gallbladder wall thickening and edema which is a   nonspecific finding and can be secondary to underlying liver or gallbladder disease; correlation is recommended with the patient's symptoms.  Small amount of ascites. Iron deposition in the liver and spleen.    A/P:  Obstructive Jaundice with Cholelithiasis concern for Choledocholithiasis  Less likely concern for Malignancy  Hepatitis C positive screen  Possible Viral Hepatitis although less likely  Transaminitis due to Hepatobiliary obstruction resolving  ESRD on HD  Anemia Chronic disease plus Renal disease  Type 2 DM/ HTN controlled  Iron overload possibly related to iron with HD    Plan:  patient was kept NPO  Discussed with GI Dr. Muhammad this morning; EUS was almost cancelled but after d/w Dr. Muhammad and need for Liver Biopsy irrespective of labs, Patient underwent EUS with Liver biopsy this afternoon.   Follow up NIKOLE, AMA, Microsomal Ab,   Hepatology Consult and f/u appreciated; d/w Dr. Hay; send genotype and markers as per GI/ Hepatology  Nephrology follow up: d/w Dr. De La Vega who will assume care AM; Patient of Dr. De La Vega from HD unit  Send also AFP as well as Ca 19-9  Hold Heparin AM  Hold oral hypoglycemics; ISS; Fingerstick ACHS; Hold statin while LFTs elevated  Continue anti HTN Losartan/ Coreg with parameters  Follow up BCB  CT with contrast Liver protocol tomorrow then HD; d/w      Discussed with patient  Discussed with Hepatology and GI  Discussed with JOEY Mosher who d/w family and updated plan Patient seen and examined this afternoon with daughter and Son in law at bedside     82 year old woman with PMH of ESRD on HD, sent in on account of yellowish discoloration of eyes noticed by her nephrologist in HD over the last 2 weeks with elevated Bilirubin on blood work. Pt reported having pruritus but denies abdominal pain, pale colored stool nor dark urine but  barely makes urine now. . Patient noted to have Hepatitic C positive qualitative screening. Reportedly negative in HD labs last month.    Patient doing okay No fever, chest pain, SOB, nausea, vomit or abdominal pain presently. There was no evidence of choledocholithiasis and given evidence of early cirrhosis on imaging and newly converted Hep C plan to proceed with EUS with Liver biopsy today.     Vital Signs Last 24 Hrs  T(C): 36.9 (02 Jun 2023 17:48), Max: 37 (01 Jun 2023 20:23)  T(F): 98.4 (02 Jun 2023 17:48), Max: 98.6 (01 Jun 2023 20:23)  HR: 51 (02 Jun 2023 17:48) (46 - 69)  BP: 149/44 (02 Jun 2023 17:48) (121/34 - 184/69)  RR: 18 (02 Jun 2023 17:48) (15 - 21)  SpO2: 97% (02 Jun 2023 17:48) (95% - 100%): room air    P/E:  elderly female, sclera icteric, NAD at rest  Psych: AAO x3  Neuro: No gross focal deficits; Power and sensation intact  CVS: S1S2 present, regular, no edema  Resp: BLAE+, No wheeze or Rhonchi  GI: Soft, BS+, Non tender, non distended  Extr: No  calf tenderness B/L Lower extremities; Left arm AVF  Skin: Warm and moist without any rashes    Labs:                               9.2    9.29  )-----------( 130      ( 02 Jun 2023 05:53 )             27.5   06-02  132<L>  |  97  |  23<H>  ----------------------------<  91  4.1   |  27  |  3.68<H>  Ca    8.4      02 Jun 2023 05:53  Phos  6.0     06-01  Mg     2.8     06-01  TPro  6.5  /  Alb  2.5<L>  /  TBili  4.2<H>  /  DBili  x   /  AST  122<H>  /  ALT  127<H>  /  AlkPhos  197<H>  06-02 5/30/23: T. Dread - 6.4 / DB - 5.2; ALP /AST/ALT - 277/287/217    HIV-1/2 Antigen/Antibody Screen by [facility] (06.02.23 @ 05:53) HIV-1/2 Combo Result: Nonreact:   Cancer Antigen, GI Ca 19-9 (06.02.23 @ 05:53) Cancer Antigen, GI Ca 19-9: 1834:   Carcinoembryonic Antigen (06.02.23 @ 05:53) Carcinoembryonic Antigen: 3.3:    MR MRCP No Cont (05.31.23 @ 18:37)   IMPRESSION: No common bile duct stone. Common bile duct upper limits of normal for caliber for age measuring 8 mm which tapers distally.  Contracted gallbladder with gallstones and small amount of pericholecystic fluid; gallbladder wall thickening and edema which is a   nonspecific finding and can be secondary to underlying liver or gallbladder disease; correlation is recommended with the patient's symptoms.  Small amount of ascites. Iron deposition in the liver and spleen.    A/P:  Viral Hepatitis with newly converted Hpe C with early Cirrhosis  Obstructive Jaundice with Cholelithiasis without Choledocholithiasis  concern for Malignancy  Transaminitis due to Hepatobiliary obstruction resolving  ESRD on HD  Anemia Chronic disease plus Renal disease  Type 2 DM/ HTN controlled  Iron overload possibly related to iron repletion with HD    Plan:  Patient was kept NPO overnight;   Discussed with GI Dr. Muhammad this morning; EUS was almost cancelled but after d/w Dr. Muhammad and need for Liver Biopsy irrespective of labs, Patient underwent EUS with Liver biopsy this afternoon. Await pathology; recommended Clear liquids today and advance in AM if remain stable  Hold anticoagulation for 72 hrs given slight bleed during the EUS but patient remain stable; Monitor CBC closely  d/w Renal Dr. De La Vega doesn't suspect platelet dysfunction as patient stabilized with routine HD and given low normal Na will hold Desmopressin  Follow up NIKOLE, AMA, Microsomal Ab,; alpha 1antitrypsin and Ceruloplasmin negative; HCV viral load 3 log noted  Hepatology  f/u appreciated; d/w Dr. Hay; f/u genotype and markers as per GI/ Hepatology; testing  As per Hepatology discussion needs MRI vs CT with contrast; given HD status will proceed with CT Abdomen and Pelvis Liver protocol  tomorrow morning with subsequent HD; d/w Dr. De La Vega; HD arranged for tomorrow after CT  Hold Heparin AM  Hold oral hypoglycemics; ISS; Fingerstick ACHS; Hold statin while LFTs elevated  Continue anti HTN Losartan/ Coreg with parameters; Ytirate to keep SBP less than 130 mm Hg    Discussed with patient cade is fully alert and awake and wishes to make her own decision at this time; will discuss Advance Directives and GOC AM  Discussed with Parth and Son in law at bedsidel; reviewed suspected etiology  Discussed with Hepatology Dr. Hay and Advanced GI Dr. Muhammad and Nephrology as above  Discussed with JOEY Mosher     (Spend 60 mins with >50% spend counseling and coordination of care with GI/ Nephrology/ Hepatology as well with pt and family and d/w Inpatient and Outpatient providers)

## 2023-06-02 NOTE — PROGRESS NOTE ADULT - ASSESSMENT
82y Female with Hx of DM, HTN, HLD, and ESRD on HD (TTS, followed by Dr. De La Vega) was sent to [facility] ER on 5/30/23 b/o new onset jaundice, and being evaluated by GI and hepatology was consulted b/o newly diagnosed hepatitis C, with reported negative test in 9/2022 and w/o any risk factors beyond HD. She was also found to have cholelithiasis w/o evidence of obstruction or choledocholithiasis, but CBD at [facility]N. MR/MRCP suggested hepatic /splenic iron overload. CT on admission farrukh the possibility of signs of early cirrhosis.   Now s/p EUS w/o choledocholithiasis. S/p liver biopsy.     # Hepatitis C ab and RNA pos, VL 2796 IU/ml.  - Duration? Reported neg test 9/2022, but antibody is also positive this time, not only the RNA. On other hand CT showed subtle signs of early cirrhosis?  - Please, follow up Hep C genotype, Fibrosure.  - Hep B neg, immune, vaccinated  - Hep A immune  - HIV neg  - S/p EUS and EUS guided liver biopsy - follow up pathology  - Will need to follow outpatient for treatment.   - Given the painless jaundice, the possibility of early cirrhosis (could be multifactorial, has also metabolic risk factors), would need HCC screening prior any DAA, as efficacy decreased in presence of active cancer. Prior CT and MRI were non contrast, would need to coordinate with her HD. AFP 10.3, CEA 3.3, CA 19-9 2274. Will need to follow contrast abdominal imaging (CT or MRI)  - Discussed transmission modes, natural Hx, treatment options, meaning of SVR, potential complications. Advised that her partner needs to be tested too.     # Hepatic and splenic iron overload?  - Quantification not available inpatient.   - Possible secondary iron overload in a HD patient  - Follow up HFE mutation - Still pending collection, please, assure collection.    # Jaundice  # Abnormal liver enzymes  # Cholelithiasis  - S/p EUS w/ liver biopsy   - F/u GI recommendations  - Hep viruses, iron studies as above. Follow up AI workup.    Thank you for consult  Will continue to monitor. Hepatology returns Monday. Please, consult GI on call if change in status, questions, concerns.   D/w primary team, GI and patient daughter

## 2023-06-02 NOTE — PROGRESS NOTE ADULT - SUBJECTIVE AND OBJECTIVE BOX
Chief Complaint:  Patient is a 82y old  Female who presents with a chief complaint of Jaundice (02 Jun 2023 15:04)      Reason for consult:    Interval Events:     Hospital Medications:  carvedilol 12.5 milliGRAM(s) Oral every 12 hours  chlorhexidine 2% Cloths 1 Application(s) Topical <User Schedule>  dextrose 5%. 1000 milliLiter(s) IV Continuous <Continuous>  dextrose 5%. 1000 milliLiter(s) IV Continuous <Continuous>  dextrose 50% Injectable 25 Gram(s) IV Push once  dextrose 50% Injectable 25 Gram(s) IV Push once  dextrose 50% Injectable 12.5 Gram(s) IV Push once  dextrose Oral Gel 15 Gram(s) Oral once PRN  epoetin ranjan-epbx (RETACRIT) Injectable 6000 Unit(s) IV Push <User Schedule>  glucagon  Injectable 1 milliGRAM(s) IntraMuscular once  insulin lispro (ADMELOG) corrective regimen sliding scale   SubCutaneous three times a day before meals  insulin lispro (ADMELOG) corrective regimen sliding scale   SubCutaneous at bedtime  losartan 100 milliGRAM(s) Oral daily  mirtazapine 7.5 milliGRAM(s) Oral daily  mupirocin 2% Ointment 1 Application(s) Both Nostrils two times a day  sevelamer carbonate 1600 milliGRAM(s) Oral three times a day with meals      ROS:   General:  No  fevers, chills, night sweats, fatigue  Eyes:  Good vision, no reported pain  ENT:  No sore throat, pain, runny nose  CV:  No pain, palpitations  Pulm:  No dyspnea, cough  GI:  See HPI, otherwise negative  :  No  incontinence, nocturia  Muscle:  No pain, weakness  Neuro:  No memory problems  Psych:  No insomnia, mood problems, depression  Endocrine:  No polyuria, polydipsia, cold/heat intolerance  Heme:  No petechiae, ecchymosis, easy bruisability  Skin:  No rash    PHYSICAL EXAM:   Vital Signs:  Vital Signs Last 24 Hrs  T(C): 36.3 (02 Jun 2023 15:21), Max: 37 (01 Jun 2023 20:23)  T(F): 97.4 (02 Jun 2023 15:21), Max: 98.6 (01 Jun 2023 20:23)  HR: 64 (02 Jun 2023 15:21) (46 - 69)  BP: 160/44 (02 Jun 2023 15:21) (121/34 - 184/69)  BP(mean): --  RR: 17 (02 Jun 2023 15:21) (15 - 21)  SpO2: 95% (02 Jun 2023 15:21) (95% - 100%)    Parameters below as of 02 Jun 2023 15:21  Patient On (Oxygen Delivery Method): room air      Daily     Daily     GENERAL: no acute distress  NEURO: alert, no asterixis  HEENT: anicteric sclera, no conjunctival pallor appreciated  CHEST: no respiratory distress, no accessory muscle use  CARDIAC: regular rate, rhythm  ABDOMEN: soft, non-tender, non-distended, no rebound or guarding  EXTREMITIES: warm, well perfused, no edema  SKIN: no lesions noted    LABS: reviewed                        9.2    9.29  )-----------( 130      ( 02 Jun 2023 05:53 )             27.5     06-02    132<L>  |  97  |  23<H>  ----------------------------<  91  4.1   |  27  |  3.68<H>    Ca    8.4      02 Jun 2023 05:53  Phos  6.0     06-01  Mg     2.8     06-01    TPro  6.5  /  Alb  2.5<L>  /  TBili  4.2<H>  /  DBili  x   /  AST  122<H>  /  ALT  127<H>  /  AlkPhos  197<H>  06-02    LIVER FUNCTIONS - ( 02 Jun 2023 05:53 )  Alb: 2.5 g/dL / Pro: 6.5 g/dL / ALK PHOS: 197 U/L / ALT: 127 U/L DA / AST: 122 U/L / GGT: x             Interval Diagnostic Studies: see sunrise for full report   Chief Complaint:  Patient is a 82y old  Female who presents with a chief complaint of Jaundice (02 Jun 2023 15:04)      Reason for consult:    Interval Events: Patient was seen and examined at bedside. No new complaints. Daughter and son-in-law at bedside. S/p EGD / EUS showing pancreatic calcifications, no choledocholithiasis, mildly prominent CBD (8mm), cholelithiasis, small ascites, s/p liver biopsy, few scattered irregular LNs surrounding the L liver lobe, 3-5 mm, normal esophagus, gastric erosions.           Hospital Medications:  carvedilol 12.5 milliGRAM(s) Oral every 12 hours  chlorhexidine 2% Cloths 1 Application(s) Topical <User Schedule>  dextrose 5%. 1000 milliLiter(s) IV Continuous <Continuous>  dextrose 5%. 1000 milliLiter(s) IV Continuous <Continuous>  dextrose 50% Injectable 25 Gram(s) IV Push once  dextrose 50% Injectable 25 Gram(s) IV Push once  dextrose 50% Injectable 12.5 Gram(s) IV Push once  dextrose Oral Gel 15 Gram(s) Oral once PRN  epoetin ranjan-epbx (RETACRIT) Injectable 6000 Unit(s) IV Push <User Schedule>  glucagon  Injectable 1 milliGRAM(s) IntraMuscular once  insulin lispro (ADMELOG) corrective regimen sliding scale   SubCutaneous three times a day before meals  insulin lispro (ADMELOG) corrective regimen sliding scale   SubCutaneous at bedtime  losartan 100 milliGRAM(s) Oral daily  mirtazapine 7.5 milliGRAM(s) Oral daily  mupirocin 2% Ointment 1 Application(s) Both Nostrils two times a day  sevelamer carbonate 1600 milliGRAM(s) Oral three times a day with meals      ROS:   General:  No  fevers, chills, night sweats, fatigue  Eyes:  Good vision, no reported pain  ENT:  No sore throat, pain, runny nose  CV:  No pain, palpitations  Pulm:  No dyspnea, cough  GI:  Denies pain, N/V, reports small amount 1 BM in am, denies bleeding  : Still makes some urine  Muscle:  No pain, weakness  Neuro:  No memory problems  Skin:  No rash      PHYSICAL EXAM:   Vital Signs:  Vital Signs Last 24 Hrs  T(C): 36.3 (02 Jun 2023 15:21), Max: 37 (01 Jun 2023 20:23)  T(F): 97.4 (02 Jun 2023 15:21), Max: 98.6 (01 Jun 2023 20:23)  HR: 64 (02 Jun 2023 15:21) (46 - 69)  BP: 160/44 (02 Jun 2023 15:21) (121/34 - 184/69)  BP(mean): --  RR: 17 (02 Jun 2023 15:21) (15 - 21)  SpO2: 95% (02 Jun 2023 15:21) (95% - 100%)    Parameters below as of 02 Jun 2023 15:21  Patient On (Oxygen Delivery Method): room air      Daily     Daily     GENERAL: no acute distress  NEURO: AAOx3, no asterixis  HEENT: icteric sclera  CHEST: no respiratory distress, no accessory muscle use  CARDIAC: regular rate, rhythm  ABDOMEN: soft, non-tender, non-distended, no rebound or guarding, BS+  EXTREMITIES: warm, well perfused, no edema, LUE AVF  SKIN: no rash      LABS: reviewed                        9.2    9.29  )-----------( 130      ( 02 Jun 2023 05:53 )             27.5     06-02    132<L>  |  97  |  23<H>  ----------------------------<  91  4.1   |  27  |  3.68<H>    Ca    8.4      02 Jun 2023 05:53  Phos  6.0     06-01  Mg     2.8     06-01    TPro  6.5  /  Alb  2.5<L>  /  TBili  4.2<H>  /  DBili  x   /  AST  122<H>  /  ALT  127<H>  /  AlkPhos  197<H>  06-02    LIVER FUNCTIONS - ( 02 Jun 2023 05:53 )  Alb: 2.5 g/dL / Pro: 6.5 g/dL / ALK PHOS: 197 U/L / ALT: 127 U/L DA / AST: 122 U/L / GGT: x             Interval Diagnostic Studies: see sunrise for full report

## 2023-06-02 NOTE — PROGRESS NOTE ADULT - PROBLEM SELECTOR PLAN 2
ESRD on HD (Tue, Th, Sat)  Last HD session on 5/30  follows with Dr. Mercado, from [facility] on [id number] [address]  Nephro Dr. Cano

## 2023-06-03 ENCOUNTER — TRANSCRIPTION ENCOUNTER (OUTPATIENT)
Age: 83
End: 2023-06-03

## 2023-06-03 LAB
ALBUMIN SERPL ELPH-MCNC: 2.5 G/DL — LOW (ref 3.5–5)
ALP SERPL-CCNC: 183 U/L — HIGH (ref 40–120)
ALT FLD-CCNC: 104 U/L DA — HIGH (ref 10–60)
ANION GAP SERPL CALC-SCNC: 7 MMOL/L — SIGNIFICANT CHANGE UP (ref 5–17)
AST SERPL-CCNC: 91 U/L — HIGH (ref 10–40)
BILIRUB SERPL-MCNC: 3.4 MG/DL — HIGH (ref 0.2–1.2)
BUN SERPL-MCNC: 36 MG/DL — HIGH (ref 7–18)
CALCIUM SERPL-MCNC: 8.5 MG/DL — SIGNIFICANT CHANGE UP (ref 8.4–10.5)
CHLORIDE SERPL-SCNC: 94 MMOL/L — LOW (ref 96–108)
CO2 SERPL-SCNC: 27 MMOL/L — SIGNIFICANT CHANGE UP (ref 22–31)
CREAT SERPL-MCNC: 5.08 MG/DL — HIGH (ref 0.5–1.3)
EGFR: 8 ML/MIN/1.73M2 — LOW
GLUCOSE BLDC GLUCOMTR-MCNC: 107 MG/DL — HIGH (ref 70–99)
GLUCOSE BLDC GLUCOMTR-MCNC: 119 MG/DL — HIGH (ref 70–99)
GLUCOSE BLDC GLUCOMTR-MCNC: 90 MG/DL — SIGNIFICANT CHANGE UP (ref 70–99)
GLUCOSE BLDC GLUCOMTR-MCNC: 98 MG/DL — SIGNIFICANT CHANGE UP (ref 70–99)
GLUCOSE SERPL-MCNC: 132 MG/DL — HIGH (ref 70–99)
HCT VFR BLD CALC: 26.7 % — LOW (ref 34.5–45)
HGB BLD-MCNC: 8.7 G/DL — LOW (ref 11.5–15.5)
MCHC RBC-ENTMCNC: 27.3 PG — SIGNIFICANT CHANGE UP (ref 27–34)
MCHC RBC-ENTMCNC: 32.6 GM/DL — SIGNIFICANT CHANGE UP (ref 32–36)
MCV RBC AUTO: 83.7 FL — SIGNIFICANT CHANGE UP (ref 80–100)
NRBC # BLD: 0 /100 WBCS — SIGNIFICANT CHANGE UP (ref 0–0)
PLATELET # BLD AUTO: 136 K/UL — LOW (ref 150–400)
POTASSIUM SERPL-MCNC: 4.6 MMOL/L — SIGNIFICANT CHANGE UP (ref 3.5–5.3)
POTASSIUM SERPL-SCNC: 4.6 MMOL/L — SIGNIFICANT CHANGE UP (ref 3.5–5.3)
PROT SERPL-MCNC: 6.4 G/DL — SIGNIFICANT CHANGE UP (ref 6–8.3)
RBC # BLD: 3.19 M/UL — LOW (ref 3.8–5.2)
RBC # FLD: 14.1 % — SIGNIFICANT CHANGE UP (ref 10.3–14.5)
SODIUM SERPL-SCNC: 128 MMOL/L — LOW (ref 135–145)
WBC # BLD: 8.88 K/UL — SIGNIFICANT CHANGE UP (ref 3.8–10.5)
WBC # FLD AUTO: 8.88 K/UL — SIGNIFICANT CHANGE UP (ref 3.8–10.5)

## 2023-06-03 PROCEDURE — 99497 ADVNCD CARE PLAN 30 MIN: CPT | Mod: 25

## 2023-06-03 PROCEDURE — 99232 SBSQ HOSP IP/OBS MODERATE 35: CPT

## 2023-06-03 PROCEDURE — 74177 CT ABD & PELVIS W/CONTRAST: CPT | Mod: 26

## 2023-06-03 RX ORDER — CARVEDILOL PHOSPHATE 80 MG/1
12.5 CAPSULE, EXTENDED RELEASE ORAL EVERY 12 HOURS
Refills: 0 | Status: DISCONTINUED | OUTPATIENT
Start: 2023-06-03 | End: 2023-06-05

## 2023-06-03 RX ORDER — HYDRALAZINE HCL 50 MG
25 TABLET ORAL THREE TIMES A DAY
Refills: 0 | Status: DISCONTINUED | OUTPATIENT
Start: 2023-06-03 | End: 2023-06-03

## 2023-06-03 RX ORDER — HYDRALAZINE HCL 50 MG
50 TABLET ORAL EVERY 8 HOURS
Refills: 0 | Status: DISCONTINUED | OUTPATIENT
Start: 2023-06-03 | End: 2023-06-05

## 2023-06-03 RX ADMIN — Medication 50 MILLIGRAM(S): at 23:39

## 2023-06-03 RX ADMIN — MIRTAZAPINE 7.5 MILLIGRAM(S): 45 TABLET, ORALLY DISINTEGRATING ORAL at 13:49

## 2023-06-03 RX ADMIN — MUPIROCIN 1 APPLICATION(S): 20 OINTMENT TOPICAL at 05:47

## 2023-06-03 RX ADMIN — SEVELAMER CARBONATE 1600 MILLIGRAM(S): 2400 POWDER, FOR SUSPENSION ORAL at 08:25

## 2023-06-03 RX ADMIN — CHLORHEXIDINE GLUCONATE 1 APPLICATION(S): 213 SOLUTION TOPICAL at 05:46

## 2023-06-03 RX ADMIN — SEVELAMER CARBONATE 1600 MILLIGRAM(S): 2400 POWDER, FOR SUSPENSION ORAL at 13:49

## 2023-06-03 RX ADMIN — ERYTHROPOIETIN 6000 UNIT(S): 10000 INJECTION, SOLUTION INTRAVENOUS; SUBCUTANEOUS at 18:51

## 2023-06-03 RX ADMIN — LOSARTAN POTASSIUM 100 MILLIGRAM(S): 100 TABLET, FILM COATED ORAL at 05:46

## 2023-06-03 RX ADMIN — Medication 25 MILLIGRAM(S): at 14:15

## 2023-06-03 NOTE — PROGRESS NOTE ADULT - SUBJECTIVE AND OBJECTIVE BOX
Patient seen and examined this afternoon with daughter and Son in law at bedside     82 year old woman with PMH of ESRD on HD, sent in on account of yellowish discoloration of eyes noticed by her nephrologist in HD over the last 2 weeks with elevated Bilirubin on blood work. Pt reported having pruritus but denies abdominal pain, pale colored stool nor dark urine but  barely makes urine now. . Patient noted to have Hepatitic C positive qualitative screening. Reportedly negative in HD labs last month.    Patient doing okay No fever, chest pain, SOB, nausea, vomit or abdominal pain presently. There was no evidence of choledocholithiasis and given evidence of early cirrhosis on imaging and newly converted Hep C plan to proceed with EUS with Liver biopsy today.     Vital Signs Last 24 Hrs  T(C): 36.3 (03 Jun 2023 20:58), Max: 36.7 (03 Jun 2023 05:06)  T(F): 97.3 (03 Jun 2023 20:58), Max: 98.1 (03 Jun 2023 05:06)  HR: 71 (03 Jun 2023 20:58) (53 - 80)  BP: 195/45 (03 Jun 2023 20:58) (145/42 - 195/45)  RR: 18 (03 Jun 2023 20:58) (16 - 19)  SpO2: 99% (03 Jun 2023 20:58) (95% - 100%)    Parameters below as of 03 Jun 2023 20:58  Patient On (Oxygen Delivery Method): room air        P/E:  elderly female, sclera icteric, NAD at rest  Psych: AAO x3  Neuro: No gross focal deficits; Power and sensation intact  CVS: S1S2 present, regular, no edema  Resp: BLAE+, No wheeze or Rhonchi  GI: Soft, BS+, Non tender, non distended  Extr: No  calf tenderness B/L Lower extremities; Left arm AVF  Skin: Warm and moist without any rashes    Labs:                               9.2    9.29  )-----------( 130      ( 02 Jun 2023 05:53 )             27.5   06-02  132<L>  |  97  |  23<H>  ----------------------------<  91  4.1   |  27  |  3.68<H>  Ca    8.4      02 Jun 2023 05:53  Phos  6.0     06-01  Mg     2.8     06-01  TPro  6.5  /  Alb  2.5<L>  /  TBili  4.2<H>  /  DBili  x   /  AST  122<H>  /  ALT  127<H>  /  AlkPhos  197<H>  06-02 5/30/23: T. Dread - 6.4 / DB - 5.2; ALP /AST/ALT - 277/287/217    HIV-1/2 Antigen/Antibody Screen by [facility] (06.02.23 @ 05:53) HIV-1/2 Combo Result: Nonreact:   Cancer Antigen, GI Ca 19-9 (06.02.23 @ 05:53) Cancer Antigen, GI Ca 19-9: 1834:   Carcinoembryonic Antigen (06.02.23 @ 05:53) Carcinoembryonic Antigen: 3.3:    MR MRCP No Cont (05.31.23 @ 18:37)   IMPRESSION: No common bile duct stone. Common bile duct upper limits of normal for caliber for age measuring 8 mm which tapers distally.  Contracted gallbladder with gallstones and small amount of pericholecystic fluid; gallbladder wall thickening and edema which is a   nonspecific finding and can be secondary to underlying liver or gallbladder disease; correlation is recommended with the patient's symptoms.  Small amount of ascites. Iron deposition in the liver and spleen.    A/P:  Viral Hepatitis with newly converted Hpe C with early Cirrhosis  Obstructive Jaundice with Cholelithiasis without Choledocholithiasis  concern for Malignancy  Transaminitis due to Hepatobiliary obstruction resolving  ESRD on HD  Anemia Chronic disease plus Renal disease  Type 2 DM/ HTN controlled  Iron overload possibly related to iron repletion with HD    Plan:  Patient was kept NPO overnight;   Discussed with GI Dr. Muhammad this morning; EUS was almost cancelled but after d/w Dr. Muhammad and need for Liver Biopsy irrespective of labs, Patient underwent EUS with Liver biopsy this afternoon. Await pathology; recommended Clear liquids today and advance in AM if remain stable  Hold anticoagulation for 72 hrs given slight bleed during the EUS but patient remain stable; Monitor CBC closely  d/w Renal Dr. De La Vega doesn't suspect platelet dysfunction as patient stabilized with routine HD and given low normal Na will hold Desmopressin  Follow up NIKOLE, AMA, Microsomal Ab,; alpha 1antitrypsin and Ceruloplasmin negative; HCV viral load 3 log noted  Hepatology  f/u appreciated; d/w Dr. Hay; f/u genotype and markers as per GI/ Hepatology; testing  As per Hepatology discussion needs MRI vs CT with contrast; given HD status will proceed with CT Abdomen and Pelvis Liver protocol  tomorrow morning with subsequent HD; d/w Dr. De La Vega; HD arranged for tomorrow after CT  Hold Heparin AM  Hold oral hypoglycemics; ISS; Fingerstick ACHS; Hold statin while LFTs elevated  Continue anti HTN Losartan/ Coreg with parameters; Ytirate to keep SBP less than 130 mm Hg    Discussed with patient cade is fully alert and awake and wishes to make her own decision at this time; will discuss Advance Directives and GOC AM  Discussed with Parth and Son in law at bedsidel; reviewed suspected etiology  Discussed with Hepatology Dr. Hay and Advanced GI Dr. Muhammad and Nephrology as above  Discussed with JOEY Mosher    Patient seen and examined this afternoon and later met with Son in law Cabral at bedside    82 year old woman with PMH of ESRD on HD, sent in on account of yellowish discoloration of eyes noticed by her nephrologist in HD over the last 2 weeks with elevated Bilirubin on blood work. Pt reported having pruritus but denies abdominal pain, pale colored stool nor dark urine but  barely makes urine now. . Patient noted to have Hepatitic C positive qualitative screening. Reportedly negative in HD labs last month.    Patient doing okay No fever, chest pain, SOB, nausea, vomit or abdominal pain presently. There was no evidence of choledocholithiasis and given evidence of early cirrhosis on imaging and newly converted Hep C underwent EUS with Liver biopsy friday; As per GI advised Clears tolerated well and advised to advance but no chemical DVT ppx as patient had mild bleeding during biopsy; No active bleeding; H/H stable    MEDICATIONS  (STANDING):  carvedilol 12.5 milliGRAM(s) Oral every 12 hours  chlorhexidine 2% Cloths 1 Application(s) Topical <User Schedule>  dextrose 5%. 1000 milliLiter(s) (50 mL/Hr) IV Continuous <Continuous>  dextrose 5%. 1000 milliLiter(s) (100 mL/Hr) IV Continuous <Continuous>  dextrose 50% Injectable 12.5 Gram(s) IV Push once  dextrose 50% Injectable 25 Gram(s) IV Push once  dextrose 50% Injectable 25 Gram(s) IV Push once  epoetin ranjan-epbx (RETACRIT) Injectable 6000 Unit(s) IV Push <User Schedule>  glucagon  Injectable 1 milliGRAM(s) IntraMuscular once  hydrALAZINE 50 milliGRAM(s) Oral every 8 hours  insulin lispro (ADMELOG) corrective regimen sliding scale   SubCutaneous at bedtime  insulin lispro (ADMELOG) corrective regimen sliding scale   SubCutaneous three times a day before meals  losartan 100 milliGRAM(s) Oral daily  mirtazapine 7.5 milliGRAM(s) Oral daily  mupirocin 2% Ointment 1 Application(s) Both Nostrils two times a day  sevelamer carbonate 1600 milliGRAM(s) Oral three times a day with meals    MEDICATIONS  (PRN):  dextrose Oral Gel 15 Gram(s) Oral once PRN Blood Glucose LESS THAN 70 milliGRAM(s)/deciliter      Vital Signs Last 24 Hrs  T(C): 36.3 (03 Jun 2023 20:58), Max: 36.7 (03 Jun 2023 05:06)  T(F): 97.3 (03 Jun 2023 20:58), Max: 98.1 (03 Jun 2023 05:06)  HR: 71 (03 Jun 2023 20:58) (53 - 80)  BP: 195/45 (03 Jun 2023 20:58) (145/42 - 195/45)  RR: 18 (03 Jun 2023 20:58) (16 - 19)  SpO2: 99% (03 Jun 2023 20:58) (95% - 100%) : room air    P/E:  elderly female, sclera icteric, NAD at rest  Psych: AAO x3  Neuro: No gross focal deficits; Power and sensation intact  CVS: S1S2 present, regular, no edema  Resp: BLAE+, No wheeze or Rhonchi  GI: Soft, BS+, Non tender, non distended  Extr: No  calf tenderness B/L Lower extremities; Left arm AVF  Skin: Warm and moist without any rashes    Labs:    reviewed as below                  CBC stable H/H 8/7/27; WBC 8.88   BMP: Na 128, K 4.8 Bun/ Cr 36/5.8 (labs prior to HD)    HIV-1/2 Antigen/Antibody Screen by [facility] (06.02.23 @ 05:53) HIV-1/2 Combo Result: Nonreact:   Cancer Antigen, GI Ca 19-9 (06.02.23 @ 05:53) Cancer Antigen, GI Ca 19-9: 1834:   Carcinoembryonic Antigen (06.02.23 @ 05:53) Carcinoembryonic Antigen: 3.3:    MR MRCP No Cont (05.31.23 @ 18:37)   IMPRESSION: No common bile duct stone. Common bile duct upper limits of normal for caliber for age measuring 8 mm which tapers distally.  Contracted gallbladder with gallstones and small amount of pericholecystic fluid; gallbladder wall thickening and edema which is a   nonspecific finding and can be secondary to underlying liver or gallbladder disease; correlation is recommended with the patient's symptoms.  Small amount of ascites. Iron deposition in the liver and spleen.    CT Abdomen and Pelvis w/ IV Cont (06.03.23 @ 10:56)     FINDINGS:    LOWER CHEST: Trace pleural effusions.  Cardiomegaly.    LIVER: Within normal limits.  BILE DUCTS: Normal caliber.  GALLBLADDER: Contracted with cholelithiasis.  SPLEEN: Within normal limits.  PANCREAS: Within normal limits.  ADRENALS: Within normal limits.  KIDNEYS/URETERS: Atrophic kidneys.  BLADDER: Within normal limits.  REPRODUCTIVE ORGANS: Within normal limits.  BOWEL: No bowel obstruction. The appendix is normal.  Colonic diverticulosis.  PERITONEUM: Mild ascites.  VESSELS:  Calcified atherosclerosis.  RETROPERITONEUM/LYMPH NODES: No lymphadenopathy.  ABDOMINAL WALL: Within normal limits.  BONES: A severe compression fracture ofL1 is again noted.    IMPRESSION: Contracted with cholelithiasis.   A severe compression fracture of L1 is again noted.

## 2023-06-03 NOTE — DISCHARGE NOTE PROVIDER - CARE PROVIDER_API CALL
Ray Dhaliwal  Internal Medicine  [address] [place] [address]  Phone: [phone]  Fax: [phone]  Follow Up Time: 1 [place] [address]  Phone: [phone]  Fax: [phone]  Follow Up Time: 1 week   Ray Dhaliwal  Internal Medicine  [address] [place] [address]  Phone: [phone]  Fax: [phone]  Follow Up Time: 1 week    [facility]  [id number] [place] [address]  Phone: [phone]  Fax: [phone]  Follow Up Time: 1 week    Hammad Muhammad  Gastroenterology  [address] [facility] 2nd [place] [address]  Phone: [phone]  Fax: [phone]  Follow Up Time: 2 weeks

## 2023-06-03 NOTE — DISCHARGE NOTE PROVIDER - NSDCFUADDINST_GEN_ALL_CORE_FT
PLEASE FOLLOW UP WITH DR. WALTON FOR HEPATITIS C TREATMENT AS OUTPATIENT ONCE GENOTYPING AVAILABLE   PLEASE FOLLOW UP WITH DR. IBARRA AS OUTPATIENT  PLEASE FOLLOW UP WITH DR. WALTON FOR HEPATITIS C TREATMENT AS OUTPATIENT ONCE GENOTYPING AVAILABLE;  OFFICE MAY REACH OUT TO MAKE APPOINTMENT  YOUR SUGARS ARE WELL CONTROLLED WITHOUT ANY MEDICATION. YOUR A1C IS VERY LOW 4.5; PLEASE DO NOT TAKE ANY DIABETIC MEDICATION INSULIN OR JANUVIA UNLESS YOU DISCUSS WITH YOUR PCP/ DIABETIC SPECIALIST AND ONLY IF SUGARS ARE PERSISTENTLY ABOVE 160; THIS IS LIKELY YOU BEING ON DIALYSI. MEDICATION REMAINS IN SYSTEM FOR LONG  PLEASE FOLLOW UP WITH DR. IBARRA AS OUTPATIENT

## 2023-06-03 NOTE — PROGRESS NOTE ADULT - ASSESSMENT
Pt is a 81 yo F with history of ESRD on HD presents with painless jaundice. Nephrology consulted for ESRD status.     1) ESRD: Last HD on 6/1, tolerated well with net 1.8L removed. Plan for next maintenance HD today post-CT with IV contrast. Monitor electrolytes.  2) HTN with ESRD: BP uncontrolled for which Hydralazine 25 mg PO TID started today. Titrate as needed. c/w Losartan 100mg PO daily. c/w low sodium diet. Monitor BP.  3) Anemia of renal disease: Hb low with elevated iron studies. Continue with Epogen 6000 units IV tiw. Monitor Hb.  4) Hyperphosphatemia: Phosphorus elevated for which renvela increased to 1600mg PO tid with meals. Continue with renal diet. Monitor serum calcium and phosphorus.      [facility]  Vin Sommers M.D.  JOSE ARMANDO EstrellaO.  Mercedes De La Vega M.D.  Tammy Nguyen, MSN, ANP-C  [phone]  Fax: [phone] [place] [address], [address]  [place] [address]

## 2023-06-03 NOTE — DISCHARGE NOTE PROVIDER - HOSPITAL COURSE
82 year old Female from home, ambulates with cane, with PMHx of ESRD on HD (T, Th, Sat), DM, HTN, HLD Patient presented to the ED after being sent from her nephrologist for jaundice. CT abdomen and pelvis resulting gallstones and gallbladder wall thickening. GI consulted s/p MRCP did not show a common bile duct stone, common bile duct upper limits of normal for caliber for age measuring 8 mm which tapers distally. Contracted gallbladder with gallstones and small amount of pericholecystic fluid; gallbladder wall thickening and edema which is a nonspecific finding and can be secondary to underlying liver or gallbladder disease; Small amount of ascites, Iron deposition in the liver and spleen. s/p EUS guided liver biopsy 6/1 Hepatology dr. garcia followed. Repeat CT and CBC post procedure unchanged, given clinical course decision made to discharge patient with outpatient follow up   -*-*-*- INCOMPLETE   Discharge discussed with attending   This is only a brief summary of patient's hospital stay, for full course please see EMR    82 year old Female from home, ambulates with cane, with PMHx of ESRD on HD (T, Th, Sat), DM, HTN, HLD Patient presented to the ED after being sent from her nephrologist for jaundice. CT abdomen and pelvis resulting gallstones and gallbladder wall thickening. GI consulted s/p MRCP did not show a common bile duct stone, common bile duct upper limits of normal for caliber for age measuring 8 mm which tapers distally. Contracted gallbladder with gallstones and small amount of pericholecystic fluid; gallbladder wall thickening and edema which is a nonspecific finding and can be secondary to underlying liver or gallbladder disease; Small amount of ascites, Iron deposition in the liver and spleen. s/p EUS guided liver biopsy 6/1 Hepatology dr. garcia followed.   Repeat CT and CBC post procedure unchanged, given clinical course decision made to discharge patient with outpatient follow up         Discharge discussed with attending   This is only a brief summary of patient's hospital stay, for full course please see EMR

## 2023-06-03 NOTE — DISCHARGE NOTE PROVIDER - NSDCMRMEDTOKEN_GEN_ALL_CORE_FT
atorvastatin 80 mg oral tablet: 1 tab(s) orally once a day (at bedtime)  Basaglar KwikPen 100 units/mL subcutaneous solution: 10 unit(s) subcutaneous once a day  Coreg 25 mg oral tablet: 1 tab(s) orally 2 times a day  ferrous sulfate 325 mg (65 mg elemental iron) oral tablet: 1 tab(s) orally once a day  Januvia 100 mg oral tablet: 1 tab(s) orally once a day  lisinopril 2.5 mg oral tablet: 1 tab(s) orally once a day  losartan 100 mg oral tablet: 1 tab(s) orally once a day  mirtazapine 7.5 mg oral tablet: 1 tab(s) orally once a day  sevelamer carbonate 800 mg oral tablet: 2 tab(s) orally 3 times a day   Coreg 12.5 mg oral tablet: 1 tab(s) orally every 12 hours  ferrous sulfate 325 mg (65 mg elemental iron) oral tablet: 1 tab(s) orally once a day  hydrALAZINE 50 mg oral tablet: 1 tab(s) orally every 8 hours  losartan 100 mg oral tablet: 1 tab(s) orally once a day  mirtazapine 7.5 mg oral tablet: 1 tab(s) orally once a day  sevelamer carbonate 800 mg oral tablet: 2 tab(s) orally 3 times a day (with meals)

## 2023-06-03 NOTE — PROGRESS NOTE ADULT - ASSESSMENT
A/P:  Viral Hepatitis with newly converted Hpe C with early Cirrhosis s/p Liver biopsy  Obstructive Jaundice with Cholelithiasis without Choledocholithiasis  concern for Malignancy  Transaminitis due to Hepatobiliary obstruction resolving  ESRD on HD (TTS schedule)  Anemia Chronic disease plus Renal disease  Type 2 DM/ HTN controlled  Iron overload possibly related to iron repletion with HD  L1 compression fracture appears chronic (no low back pain)    Plan:  Patient underwent EUS with Liver biopsy Friday;  Await pathology; tolerated Clear liquids; Advanced to regular diet;   Hld anticoagulation for 72 hrs given slight bleed during the EUS but patient remain stable; H/H stable  Follow up NIKOLE, AMA, Microsomal Ab,; alpha 1antitrypsin and Ceruloplasmin negative; HCV viral load 3 log noted  Hepatology  f/u outpt with Dr. Hay; f/u genotype and markers as per GI/ Hepatology; testing  Ct Abdomen and Pelvis completed prior to HD; spoke with Tech this morning; CT with no significant Cirrhosis  Hold oral hypoglycemics; ISS; Fingerstick ACHS; Hold statin while LFTs elevated  Continue anti HTN Losartan/ Coreg with parameters; Ttirate to keep SBP less than 130 mm Hg  BP was elevated; Increased Hydralazine to 50 mg q 8 hrs    Discussed with patient cade is fully alert and awake and wishes to make her own decision at this time; GOC discussed (see [facility] note); DNR/ DNI  Discussed with Son in law Hull who later arrived at bedside; MOLST completed; copy provided to Misha;   Discussed with JOEY Mosher

## 2023-06-03 NOTE — DISCHARGE NOTE PROVIDER - NSDCCPCAREPLAN_GEN_ALL_CORE_FT
PRINCIPAL DISCHARGE DIAGNOSIS  Diagnosis: Jaundice  Assessment and Plan of Treatment: You presented to the hospital with jaundice of your skin, and elevated liver function. You were followed by the hepatologist and found to have newly diagnosed hepatitis C. Your CT found cholelithiasis w/o evidence of obstruction or choledocholithiasis, but common bile duct was within normal limits. You had an MR/MRCP which suggested hepatic /splenic iron overload. CT on admission farrukh the possibility of signs of early cirrhosis. You had an endoscopic ultrasound done by the gastroenterologist on 6/2 and liver biopsy sent. It is important to follow up with your hepatologist for continued work up of this condition and for your biopsy results      SECONDARY DISCHARGE DIAGNOSES  Diagnosis: ESRD on dialysis  Assessment and Plan of Treatment: You have a history of end stage renal disease, it is important to continue your hemodialysis as reccomended by your nephrologist. Avoid taking (NSAIDs) - (ex: Ibuprofen, Advil, Celebrex, Naprosyn)  Have all medications adjusted for your renal function by your Health Care Provider. Blood pressure control is important.  Take all medication as prescribed.      Diagnosis: HTN (hypertension)  Assessment and Plan of Treatment: You have a history of high blood pressure, it is important to continue a Low salt diet Activity as tolerated. Take all medication as prescribed.  Follow up with your medical doctor for routine blood pressure monitoring at your next visit. Notify your doctor if you have any of the following symptoms: Dizziness, Lightheadedness, Blurry vision, Headache, Chest pain, Shortness of breath    Diagnosis: DM (diabetes mellitus)  Assessment and Plan of Treatment: Your HgA1C was 4.5 this admission. Keep a log of your blood glucose results and always take it with you to your doctor appointments.  Keep a list of your current medications including injectables and over the counter medications and bring this medication list with you to all your doctor appointments. If you have not seen your ophthalmologist this year call for appointment. Check your feet daily for redness, sores, or openings. Do not self treat. If no improvement in two days call your primary care physician for an appointment. Follow up with your endocrinologist for continued management of this condition     PRINCIPAL DISCHARGE DIAGNOSIS  Diagnosis: Jaundice  Assessment and Plan of Treatment: You presented to the hospital send in by your Kidney specialist noted to have jaundice of your skin, and abnormal liver enzymes and Bilirubin.   You was also noted to have stones in Gall bladder (Gallstones) You were followed by the hepatologist and found to have newly diagnosed hepatitis C. Your CT found cholelithiasis w/o evidence of obstruction or choledocholithiasis, but common bile duct was within normal limits. You had an MR/MRCP to rule out stnes in Bile duct but which suggested hepatic /splenic iron overload. You was seen by GI Sina Copeland and scheduled for an ERCP. CT on admission farrukh the possibility of signs of early cirrhosis. As you did not have any stones in Bile duct on MRCP and liver enzymes trended down well, GI Dr. Patel suggested proceed with and Endoscopic Ultrasound and Liver biopsy given newly found Hepatitis C (newly converted).   You had an endoscopic ultrasound done by the gastroenterologist Dr. Hammad Muhammad on 6/2/23 and liver biopsy sent. It is important to follow up with your hepatologist for continued work up of this condition and for your biopsy results      SECONDARY DISCHARGE DIAGNOSES  Diagnosis: ESRD on dialysis  Assessment and Plan of Treatment: You have a history of end stage renal disease, it is important to continue your hemodialysis as reccomended by your nephrologist Dr. Clarisse De La Vega who folowed you in hospital.   Avoid taking (NSAIDs) - (ex: Ibuprofen, Advil, Celebrex, Naprosyn)  Have all medications adjusted for your renal function by your Health Care Provider. Blood pressure control is important.  Take all medication as prescribed.  PLEASE FOLLOW UP WITH YOUR HEMODIALYSIS UNIT TOMORROW.  HAS REINSTATED YOUR DIALYSIS       Diagnosis: HTN (hypertension)  Assessment and Plan of Treatment: You have a history of high blood pressure, it is important to continue a Low salt diet Activity as tolerated. Take all medication as prescribed.  Follow up with your medical doctor for routine blood pressure monitoring at your next visit. Notify your doctor if you have any of the following symptoms: Dizziness, Lightheadedness, Blurry vision, Headache, Chest pain, Shortness of breath    Diagnosis: DM (diabetes mellitus)  Assessment and Plan of Treatment: Your HgA1C was 4.5 this admission. Keep a log of your blood glucose results and always take it with you to your doctor appointments.  YOU DO NOT NEED ANY DIABETIC MEDICATIONS AT THIS TIME  Keep a list of your current medications including injectables and over the counter medications and bring this medication list with you to all your doctor appointments. If you have not seen your ophthalmologist this year call for appointment. Check your feet daily for redness, sores, or openings. Do not self treat. If no improvement in two days call your primary care physician for an appointment. Follow up with your endocrinologist for continued management of this condition     PRINCIPAL DISCHARGE DIAGNOSIS  Diagnosis: Jaundice  Assessment and Plan of Treatment: You presented to the hospital send in by your Kidney specialist noted to have jaundice of your skin, and abnormal liver enzymes and Bilirubin.   You was also noted to have stones in Gall bladder (Gallstones) You were followed by the hepatologist and found to have newly diagnosed hepatitis C. Your CT found cholelithiasis w/o evidence of obstruction or choledocholithiasis, but common bile duct was within normal limits. You had an MR/MRCP to rule out stnes in Bile duct but which suggested hepatic /splenic iron overload. You was seen by GI Sina Copeland and scheduled for an ERCP. CT on admission farrukh the possibility of signs of early cirrhosis. As you did not have any stones in Bile duct on MRCP and liver enzymes trended down well, GI Dr. Patel suggested proceed with and Endoscopic Ultrasound and Liver biopsy given newly found Hepatitis C (newly converted).   You had an endoscopic ultrasound done by the gastroenterologist Dr. Hammad Muhammad on 6/2/23 and liver biopsy sent. It is important to follow up with your hepatologist for continued work up of this condition and for your biopsy results      SECONDARY DISCHARGE DIAGNOSES  Diagnosis: ESRD on dialysis  Assessment and Plan of Treatment: You have a history of end stage renal disease, it is important to continue your hemodialysis as reccomended by your nephrologist Dr. Clarisse De La Vega who folowed you in hospital.   Avoid taking (NSAIDs) - (ex: Ibuprofen, Advil, Celebrex, Naprosyn)  Have all medications adjusted for your renal function by your Health Care Provider. Blood pressure control is important.  Take all medication as prescribed.  PLEASE FOLLOW UP WITH YOUR HEMODIALYSIS UNIT TOMORROW.  HAS REINSTATED YOUR DIALYSIS       Diagnosis: HTN (hypertension)  Assessment and Plan of Treatment: You have a history of high blood pressure, it is important to continue a Low salt diet Activity as tolerated. Take all medication as prescribed.  Follow up with your medical doctor for routine blood pressure monitoring at your next visit. Notify your doctor if you have any of the following symptoms: Dizziness, Lightheadedness, Blurry vision, Headache, Chest pain, Shortness of breath    Diagnosis: DM (diabetes mellitus)  Assessment and Plan of Treatment: Your HgA1C was 4.5 this admission.   YOU DO NOT NEED ANY DIABETIC MEDICATIONS AT THIS TIME  Keep a list of your current medications including injectables and over the counter medications and bring this medication list with you to all your doctor appointments. If you have not seen your ophthalmologist this year call for appointment. Check your feet daily for redness, sores, or openings. Do not self treat. If no improvement in two days call your primary care physician for an appointment. Follow up with your endocrinologist for continued management of this condition     PRINCIPAL DISCHARGE DIAGNOSIS  Diagnosis: Jaundice  Assessment and Plan of Treatment: You presented to the hospital send in by your Kidney specialist noted to have jaundice of your skin, and abnormal liver enzymes and Bilirubin.   You was also noted to have stones in Gall bladder (Gallstones) You were followed by the hepatologist and found to have newly diagnosed hepatitis C. Your CT found cholelithiasis w/o evidence of obstruction or choledocholithiasis, but common bile duct was within normal limits. You had an MR/MRCP to rule out stnes in Bile duct but which suggested hepatic /splenic iron overload. You was seen by GI Sina Copeland and scheduled for an ERCP. CT on admission farrukh the possibility of signs of early cirrhosis. As you did not have any stones in Bile duct on MRCP and liver enzymes trended down well, GI Dr. Patel suggested proceed with and Endoscopic Ultrasound and Liver biopsy given newly found Hepatitis C (newly converted).   You had an endoscopic ultrasound done by the gastroenterologist Dr. Hammad Muhammad on 6/2/23 and liver biopsy sent. It is important to follow up with your hepatologist for continued work up of this condition and for your biopsy results      SECONDARY DISCHARGE DIAGNOSES  Diagnosis: ESRD on dialysis  Assessment and Plan of Treatment: You have a history of end stage renal disease, it is important to continue your hemodialysis as reccomended by your nephrologist Dr. Clarisse Nassar who folowed you in hospital.   Avoid taking (NSAIDs) - (ex: Ibuprofen, Advil, Celebrex, Naprosyn)  Have all medications adjusted for your renal function by your Health Care Provider. Blood pressure control is important.  Take all medication as prescribed.  PLEASE FOLLOW UP WITH YOUR HEMODIALYSIS UNIT TOMORROW.  HAS REINSTATED YOUR DIALYSIS       Diagnosis: Viral hepatitis C without coma  Assessment and Plan of Treatment: You was noted to have new Hep C positive screen. Quialitative RNA was confirmatory. Previously you was negative as per HD labs. You had EUS with liver biopsy and also seen by Heaptology Dr. Hay you will follow up as outpatient. All the secondary work up for Hep C was negative. Please follow up with Dr. Muhammad to follow up biopsy results    Diagnosis: HTN (hypertension)  Assessment and Plan of Treatment: You have a history of high blood pressure, it is important to continue a Low salt diet Activity as tolerated. Take all medication as prescribed. You are on Carvedilol, Losartan and Hydralazine which was titrated up to 50 mg three times a day.   Follow up with your medical doctor for routine blood pressure monitoring at your next visit.   Yourt BP will be monitored by Kidney specialist Dr. nassar during HD sesdsions  Notify your doctor if you have any of the following symptoms: Dizziness, Lightheadedness, Blurry vision, Headache, Chest pain, Shortness of breath    Diagnosis: DM (diabetes mellitus)  Assessment and Plan of Treatment: Your HgA1C was 4.5 this admission.   YOU DO NOT NEED ANY DIABETIC MEDICATIONS AT THIS TIME  Keep a list of your current medications including injectables and over the counter medications and bring this medication list with you to all your doctor appointments. If you have not seen your ophthalmologist this year call for appointment. Check your feet daily for redness, sores, or openings. Do not self treat. If no improvement in two days call your primary care physician for an appointment. Follow up with your endocrinologist for continued management of this condition

## 2023-06-03 NOTE — GOALS OF CARE CONVERSATION - ADVANCED CARE PLANNING - CONVERSATION DETAILS
Spoke with patient at length earlier this afternoon about her underlying disease process and current clinical status. Patient reported having raised 6 kids, one of them passed away and she supports his kids in [facility]. She does have kids in [place] including 3 daughters one of them eldest is sick in [place]. She reports coming to [place] and worked through  multiple jobs to support family and bring them to [place]. Her Spouse passed away 15 years ago. She does want to be a bother to any of her daughters or children and still feels can continue to live independently as she is at this time and has partner she is sharing the living space but help each other.     Patient is alert, awake and oriented and has capacity to make her own decisions at this time. Advance directives were reviewed with patient. Risks and Benefits of resuscitation and intubation discussed. Patient is very clear if God's will and her heart stops or cannot breathe wishes not to have chest compressions and resuscitated nor would want to be hooked up to any machines (ventilator). About feeding tube, she wishes tyo make decision at a later time if clinically warranted but does wish it if she is in a state where she has no quality of life    Ms. Crews appoints three individuals who should be contacted in case of an emergency and they can make a decision together.  Patient is fully alert, awake and oriented appoints following individuals in the order of preference to make decisions for her if she is unable to do so in the future.    1. Misha Martinez (Son in law) [phone]  2. Alexis (partner) [phone]  3. Rick  (youngest Son) [phone]    Later this afternoon I met with Son in law Cabral and we again reviewed current clinical status and treatment plan. We also discussed Advance Directives in the presence of patient and  patient wishes and further witnessed by RN at bedside. BRITT completed and copy of which was given to Son in law also.

## 2023-06-03 NOTE — DISCHARGE NOTE PROVIDER - PROVIDER TOKENS
PROVIDER:[TOKEN:[[id number]],FOLLOWUP:[1 week]],PROVIDER:[TOKEN:[[id number]],FOLLOWUP:[1 week]] PROVIDER:[TOKEN:[[id number]],FOLLOWUP:[1 week]],PROVIDER:[TOKEN:[[id number]],FOLLOWUP:[1 week]],PROVIDER:[TOKEN:[[id number]],FOLLOWUP:[2 weeks]]

## 2023-06-03 NOTE — GOALS OF CARE CONVERSATION - ADVANCED CARE PLANNING - TREATMENT GUIDELINE COMMENT
DNR/DNI; Treatment with antibiotic, fluids if needed as well as continue with hemodialysis unless her quality of life is significantly impaired

## 2023-06-03 NOTE — DISCHARGE NOTE PROVIDER - ATTENDING DISCHARGE PHYSICAL EXAMINATION:
Psych: AAO x3  Neuro: No gross focal deficits; Power and sensation intact  CVS: S1S2 present, regular, no edema  Resp: BLAE+, No wheeze or Rhonchi  GI: Soft, BS+, Non tender, non distended  Extr: No  calf tenderness B/L Lower extremities  Skin: Warm and moist without any rashes   Patient seen and examined    82 Female with ESRD on HD new Jaundice found to have cholelithiasis and Transaminitis with possibly a passed stone, also with new Hep C positive and early cirrhosis on CT without contrast; MRCP negative for choledocholithiasis; underwent EUS with Biopsy; seen by Hepatology and work up send    P/E:  Psych: AAO x3  Neuro: No gross focal deficits; Power and sensation intact  CVS: S1S2 present, regular, no edema  Resp: BLAE+, No wheeze or Rhonchi  GI: Soft, BS+, Non tender, non distended  Extr: No  calf tenderness B/L Lower extremities  Skin: Warm and moist without any rashes    Plan:  D/C Home  Outpt HD reinstated TTS schedule d/w SW and Renal  Outpt f/u Liver clinic d/w Dr. Hay  F/U GI for EUS Biopsy results  A1c 4.8; sugars well controlled during hospital stay off any meds;  Hold Insulin and oral Hypoglycemics for now; d/w Son in law; HCP over the phone  Inadvertently discharge medication listed Losartan and Lisinopril; d/w TON Cabral over the phone after discharge; Continue Losartan and no Lisinopril  See d/c instructions reviewed and updated   See discharge instructions reviewed and updated  Discussed

## 2023-06-03 NOTE — PROGRESS NOTE ADULT - SUBJECTIVE AND OBJECTIVE BOX
[facility] NEPHROLOGY- PROGRESS NOTE    Patient is a 83yo Female with ESRD on HD (TTS @ [facility]; nephrologist myself) presents with painless jaundice. Nephrology consulted for ESRD status.   +HepC ab, elevated Ca 19-9, mildly elevated AFP  MRCP (non contrast) Contracted gallbladder with gallstones and small amount of  pericholecystic fluid; gallbladder wall thickening and edema   CT abd/pelvis with ?cirrhosis      Hospital Medications: Medications reviewed.  REVIEW OF SYSTEMS:  CONSTITUTIONAL: No fevers or chills  RESPIRATORY: No shortness of breath  CARDIOVASCULAR: No chest pain.  GASTROINTESTINAL: No nausea, vomiting, diarrhea or abdominal pain.   VASCULAR: No bilateral lower extremity edema.       VITALS:  T(F): 98 (06-03-23 @ 13:20), Max: 98.7 (06-02-23 @ 20:31)  HR: 80 (06-03-23 @ 13:20)  BP: 168/50 (06-03-23 @ 13:20)  RR: 17 (06-03-23 @ 13:20)  SpO2: 99% (06-03-23 @ 13:20)  Wt(kg): --      PHYSICAL EXAM:  Constitutional: NAD  HEENT: icteric sclera,   Respiratory: CTAB, no wheezes, rales or rhonchi  Cardiovascular: S1, S2, RRR  Gastrointestinal: BS+, soft, NT/ND  Extremities: No peripheral edema  Access: Left AVF +thrill +bruit      LABS:  06-03    128<L>  |  94<L>  |  36<H>  ----------------------------<  132<H>  4.6   |  27  |  5.08<H>    Ca    8.5      03 Jun 2023 10:21    TPro  6.4  /  Alb  2.5<L>  /  TBili  3.4<H>  /  DBili      /  AST  91<H>  /  ALT  104<H>  /  AlkPhos  183<H>  06-03    Creatinine Trend: 5.08 <--, 3.68 <--, 5.36 <--, 4.19 <--, 3.20 <--                        8.7    8.88  )-----------( 136      ( 03 Jun 2023 10:21 )             26.7     Urine Studies:      < from: CT Abdomen and Pelvis w/ IV Cont (06.03.23 @ 10:56) >  IMPRESSION: Contracted with cholelithiasis.    A severe compression fracture of L1 is again noted.    --- End of Report ---    < end of copied text >

## 2023-06-04 LAB
ALBUMIN SERPL ELPH-MCNC: 2.6 G/DL — LOW (ref 3.5–5)
ALP SERPL-CCNC: 178 U/L — HIGH (ref 40–120)
ALT FLD-CCNC: 92 U/L DA — HIGH (ref 10–60)
ANION GAP SERPL CALC-SCNC: 6 MMOL/L — SIGNIFICANT CHANGE UP (ref 5–17)
AST SERPL-CCNC: 78 U/L — HIGH (ref 10–40)
BILIRUB SERPL-MCNC: 3.4 MG/DL — HIGH (ref 0.2–1.2)
BUN SERPL-MCNC: 14 MG/DL — SIGNIFICANT CHANGE UP (ref 7–18)
CALCIUM SERPL-MCNC: 8.5 MG/DL — SIGNIFICANT CHANGE UP (ref 8.4–10.5)
CHLORIDE SERPL-SCNC: 98 MMOL/L — SIGNIFICANT CHANGE UP (ref 96–108)
CO2 SERPL-SCNC: 29 MMOL/L — SIGNIFICANT CHANGE UP (ref 22–31)
CREAT SERPL-MCNC: 3.01 MG/DL — HIGH (ref 0.5–1.3)
EGFR: 15 ML/MIN/1.73M2 — LOW
GLUCOSE BLDC GLUCOMTR-MCNC: 125 MG/DL — HIGH (ref 70–99)
GLUCOSE BLDC GLUCOMTR-MCNC: 180 MG/DL — HIGH (ref 70–99)
GLUCOSE BLDC GLUCOMTR-MCNC: 84 MG/DL — SIGNIFICANT CHANGE UP (ref 70–99)
GLUCOSE BLDC GLUCOMTR-MCNC: 88 MG/DL — SIGNIFICANT CHANGE UP (ref 70–99)
GLUCOSE SERPL-MCNC: 85 MG/DL — SIGNIFICANT CHANGE UP (ref 70–99)
HCT VFR BLD CALC: 27 % — LOW (ref 34.5–45)
HEV IGM SER QL: NEGATIVE — SIGNIFICANT CHANGE UP
HGB BLD-MCNC: 8.7 G/DL — LOW (ref 11.5–15.5)
MCHC RBC-ENTMCNC: 27.1 PG — SIGNIFICANT CHANGE UP (ref 27–34)
MCHC RBC-ENTMCNC: 32.2 GM/DL — SIGNIFICANT CHANGE UP (ref 32–36)
MCV RBC AUTO: 84.1 FL — SIGNIFICANT CHANGE UP (ref 80–100)
MITOCHONDRIA AB SER-ACNC: SIGNIFICANT CHANGE UP
MITOCHONDRIA AB SER-ACNC: SIGNIFICANT CHANGE UP
NRBC # BLD: 0 /100 WBCS — SIGNIFICANT CHANGE UP (ref 0–0)
PLATELET # BLD AUTO: 132 K/UL — LOW (ref 150–400)
POTASSIUM SERPL-MCNC: 3.8 MMOL/L — SIGNIFICANT CHANGE UP (ref 3.5–5.3)
POTASSIUM SERPL-SCNC: 3.8 MMOL/L — SIGNIFICANT CHANGE UP (ref 3.5–5.3)
PROT SERPL-MCNC: 6.4 G/DL — SIGNIFICANT CHANGE UP (ref 6–8.3)
RBC # BLD: 3.21 M/UL — LOW (ref 3.8–5.2)
RBC # FLD: 14 % — SIGNIFICANT CHANGE UP (ref 10.3–14.5)
SMOOTH MUSCLE AB SER-ACNC: SIGNIFICANT CHANGE UP
SODIUM SERPL-SCNC: 133 MMOL/L — LOW (ref 135–145)
WBC # BLD: 11.18 K/UL — HIGH (ref 3.8–10.5)
WBC # FLD AUTO: 11.18 K/UL — HIGH (ref 3.8–10.5)

## 2023-06-04 PROCEDURE — 99232 SBSQ HOSP IP/OBS MODERATE 35: CPT

## 2023-06-04 RX ORDER — ONDANSETRON 8 MG/1
4 TABLET, FILM COATED ORAL ONCE
Refills: 0 | Status: COMPLETED | OUTPATIENT
Start: 2023-06-04 | End: 2023-06-04

## 2023-06-04 RX ADMIN — SEVELAMER CARBONATE 1600 MILLIGRAM(S): 2400 POWDER, FOR SUSPENSION ORAL at 08:32

## 2023-06-04 RX ADMIN — MIRTAZAPINE 7.5 MILLIGRAM(S): 45 TABLET, ORALLY DISINTEGRATING ORAL at 11:33

## 2023-06-04 RX ADMIN — MUPIROCIN 1 APPLICATION(S): 20 OINTMENT TOPICAL at 05:35

## 2023-06-04 RX ADMIN — CHLORHEXIDINE GLUCONATE 1 APPLICATION(S): 213 SOLUTION TOPICAL at 05:35

## 2023-06-04 RX ADMIN — Medication 50 MILLIGRAM(S): at 13:37

## 2023-06-04 RX ADMIN — SEVELAMER CARBONATE 1600 MILLIGRAM(S): 2400 POWDER, FOR SUSPENSION ORAL at 11:33

## 2023-06-04 RX ADMIN — ONDANSETRON 4 MILLIGRAM(S): 8 TABLET, FILM COATED ORAL at 13:36

## 2023-06-04 RX ADMIN — Medication 1: at 17:10

## 2023-06-04 RX ADMIN — LOSARTAN POTASSIUM 100 MILLIGRAM(S): 100 TABLET, FILM COATED ORAL at 08:36

## 2023-06-04 RX ADMIN — SEVELAMER CARBONATE 1600 MILLIGRAM(S): 2400 POWDER, FOR SUSPENSION ORAL at 17:09

## 2023-06-04 RX ADMIN — MUPIROCIN 1 APPLICATION(S): 20 OINTMENT TOPICAL at 17:09

## 2023-06-04 RX ADMIN — CARVEDILOL PHOSPHATE 12.5 MILLIGRAM(S): 80 CAPSULE, EXTENDED RELEASE ORAL at 08:36

## 2023-06-04 NOTE — PROGRESS NOTE ADULT - ASSESSMENT
A/P:  Viral Hepatitis with newly converted Hpe C with early Cirrhosis s/p Liver biopsy  Obstructive Jaundice with Cholelithiasis without Choledocholithiasis  concern for Malignancy  Transaminitis due to Hepatobiliary obstruction resolving  ESRD on HD (TTS schedule)  Anemia Chronic disease plus Renal disease  Type 2 DM/ HTN controlled  Iron overload possibly related to iron repletion with HD  L1 compression fracture appears chronic (no low back pain)    Plan:  Patient underwent EUS with Liver biopsy Friday;  Await pathology; tolerated Clear liquids; Advanced to regular diet;   Hld anticoagulation for 72 hrs given slight bleed during the EUS but patient remain stable; H/H stable  Follow up NIKOLE, AMA, Microsomal Ab,; alpha 1antitrypsin and Ceruloplasmin negative; HCV viral load 3 log noted  Hepatology  f/u outpt with Dr. Hay; f/u genotype and markers as per GI/ Hepatology; testing  Ct Abdomen and Pelvis completed prior to HD; spoke with Tech this morning; CT with no significant Cirrhosis  Hold oral hypoglycemics; ISS; Fingerstick ACHS; Hold statin while LFTs elevated  Continue anti HTN Losartan/ Coreg with parameters; Ttirate to keep SBP less than 130 mm Hg  BP was elevated; Increased Hydralazine to 50 mg q 8 hrs    Discussed with patient cade is fully alert and awake and wishes to make her own decision at this time; GOC discussed (see [facility] note); DNR/ DNI  Discussed with Son in law Hull who later arrived at bedside; MOLST completed; copy provided to Misha;   Discussed with JOEY Mosher        A/P:  Viral Hepatitis with newly converted Hpe C with early Cirrhosis s/p Liver biopsy  Obstructive Jaundice with Cholelithiasis without Choledocholithiasis  concern for Malignancy  Transaminitis due to Hepatobiliary obstruction resolving  ESRD on HD (TTS schedule)  Anemia Chronic disease plus Renal disease  Type 2 DM/ HTN controlled  Iron overload possibly related to iron repletion with HD  L1 compression fracture appears chronic (no low back pain)    Plan:  Patient underwent EUS with Liver biopsy Friday;  Await pathology; Tolerate regular diet; slight nausea Zofran given  Hld anticoagulation for 72 hrs given slight bleed during the EUS but patient remain stable; H/H remain stable  Follow up NIKOLE, AMA and, Microsomal Ab,and alpha 1antitrypsin and Ceruloplasmin negative; HCV viral load 3 log noted  Hepatology  f/u outpt with Dr. Hay; f/u genotype and markers as per GI/ Hepatology; testing  Ct Abdomen and Pelvis with contrast with no significant Cirrhosis  Hold oral hypoglycemics; ISS; Fingerstick ACHS; Hold statin while LFTs elevated; appears Transaminitis from a obstructed stone which has passed  Continue anti HTN Losartan 100 mg daily and Coreg 12.5 mg q 12 hrs with parameters; Hydralazine 50 mg q 8 hrs;  Ttirate to keep SBP less than 130 mm Hg      Discussed with patient and updated; also spoke with daughter Xochitl over the phone as it seems she was somewhat upset at patient decision of HCP names; I counseled her that HCP  Spoke with partner Fox at bedside  Discussed with RN and ACP covering  D/C Home with Home care and VNS and outpt with Hepatology and Dr. Muhammad to follow up Liver biopsy results as well as reinstate outpt HD

## 2023-06-04 NOTE — PROGRESS NOTE ADULT - SUBJECTIVE AND OBJECTIVE BOX
[facility] NEPHROLOGY- PROGRESS NOTE    Patient is a 81yo Female with ESRD on HD (TTS @ [facility]; nephrologist myself) presents with painless jaundice. Nephrology consulted for ESRD status.   +HepC ab, elevated Ca 19-9, mildly elevated AFP  MRCP (non contrast) Contracted gallbladder with gallstones and small amount of  pericholecystic fluid; gallbladder wall thickening and edema   CT abd/pelvis with ?cirrhosis      Hospital Medications: Medications reviewed.  REVIEW OF SYSTEMS:  CONSTITUTIONAL: No fevers or chills  RESPIRATORY: No shortness of breath  CARDIOVASCULAR: No chest pain.  GASTROINTESTINAL: No nausea, vomiting, diarrhea or abdominal pain.   VASCULAR: No bilateral lower extremity edema.       VITALS:  T(F): 99 (06-04-23 @ 05:06), Max: 99 (06-04-23 @ 05:06)  HR: 67 (06-04-23 @ 10:10)  BP: 164/45 (06-04-23 @ 10:10)  RR: 20 (06-04-23 @ 05:06)  SpO2: 99% (06-04-23 @ 05:06)  Wt(kg): --        PHYSICAL EXAM:  Constitutional: NAD  HEENT: icteric sclera,   Respiratory: CTAB, no wheezes, rales or rhonchi  Cardiovascular: S1, S2, RRR  Gastrointestinal: BS+, soft, NT/ND  Extremities: No peripheral edema  Access: Left AVF +thrill +bruit      LABS:  06-04    133<L>  |  98  |  14  ----------------------------<  85  3.8   |  29  |  3.01<H>    Ca    8.5      04 Jun 2023 06:15    TPro  6.4  /  Alb  2.6<L>  /  TBili  3.4<H>  /  DBili      /  AST  78<H>  /  ALT  92<H>  /  AlkPhos  178<H>  06-04    Creatinine Trend: 3.01 <--, 5.08 <--, 3.68 <--, 5.36 <--, 4.19 <--, 3.20 <--                        8.7    11.18 )-----------( 132      ( 04 Jun 2023 06:15 )             27.0     Urine Studies:

## 2023-06-04 NOTE — PROGRESS NOTE ADULT - SUBJECTIVE AND OBJECTIVE BOX
Patient seen and examined this afternoon with daughter and Son in law at bedside     82 year old woman with PMH of ESRD on HD, sent in on account of yellowish discoloration of eyes noticed by her nephrologist in HD over the last 2 weeks with elevated Bilirubin on blood work. Pt reported having pruritus but denies abdominal pain, pale colored stool nor dark urine but  barely makes urine now. . Patient noted to have Hepatitic C positive qualitative screening. Reportedly negative in HD labs last month.    Patient doing okay No fever, chest pain, SOB, nausea, vomit or abdominal pain presently. There was no evidence of choledocholithiasis and given evidence of early cirrhosis on imaging and newly converted Hep C plan to proceed with EUS with Liver biopsy today.     Vital Signs Last 24 Hrs  T(C): 37.2 (04 Jun 2023 05:06), Max: 37.2 (04 Jun 2023 05:06)  T(F): 99 (04 Jun 2023 05:06), Max: 99 (04 Jun 2023 05:06)  HR: 67 (04 Jun 2023 10:10) (57 - 80)  BP: 164/45 (04 Jun 2023 10:10) (161/86 - 195/45)  BP(mean): 86 (03 Jun 2023 22:00) (86 - 86)  RR: 20 (04 Jun 2023 05:06) (16 - 20)  SpO2: 99% (04 Jun 2023 05:06) (99% - 100%) room air        P/E:  elderly female, sclera icteric, NAD at rest  Psych: AAO x3  Neuro: No gross focal deficits; Power and sensation intact  CVS: S1S2 present, regular, no edema  Resp: BLAE+, No wheeze or Rhonchi  GI: Soft, BS+, Non tender, non distended  Extr: No  calf tenderness B/L Lower extremities; Left arm AVF  Skin: Warm and moist without any rashes    Labs:                                          8.7    11.18 )-----------( 132      ( 04 Jun 2023 06:15 )             27.0   06-04    133<L>  |  98  |  14  ----------------------------<  85  3.8   |  29  |  3.01<H>  Ca    8.5      04 Jun 2023 06:15  TPro  6.4  /  Alb  2.6<L>  /  TBili  3.4<H>  /  DBili  x   /  AST  78<H>  /  ALT  92<H>  /  AlkPhos  178<H>  06-04    HIV-1/2 Antigen/Antibody Screen by [facility] (06.02.23 @ 05:53) HIV-1/2 Combo Result: Nonreact:   Cancer Antigen, GI Ca 19-9 (06.02.23 @ 05:53) Cancer Antigen, GI Ca 19-9: 1834:   Carcinoembryonic Antigen (06.02.23 @ 05:53) Carcinoembryonic Antigen: 3.3:    MR MRCP No Cont (05.31.23 @ 18:37)   IMPRESSION: No common bile duct stone. Common bile duct upper limits of normal for caliber for age measuring 8 mm which tapers distally. Contracted gallbladder with gallstones and small amount of pericholecystic fluid; gallbladder wall thickening and edema which is a nonspecific finding and can be secondary to underlying liver or gallbladder disease; correlation is recommended with the patient's symptoms.   Small amount of ascites. Iron deposition in the liver and spleen.    A/P:  Viral Hepatitis with newly converted Hpe C with early Cirrhosis  Obstructive Jaundice with Cholelithiasis without Choledocholithiasis  concern for Malignancy  Transaminitis due to Hepatobiliary obstruction resolving  ESRD on HD  Anemia Chronic disease plus Renal disease  Type 2 DM/ HTN controlled  Iron overload possibly related to iron repletion with HD    Plan:   Patient underwent EUS with Liver biopsy this afternoon. Await pathology; recommended Clear liquids today and advance in AM if remain stable Hold anticoagulation for 72 hrs given slight bleed during the EUS but patient remain stable; Monitor CBC closely  H/H stable; No active bleeding; Advance diet;   d/w Renal Dr. De La Vega doesn't suspect platelet dysfunction as patient stabilized with routine HD and given low normal Na will hold Desmopressin  Follow up NIKOLE, AMA, Microsomal Ab,; alpha 1antitrypsin and Ceruloplasmin negative; HCV viral load 3 log noted  Hepatology  f/u appreciated; d/w Dr. Hay; f/u genotype and markers as per GI/ Hepatology; testing  As per Hepatology discussion needs MRI vs CT with contrast; given HD status will proceed with CT Abdomen and Pelvis Liver protocol  tomorrow morning with subsequent HD; d/w Dr. De La Vega; HD arranged for tomorrow after CT  Hold Heparin AM  Hold oral hypoglycemics; ISS; Fingerstick ACHS; Hold statin while LFTs elevated  Continue anti HTN Losartan/ Coreg with parameters; Ytirate to keep SBP less than 130 mm Hg    Discussed with patient cade is fully alert and awake and wishes to make her own decision at this time; will discuss Advance Directives and GOC AM  Discussed with Parth and Son in law at bedsidel; reviewed suspected etiology  Discussed with Hepatology Dr. Hay and Advanced GI Dr. Muhammad and Nephrology as above  Discussed with JOEY Mosher    Patient seen and examined this afternoon with daughter and Son in law at bedside     82 year old woman with PMH of ESRD on HD, sent in on account of yellowish discoloration of eyes noticed by her nephrologist in HD over the last 2 weeks with elevated Bilirubin on blood work. Pt reported having pruritus but denies abdominal pain, pale colored stool nor dark urine but  barely makes urine now. . Patient noted to have Hepatitic C positive qualitative screening. Reportedly negative in HD labs last month.    Patient doing okay No fever, chest pain, SOB, nausea, vomit or abdominal pain presently. There was no evidence of choledocholithiasis and given evidence of early cirrhosis on imaging and newly converted Hep C plan to proceed with EUS with Liver biopsy today.     Vital Signs Last 24 Hrs  T(C): 37.2 (04 Jun 2023 05:06), Max: 37.2 (04 Jun 2023 05:06)  T(F): 99 (04 Jun 2023 05:06), Max: 99 (04 Jun 2023 05:06)  HR: 67 (04 Jun 2023 10:10) (57 - 80)  BP: 164/45 (04 Jun 2023 10:10) (161/86 - 195/45)  BP(mean): 86 (03 Jun 2023 22:00) (86 - 86)  RR: 20 (04 Jun 2023 05:06) (16 - 20)  SpO2: 99% (04 Jun 2023 05:06) (99% - 100%) room air      P/E:  elderly female, sclera icteric, NAD at rest  Psych: AAO x3  Neuro: No gross focal deficits; Power and sensation intact  CVS: S1S2 present, regular, no edema  Resp: BLAE+, No wheeze or Rhonchi  GI: Soft, BS+, Non tender, non distended  Extr: No  calf tenderness B/L Lower extremities; Left arm AVF  Skin: Warm and moist without any rashes    Labs:                                          8.7    11.18 )-----------( 132      ( 04 Jun 2023 06:15 )             27.0   06-04    133<L>  |  98  |  14  ----------------------------<  85  3.8   |  29  |  3.01<H>  Ca    8.5      04 Jun 2023 06:15  TPro  6.4  /  Alb  2.6<L>  /  TBili  3.4<H>  /  DBili  x   /  AST  78<H>  /  ALT  92<H>  /  AlkPhos  178<H>  06-04    HIV-1/2 Antigen/Antibody Screen by CM[facility] (06.02.23 @ 05:53) HIV-1/2 Combo Result: Nonreact:   Cancer Antigen, GI Ca 19-9 (06.02.23 @ 05:53) Cancer Antigen, GI Ca 19-9: 1834:   Carcinoembryonic Antigen (06.02.23 @ 05:53) Carcinoembryonic Antigen: 3.3:    MR MRCP No Cont (05.31.23 @ 18:37)   IMPRESSION: No common bile duct stone. Common bile duct upper limits of normal for caliber for age measuring 8 mm which tapers distally. Contracted gallbladder with gallstones and small amount of pericholecystic fluid; gallbladder wall thickening and edema which is a nonspecific finding and can be secondary to underlying liver or gallbladder disease; correlation is recommended with the patient's symptoms.   Small amount of ascites. Iron deposition in the liver and spleen.    CT Abdomen and Pelvis w/ IV Cont (06.03.23 @ 10:56)     FINDINGS:    LOWER CHEST: Trace pleural effusions.  Cardiomegaly.    LIVER: Within normal limits.  BILE DUCTS: Normal caliber.  GALLBLADDER: Contracted with cholelithiasis.  SPLEEN: Within normal limits.  PANCREAS: Within normal limits.  ADRENALS: Within normal limits.  KIDNEYS/URETERS: Atrophic kidneys.    BLADDER: Within normal limits.  REPRODUCTIVE ORGANS: Within normal limits.    BOWEL: No bowel obstruction. The appendix is normal.  Colonic diverticulosis.  PERITONEUM: Mild ascites.  VESSELS:  Calcified atherosclerosis.  RETROPERITONEUM/LYMPH NODES: No lymphadenopathy.  ABDOMINAL WALL: Within normal limits.  BONES: A severe compression fracture ofL1 is again noted.    IMPRESSION: Contracted with cholelithiasis.  A severe compression fracture of L1 is again noted.     Patient seen and examined this afternoon with daughter and Son in law at bedside     82 year old woman with PMH of ESRD on HD, sent in on account of yellowish discoloration of eyes noticed by her nephrologist in HD over the last 2 weeks with elevated Bilirubin on blood work. Pt reported having pruritus but denies abdominal pain, pale colored stool nor dark urine but  barely makes urine now. . Patient noted to have Hepatitic C positive qualitative screening. Reportedly negative in HD labs last month.    Patient doing okay No fever, chest pain, SOB, nausea, vomit or abdominal pain presently. There was no evidence of choledocholithiasis and given evidence of early cirrhosis on imaging and newly converted Hep C plan to proceed with EUS with Liver biopsy today.     MEDICATIONS  (STANDING):  carvedilol 12.5 milliGRAM(s) Oral every 12 hours  chlorhexidine 2% Cloths 1 Application(s) Topical <User Schedule>  dextrose 5%. 1000 milliLiter(s) (50 mL/Hr) IV Continuous <Continuous>  dextrose 5%. 1000 milliLiter(s) (100 mL/Hr) IV Continuous <Continuous>  dextrose 50% Injectable 12.5 Gram(s) IV Push once  dextrose 50% Injectable 25 Gram(s) IV Push once  dextrose 50% Injectable 25 Gram(s) IV Push once  epoetin ranjan-epbx (RETACRIT) Injectable 6000 Unit(s) IV Push <User Schedule>  glucagon  Injectable 1 milliGRAM(s) IntraMuscular once  hydrALAZINE 50 milliGRAM(s) Oral every 8 hours  insulin lispro (ADMELOG) corrective regimen sliding scale   SubCutaneous at bedtime  insulin lispro (ADMELOG) corrective regimen sliding scale   SubCutaneous three times a day before meals  losartan 100 milliGRAM(s) Oral daily  mirtazapine 7.5 milliGRAM(s) Oral daily  mupirocin 2% Ointment 1 Application(s) Both Nostrils two times a day  sevelamer carbonate 1600 milliGRAM(s) Oral three times a day with meals    MEDICATIONS  (PRN):  dextrose Oral Gel 15 Gram(s) Oral once PRN Blood Glucose LESS THAN 70 milliGRAM(s)/deciliter      Vital Signs Last 24 Hrs  T(C): 37.2 (2023 05:06), Max: 37.2 (2023 05:06)  T(F): 99 (2023 05:06), Max: 99 (2023 05:06)  HR: 67 (2023 10:10) (57 - 80)  BP: 164/45 (2023 10:10) (161/86 - 195/45)  BP(mean): 86 (2023 22:00) (86 - 86)  RR: 20 (2023 05:06) (16 - 20)  SpO2: 99% (2023 05:06) (99% - 100%) room air      P/E:  elderly female, sclera icteric, NAD at rest  Psych: AAO x3  Neuro: No gross focal deficits; Power and sensation intact  CVS: S1S2 present, regular, no edema  Resp: BLAE+, No wheeze or Rhonchi  GI: Soft, BS+, Non tender, non distended  Extr: No  calf tenderness B/L Lower extremities; Left arm AVF  Skin: Warm and moist without any rashes    Labs:                                          8.7    11.18 )-----------( 132      ( 2023 06:15 )             27.0   06-    133<L>  |  98  |  14  ----------------------------<  85  3.8   |  29  |  3.01<H>  Ca    8.5      2023 06:15    TPro  6.4  /  Alb  2.6<L>  /  TBili  3.4<H>  /  DBili  x   /  AST  78<H>  /  ALT  92<H>  /  AlkPhos  178<H>  06-04    Mitochondrial Antibody (23 @ 11:11) Mitochondrial Antibody: <1:20:   Alpha-1-Antitrypsin, Serum (23 @ 11:11) Alpha-1-Antitrypsin, Serum: 152 mg/dL  Ceruloplasmin, Serum (23 @ 11:11) Ceruloplasmin, Serum: 22 mg/dL  Smooth Muscle Antibody (23 @ 11:11) Smooth Muscle Antibody: <1:20: Method: Indirect fluorescent antibody.  Alpha Fetoprotein - Tumor Marker (23 @ 05:53) Alpha Fetoprotein - Tumor Marker: 10.3:   Cancer Antigen, GI Ca 19-9 (23 @ 05:53) Cancer Antigen, GI Ca 19-9: 1834:   Carcinoembryonic Antigen (23 @ 05:53) Carcinoembryonic Antigen: 3.3:    Immunoglobulins Panel (23 @ 11:11)   Quantitative Ig mg/dL  Quantitative IgA: 269 mg/dL  Quantitative IgM: 35 mg/dL  FEDERICO Kappa: 26.48 mg/dL  FEDERICO Lambda: 19.95 mg/dL  [place]/Lambda Free Light Chain Ratio, Serum: 1.33 Ratio  HIV-1/2 Antigen/Antibody Screen by CMIA (23 @ 05:53) HIV-1/2 Combo Result: Nonreact:     MR MRCP No Cont (23 @ 18:37)   IMPRESSION: No common bile duct stone. Common bile duct upper limits of normal for caliber for age measuring 8 mm which tapers distally. Contracted gallbladder with gallstones and small amount of pericholecystic fluid; gallbladder wall thickening and edema which is a nonspecific finding and can be secondary to underlying liver or gallbladder disease; correlation is recommended with the patient's symptoms.   Small amount of ascites. Iron deposition in the liver and spleen.    CT Abdomen and Pelvis w/ IV Cont (23 @ 10:56)     FINDINGS:    LOWER CHEST: Trace pleural effusions.  Cardiomegaly.    LIVER: Within normal limits.  BILE DUCTS: Normal caliber.  GALLBLADDER: Contracted with cholelithiasis.  SPLEEN: Within normal limits.  PANCREAS: Within normal limits.  ADRENALS: Within normal limits.  KIDNEYS/URETERS: Atrophic kidneys.    BLADDER: Within normal limits.  REPRODUCTIVE ORGANS: Within normal limits.    BOWEL: No bowel obstruction. The appendix is normal.  Colonic diverticulosis.  PERITONEUM: Mild ascites.  VESSELS:  Calcified atherosclerosis.  RETROPERITONEUM/LYMPH NODES: No lymphadenopathy.  ABDOMINAL WALL: Within normal limits.  BONES: A severe compression fracture ofL1 is again noted.    IMPRESSION: Contracted with cholelithiasis.  A severe compression fracture of L1 is again noted.

## 2023-06-04 NOTE — PROGRESS NOTE ADULT - ASSESSMENT
Pt is a 83 yo F with history of ESRD on HD presents with painless jaundice. Nephrology consulted for ESRD status.     1) ESRD: Last HD on 6/3 tolerated well with net 2L removed. Plan for next maintenance HD on 6/6. Monitor electrolytes.  2) HTN with ESRD: BP uncontrolled for which Hydralazine increased to 50 mg PO TID yesterday evening. Titrate as needed. c/w Losartan 100mg PO daily. c/w low sodium diet. Monitor BP.  3) Anemia of renal disease: Hb low with elevated iron studies. Continue with Epogen 6000 units IV tiw. Monitor Hb.  4) Hyperphosphatemia: Phosphorus elevated for which renvela increased to 1600mg PO tid with meals. Continue with renal diet. Monitor serum calcium and phosphorus.      [facility] NEPHROLOGY  Vin Sommers M.D.  Jose Raul Llanos D.O.  Mercedes De La Vega M.D.  Tammy Nguyen, MSN, ANP-C  [phone]  Fax: [phone]    [address] [place] [address], [address]  [place] [address]

## 2023-06-05 ENCOUNTER — TRANSCRIPTION ENCOUNTER (OUTPATIENT)
Age: 83
End: 2023-06-05

## 2023-06-05 VITALS — SYSTOLIC BLOOD PRESSURE: 156 MMHG | HEART RATE: 57 BPM | DIASTOLIC BLOOD PRESSURE: 50 MMHG

## 2023-06-05 LAB
ALBUMIN SERPL ELPH-MCNC: 2.5 G/DL — LOW (ref 3.5–5)
ALP SERPL-CCNC: 180 U/L — HIGH (ref 40–120)
ALT FLD-CCNC: 73 U/L DA — HIGH (ref 10–60)
ANA TITR SER: NEGATIVE — SIGNIFICANT CHANGE UP
ANION GAP SERPL CALC-SCNC: 4 MMOL/L — LOW (ref 5–17)
AST SERPL-CCNC: 60 U/L — HIGH (ref 10–40)
BILIRUB SERPL-MCNC: 3 MG/DL — HIGH (ref 0.2–1.2)
BUN SERPL-MCNC: 23 MG/DL — HIGH (ref 7–18)
CALCIUM SERPL-MCNC: 8.2 MG/DL — LOW (ref 8.4–10.5)
CHLORIDE SERPL-SCNC: 94 MMOL/L — LOW (ref 96–108)
CO2 SERPL-SCNC: 30 MMOL/L — SIGNIFICANT CHANGE UP (ref 22–31)
CREAT SERPL-MCNC: 4.24 MG/DL — HIGH (ref 0.5–1.3)
EGFR: 10 ML/MIN/1.73M2 — LOW
GLUCOSE BLDC GLUCOMTR-MCNC: 120 MG/DL — HIGH (ref 70–99)
GLUCOSE BLDC GLUCOMTR-MCNC: 173 MG/DL — HIGH (ref 70–99)
GLUCOSE BLDC GLUCOMTR-MCNC: 92 MG/DL — SIGNIFICANT CHANGE UP (ref 70–99)
GLUCOSE SERPL-MCNC: 85 MG/DL — SIGNIFICANT CHANGE UP (ref 70–99)
HCT VFR BLD CALC: 26.2 % — LOW (ref 34.5–45)
HGB BLD-MCNC: 8.5 G/DL — LOW (ref 11.5–15.5)
LKM AB SER-ACNC: <20.1 UNITS — SIGNIFICANT CHANGE UP (ref 0–20)
MCHC RBC-ENTMCNC: 27.3 PG — SIGNIFICANT CHANGE UP (ref 27–34)
MCHC RBC-ENTMCNC: 32.4 GM/DL — SIGNIFICANT CHANGE UP (ref 32–36)
MCV RBC AUTO: 84.2 FL — SIGNIFICANT CHANGE UP (ref 80–100)
NRBC # BLD: 0 /100 WBCS — SIGNIFICANT CHANGE UP (ref 0–0)
PLATELET # BLD AUTO: 133 K/UL — LOW (ref 150–400)
POTASSIUM SERPL-MCNC: 4.1 MMOL/L — SIGNIFICANT CHANGE UP (ref 3.5–5.3)
POTASSIUM SERPL-SCNC: 4.1 MMOL/L — SIGNIFICANT CHANGE UP (ref 3.5–5.3)
PROT SERPL-MCNC: 6.3 G/DL — SIGNIFICANT CHANGE UP (ref 6–8.3)
RBC # BLD: 3.11 M/UL — LOW (ref 3.8–5.2)
RBC # FLD: 14.1 % — SIGNIFICANT CHANGE UP (ref 10.3–14.5)
SODIUM SERPL-SCNC: 128 MMOL/L — LOW (ref 135–145)
WBC # BLD: 10.01 K/UL — SIGNIFICANT CHANGE UP (ref 3.8–10.5)
WBC # FLD AUTO: 10.01 K/UL — SIGNIFICANT CHANGE UP (ref 3.8–10.5)

## 2023-06-05 PROCEDURE — 87902 NFCT AGT GNTYP ALYS HEP C: CPT

## 2023-06-05 PROCEDURE — 83883 ASSAY NEPHELOMETRY NOT SPEC: CPT

## 2023-06-05 PROCEDURE — 85610 PROTHROMBIN TIME: CPT

## 2023-06-05 PROCEDURE — 86255 FLUORESCENT ANTIBODY SCREEN: CPT

## 2023-06-05 PROCEDURE — 86376 MICROSOMAL ANTIBODY EACH: CPT

## 2023-06-05 PROCEDURE — 84460 ALANINE AMINO (ALT) (SGPT): CPT

## 2023-06-05 PROCEDURE — 83735 ASSAY OF MAGNESIUM: CPT

## 2023-06-05 PROCEDURE — 76705 ECHO EXAM OF ABDOMEN: CPT

## 2023-06-05 PROCEDURE — 80053 COMPREHEN METABOLIC PANEL: CPT

## 2023-06-05 PROCEDURE — 86706 HEP B SURFACE ANTIBODY: CPT

## 2023-06-05 PROCEDURE — 82977 ASSAY OF GGT: CPT

## 2023-06-05 PROCEDURE — 36415 COLL VENOUS BLD VENIPUNCTURE: CPT

## 2023-06-05 PROCEDURE — 88312 SPECIAL STAINS GROUP 1: CPT

## 2023-06-05 PROCEDURE — 86704 HEP B CORE ANTIBODY TOTAL: CPT

## 2023-06-05 PROCEDURE — 88313 SPECIAL STAINS GROUP 2: CPT

## 2023-06-05 PROCEDURE — 86381 MITOCHONDRIAL ANTIBODY EACH: CPT

## 2023-06-05 PROCEDURE — 86038 ANTINUCLEAR ANTIBODIES: CPT

## 2023-06-05 PROCEDURE — 88305 TISSUE EXAM BY PATHOLOGIST: CPT

## 2023-06-05 PROCEDURE — 99261: CPT

## 2023-06-05 PROCEDURE — 83010 ASSAY OF HAPTOGLOBIN QUANT: CPT

## 2023-06-05 PROCEDURE — 82310 ASSAY OF CALCIUM: CPT

## 2023-06-05 PROCEDURE — 82248 BILIRUBIN DIRECT: CPT

## 2023-06-05 PROCEDURE — 82787 IGG 1 2 3 OR 4 EACH: CPT

## 2023-06-05 PROCEDURE — 82105 ALPHA-FETOPROTEIN SERUM: CPT

## 2023-06-05 PROCEDURE — 83550 IRON BINDING TEST: CPT

## 2023-06-05 PROCEDURE — 86708 HEPATITIS A ANTIBODY: CPT

## 2023-06-05 PROCEDURE — 74176 CT ABD & PELVIS W/O CONTRAST: CPT | Mod: MA

## 2023-06-05 PROCEDURE — 80074 ACUTE HEPATITIS PANEL: CPT

## 2023-06-05 PROCEDURE — 99239 HOSP IP/OBS DSCHRG MGMT >30: CPT

## 2023-06-05 PROCEDURE — 99285 EMERGENCY DEPT VISIT HI MDM: CPT

## 2023-06-05 PROCEDURE — 85027 COMPLETE CBC AUTOMATED: CPT

## 2023-06-05 PROCEDURE — 82103 ALPHA-1-ANTITRYPSIN TOTAL: CPT

## 2023-06-05 PROCEDURE — 88307 TISSUE EXAM BY PATHOLOGIST: CPT

## 2023-06-05 PROCEDURE — 83540 ASSAY OF IRON: CPT

## 2023-06-05 PROCEDURE — 87640 STAPH A DNA AMP PROBE: CPT

## 2023-06-05 PROCEDURE — 74181 MRI ABDOMEN W/O CONTRAST: CPT

## 2023-06-05 PROCEDURE — 74177 CT ABD & PELVIS W/CONTRAST: CPT

## 2023-06-05 PROCEDURE — 85025 COMPLETE CBC W/AUTO DIFF WBC: CPT

## 2023-06-05 PROCEDURE — 99232 SBSQ HOSP IP/OBS MODERATE 35: CPT

## 2023-06-05 PROCEDURE — 87389 HIV-1 AG W/HIV-1&-2 AB AG IA: CPT

## 2023-06-05 PROCEDURE — 82247 BILIRUBIN TOTAL: CPT

## 2023-06-05 PROCEDURE — 82962 GLUCOSE BLOOD TEST: CPT

## 2023-06-05 PROCEDURE — 87521 HEPATITIS C PROBE&RVRS TRNSC: CPT

## 2023-06-05 PROCEDURE — 85730 THROMBOPLASTIN TIME PARTIAL: CPT

## 2023-06-05 PROCEDURE — 81596 NFCT DS CHRNC HCV 6 ASSAYS: CPT

## 2023-06-05 PROCEDURE — 81256 HFE GENE: CPT

## 2023-06-05 PROCEDURE — 86301 IMMUNOASSAY TUMOR CA 19-9: CPT

## 2023-06-05 PROCEDURE — 82728 ASSAY OF FERRITIN: CPT

## 2023-06-05 PROCEDURE — 82390 ASSAY OF CERULOPLASMIN: CPT

## 2023-06-05 PROCEDURE — 82652 VIT D 1 25-DIHYDROXY: CPT

## 2023-06-05 PROCEDURE — 93005 ELECTROCARDIOGRAM TRACING: CPT

## 2023-06-05 PROCEDURE — 83970 ASSAY OF PARATHORMONE: CPT

## 2023-06-05 PROCEDURE — 83615 LACTATE (LD) (LDH) ENZYME: CPT

## 2023-06-05 PROCEDURE — 82378 CARCINOEMBRYONIC ANTIGEN: CPT

## 2023-06-05 PROCEDURE — 87641 MR-STAPH DNA AMP PROBE: CPT

## 2023-06-05 PROCEDURE — 82784 ASSAY IGA/IGD/IGG/IGM EACH: CPT

## 2023-06-05 PROCEDURE — 86790 VIRUS ANTIBODY NOS: CPT

## 2023-06-05 PROCEDURE — 83036 HEMOGLOBIN GLYCOSYLATED A1C: CPT

## 2023-06-05 PROCEDURE — 84100 ASSAY OF PHOSPHORUS: CPT

## 2023-06-05 PROCEDURE — 87522 HEPATITIS C REVRS TRNSCRPJ: CPT

## 2023-06-05 RX ORDER — MIRTAZAPINE 45 MG/1
1 TABLET, ORALLY DISINTEGRATING ORAL
Qty: 0 | Refills: 0 | DISCHARGE
Start: 2023-06-05

## 2023-06-05 RX ORDER — SEVELAMER CARBONATE 2400 MG/1
2 POWDER, FOR SUSPENSION ORAL
Qty: 0 | Refills: 0 | DISCHARGE
Start: 2023-06-05

## 2023-06-05 RX ORDER — HYDRALAZINE HCL 50 MG
1 TABLET ORAL
Qty: 90 | Refills: 0
Start: 2023-06-05 | End: 2023-07-04

## 2023-06-05 RX ORDER — CARVEDILOL PHOSPHATE 80 MG/1
1 CAPSULE, EXTENDED RELEASE ORAL
Qty: 60 | Refills: 0
Start: 2023-06-05 | End: 2023-07-04

## 2023-06-05 RX ORDER — LOSARTAN POTASSIUM 100 MG/1
1 TABLET, FILM COATED ORAL
Qty: 30 | Refills: 0
Start: 2023-06-05 | End: 2023-07-04

## 2023-06-05 RX ADMIN — SEVELAMER CARBONATE 1600 MILLIGRAM(S): 2400 POWDER, FOR SUSPENSION ORAL at 07:57

## 2023-06-05 RX ADMIN — Medication 50 MILLIGRAM(S): at 13:07

## 2023-06-05 RX ADMIN — MIRTAZAPINE 7.5 MILLIGRAM(S): 45 TABLET, ORALLY DISINTEGRATING ORAL at 11:41

## 2023-06-05 RX ADMIN — CHLORHEXIDINE GLUCONATE 1 APPLICATION(S): 213 SOLUTION TOPICAL at 05:53

## 2023-06-05 RX ADMIN — MUPIROCIN 1 APPLICATION(S): 20 OINTMENT TOPICAL at 05:54

## 2023-06-05 RX ADMIN — SEVELAMER CARBONATE 1600 MILLIGRAM(S): 2400 POWDER, FOR SUSPENSION ORAL at 17:05

## 2023-06-05 RX ADMIN — CARVEDILOL PHOSPHATE 12.5 MILLIGRAM(S): 80 CAPSULE, EXTENDED RELEASE ORAL at 17:05

## 2023-06-05 RX ADMIN — Medication 1: at 17:04

## 2023-06-05 RX ADMIN — MUPIROCIN 1 APPLICATION(S): 20 OINTMENT TOPICAL at 17:05

## 2023-06-05 RX ADMIN — SEVELAMER CARBONATE 1600 MILLIGRAM(S): 2400 POWDER, FOR SUSPENSION ORAL at 11:41

## 2023-06-05 RX ADMIN — CARVEDILOL PHOSPHATE 12.5 MILLIGRAM(S): 80 CAPSULE, EXTENDED RELEASE ORAL at 05:54

## 2023-06-05 RX ADMIN — LOSARTAN POTASSIUM 100 MILLIGRAM(S): 100 TABLET, FILM COATED ORAL at 05:54

## 2023-06-05 NOTE — PROGRESS NOTE ADULT - PROVIDER SPECIALTY LIST ADULT
Gastroenterology
Hepatology
Hospitalist
Hospitalist
Nephrology
Hepatology
Internal Medicine
Nephrology
Internal Medicine
Internal Medicine

## 2023-06-05 NOTE — PROGRESS NOTE ADULT - ASSESSMENT
82y Female with Hx of DM, HTN, HLD, and ESRD on HD (TTS, followed by Dr. De La Vega) was sent to [facility] ER on 5/30/23 b/o new onset jaundice, and being evaluated by GI and hepatology was consulted b/o newly diagnosed hepatitis C, with reported negative test in 9/2022 and w/o any risk factors beyond HD. She was also found to have cholelithiasis w/o evidence of obstruction or choledocholithiasis, but CBD at [facility]. MR/MRCP suggested hepatic /splenic iron overload. CT on admission farrukh the possibility of signs of early cirrhosis.   Now s/p EUS w/o choledocholithiasis. S/p liver biopsy.     # Hepatitis C ab and RNA pos, VL 2796 IU/ml.  - Duration? Reported neg test 9/2022, but antibody is also positive this time, not only the RNA. On other hand CT showed subtle signs of early cirrhosis?  - Please, follow up Hep C genotype, Fibrosure. In process.   - Hep B neg, immune, vaccinated  - Hep A immune  - HIV neg  - S/p EUS and EUS guided liver biopsy - follow up pathology  - Will need to follow outpatient for treatment.   - AFP 10.3, CEA 3.3, CA 19-9 2274. CT a/p w/ iv did not report any focal hepatic mass.   - Discussed transmission modes, natural Hx, treatment options, meaning of SVR, potential complications. Advised that her partner needs to be tested too.     # Hepatic and splenic iron overload?  - Quantification not available inpatient.   - Possible secondary iron overload in a HD patient  - Follow up HFE mutation (in process)    # Jaundice - improving  # Abnormal liver enzymes - improving  # Cholelithiasis (passed stone?)  - S/p EUS w/ liver biopsy   - F/u GI recommendations  - Hep viruses, iron studies as above. AI workup neg (NIKOLE, SMA, LKM, AMA), Ig panel nl.     Thank you for consult  Will continue to monitor. If patient get discharge, she will need to follow up in Liver clinic in about 1-2 weeks. Please, give the  number to patient to schedule appointment and patient will be also reached from the liver clinic to try to assist with scheduling.   D/w primary team, nephrology

## 2023-06-05 NOTE — PROGRESS NOTE ADULT - SUBJECTIVE AND OBJECTIVE BOX
Chief Complaint:  Patient is a 82y old  Female who presents with a chief complaint of Jaundice (04 Jun 2023 13:15)      Reason for consult:    Interval Events:     Hospital Medications:  carvedilol 12.5 milliGRAM(s) Oral every 12 hours  chlorhexidine 2% Cloths 1 Application(s) Topical <User Schedule>  dextrose 5%. 1000 milliLiter(s) IV Continuous <Continuous>  dextrose 5%. 1000 milliLiter(s) IV Continuous <Continuous>  dextrose 50% Injectable 25 Gram(s) IV Push once  dextrose 50% Injectable 25 Gram(s) IV Push once  dextrose 50% Injectable 12.5 Gram(s) IV Push once  dextrose Oral Gel 15 Gram(s) Oral once PRN  epoetin ranjan-epbx (RETACRIT) Injectable 6000 Unit(s) IV Push <User Schedule>  glucagon  Injectable 1 milliGRAM(s) IntraMuscular once  hydrALAZINE 50 milliGRAM(s) Oral every 8 hours  insulin lispro (ADMELOG) corrective regimen sliding scale   SubCutaneous at bedtime  insulin lispro (ADMELOG) corrective regimen sliding scale   SubCutaneous three times a day before meals  losartan 100 milliGRAM(s) Oral daily  mirtazapine 7.5 milliGRAM(s) Oral daily  mupirocin 2% Ointment 1 Application(s) Both Nostrils two times a day  sevelamer carbonate 1600 milliGRAM(s) Oral three times a day with meals      ROS:   General:  No  fevers, chills, night sweats, fatigue  Eyes:  Good vision, no reported pain  ENT:  No sore throat, pain, runny nose  CV:  No pain, palpitations  Pulm:  No dyspnea, cough  GI:  See HPI, otherwise negative  :  No  incontinence, nocturia  Muscle:  No pain, weakness  Neuro:  No memory problems  Psych:  No insomnia, mood problems, depression  Endocrine:  No polyuria, polydipsia, cold/heat intolerance  Heme:  No petechiae, ecchymosis, easy bruisability  Skin:  No rash    PHYSICAL EXAM:   Vital Signs:  Vital Signs Last 24 Hrs  T(C): 36.9 (05 Jun 2023 05:04), Max: 37.4 (04 Jun 2023 14:03)  T(F): 98.5 (05 Jun 2023 05:04), Max: 99.4 (04 Jun 2023 14:03)  HR: 65 (05 Jun 2023 05:04) (57 - 70)  BP: 158/49 (05 Jun 2023 05:04) (142/32 - 168/55)  BP(mean): --  RR: 18 (05 Jun 2023 05:04) (18 - 19)  SpO2: 93% (05 Jun 2023 05:04) (93% - 98%)    Parameters below as of 05 Jun 2023 05:04  Patient On (Oxygen Delivery Method): room air      Daily     Daily     GENERAL: no acute distress  NEURO: alert, no asterixis  HEENT: anicteric sclera, no conjunctival pallor appreciated  CHEST: no respiratory distress, no accessory muscle use  CARDIAC: regular rate, rhythm  ABDOMEN: soft, non-tender, non-distended, no rebound or guarding  EXTREMITIES: warm, well perfused, no edema  SKIN: no lesions noted    LABS: reviewed                        8.5    10.01 )-----------( 133      ( 05 Jun 2023 08:00 )             26.2     06-05    128<L>  |  94<L>  |  23<H>  ----------------------------<  85  4.1   |  30  |  4.24<H>    Ca    8.2<L>      05 Jun 2023 08:00    TPro  6.3  /  Alb  2.5<L>  /  TBili  3.0<H>  /  DBili  x   /  AST  60<H>  /  ALT  73<H>  /  AlkPhos  180<H>  06-05    LIVER FUNCTIONS - ( 05 Jun 2023 08:00 )  Alb: 2.5 g/dL / Pro: 6.3 g/dL / ALK PHOS: 180 U/L / ALT: 73 U/L DA / AST: 60 U/L / GGT: x             Interval Diagnostic Studies: see sunrise for full report   Chief Complaint:  Patient is a 82y old  Female who presents with a chief complaint of Jaundice (04 Jun 2023 13:15)      Reason for consult: Hep C    Interval Events: Patient was seen and examined at bedside. Reports that had nausea yesterday, but today feels better / well. No abdominal pain. Bilirubin continued to downtrend.   CT a/p w/ iv did not show any focal hepatic lesion. Patient is awaiting discharge.     Hospital Medications:  carvedilol 12.5 milliGRAM(s) Oral every 12 hours  chlorhexidine 2% Cloths 1 Application(s) Topical <User Schedule>  dextrose 5%. 1000 milliLiter(s) IV Continuous <Continuous>  dextrose 5%. 1000 milliLiter(s) IV Continuous <Continuous>  dextrose 50% Injectable 25 Gram(s) IV Push once  dextrose 50% Injectable 25 Gram(s) IV Push once  dextrose 50% Injectable 12.5 Gram(s) IV Push once  dextrose Oral Gel 15 Gram(s) Oral once PRN  epoetin ranjan-epbx (RETACRIT) Injectable 6000 Unit(s) IV Push <User Schedule>  glucagon  Injectable 1 milliGRAM(s) IntraMuscular once  hydrALAZINE 50 milliGRAM(s) Oral every 8 hours  insulin lispro (ADMELOG) corrective regimen sliding scale   SubCutaneous at bedtime  insulin lispro (ADMELOG) corrective regimen sliding scale   SubCutaneous three times a day before meals  losartan 100 milliGRAM(s) Oral daily  mirtazapine 7.5 milliGRAM(s) Oral daily  mupirocin 2% Ointment 1 Application(s) Both Nostrils two times a day  sevelamer carbonate 1600 milliGRAM(s) Oral three times a day with meals      ROS:   General:  No  fevers, chills, night sweats, fatigue  Eyes:  Good vision, no reported pain  ENT:  No sore throat, pain, runny nose  CV:  No pain, palpitations  Pulm:  No dyspnea, cough  GI:  Denies pain, N/V (had nausea yesterday), reports normal BM in am, denies bleeding  : Still makes some urine  Muscle:  No pain, weakness  Neuro:  No memory problems  Skin:  No rash        PHYSICAL EXAM:   Vital Signs:  Vital Signs Last 24 Hrs  T(C): 36.9 (05 Jun 2023 05:04), Max: 37.4 (04 Jun 2023 14:03)  T(F): 98.5 (05 Jun 2023 05:04), Max: 99.4 (04 Jun 2023 14:03)  HR: 65 (05 Jun 2023 05:04) (57 - 70)  BP: 158/49 (05 Jun 2023 05:04) (142/32 - 168/55)  BP(mean): --  RR: 18 (05 Jun 2023 05:04) (18 - 19)  SpO2: 93% (05 Jun 2023 05:04) (93% - 98%)    Parameters below as of 05 Jun 2023 05:04  Patient On (Oxygen Delivery Method): room air      Daily     Daily     GENERAL: no acute distress  NEURO: AAOx3, no asterixis  HEENT: icteric sclera  CHEST: no respiratory distress, no accessory muscle use  CARDIAC: regular rate, rhythm  ABDOMEN: soft, non-tender, non-distended, no rebound or guarding, BS+  EXTREMITIES: warm, well perfused, no edema, LUE AVF  SKIN: no rash    LABS: reviewed                        8.5    10.01 )-----------( 133      ( 05 Jun 2023 08:00 )             26.2     06-05    128<L>  |  94<L>  |  23<H>  ----------------------------<  85  4.1   |  30  |  4.24<H>    Ca    8.2<L>      05 Jun 2023 08:00    TPro  6.3  /  Alb  2.5<L>  /  TBili  3.0<H>  /  DBili  x   /  AST  60<H>  /  ALT  73<H>  /  AlkPhos  180<H>  06-05    LIVER FUNCTIONS - ( 05 Jun 2023 08:00 )  Alb: 2.5 g/dL / Pro: 6.3 g/dL / ALK PHOS: 180 U/L / ALT: 73 U/L DA / AST: 60 U/L / GGT: x             Interval Diagnostic Studies: see sunrise for full report

## 2023-06-05 NOTE — PROGRESS NOTE ADULT - ASSESSMENT
Pt is a 81 yo F with history of ESRD on HD presents with painless jaundice. Nephrology consulted for ESRD status.     1) ESRD: Last HD on 6/3 tolerated well with net 2L removed. Plan for next maintenance HD on 6/6 [facility]. Monitor electrolytes.  2) HTN with ESRD: BP elevated but improving on Hydralazine 50 mg PO TID, titrate as needed. c/w Losartan 100mg PO daily and Coreg 12.5mg PO bid. c/w low sodium diet. Monitor BP.  3) Anemia of renal disease: Hb low with elevated iron studies. Continue with Epogen 6000 units IV tiw. Monitor Hb.  4) Hyperphosphatemia: Check serum phosphorus  c/w renvela 600mg PO tid with meals. Continue with renal diet. Monitor serum calcium and phosphorus.      [facility] NEPHROLOGY  Vin Sommers M.D.  Jose Raul Llanos D.O.  Mercedes De La Vega M.D.  Tammy Nguyen, MSN, ANP-C  [phone]  Fax: [phone] [place] [address], [address]  [place] [address]

## 2023-06-05 NOTE — PROGRESS NOTE ADULT - SUBJECTIVE AND OBJECTIVE BOX
[facility] NEPHROLOGY- PROGRESS NOTE    Patient is a 81yo Female with ESRD on HD (TTS @ [facility]; nephrologist myself) presents with painless jaundice. Nephrology consulted for ESRD status.   +HepC ab, elevated Ca 19-9, mildly elevated AFP  MRCP (non contrast) Contracted gallbladder with gallstones and small amount of  pericholecystic fluid; gallbladder wall thickening and edema   CT abd/pelvis with ?cirrhosis      Hospital Medications: Medications reviewed.  REVIEW OF SYSTEMS:  CONSTITUTIONAL: No fevers or chills  RESPIRATORY: No shortness of breath  CARDIOVASCULAR: No chest pain.  GASTROINTESTINAL: No nausea, vomiting, diarrhea or abdominal pain. +nausea yesterday  VASCULAR: No bilateral lower extremity edema.       VITALS:  T(F): 98.5 (06-05-23 @ 05:04), Max: 99.4 (06-04-23 @ 14:03)  HR: 65 (06-05-23 @ 05:04)  BP: 158/49 (06-05-23 @ 05:04)  RR: 18 (06-05-23 @ 05:04)  SpO2: 93% (06-05-23 @ 05:04)  Wt(kg): --            PHYSICAL EXAM:  Constitutional: NAD  HEENT: icteric sclera,   Respiratory: CTAB, no wheezes, rales or rhonchi  Cardiovascular: S1, S2, RRR  Gastrointestinal: BS+, soft, NT/ND  Extremities: No peripheral edema  Access: Left AVF +thrill +bruit      LABS:  06-05    128<L>  |  94<L>  |  23<H>  ----------------------------<  85  4.1   |  30  |  4.24<H>    Ca    8.2<L>      05 Jun 2023 08:00    TPro  6.3  /  Alb  2.5<L>  /  TBili  3.0<H>  /  DBili      /  AST  60<H>  /  ALT  73<H>  /  AlkPhos  180<H>  06-05    Creatinine Trend: 4.24 <--, 3.01 <--, 5.08 <--, 3.68 <--, 5.36 <--, 4.19 <--, 3.20 <--                        8.5    10.01 )-----------( 133      ( 05 Jun 2023 08:00 )             26.2     Urine Studies:

## 2023-06-05 NOTE — DISCHARGE NOTE NURSING/CASE MANAGEMENT/SOCIAL WORK - PATIENT PORTAL LINK FT
You can access the FollowMyHealth Patient Portal offered by [facility] by registering at the following website: [email or web address]. By joining [employer]’s FollowMyHealth portal, you will also be able to view your health information using other applications (apps) compatible with our system.

## 2023-06-05 NOTE — PROGRESS NOTE ADULT - REASON FOR ADMISSION
Jaundice Prednisone Counseling:  I discussed with the patient the risks of prolonged use of prednisone including but not limited to weight gain, insomnia, osteoporosis, mood changes, diabetes, susceptibility to infection, glaucoma and high blood pressure.  In cases where prednisone use is prolonged, patients should be monitored with blood pressure checks, serum glucose levels and an eye exam.  Additionally, the patient may need to be placed on GI prophylaxis, PCP prophylaxis, and calcium and vitamin D supplementation and/or a bisphosphonate.  The patient verbalized understanding of the proper use and the possible adverse effects of prednisone.  All of the patient's questions and concerns were addressed.

## 2023-06-06 LAB
IGG4 SER-MCNC: 27 MG/DL — SIGNIFICANT CHANGE UP (ref 2–96)
SURGICAL PATHOLOGY STUDY: SIGNIFICANT CHANGE UP

## 2023-06-07 LAB
A2 MACROGLOB SERPL-MCNC: 155 MG/DL — SIGNIFICANT CHANGE UP (ref 110–276)
ALT SERPL W P-5'-P-CCNC: 86 IU/L — HIGH (ref 0–40)
APO A-I SERPL-MCNC: 58 MG/DL — LOW (ref 114–214)
BILIRUB SERPL-MCNC: 3 MG/DL — HIGH (ref 0–1.2)
COMMENT 2:: SIGNIFICANT CHANGE UP
FIBROSIS SCORE: 0.98 — HIGH (ref 0–0.21)
FIBROSIS SCORING:: SIGNIFICANT CHANGE UP
FIBROSIS STAGE: SIGNIFICANT CHANGE UP
GGT SERPL-CCNC: 402 IU/L — HIGH (ref 0–60)
HAPTOGLOB SERPL-MCNC: <10 MG/DL — LOW (ref 41–333)
HCV GENTYP BLD NAA+PROBE: SIGNIFICANT CHANGE UP
INTERPRETATIONS:: SIGNIFICANT CHANGE UP
LIMITATIONS:: SIGNIFICANT CHANGE UP
NECROINFLAMM ACTIVITY SCORING:: SIGNIFICANT CHANGE UP
NECROINFLAMMAT ACTIVITY GRADE: SIGNIFICANT CHANGE UP
NECROINFLAMMAT ACTIVITY SCORE: 0.74 — HIGH (ref 0–0.17)

## 2023-06-13 RX ORDER — FLUCONAZOLE 200 MG/1
200 TABLET ORAL DAILY
Qty: 14 | Refills: 0 | Status: ACTIVE | COMMUNITY
Start: 2023-06-13 | End: 1900-01-01

## 2023-06-21 ENCOUNTER — NON-APPOINTMENT (OUTPATIENT)
Age: 83
End: 2023-06-21

## 2023-06-21 ENCOUNTER — APPOINTMENT (OUTPATIENT)
Dept: HEPATOLOGY | Facility: CLINIC | Age: 83
End: 2023-06-21
Payer: MEDICARE

## 2023-06-21 VITALS
RESPIRATION RATE: 16 BRPM | SYSTOLIC BLOOD PRESSURE: 167 MMHG | TEMPERATURE: 97.2 F | DIASTOLIC BLOOD PRESSURE: 66 MMHG | HEART RATE: 69 BPM | WEIGHT: 119 LBS | HEIGHT: 59 IN | OXYGEN SATURATION: 98 % | BODY MASS INDEX: 23.99 KG/M2

## 2023-06-21 DIAGNOSIS — Z78.9 OTHER SPECIFIED HEALTH STATUS: ICD-10-CM

## 2023-06-21 DIAGNOSIS — K76.1 CHRONIC PASSIVE CONGESTION OF LIVER: ICD-10-CM

## 2023-06-21 DIAGNOSIS — R17 UNSPECIFIED JAUNDICE: ICD-10-CM

## 2023-06-21 DIAGNOSIS — G47.00 INSOMNIA, UNSPECIFIED: ICD-10-CM

## 2023-06-21 DIAGNOSIS — E11.9 TYPE 2 DIABETES MELLITUS W/OUT COMPLICATIONS: ICD-10-CM

## 2023-06-21 DIAGNOSIS — I10 ESSENTIAL (PRIMARY) HYPERTENSION: ICD-10-CM

## 2023-06-21 PROCEDURE — 99215 OFFICE O/P EST HI 40 MIN: CPT

## 2023-06-21 PROCEDURE — 99205 OFFICE O/P NEW HI 60 MIN: CPT

## 2023-06-21 RX ORDER — HYDRALAZINE HYDROCHLORIDE 50 MG/1
50 TABLET ORAL 3 TIMES DAILY
Refills: 0 | Status: ACTIVE | COMMUNITY

## 2023-06-21 RX ORDER — LOSARTAN POTASSIUM 100 MG/1
100 TABLET, FILM COATED ORAL DAILY
Refills: 0 | Status: ACTIVE | COMMUNITY

## 2023-06-21 RX ORDER — CARVEDILOL 12.5 MG/1
12.5 TABLET, FILM COATED ORAL
Refills: 0 | Status: ACTIVE | COMMUNITY

## 2023-06-21 RX ORDER — MIRTAZAPINE 7.5 MG/1
7.5 TABLET, FILM COATED ORAL
Refills: 0 | Status: ACTIVE | COMMUNITY

## 2023-06-21 RX ORDER — SEVELAMER CARBONATE 800 MG/1
800 TABLET, FILM COATED ORAL
Refills: 0 | Status: ACTIVE | COMMUNITY

## 2023-06-21 NOTE — PHYSICAL EXAM
[Scleral Icterus] : Scleral Icterus noted [Non-Tender] : non-tender [General Appearance - Alert] : alert [General Appearance - In No Acute Distress] : in no acute distress [General Appearance - Well Developed] : well developed [Oropharynx] : the oropharynx was normal [Neck Appearance] : the appearance of the neck was normal [] : no respiratory distress [Respiration, Rhythm And Depth] : normal respiratory rhythm and effort [Exaggerated Use Of Accessory Muscles For Inspiration] : no accessory muscle use [Auscultation Breath Sounds / Voice Sounds] : lungs were clear to auscultation bilaterally [Heart Rate And Rhythm] : heart rate was normal and rhythm regular [Rounded] : rounded [Normal] : normal [Soft, Nontender] : the abdomen was soft and nontender [None] : no CVA tenderness [Cervical Lymph Nodes Enlarged Posterior Bilaterally] : posterior cervical [Cervical Lymph Nodes Enlarged Anterior Bilaterally] : anterior cervical [Supraclavicular Lymph Nodes Enlarged Bilaterally] : supraclavicular [Axillary Lymph Nodes Enlarged Bilaterally] : axillary [Femoral Lymph Nodes Enlarged Bilaterally] : femoral [Inguinal Lymph Nodes Enlarged Bilaterally] : inguinal [No CVA Tenderness] : no ~M costovertebral angle tenderness [No Spinal Tenderness] : no spinal tenderness [Abnormal Walk] : normal gait [Skin Color & Pigmentation] : normal skin color and pigmentation [Oriented To Time, Place, And Person] : oriented to person, place, and time [Impaired Insight] : insight and judgment were intact [Affect] : the affect was normal [Mood] : the mood was normal [Spider Angioma] : No spider angioma(s) were observed [Abdominal  Ascites] : no ascites [Asterixis] : no asterixis observed [Palmar Erythema] : no Palmar Erythema [Dilated Collat. Veins] : no collateral vein dilation [Firm] : not firm [Rigid] : not rigid [Rebound] : no rebound [Guarding] : no guarding [Liver Tender To Palpation] : not tender [FreeTextEntry1] : Grossly intact

## 2023-06-21 NOTE — REVIEW OF SYSTEMS
[Feeling Tired] : feeling tired [Recent Weight Loss (___ Lbs)] : recent [unfilled] ~Ulb weight loss [As Noted in HPI] : as noted in HPI [Itching] : itching [Negative] : Heme/Lymph [Fever] : no fever [Chills] : no chills [Chest Pain] : no chest pain [Palpitations] : no palpitations [Lower Ext Edema] : no lower extremity edema [Shortness Of Breath] : no shortness of breath [Wheezing] : no wheezing [Cough] : no cough [Abdominal Pain] : no abdominal pain [Vomiting] : no vomiting [Constipation] : no constipation [Diarrhea] : no diarrhea [Heartburn] : no heartburn [Melena] : no melena [Arthralgias] : no arthralgias [Confused] : no confusion [FreeTextEntry3] : Scleral icterus [FreeTextEntry7] : Nausea, poor appetite. No hematochezia. [FreeTextEntry8] : Still makes some urine, no dysuria

## 2023-06-21 NOTE — ASSESSMENT
[FreeTextEntry1] : 82y Female with Hx of DM, HTN, HLD, and ESRD on HD (TTS, followed by Dr. De La Vega) was hospitalized at [facility]  5/30/23 - 6/5/23 when p/w new onset jaundice, found to have cholelithiasis but w/o evidence of obstruction or\par choledocholithiasis on MRCP (5/31/23) and on EUS (6/2/23), and was newly Dx with hepatitis C (Gt 1a, VL 2796 IU/ml). CT a/p w/o contrast 5/30/23 farrukh the possibility of early cirrhosis (enlargement of lateral segment). MRI abd 5/31/23 also suggested hepatic and splenic iron overload. She underwent parenchymal liver biopsy via EUS 6/2/23.\par  \par # Hepatitis C ab and RNA pos, VL 2796 IU/ml, Gt 1a.\par - Duration? Reported neg test 9/2022, but antibody is also positive this time, not only the RNA. On other hand CT showed subtle signs of early cirrhosis, and liver biopsy per verbal information showed moderate portal fibrosis\par - Reordered Hep C Fibrosure (inpatient suggested F4/ cirrhosis, but biopsy did not suggest cirrhosis)\par - I have explained to the patient the natural history of hepatitis C virus infection including the potential progression to cirrhosis and risk of HCC. \par - i have discussed transmission modes.  Advised that her partner needs to be tested too.\par - I have also discussed the current FDA approved hepatitis C treatment options, drug and drug interaction, risks and benefits, side effects and SVR of the various treatment regimens. Patient is interested in HCV treatment and is agreeable to it.\par - Will still try to do Fibroscan. \par - F/ u final liver biopsy report. Per verbal information, findings were extensive than would be expected from only Hepatitis C\par - We will proceed with authorization and approval for HCV treatment when above tests are completed.\par - AFP 10.3, CEA 3.3, CA 19-9 2274. CT a/p w/ iv did not report any focal hepatic mass. Will repeat tumor markers.\par \par # HCM\par - Hep B neg, immune, vaccinated\par - Hep A immune\par - HIV neg\par \par # Hepatic and splenic iron overload?\par - Quantification not available inpatient.\par - Possible secondary iron overload in a HD patient (liver biopsy pattern also supported that)\par - Reordered HFE mutation (as not resulted yet)\par - Anemia mgmt. per nephrology\par  \par # Jaundice - was improving (bili 6.4-->3.0)\par # Abnormal liver enzymes - was improving (-->60, -->73, ->180)\par # Cholelithiasis (passed stone?)\par # Hypoalbuminemia (alb 2.5), INR WNL\par - S/p EUS w/ liver biopsy, will follow report. Spoke to pathologist, and per verbal information: moderate inflammation, also fibrosis (but no cirrhosis), hepatocyte apoptosis, ductular reaction, cholangiocyte injury, but definite PBC or PSC Dx. No acute cholangitis type picture. DILI was raised as possibility or infection (?) or AI. \par - AI serology was negative (NIKOLE, SMA, LKM, AMA all neg, Ig panel WNL)\par - Viral w/o: Hep A IgM neg, Hep E IgM neg, Hep C as above, Hep B vaccinated, immune\par - A1AT 152, ceruloplasmin 22 \par - Will repeat LFTs\par - Add additional viral w/u\par - If no improvement, will review medication list, especially that hydralazine can cause clinically apparent liver injury \par - Noted JVD, hepatojugular reflux, congestion might contribute too some extent. \par \par # Esophageal candidiasis\par - Mgmt. per GI\par \par RTC 1 week TEB b/o pending liver biopsy, then ~ 5 weeks (4 weeks into hep C treatment) if liver tests improved (otherwise earlier)

## 2023-06-21 NOTE — HISTORY OF PRESENT ILLNESS
[FreeTextEntry1] : 82y Female with Hx of DM, HTN, HLD, and ESRD on HD (TTS, followed by Dr. De La Vega) was hospitalized at [facility]  5/30/23 - 6/5/23 when p/w new onset jaundice, found to have cholelithiasis but w/o evidence of obstruction or\par choledocholithiasis on MRCP (5/31/23) and on EUS (6/2/23), and was newly Dx with hepatitis C (Gt 1a, VL 2796 IU/ml). CT a/p w/o contrast 5/30/23 farrukh the possibility of early cirrhosis (enlargement of lateral segment). MRI abd 5/31/23 also suggested hepatic and splenic iron overload. She underwent parenchymal liver biopsy via EUS 6/2/23.\par She is here for initial post hospital discharge. \par She was found to have Candida esophagitis and was started on  fluconazole 200 mg for 14 days in 6/13/23. \par Her liver biopsy has not been reported yet. \par She reports nausea and poor appetite, but no other new symptoms. Yesterday had a short episode of epigastric discomfort, improved w/ massaging area. Stool color reported normal, denies diarrhea or bleeding. \par \par Home medications per 6/5/23 d/c summary: Coreg 12.5 mg bid, Ferrous sulfate 325 mg (65 mg elemental iron) daily, hydrALAZINE 50 mg  tid, Losartan 100 mg daily, Mirtazapine 7.5 mg daily, Sevelamer carbonate 800 mg oral tablet 2 tbl tid. \par

## 2023-06-23 ENCOUNTER — APPOINTMENT (OUTPATIENT)
Dept: INTERNAL MEDICINE | Facility: CLINIC | Age: 83
End: 2023-06-23

## 2023-06-28 ENCOUNTER — APPOINTMENT (OUTPATIENT)
Dept: INTERNAL MEDICINE | Facility: CLINIC | Age: 83
End: 2023-06-28

## 2023-06-28 ENCOUNTER — OUTPATIENT (OUTPATIENT)
Dept: OUTPATIENT SERVICES | Facility: HOSPITAL | Age: 83
LOS: 1 days | End: 2023-06-28

## 2023-06-28 DIAGNOSIS — Z98.49 CATARACT EXTRACTION STATUS, UNSPECIFIED EYE: Chronic | ICD-10-CM

## 2023-06-30 ENCOUNTER — APPOINTMENT (OUTPATIENT)
Dept: HEPATOLOGY | Facility: CLINIC | Age: 83
End: 2023-06-30
Payer: MEDICARE

## 2023-06-30 DIAGNOSIS — K75.89 OTHER SPECIFIED INFLAMMATORY LIVER DISEASES: ICD-10-CM

## 2023-06-30 DIAGNOSIS — E83.19 OTHER DISORDERS OF IRON METABOLISM: ICD-10-CM

## 2023-06-30 PROCEDURE — 99213 OFFICE O/P EST LOW 20 MIN: CPT | Mod: 95

## 2023-07-02 PROBLEM — K75.89 CHOLESTATIC HEPATITIS: Status: ACTIVE | Noted: 2023-06-21

## 2023-07-02 PROBLEM — E83.19 IRON OVERLOAD: Status: ACTIVE | Noted: 2023-06-21

## 2023-07-02 NOTE — ASSESSMENT
[FreeTextEntry1] : 82y Female with Hx of DM, HTN, HLD, and ESRD on HD (TTS, followed by Dr. De La Vega) was hospitalized at [facility]  5/30/23 - 6/5/23 when p/w new onset jaundice, found to have cholelithiasis but w/o evidence of obstruction or\par choledocholithiasis on MRCP (5/31/23) and on EUS (6/2/23), and was newly Dx with hepatitis C (Gt 1a, VL 2796 IU/ml). CT a/p w/o contrast 5/30/23 farrukh the possibility of early cirrhosis (enlargement of lateral segment). MRI abd 5/31/23 also suggested hepatic and splenic iron overload. She underwent parenchymal liver biopsy via EUS 6/2/23.\par  \par # Hepatitis C ab and RNA pos, VL 2796 IU/ml, Gt 1a.\par - Duration? Reported neg test 9/2022, but antibody is also positive this time, not only the RNA. On other hand CT showed subtle signs of early cirrhosis, and liver biopsy per verbal information showed moderate portal fibrosis\par - Reordered Hep C Fibrosure (inpatient suggested F4/ cirrhosis, but biopsy did not suggest cirrhosis)\par - I have explained to the patient the natural history of hepatitis C virus infection including the potential progression to cirrhosis and risk of HCC. \par - i have discussed transmission modes.  Advised that her partner needs to be tested too.\par - I have also discussed the current FDA approved hepatitis C treatment options, drug and drug interaction, risks and benefits, side effects and SVR of the various treatment regimens. Patient is interested in HCV treatment and is agreeable to it.\par - Will still try to do Fibroscan. \par - F/ u final liver biopsy report. Per verbal information, findings were extensive than would be expected from only Hepatitis C\par - We will proceed with authorization and approval for HCV treatment when but advised to complete the tests above. \par - AFP 10.3, CEA 3.3, CA 19-9 2274. CT a/p w/ iv did not report any focal hepatic mass. Will repeat tumor markers.\par \par - Advised to get the BW done ASAP. She agreed to go to her PCP office to do.\par - Advised to follow nephrology recs regarding the carvedilol / labetalol switch. \par \par # HCM\par - Hep B neg, immune, vaccinated\par - Hep A immune\par - HIV neg\par \par # Hepatic and splenic iron overload?\par - Quantification not available inpatient.\par - Possible secondary iron overload in a HD patient (liver biopsy pattern also supported that)\par - Reordered HFE mutation (as not resulted yet)\par - Anemia mgmt. per nephrology\par  \par # Jaundice - was improving (bili 6.4-->3.0)\par # Abnormal liver enzymes - was improving (-->60, -->73, ->180)\par # Cholelithiasis (passed stone?)\par # Hypoalbuminemia (alb 2.5), INR WNL\par - S/p EUS w/ liver biopsy, will follow report. Spoke to pathologist, and per verbal information: moderate inflammation, also fibrosis (but no cirrhosis), hepatocyte apoptosis, ductular reaction, cholangiocyte injury, but definite PBC or PSC Dx. No acute cholangitis type picture. DILI was raised as possibility or infection (?) or AI. \par - AI serology was negative (NIKOLE, SMA, LKM, AMA all neg, Ig panel WNL)\par - Viral w/o: Hep A IgM neg, Hep E IgM neg, Hep C as above, Hep B vaccinated, immune\par - A1AT 152, ceruloplasmin 22 \par - Will repeat LFTs\par - Add additional viral w/u\par - If no improvement, will review medication list, especially that hydralazine can cause clinically apparent liver injury \par - Noted JVD, hepatojugular reflux on prior exam, congestion might contribute too some extent. \par \par # Esophageal candidiasis\par - Mgmt. per GI\par \par RTC 2 weeks, still pending labs

## 2023-07-02 NOTE — HISTORY OF PRESENT ILLNESS
[FreeTextEntry1] : This visit was provided via telehealth using real-time 2-way audio visual technology. The patient was located at home at the time of the visit. \par The patient and Provider participated in the telehealth encounter. \par Verbal consent given on by patient himself on 6/30/23.\par \par 82y Female with Hx of DM, HTN, HLD, and ESRD on HD (TTS, followed by Dr. De La Vega) was hospitalized at [facility]  5/30/23 - 6/5/23 when p/w new onset jaundice, found to have cholelithiasis but w/o evidence of obstruction or\par choledocholithiasis on MRCP (5/31/23) and on EUS (6/2/23), and was newly Dx with hepatitis C (Gt 1a, VL 2796 IU/ml). CT a/p w/o contrast 5/30/23 farrukh the possibility of early cirrhosis (enlargement of lateral segment). MRI abd 5/31/23 also suggested hepatic and splenic iron overload. She underwent parenchymal liver biopsy via EUS 6/2/23.\par She was seen for initial post hospital discharge 6/21/23. \par She was found to have Candida esophagitis and was started on  fluconazole 200 mg for 14 days in 6/13/23. \par Her liver biopsy has not been reported yet. \par She reported nausea and poor appetite, but no other new symptoms. Yesterday had a short episode of epigastric discomfort, improved w/ massaging area. Stool color reported normal, denies diarrhea or bleeding. \par \par Home medications per 6/5/23 d/c summary: Coreg 12.5 mg bid, Ferrous sulfate 325 mg (65 mg elemental iron) daily, hydrALAZINE 50 mg  tid, Losartan 100 mg daily, Mirtazapine 7.5 mg daily, Sevelamer carbonate 800 mg oral tablet 2 tbl tid. \par \par She is here today for follow up. Liver biopsy still has not been officially reported yet. \par She denies new complaints.\par She has not done the blood work yet.\par Her medication reconciliation and drug-drug interactions revealed interaction w/ Epclusa and carvedilol. \par Nephrology changed carvedilol to labetalol. Patient has not started yet.\par

## 2023-07-02 NOTE — REVIEW OF SYSTEMS
[Fever] : no fever [Chills] : no chills [Feeling Tired] : feeling tired [Recent Weight Loss (___ Lbs)] : recent [unfilled] ~Ulb weight loss [Chest Pain] : no chest pain [Palpitations] : no palpitations [Lower Ext Edema] : no lower extremity edema [Shortness Of Breath] : no shortness of breath [Wheezing] : no wheezing [Cough] : no cough [As Noted in HPI] : as noted in HPI [Abdominal Pain] : no abdominal pain [Vomiting] : no vomiting [Constipation] : no constipation [Diarrhea] : no diarrhea [Heartburn] : no heartburn [Melena] : no melena [Arthralgias] : no arthralgias [Itching] : itching [Confused] : no confusion [Negative] : Heme/Lymph [FreeTextEntry3] : Scleral icterus [FreeTextEntry7] : Nausea, poor appetite. No hematochezia. [FreeTextEntry8] : Still makes some urine, no dysuria

## 2023-07-17 ENCOUNTER — NON-APPOINTMENT (OUTPATIENT)
Age: 83
End: 2023-07-17

## 2023-07-19 RX ORDER — VELPATASVIR AND SOFOSBUVIR 100; 400 MG/1; MG/1
400-100 TABLET, FILM COATED ORAL
Qty: 28 | Refills: 2 | Status: ACTIVE | COMMUNITY
Start: 2023-06-21 | End: 1900-01-01

## 2023-07-21 ENCOUNTER — APPOINTMENT (OUTPATIENT)
Dept: GASTROENTEROLOGY | Facility: CLINIC | Age: 83
End: 2023-07-21

## 2023-08-17 ENCOUNTER — NON-APPOINTMENT (OUTPATIENT)
Age: 83
End: 2023-08-17

## 2023-09-19 RX ORDER — ALBUTEROL 90 UG/1
2 AEROSOL, METERED ORAL
Refills: 0 | DISCHARGE

## 2023-09-19 RX ORDER — CARVEDILOL PHOSPHATE 80 MG/1
1 CAPSULE, EXTENDED RELEASE ORAL
Refills: 0 | DISCHARGE

## 2023-09-19 RX ORDER — SITAGLIPTIN 50 MG/1
1 TABLET, FILM COATED ORAL
Refills: 0 | DISCHARGE

## 2023-09-19 RX ORDER — ATORVASTATIN CALCIUM 80 MG/1
1 TABLET, FILM COATED ORAL
Refills: 0 | DISCHARGE

## 2023-09-19 RX ORDER — SEVELAMER CARBONATE 2400 MG/1
2 POWDER, FOR SUSPENSION ORAL
Refills: 0 | DISCHARGE

## 2023-09-19 RX ORDER — INSULIN GLARGINE 100 [IU]/ML
10 INJECTION, SOLUTION SUBCUTANEOUS
Refills: 0 | DISCHARGE

## 2023-09-19 RX ORDER — LOSARTAN POTASSIUM 100 MG/1
1 TABLET, FILM COATED ORAL
Refills: 0 | DISCHARGE

## 2023-09-19 RX ORDER — FERROUS SULFATE 325(65) MG
1 TABLET ORAL
Refills: 0 | DISCHARGE

## 2023-09-19 RX ORDER — LISINOPRIL 2.5 MG/1
1 TABLET ORAL
Refills: 0 | DISCHARGE

## 2023-09-19 RX ORDER — MIRTAZAPINE 45 MG/1
1 TABLET, ORALLY DISINTEGRATING ORAL
Refills: 0 | DISCHARGE

## 2023-10-04 PROBLEM — N18.6 END STAGE RENAL DISEASE: Chronic | Status: ACTIVE | Noted: 2023-05-31

## 2023-10-04 PROBLEM — E78.5 HYPERLIPIDEMIA, UNSPECIFIED: Chronic | Status: ACTIVE | Noted: 2023-05-31

## 2023-10-04 PROBLEM — E11.9 TYPE 2 DIABETES MELLITUS WITHOUT COMPLICATIONS: Chronic | Status: ACTIVE | Noted: 2023-05-31

## 2023-10-04 PROBLEM — I10 ESSENTIAL (PRIMARY) HYPERTENSION: Chronic | Status: ACTIVE | Noted: 2023-05-31

## 2023-10-20 ENCOUNTER — APPOINTMENT (OUTPATIENT)
Dept: GASTROENTEROLOGY | Facility: CLINIC | Age: 83
End: 2023-10-20

## 2023-11-08 ENCOUNTER — APPOINTMENT (OUTPATIENT)
Dept: GASTROENTEROLOGY | Facility: CLINIC | Age: 83
End: 2023-11-08
Payer: MEDICARE

## 2023-11-08 VITALS
TEMPERATURE: 98.3 F | SYSTOLIC BLOOD PRESSURE: 155 MMHG | HEIGHT: 59 IN | HEART RATE: 58 BPM | BODY MASS INDEX: 26.81 KG/M2 | WEIGHT: 133 LBS | DIASTOLIC BLOOD PRESSURE: 57 MMHG | OXYGEN SATURATION: 93 %

## 2023-11-08 DIAGNOSIS — D64.9 ANEMIA, UNSPECIFIED: ICD-10-CM

## 2023-11-08 DIAGNOSIS — K21.9 GASTRO-ESOPHAGEAL REFLUX DISEASE W/OUT ESOPHAGITIS: ICD-10-CM

## 2023-11-08 DIAGNOSIS — R11.0 NAUSEA: ICD-10-CM

## 2023-11-08 DIAGNOSIS — Z99.2 END STAGE RENAL DISEASE: ICD-10-CM

## 2023-11-08 DIAGNOSIS — R97.8 OTHER ABNORMAL TUMOR MARKERS: ICD-10-CM

## 2023-11-08 DIAGNOSIS — B18.2 CHRONIC VIRAL HEPATITIS C: ICD-10-CM

## 2023-11-08 DIAGNOSIS — K74.00 HEPATIC FIBROSIS, UNSPECIFIED: ICD-10-CM

## 2023-11-08 DIAGNOSIS — B37.81 CANDIDAL ESOPHAGITIS: ICD-10-CM

## 2023-11-08 DIAGNOSIS — R10.13 EPIGASTRIC PAIN: ICD-10-CM

## 2023-11-08 DIAGNOSIS — N18.6 END STAGE RENAL DISEASE: ICD-10-CM

## 2023-11-08 PROCEDURE — 99214 OFFICE O/P EST MOD 30 MIN: CPT

## 2023-11-08 PROCEDURE — 99204 OFFICE O/P NEW MOD 45 MIN: CPT

## 2023-11-08 RX ORDER — PANTOPRAZOLE 40 MG/1
40 TABLET, DELAYED RELEASE ORAL DAILY
Qty: 30 | Refills: 3 | Status: ACTIVE | COMMUNITY
Start: 2023-11-08 | End: 1900-01-01

## 2023-11-08 RX ORDER — SIMETHICONE 125 MG/1
125 TABLET, CHEWABLE ORAL
Qty: 120 | Refills: 2 | Status: ACTIVE | COMMUNITY
Start: 2023-11-08 | End: 1900-01-01

## 2023-11-09 ENCOUNTER — NON-APPOINTMENT (OUTPATIENT)
Age: 83
End: 2023-11-09

## 2023-11-10 ENCOUNTER — NON-APPOINTMENT (OUTPATIENT)
Age: 83
End: 2023-11-10

## 2023-11-10 LAB
AFP-TM SERPL-MCNC: 3 NG/ML
ALBUMIN SERPL ELPH-MCNC: 4.3 G/DL
ALP BLD-CCNC: 142 U/L
ALT SERPL-CCNC: 10 U/L
ANION GAP SERPL CALC-SCNC: 16 MMOL/L
AST SERPL-CCNC: 17 U/L
BILIRUB SERPL-MCNC: 0.6 MG/DL
BUN SERPL-MCNC: 36 MG/DL
CALCIUM SERPL-MCNC: 9.1 MG/DL
CANCER AG19-9 SERPL-ACNC: 133 U/ML
CEA SERPL-MCNC: 2.7 NG/ML
CHLORIDE SERPL-SCNC: 95 MMOL/L
CO2 SERPL-SCNC: 29 MMOL/L
CREAT SERPL-MCNC: 4.52 MG/DL
EGFR: 9 ML/MIN/1.73M2
FERRITIN SERPL-MCNC: 1013 NG/ML
FOLATE SERPL-MCNC: 5.6 NG/ML
GGT SERPL-CCNC: 63 U/L
GLUCOSE SERPL-MCNC: 179 MG/DL
HCT VFR BLD CALC: 33.3 %
HCV RNA SERPL NAA+PROBE-LOG IU: NOT DETECTED LOGIU/ML
HEPC RNA INTERP: NOT DETECTED
HGB BLD-MCNC: 10.4 G/DL
IRON SATN MFR SERPL: 70 %
IRON SERPL-MCNC: 170 UG/DL
LPL SERPL-CCNC: 97 U/L
MCHC RBC-ENTMCNC: 26.9 PG
MCHC RBC-ENTMCNC: 31.2 GM/DL
MCV RBC AUTO: 86.3 FL
PLATELET # BLD AUTO: 165 K/UL
POTASSIUM SERPL-SCNC: 5 MMOL/L
PROT SERPL-MCNC: 7.1 G/DL
RBC # BLD: 3.86 M/UL
RBC # FLD: 15.5 %
SODIUM SERPL-SCNC: 139 MMOL/L
TIBC SERPL-MCNC: 245 UG/DL
UIBC SERPL-MCNC: 75 UG/DL
VIT B12 SERPL-MCNC: 735 PG/ML
WBC # FLD AUTO: 11 K/UL

## 2024-01-04 ENCOUNTER — NON-APPOINTMENT (OUTPATIENT)
Age: 84
End: 2024-01-04

## 2024-01-20 ENCOUNTER — INPATIENT (INPATIENT)
Facility: HOSPITAL | Age: 84
LOS: 1 days | Discharge: HOME | DRG: 555 | End: 2024-01-22
Attending: INTERNAL MEDICINE | Admitting: INTERNAL MEDICINE
Payer: COMMERCIAL

## 2024-01-20 VITALS
WEIGHT: 130.07 LBS | RESPIRATION RATE: 18 BRPM | TEMPERATURE: 98 F | SYSTOLIC BLOOD PRESSURE: 171 MMHG | HEART RATE: 103 BPM | DIASTOLIC BLOOD PRESSURE: 59 MMHG | OXYGEN SATURATION: 98 %

## 2024-01-20 DIAGNOSIS — I10 ESSENTIAL (PRIMARY) HYPERTENSION: ICD-10-CM

## 2024-01-20 DIAGNOSIS — R26.2 DIFFICULTY IN WALKING, NOT ELSEWHERE CLASSIFIED: ICD-10-CM

## 2024-01-20 DIAGNOSIS — N18.6 END STAGE RENAL DISEASE: ICD-10-CM

## 2024-01-20 DIAGNOSIS — M25.561 PAIN IN RIGHT KNEE: ICD-10-CM

## 2024-01-20 DIAGNOSIS — E11.9 TYPE 2 DIABETES MELLITUS WITHOUT COMPLICATIONS: ICD-10-CM

## 2024-01-20 DIAGNOSIS — E78.5 HYPERLIPIDEMIA, UNSPECIFIED: ICD-10-CM

## 2024-01-20 DIAGNOSIS — I25.10 ATHEROSCLEROTIC HEART DISEASE OF NATIVE CORONARY ARTERY WITHOUT ANGINA PECTORIS: ICD-10-CM

## 2024-01-20 DIAGNOSIS — Z29.9 ENCOUNTER FOR PROPHYLACTIC MEASURES, UNSPECIFIED: ICD-10-CM

## 2024-01-20 DIAGNOSIS — Z98.49 CATARACT EXTRACTION STATUS, UNSPECIFIED EYE: Chronic | ICD-10-CM

## 2024-01-20 LAB
ALBUMIN SERPL ELPH-MCNC: 3.3 G/DL — LOW (ref 3.5–5)
ALP SERPL-CCNC: 155 U/L — HIGH (ref 40–120)
ALT FLD-CCNC: 12 U/L DA — SIGNIFICANT CHANGE UP (ref 10–60)
ANION GAP SERPL CALC-SCNC: 6 MMOL/L — SIGNIFICANT CHANGE UP (ref 5–17)
ANISOCYTOSIS BLD QL: SLIGHT — SIGNIFICANT CHANGE UP
AST SERPL-CCNC: 14 U/L — SIGNIFICANT CHANGE UP (ref 10–40)
BASOPHILS # BLD AUTO: 0 K/UL — SIGNIFICANT CHANGE UP (ref 0–0.2)
BASOPHILS NFR BLD AUTO: 0 % — SIGNIFICANT CHANGE UP (ref 0–2)
BILIRUB SERPL-MCNC: 1.4 MG/DL — HIGH (ref 0.2–1.2)
BUN SERPL-MCNC: 36 MG/DL — HIGH (ref 7–18)
CALCIUM SERPL-MCNC: 8.9 MG/DL — SIGNIFICANT CHANGE UP (ref 8.4–10.5)
CHLORIDE SERPL-SCNC: 100 MMOL/L — SIGNIFICANT CHANGE UP (ref 96–108)
CO2 SERPL-SCNC: 31 MMOL/L — SIGNIFICANT CHANGE UP (ref 22–31)
CREAT SERPL-MCNC: 6.18 MG/DL — HIGH (ref 0.5–1.3)
EGFR: 6 ML/MIN/1.73M2 — LOW
EOSINOPHIL # BLD AUTO: 2.41 K/UL — HIGH (ref 0–0.5)
EOSINOPHIL NFR BLD AUTO: 20 % — HIGH (ref 0–6)
FLUAV AG NPH QL: SIGNIFICANT CHANGE UP
FLUBV AG NPH QL: SIGNIFICANT CHANGE UP
GLUCOSE BLDC GLUCOMTR-MCNC: 146 MG/DL — HIGH (ref 70–99)
GLUCOSE BLDC GLUCOMTR-MCNC: 209 MG/DL — HIGH (ref 70–99)
GLUCOSE SERPL-MCNC: 204 MG/DL — HIGH (ref 70–99)
HCT VFR BLD CALC: 25.8 % — LOW (ref 34.5–45)
HGB BLD-MCNC: 8.5 G/DL — LOW (ref 11.5–15.5)
LYMPHOCYTES # BLD AUTO: 1.45 K/UL — SIGNIFICANT CHANGE UP (ref 1–3.3)
LYMPHOCYTES # BLD AUTO: 12 % — LOW (ref 13–44)
MACROCYTES BLD QL: SLIGHT — SIGNIFICANT CHANGE UP
MCHC RBC-ENTMCNC: 26.9 PG — LOW (ref 27–34)
MCHC RBC-ENTMCNC: 32.9 GM/DL — SIGNIFICANT CHANGE UP (ref 32–36)
MCV RBC AUTO: 81.6 FL — SIGNIFICANT CHANGE UP (ref 80–100)
MICROCYTES BLD QL: SLIGHT — SIGNIFICANT CHANGE UP
MONOCYTES # BLD AUTO: 1.21 K/UL — HIGH (ref 0–0.9)
MONOCYTES NFR BLD AUTO: 10 % — SIGNIFICANT CHANGE UP (ref 2–14)
NEUTROPHILS # BLD AUTO: 6.99 K/UL — SIGNIFICANT CHANGE UP (ref 1.8–7.4)
NEUTROPHILS NFR BLD AUTO: 58 % — SIGNIFICANT CHANGE UP (ref 43–77)
NRBC # BLD: 0 /100 WBCS — SIGNIFICANT CHANGE UP (ref 0–0)
PLAT MORPH BLD: NORMAL — SIGNIFICANT CHANGE UP
PLATELET # BLD AUTO: 132 K/UL — LOW (ref 150–400)
PLATELET COUNT - ESTIMATE: ABNORMAL
POTASSIUM SERPL-MCNC: 5.1 MMOL/L — SIGNIFICANT CHANGE UP (ref 3.5–5.3)
POTASSIUM SERPL-SCNC: 5.1 MMOL/L — SIGNIFICANT CHANGE UP (ref 3.5–5.3)
PROT SERPL-MCNC: 7.3 G/DL — SIGNIFICANT CHANGE UP (ref 6–8.3)
RBC # BLD: 3.16 M/UL — LOW (ref 3.8–5.2)
RBC # FLD: 14.6 % — HIGH (ref 10.3–14.5)
RBC BLD AUTO: ABNORMAL
SARS-COV-2 RNA SPEC QL NAA+PROBE: SIGNIFICANT CHANGE UP
SODIUM SERPL-SCNC: 137 MMOL/L — SIGNIFICANT CHANGE UP (ref 135–145)
WBC # BLD: 12.05 K/UL — HIGH (ref 3.8–10.5)
WBC # FLD AUTO: 12.05 K/UL — HIGH (ref 3.8–10.5)

## 2024-01-20 PROCEDURE — 73562 X-RAY EXAM OF KNEE 3: CPT | Mod: 26,LT

## 2024-01-20 PROCEDURE — 72170 X-RAY EXAM OF PELVIS: CPT | Mod: 26

## 2024-01-20 PROCEDURE — 99285 EMERGENCY DEPT VISIT HI MDM: CPT

## 2024-01-20 PROCEDURE — 71045 X-RAY EXAM CHEST 1 VIEW: CPT | Mod: 26

## 2024-01-20 PROCEDURE — 73552 X-RAY EXAM OF FEMUR 2/>: CPT | Mod: 26,LT

## 2024-01-20 RX ORDER — SITAGLIPTIN 50 MG/1
1 TABLET, FILM COATED ORAL
Qty: 0 | Refills: 0 | DISCHARGE

## 2024-01-20 RX ORDER — FERROUS SULFATE 325(65) MG
1 TABLET ORAL
Refills: 0 | DISCHARGE

## 2024-01-20 RX ORDER — INSULIN LISPRO 100/ML
VIAL (ML) SUBCUTANEOUS
Refills: 0 | Status: DISCONTINUED | OUTPATIENT
Start: 2024-01-20 | End: 2024-01-22

## 2024-01-20 RX ORDER — ERYTHROPOIETIN 10000 [IU]/ML
10000 INJECTION, SOLUTION INTRAVENOUS; SUBCUTANEOUS
Refills: 0 | Status: DISCONTINUED | OUTPATIENT
Start: 2024-01-20 | End: 2024-01-20

## 2024-01-20 RX ORDER — LANOLIN ALCOHOL/MO/W.PET/CERES
3 CREAM (GRAM) TOPICAL AT BEDTIME
Refills: 0 | Status: DISCONTINUED | OUTPATIENT
Start: 2024-01-20 | End: 2024-01-22

## 2024-01-20 RX ORDER — LISINOPRIL 2.5 MG/1
1 TABLET ORAL
Refills: 0 | DISCHARGE

## 2024-01-20 RX ORDER — ERYTHROPOIETIN 10000 [IU]/ML
10000 INJECTION, SOLUTION INTRAVENOUS; SUBCUTANEOUS
Refills: 0 | Status: DISCONTINUED | OUTPATIENT
Start: 2024-01-20 | End: 2024-01-22

## 2024-01-20 RX ORDER — ACETAMINOPHEN 500 MG
975 TABLET ORAL ONCE
Refills: 0 | Status: COMPLETED | OUTPATIENT
Start: 2024-01-20 | End: 2024-01-20

## 2024-01-20 RX ORDER — MIRTAZAPINE 45 MG/1
7.5 TABLET, ORALLY DISINTEGRATING ORAL AT BEDTIME
Refills: 0 | Status: DISCONTINUED | OUTPATIENT
Start: 2024-01-20 | End: 2024-01-22

## 2024-01-20 RX ORDER — INSULIN LISPRO 100/ML
VIAL (ML) SUBCUTANEOUS AT BEDTIME
Refills: 0 | Status: DISCONTINUED | OUTPATIENT
Start: 2024-01-20 | End: 2024-01-22

## 2024-01-20 RX ORDER — LABETALOL HCL 100 MG
1 TABLET ORAL
Refills: 0 | DISCHARGE

## 2024-01-20 RX ORDER — PANTOPRAZOLE SODIUM 20 MG/1
1 TABLET, DELAYED RELEASE ORAL
Refills: 0 | DISCHARGE

## 2024-01-20 RX ORDER — ERGOCALCIFEROL 1.25 MG/1
1 CAPSULE ORAL
Qty: 0 | Refills: 0 | DISCHARGE

## 2024-01-20 RX ORDER — ATORVASTATIN CALCIUM 80 MG/1
1 TABLET, FILM COATED ORAL
Refills: 0 | DISCHARGE

## 2024-01-20 RX ORDER — LOSARTAN POTASSIUM 100 MG/1
100 TABLET, FILM COATED ORAL DAILY
Refills: 0 | Status: DISCONTINUED | OUTPATIENT
Start: 2024-01-20 | End: 2024-01-22

## 2024-01-20 RX ORDER — HYDRALAZINE HCL 50 MG
50 TABLET ORAL EVERY 8 HOURS
Refills: 0 | Status: DISCONTINUED | OUTPATIENT
Start: 2024-01-20 | End: 2024-01-22

## 2024-01-20 RX ORDER — CARVEDILOL PHOSPHATE 80 MG/1
12.5 CAPSULE, EXTENDED RELEASE ORAL EVERY 12 HOURS
Refills: 0 | Status: DISCONTINUED | OUTPATIENT
Start: 2024-01-20 | End: 2024-01-22

## 2024-01-20 RX ORDER — FERROUS SULFATE 325(65) MG
325 TABLET ORAL DAILY
Refills: 0 | Status: DISCONTINUED | OUTPATIENT
Start: 2024-01-20 | End: 2024-01-22

## 2024-01-20 RX ORDER — ONDANSETRON 8 MG/1
4 TABLET, FILM COATED ORAL EVERY 8 HOURS
Refills: 0 | Status: DISCONTINUED | OUTPATIENT
Start: 2024-01-20 | End: 2024-01-22

## 2024-01-20 RX ORDER — HEPARIN SODIUM 5000 [USP'U]/ML
5000 INJECTION INTRAVENOUS; SUBCUTANEOUS EVERY 12 HOURS
Refills: 0 | Status: DISCONTINUED | OUTPATIENT
Start: 2024-01-20 | End: 2024-01-22

## 2024-01-20 RX ORDER — PANTOPRAZOLE SODIUM 20 MG/1
40 TABLET, DELAYED RELEASE ORAL
Refills: 0 | Status: DISCONTINUED | OUTPATIENT
Start: 2024-01-20 | End: 2024-01-22

## 2024-01-20 RX ORDER — ACETAMINOPHEN 500 MG
650 TABLET ORAL EVERY 6 HOURS
Refills: 0 | Status: DISCONTINUED | OUTPATIENT
Start: 2024-01-20 | End: 2024-01-22

## 2024-01-20 RX ORDER — SEVELAMER CARBONATE 2400 MG/1
1600 POWDER, FOR SUSPENSION ORAL
Refills: 0 | Status: DISCONTINUED | OUTPATIENT
Start: 2024-01-20 | End: 2024-01-22

## 2024-01-20 RX ORDER — ALBUTEROL 90 UG/1
2 AEROSOL, METERED ORAL
Qty: 0 | Refills: 0 | DISCHARGE

## 2024-01-20 RX ORDER — ATORVASTATIN CALCIUM 80 MG/1
80 TABLET, FILM COATED ORAL AT BEDTIME
Refills: 0 | Status: DISCONTINUED | OUTPATIENT
Start: 2024-01-20 | End: 2024-01-20

## 2024-01-20 RX ADMIN — CARVEDILOL PHOSPHATE 12.5 MILLIGRAM(S): 80 CAPSULE, EXTENDED RELEASE ORAL at 23:38

## 2024-01-20 RX ADMIN — Medication 975 MILLIGRAM(S): at 10:00

## 2024-01-20 RX ADMIN — ERYTHROPOIETIN 10000 UNIT(S): 10000 INJECTION, SOLUTION INTRAVENOUS; SUBCUTANEOUS at 19:42

## 2024-01-20 RX ADMIN — HEPARIN SODIUM 5000 UNIT(S): 5000 INJECTION INTRAVENOUS; SUBCUTANEOUS at 23:38

## 2024-01-20 RX ADMIN — Medication 50 MILLIGRAM(S): at 23:37

## 2024-01-20 RX ADMIN — Medication 975 MILLIGRAM(S): at 09:30

## 2024-01-20 NOTE — ED PROVIDER NOTE - PHYSICAL EXAMINATION
Primary Survey   A - airway intact  B - bilateral breath sounds and good chest rise  C -palpable pulses in all extremities  D - GCS Motor:  6/6, Verbal  5/5, eyes 4/4 total= 15   Exposure obtained      Secondary Survey:  Gen:  No respiratory Distress  /no distress from pain  HEENT: pupils 3 mm reactive to light equally,   EOMI  NO Raccoon Eyes/ Aguirre Sign/ Neck: C- spine non tender. no step off or deformity. tm clear.   Lungs: breath sounds: b/l bs   CVS: S1S2,    Distal Pulses: 2+ radial and dp  Abd: soft non tender no distention  Extremities: left knee no edema no erythema no gross deformity. full range of motion, distal neuro vasc intact however tender to palp over the patella mostly at the distal surface. left hip full range of motion  MSK: strength: 5/5 b/l upper and lower ext, moving all ext spontaneously  Back: no midline tend or step off  Neuro: aaox3 no foal deficits.

## 2024-01-20 NOTE — PATIENT PROFILE ADULT - PATIENT'S GENDER IDENTITY
Female Please follow up with your primary care provider 1-2 weeks after discharge regarding recent hospitalization.     Please follow up with Dr Singh, gastroenterology, 2-3 months after discharge for stent removal. Please follow up with your primary care provider 1-2 weeks after discharge regarding recent hospitalization.     Please follow up with Pulmonology 1-2 weeks after discharge regarding management of pulmonary fibrosis.     Please follow up with Dr Singh, gastroenterology, 2-3 months after discharge for stent removal.

## 2024-01-20 NOTE — ED PROVIDER NOTE - CLINICAL SUMMARY MEDICAL DECISION MAKING FREE TEXT BOX
ATTG: : Knee pain after fall concerns include but not limited to fracture dislocation we will check x-ray  Missed dialysis this morning will check labs EKG and reeval for dispo

## 2024-01-20 NOTE — ED ADULT NURSE NOTE - OBJECTIVE STATEMENT
as per pt son, pt was leaving the house for dialysis (T/TH/Sat), when pt slipped and fell, hurting her L knee. Pt denies LOC.

## 2024-01-20 NOTE — H&P ADULT - PROBLEM SELECTOR PLAN 2
Pt has a history of ESRD on HD (TTS), last session 1/18 (thursday)  AVF left upper arm  Missed todays session outpatient  - C/w HD TTS per nephro  Dr. De La Vega Consulted

## 2024-01-20 NOTE — PATIENT PROFILE ADULT - FALL HARM RISK - HARM RISK INTERVENTIONS

## 2024-01-20 NOTE — H&P ADULT - PROBLEM SELECTOR PLAN 1
Pt presents s/p fall 4 days ago with persistent left knee pain  pt ambulates with cane at baseline, requesting walker  Xray Left knee negative for fracture  - F/u PT eval    Social work consult for increasing HHA hours Pt presents s/p fall 4 days ago with persistent left knee pain  pt ambulates with cane at baseline, requesting walker  Xray Left knee negative for fracture  Fall with unknown mechanism, not a syncopal episode  - F/u Orthostatic vitals  - F/u PT eval    Social work consult for increasing HHA hours

## 2024-01-20 NOTE — H&P ADULT - NSHPPHYSICALEXAM_GEN_ALL_CORE
GENERAL: NAD, regular build  HEAD:  Atraumatic, Normocephalic  EYES: EOMI, PERRLA, conjunctiva and sclera clear  NECK: Supple  CHEST/LUNG: Clear to auscultation bilaterally, no RRW  HEART: Regular rate and rhythm; Holosystolic murmur   ABDOMEN: Soft, Nontender, Nondistended; Bowel sounds present  EXTREMITIES:  2+ Peripheral Pulses, No edema. LUE AVF  PSYCH: AAOx3  NEUROLOGY: non-focal  SKIN: No rashes or lesions GENERAL: NAD, regular build  HEAD:  Atraumatic, Normocephalic  EYES: EOMI, PERRLA, conjunctiva and sclera clear  NECK: Supple  CHEST/LUNG: Clear to auscultation bilaterally, no RRW  HEART: Regular rate and rhythm; Holosystolic murmur   ABDOMEN: Soft, Nontender, Nondistended; Bowel sounds present  EXTREMITIES:  2+ Peripheral Pulses, No edema. LUE AVF. Tenderness to left knee  PSYCH: AAOx3  NEUROLOGY: non-focal. Strength intact.  SKIN: No rashes or lesions

## 2024-01-20 NOTE — H&P ADULT - HISTORY OF PRESENT ILLNESS
This is a  This is an 82 y/o F, from home (HHA 4h/7d), ambulates with cane, with PMHx of DM, ESRD on HD (TTS), CAD (s/p stent), HTN, and HLD who presents with knee pain. Pt states she fell on her left knee 4 days ago, due to an unknown mechanism. Pt denies head trauma or LOC. Pt states she was unable to get up for 5-10 minutes. Pt adds she was able to ambulate with her cane afterwards, however the pain was worsening leading her to need extra help moving around. Pt would take alleve at home for relief of symptoms. Pt states she was not able to go to dialysis today due to the pain in her knee. Last dialysis was Thursday 1/18. Pt denies any recent sickness, fever, chills, CP, SOB, N/V/D, constipation, numbness or tingling.  This is an 82 y/o F, from home (HHA 4h/7d), ambulates with cane, with PMHx of DM, ESRD on HD (TTS), CAD (s/p stent), HTN, and HLD who presents with left knee pain. Pt states she fell on her left knee 4 days ago, due to an unknown mechanism. Pt denies head trauma or LOC. Pt states she was unable to get up for 5-10 minutes. Pt adds she was able to ambulate with her cane afterwards, however the pain was worsening leading her to need extra help moving around. Pt would take alleve at home for relief of symptoms. Pt states she was not able to go to dialysis today due to the pain in her knee. Last dialysis was Thursday 1/18. Pt denies any recent sickness, fever, chills, CP, SOB, N/V/D, constipation, numbness or tingling.

## 2024-01-20 NOTE — H&P ADULT - PROBLEM SELECTOR PLAN 6
Pt has a history of CAD, pt does not take Aspirin and statin at home  - F/u With family to see if pt still takes meds at home, not documented per pharmacy

## 2024-01-20 NOTE — ED PROVIDER NOTE - PROGRESS NOTE DETAILS
ATTG: : Reassessment note: Patient without acute fracture noted on imaging.  However she continues to have some pain in her knee and has difficulty ambulating. Will place in an Ace wrap.  Since she missed her dialysis today she will have to stay in the hospital for hemodialysis.

## 2024-01-20 NOTE — CONSULT NOTE ADULT - ASSESSMENT
Patient is a 82yo Female with ESRD on HD, h/o Hep C s/p tx, CAD s/p stent, HTN, HLD presents with left knee pain with difficulty ambulating s/p fall. Nephrology consulted for ESRD status.    1) ESRD: Last HD on 1/18 @ outpt facility. Plan for next maintenance HD today 1/20.  Monitor electrolytes.  2) HTN with ESRD: BP elevated c/w Hydralazine 50 mg PO TID, titrate as needed. c/w Losartan 100mg PO daily and Coreg 12.5mg PO bid. c/w low sodium diet. Monitor BP.  3) Anemia of renal disease: Hb low. Continue with Epogen 06692 units IV tiw. Monitor Hb.  4) Hyperphosphatemia: Check serum phosphorus  c/w sevelamer 1600mg PO tid with meals. Continue with renal diet. Monitor serum calcium and phosphorus.      [facility] NEPHROLOGY  Vin Sommers M.D.  Jose Raul Llanos D.O.  Mercedes De La Vega M.D.  Tammy Nguyen, MSN, ANP-C  [phone]  Fax: [phone]    [facility] [place] [address], [id number]  [place] [address]

## 2024-01-20 NOTE — CONSULT NOTE ADULT - SUBJECTIVE AND OBJECTIVE BOX
[facility] NEPHROLOGY- CONSULTATION NOTE    Patient is a 84yo Female with ESRD on HD, h/o Hep C s/p tx, CAD s/p stent, HTN, HLD presents with left knee pain with difficulty ambulating s/p fall. Nephrology consulted for ESRD status.    Pt with ESRD on HD ([facility]S [facility]; Nephrologist myself) with last HD 1/18. Pt fell on 1/16 and c/o left knee pain  Denies any SOB or ches tpain    PAST MEDICAL & SURGICAL HISTORY:  HTN (hypertension)      HLD (hyperlipidemia)      DM type 2 (diabetes mellitus, type 2)      CKD (chronic kidney disease)      Asthma      H/O vitamin D deficiency      GERD (gastroesophageal reflux disease)      ESRD on dialysis      HLD (hyperlipidemia)      DM (diabetes mellitus)      HTN (hypertension)      History of cataract surgery        No Known Allergies    Home Medications Reviewed  Hospital Medications: Reviewed     SOCIAL HISTORY:  Denies ETOh, Smoking, or drug use  FAMILY HISTORY:      REVIEW OF SYSTEMS:  Gen: no changes in weight  HEENT: no rhinorrhea  Neck: no sore throat  Cards: no chest pain  Resp: no dyspnea  GI: no nausea or vomiting or diarrhea  : no dysuria or hematuria  Vascular: no LE edema +left knee pain  Derm: no rashes  Neuro: no numbness/tingling  All other review of systems is negative unless indicated above.    VITALS:  T(F): 98.2 (01-20-24 @ 17:29), Max: 98.2 (01-20-24 @ 11:21)  HR: 61 (01-20-24 @ 17:29)  BP: 184/67 (01-20-24 @ 17:29)  RR: 18 (01-20-24 @ 17:29)  SpO2: 97% (01-20-24 @ 17:29)  Wt(kg): --      Weight (kg): 59 (01-20 @ 08:40)      PHYSICAL EXAM:  Gen: NAD, calm  HEENT: MMM  Neck: no JVD  Cards: RRR, +S1/S2, no M/G/R  Resp: CTA B/L  GI: soft, NT/ND, NABS  : no CVA tenderness  Extremities: no LE edema B/L  Left knee wrapped  Derm: no rashes  Neuro: non-focal    LABS:  01-20    137  |  100  |  36<H>  ----------------------------<  204<H>  5.1   |  31  |  6.18<H>    Ca    8.9      20 Jan 2024 09:41    TPro  7.3  /  Alb  3.3<L>  /  TBili  1.4<H>  /  DBili      /  AST  14  /  ALT  12  /  AlkPhos  155<H>  01-20    Creatinine Trend: 6.18 <--                        8.5    12.05 )-----------( 132      ( 20 Jan 2024 09:41 )             25.8     Urine Studies:  Urinalysis Basic - ( 20 Jan 2024 09:41 )    Color:  / Appearance:  / SG:  / pH:   Gluc: 204 mg/dL / Ketone:   / Bili:  / Urobili:    Blood:  / Protein:  / Nitrite:    Leuk Esterase:  / RBC:  / WBC    Sq Epi:  / Non Sq Epi:  / Bacteria:         RADIOLOGY & ADDITIONAL STUDIES:    < from: Xray Knee 3 Views, Left (01.20.24 @ 09:59) >    ACC: [id number] EXAM:  XR KNEE AP LAT OBL 3 VIEWS LT   ORDERED BY:   GIBSON CAMPBELL     ACC: [id number] EXAM:  XR PELVIS AP ONLY 1-2 VIEWS   ORDERED BY:   GIBSON CAMPBELL     ACC: [id number] EXAM:  XR CHEST AP OR PA 1V   ORDERED BY: GIBSON CAMPBELL     ACC: [id number] EXAM:  XR FEMUR 2 VIEWS LT   ORDERED BY: GIBSON CAMPBELL     PROCEDURE DATE:  01/20/2024      < end of copied text >  < from: Xray Knee 3 Views, Left (01.20.24 @ 09:59) >  IMPRESSION: Heart enlargement again noted. Clear lungs at this time. No   fracture.    --- End of Report ---      < end of copied text >

## 2024-01-20 NOTE — H&P ADULT - PROBLEM SELECTOR PLAN 4
Pt has a history of HTN, takes Losartan 100mg qD, Hydralazine 50mg q8 and coreg 12.5mg BID at home  - C/w home meds

## 2024-01-20 NOTE — H&P ADULT - PROBLEM SELECTOR PLAN 5
Pt has a history of HLD, takes atorvastatin at home in the past, now not on any medications  Hold statin in setting of age >80 and limited research indicating statin use

## 2024-01-20 NOTE — H&P ADULT - ATTENDING COMMENTS
Seen and examined . Said I feel fine except left  knee pain on walking. HD per renal today . Will Check Echo and CT head . Will get PT evaluation . .

## 2024-01-20 NOTE — H&P ADULT - ASSESSMENT
0 84 y/o F, from home (HHA 4h/7d), ambulates with cane, with PMHx of DM, ESRD on HD (TTS), CAD (s/p stent), HTN, and HLD who presents with left knee pain. Xray of the left knee negative for fracture. Admitted for ambulatory dysfunction and missed dialysis.

## 2024-01-20 NOTE — ED PROVIDER NOTE - NSICDXPASTMEDICALHX_GEN_ALL_CORE_FT
PAST MEDICAL HISTORY:  Asthma     CKD (chronic kidney disease)     DM (diabetes mellitus)     DM type 2 (diabetes mellitus, type 2)     ESRD on dialysis     GERD (gastroesophageal reflux disease)     H/O vitamin D deficiency     HLD (hyperlipidemia)     HLD (hyperlipidemia)     HTN (hypertension)     HTN (hypertension)

## 2024-01-20 NOTE — ED ADULT NURSE REASSESSMENT NOTE - NS ED NURSE REASSESS COMMENT FT1
Receive report from [facility] in Dialysis. Patient received 2 hours of treatment and 1.7L removed. Blood pressure dropped towards the end of the treatment but is now stabilized. /47, HR60. Patient awaiting transport.

## 2024-01-20 NOTE — H&P ADULT - NSTOBACCOSCREENHP_GEN_A_CS
No
lesia: pt came to the ED with 5 days fever (103 at home) and found transaminitis on labs.  being seen by ID and GI (hepatology) recently started a statin (chol in 300's).  appreciate ID input, awaiting GI further recs and plan.  no tenderness ruq, lfts have dropped a bit since yesterday, afebrile today.  has received doxy but as per ID will hold awaiting tick disease panel.    I performed a history and physical exam of the patient and discussed their management with the resident and /or advanced care provider. I reviewed the resident and /or ACP's note and agree with the documented findings and plan of care. My medical decison making and observations are found above.

## 2024-01-20 NOTE — ED ADULT NURSE NOTE - NSFALLHARMRISKINTERV_ED_ALL_ED

## 2024-01-21 ENCOUNTER — TRANSCRIPTION ENCOUNTER (OUTPATIENT)
Age: 84
End: 2024-01-21

## 2024-01-21 LAB
A1C WITH ESTIMATED AVERAGE GLUCOSE RESULT: 5.5 % — SIGNIFICANT CHANGE UP (ref 4–5.6)
ALBUMIN SERPL ELPH-MCNC: 2.9 G/DL — LOW (ref 3.5–5)
ALP SERPL-CCNC: 142 U/L — HIGH (ref 40–120)
ALT FLD-CCNC: 9 U/L DA — LOW (ref 10–60)
ANION GAP SERPL CALC-SCNC: 5 MMOL/L — SIGNIFICANT CHANGE UP (ref 5–17)
AST SERPL-CCNC: 13 U/L — SIGNIFICANT CHANGE UP (ref 10–40)
BASOPHILS # BLD AUTO: 0.1 K/UL — SIGNIFICANT CHANGE UP (ref 0–0.2)
BASOPHILS NFR BLD AUTO: 0.9 % — SIGNIFICANT CHANGE UP (ref 0–2)
BILIRUB SERPL-MCNC: 0.9 MG/DL — SIGNIFICANT CHANGE UP (ref 0.2–1.2)
BUN SERPL-MCNC: 23 MG/DL — HIGH (ref 7–18)
CALCIUM SERPL-MCNC: 9.2 MG/DL — SIGNIFICANT CHANGE UP (ref 8.4–10.5)
CHLORIDE SERPL-SCNC: 98 MMOL/L — SIGNIFICANT CHANGE UP (ref 96–108)
CO2 SERPL-SCNC: 31 MMOL/L — SIGNIFICANT CHANGE UP (ref 22–31)
CREAT SERPL-MCNC: 4.54 MG/DL — HIGH (ref 0.5–1.3)
EGFR: 9 ML/MIN/1.73M2 — LOW
EOSINOPHIL # BLD AUTO: 1.82 K/UL — HIGH (ref 0–0.5)
EOSINOPHIL NFR BLD AUTO: 16.6 % — HIGH (ref 0–6)
ESTIMATED AVERAGE GLUCOSE: 111 MG/DL — SIGNIFICANT CHANGE UP (ref 68–114)
GLUCOSE BLDC GLUCOMTR-MCNC: 145 MG/DL — HIGH (ref 70–99)
GLUCOSE BLDC GLUCOMTR-MCNC: 165 MG/DL — HIGH (ref 70–99)
GLUCOSE BLDC GLUCOMTR-MCNC: 187 MG/DL — HIGH (ref 70–99)
GLUCOSE BLDC GLUCOMTR-MCNC: 195 MG/DL — HIGH (ref 70–99)
GLUCOSE SERPL-MCNC: 145 MG/DL — HIGH (ref 70–99)
HBV SURFACE AG SER-ACNC: SIGNIFICANT CHANGE UP
HCT VFR BLD CALC: 25.2 % — LOW (ref 34.5–45)
HGB BLD-MCNC: 8.2 G/DL — LOW (ref 11.5–15.5)
IMM GRANULOCYTES NFR BLD AUTO: 0.3 % — SIGNIFICANT CHANGE UP (ref 0–0.9)
LYMPHOCYTES # BLD AUTO: 2.43 K/UL — SIGNIFICANT CHANGE UP (ref 1–3.3)
LYMPHOCYTES # BLD AUTO: 22.2 % — SIGNIFICANT CHANGE UP (ref 13–44)
MAGNESIUM SERPL-MCNC: 2.4 MG/DL — SIGNIFICANT CHANGE UP (ref 1.6–2.6)
MCHC RBC-ENTMCNC: 26.5 PG — LOW (ref 27–34)
MCHC RBC-ENTMCNC: 32.5 GM/DL — SIGNIFICANT CHANGE UP (ref 32–36)
MCV RBC AUTO: 81.3 FL — SIGNIFICANT CHANGE UP (ref 80–100)
MONOCYTES # BLD AUTO: 1.17 K/UL — HIGH (ref 0–0.9)
MONOCYTES NFR BLD AUTO: 10.7 % — SIGNIFICANT CHANGE UP (ref 2–14)
NEUTROPHILS # BLD AUTO: 5.42 K/UL — SIGNIFICANT CHANGE UP (ref 1.8–7.4)
NEUTROPHILS NFR BLD AUTO: 49.3 % — SIGNIFICANT CHANGE UP (ref 43–77)
NRBC # BLD: 0 /100 WBCS — SIGNIFICANT CHANGE UP (ref 0–0)
PHOSPHATE SERPL-MCNC: 4.1 MG/DL — SIGNIFICANT CHANGE UP (ref 2.5–4.5)
PLATELET # BLD AUTO: 122 K/UL — LOW (ref 150–400)
POTASSIUM SERPL-MCNC: 4.1 MMOL/L — SIGNIFICANT CHANGE UP (ref 3.5–5.3)
POTASSIUM SERPL-SCNC: 4.1 MMOL/L — SIGNIFICANT CHANGE UP (ref 3.5–5.3)
PROT SERPL-MCNC: 6.8 G/DL — SIGNIFICANT CHANGE UP (ref 6–8.3)
RBC # BLD: 3.1 M/UL — LOW (ref 3.8–5.2)
RBC # FLD: 14.6 % — HIGH (ref 10.3–14.5)
SODIUM SERPL-SCNC: 134 MMOL/L — LOW (ref 135–145)
WBC # BLD: 10.97 K/UL — HIGH (ref 3.8–10.5)
WBC # FLD AUTO: 10.97 K/UL — HIGH (ref 3.8–10.5)

## 2024-01-21 PROCEDURE — 70450 CT HEAD/BRAIN W/O DYE: CPT | Mod: 26

## 2024-01-21 RX ADMIN — SEVELAMER CARBONATE 1600 MILLIGRAM(S): 2400 POWDER, FOR SUSPENSION ORAL at 19:05

## 2024-01-21 RX ADMIN — PANTOPRAZOLE SODIUM 40 MILLIGRAM(S): 20 TABLET, DELAYED RELEASE ORAL at 08:07

## 2024-01-21 RX ADMIN — SEVELAMER CARBONATE 1600 MILLIGRAM(S): 2400 POWDER, FOR SUSPENSION ORAL at 11:43

## 2024-01-21 RX ADMIN — Medication 650 MILLIGRAM(S): at 12:12

## 2024-01-21 RX ADMIN — Medication 50 MILLIGRAM(S): at 05:42

## 2024-01-21 RX ADMIN — Medication 1: at 11:41

## 2024-01-21 RX ADMIN — SEVELAMER CARBONATE 1600 MILLIGRAM(S): 2400 POWDER, FOR SUSPENSION ORAL at 09:57

## 2024-01-21 RX ADMIN — MIRTAZAPINE 7.5 MILLIGRAM(S): 45 TABLET, ORALLY DISINTEGRATING ORAL at 22:15

## 2024-01-21 RX ADMIN — Medication 650 MILLIGRAM(S): at 11:42

## 2024-01-21 RX ADMIN — CARVEDILOL PHOSPHATE 12.5 MILLIGRAM(S): 80 CAPSULE, EXTENDED RELEASE ORAL at 19:05

## 2024-01-21 RX ADMIN — LOSARTAN POTASSIUM 100 MILLIGRAM(S): 100 TABLET, FILM COATED ORAL at 05:42

## 2024-01-21 RX ADMIN — HEPARIN SODIUM 5000 UNIT(S): 5000 INJECTION INTRAVENOUS; SUBCUTANEOUS at 05:42

## 2024-01-21 RX ADMIN — HEPARIN SODIUM 5000 UNIT(S): 5000 INJECTION INTRAVENOUS; SUBCUTANEOUS at 19:06

## 2024-01-21 RX ADMIN — Medication 50 MILLIGRAM(S): at 22:15

## 2024-01-21 RX ADMIN — CARVEDILOL PHOSPHATE 12.5 MILLIGRAM(S): 80 CAPSULE, EXTENDED RELEASE ORAL at 05:42

## 2024-01-21 RX ADMIN — Medication 1: at 16:52

## 2024-01-21 NOTE — PHYSICAL THERAPY INITIAL EVALUATION ADULT - ACTIVE RANGE OF MOTION EXAMINATION, REHAB EVAL
pain on left knee during flexion/ayaka. upper extremity Active ROM was WNL (within normal limits)/bilateral lower extremity Active ROM was WNL (within normal limits)

## 2024-01-21 NOTE — PHYSICAL THERAPY INITIAL EVALUATION ADULT - GENERAL OBSERVATIONS, REHAB EVAL
Patient was seen for PT evaluation today. EMR, laboratory and radiology results reviewed. Pt received supine in bed, NAD, ACE bandage on left knee with pain scale of 5/10 upon activity

## 2024-01-21 NOTE — DISCHARGE NOTE PROVIDER - NSDCMRMEDTOKEN_GEN_ALL_CORE_FT
Coreg 12.5 mg oral tablet: 1 tab(s) orally every 12 hours  ferrous sulfate 325 mg (65 mg elemental iron) oral tablet: 1 tab(s) orally once a day  hydrALAZINE 50 mg oral tablet: 1 tab(s) orally every 8 hours  Januvia 100 mg oral tablet: 1 tab(s) orally once a day  losartan 100 mg oral tablet: 1 tab(s) orally once a day  mirtazapine 7.5 mg oral tablet: 1 tab(s) orally once a day  pantoprazole 40 mg oral delayed release tablet: 1 tab(s) orally once a day  sevelamer carbonate 800 mg oral tablet: 2 tab(s) orally 3 times a day (with meals)   Coreg 12.5 mg oral tablet: 1 tab(s) orally every 12 hours  epoetin ranjan 10,000 units/mL preservative-free injectable solution: 100,000 unit(s) intravenously 3 times a week  ferrous sulfate 325 mg (65 mg elemental iron) oral tablet: 1 tab(s) orally once a day  hydrALAZINE 50 mg oral tablet: 1 tab(s) orally every 8 hours  Januvia 100 mg oral tablet: 1 tab(s) orally once a day  losartan 100 mg oral tablet: 1 tab(s) orally once a day  mirtazapine 7.5 mg oral tablet: 1 tab(s) orally once a day  pantoprazole 40 mg oral delayed release tablet: 1 tab(s) orally once a day  sevelamer carbonate 800 mg oral tablet: 2 tab(s) orally 3 times a day (with meals)

## 2024-01-21 NOTE — PROGRESS NOTE ADULT - ASSESSMENT
84 y/o F, from home (HHA 4h/7d), ambulates with cane, with PMHx of DM, ESRD on HD (TTS), CAD (s/p stent), HTN, and HLD who presents with left knee pain. Xray of the left knee negative for fracture. Admitted for ambulatory dysfunction and missed dialysis.        Problem/Plan - 1:  ·  Problem: Ambulatory dysfunction with Fall.   ·  Plan: Pt presents s/p fall 4 days ago with persistent left knee pain  pt ambulates with cane at baseline, requesting walker  Xray Left knee negative for fracture  Fall with unknown mechanism, not a syncopal episode  CT head noted.      Problem/Plan - 2:  ·  Problem: ESRD on dialysis.   ·  Plan: Pt has a history of ESRD on HD (TTS), last session 1/18 (thursday)  AVF left upper arm  Missed todays session outpatient  - C/w HD TTS per nephro  Dr. De La Vega Consulted.     Problem/Plan - 3:  ·  Problem: DM (diabetes mellitus).   ·  Plan: Pt has a history of DM, takes Januvia 100mg daily at home  - C/w ISS  FS ACHS.     Problem/Plan - 4:  ·  Problem: HTN (hypertension).   ·  Plan: Pt has a history of HTN, takes Losartan 100mg qD, Hydralazine 50mg q8 and coreg 12.5mg BID at home  - C/w home meds.     Problem/Plan - 5:  ·  Problem: HLD (hyperlipidemia).   ·  Plan: Pt has a history of HLD, takes atorvastatin at home in the past, now not on any medications  Hold statin in setting of age >80 and limited research indicating statin use.     Problem/Plan - 6:  ·  Problem: CAD (coronary artery disease).   ·  Plan: Pt has a history of CAD, pt does not take Aspirin and statin at home  - F/u With family to see if pt still takes meds at home, not documented per pharmacy.     Problem/Plan - 7:  ·  Problem: Prophylactic measure.   ·  Plan: Heparin subq.    Dispo : CAROL ANN planning.

## 2024-01-21 NOTE — DISCHARGE NOTE PROVIDER - CARE PROVIDER_API CALL
[place] [address]  Phone: [phone]  Fax: [phone]  Follow Up Time: 1 week

## 2024-01-21 NOTE — PROGRESS NOTE ADULT - ASSESSMENT
Patient is a 82yo Female with ESRD on HD, h/o Hep C s/p tx, CAD s/p stent, HTN, HLD presents with left knee pain with difficulty ambulating s/p fall. Nephrology consulted for ESRD status.    1) ESRD: Last HD 1/20, terminated after 1.5 hrs due to intradialytic hypotension with net 1.7L removed. Plan for next maintenance HD 1/23.  Monitor electrolytes.  2) HTN with ESRD: BP borderline c/w Hydralazine 50 mg PO TID, titrate as needed. c/w Losartan 100mg PO daily and Coreg 12.5mg PO bid. c/w low sodium diet. Monitor BP.  3) Anemia of renal disease: Hb low. Continue with Epogen 34277 units IV tiw. Monitor Hb.  4) Hyperphosphatemia: serum phosphorus acceptable  c/w sevelamer 1600mg PO tid with meals. Continue with renal diet. Monitor serum calcium and phosphorus.      [facility] NEPHROLOGY  Vin Sommers M.D.  Jose Raul Llanos D.O.  Mercedes De La Vega M.D.  Tammy Nguyen, MSN, ANP-C  [phone]  Fax: [phone] [place] [address], [address]  [place] [address]

## 2024-01-21 NOTE — PHYSICAL THERAPY INITIAL EVALUATION ADULT - ADDITIONAL COMMENTS
Patient lives in a basement of a private house with 5-6 steps and presence of left rail (walking up). Patient with HHA x 4 hours daily /7 days.

## 2024-01-21 NOTE — DISCHARGE NOTE PROVIDER - HOSPITAL COURSE
This is an 82 y/o F, from home (HHA 4h/7d), ambulates with cane, with PMHx of DM, ESRD on HD (TTS), CAD (s/p stent), HTN, and HLD who presents with left knee pain. Pt states she fell on her left knee 4 days ago, due to an unknown mechanism. Pt denies head trauma or LOC. Pt states she was unable to get up for 5-10 minutes. Pt adds she was able to ambulate with her cane afterwards, however the pain was worsening leading her to need extra help moving around. Pt would take alleve at home for relief of symptoms. Pt states she was not able to go to dialysis today due to the pain in her knee. Last dialysis was Thursday 1/18. Pt denies any recent sickness, fever, chills, CP, SOB, N/V/D, constipation, numbness or tingling.  All imaging was negative for any acute fractures.  Patient was treated with supportive care.  She was seen by PT who recommended home PT.    Patient is now stable for discharge.

## 2024-01-21 NOTE — PHYSICAL THERAPY INITIAL EVALUATION ADULT - DIAGNOSIS, PT EVAL
Pt. present w/deficits in  Body Structures/Function including strength, balance, left pain leading to deficits in performing bed mobility, transfer and ambulation endurance.

## 2024-01-22 ENCOUNTER — TRANSCRIPTION ENCOUNTER (OUTPATIENT)
Age: 84
End: 2024-01-22

## 2024-01-22 VITALS — DIASTOLIC BLOOD PRESSURE: 75 MMHG | SYSTOLIC BLOOD PRESSURE: 171 MMHG | OXYGEN SATURATION: 95 % | HEART RATE: 63 BPM

## 2024-01-22 LAB
ALBUMIN SERPL ELPH-MCNC: 2.9 G/DL — LOW (ref 3.5–5)
ALP SERPL-CCNC: 123 U/L — HIGH (ref 40–120)
ALT FLD-CCNC: 12 U/L DA — SIGNIFICANT CHANGE UP (ref 10–60)
ANION GAP SERPL CALC-SCNC: 6 MMOL/L — SIGNIFICANT CHANGE UP (ref 5–17)
AST SERPL-CCNC: 12 U/L — SIGNIFICANT CHANGE UP (ref 10–40)
BASOPHILS # BLD AUTO: 0.1 K/UL — SIGNIFICANT CHANGE UP (ref 0–0.2)
BASOPHILS NFR BLD AUTO: 0.9 % — SIGNIFICANT CHANGE UP (ref 0–2)
BILIRUB SERPL-MCNC: 0.9 MG/DL — SIGNIFICANT CHANGE UP (ref 0.2–1.2)
BUN SERPL-MCNC: 37 MG/DL — HIGH (ref 7–18)
CALCIUM SERPL-MCNC: 8.8 MG/DL — SIGNIFICANT CHANGE UP (ref 8.4–10.5)
CHLORIDE SERPL-SCNC: 98 MMOL/L — SIGNIFICANT CHANGE UP (ref 96–108)
CO2 SERPL-SCNC: 29 MMOL/L — SIGNIFICANT CHANGE UP (ref 22–31)
CREAT SERPL-MCNC: 5.92 MG/DL — HIGH (ref 0.5–1.3)
EGFR: 7 ML/MIN/1.73M2 — LOW
EOSINOPHIL # BLD AUTO: 2.14 K/UL — HIGH (ref 0–0.5)
EOSINOPHIL NFR BLD AUTO: 18.8 % — HIGH (ref 0–6)
GLUCOSE BLDC GLUCOMTR-MCNC: 126 MG/DL — HIGH (ref 70–99)
GLUCOSE BLDC GLUCOMTR-MCNC: 137 MG/DL — HIGH (ref 70–99)
GLUCOSE BLDC GLUCOMTR-MCNC: 173 MG/DL — HIGH (ref 70–99)
GLUCOSE SERPL-MCNC: 116 MG/DL — HIGH (ref 70–99)
HCT VFR BLD CALC: 24.7 % — LOW (ref 34.5–45)
HGB BLD-MCNC: 8 G/DL — LOW (ref 11.5–15.5)
IMM GRANULOCYTES NFR BLD AUTO: 0.3 % — SIGNIFICANT CHANGE UP (ref 0–0.9)
LYMPHOCYTES # BLD AUTO: 26.5 % — SIGNIFICANT CHANGE UP (ref 13–44)
LYMPHOCYTES # BLD AUTO: 3.02 K/UL — SIGNIFICANT CHANGE UP (ref 1–3.3)
MAGNESIUM SERPL-MCNC: 2.6 MG/DL — SIGNIFICANT CHANGE UP (ref 1.6–2.6)
MCHC RBC-ENTMCNC: 26.3 PG — LOW (ref 27–34)
MCHC RBC-ENTMCNC: 32.4 GM/DL — SIGNIFICANT CHANGE UP (ref 32–36)
MCV RBC AUTO: 81.3 FL — SIGNIFICANT CHANGE UP (ref 80–100)
MONOCYTES # BLD AUTO: 1.16 K/UL — HIGH (ref 0–0.9)
MONOCYTES NFR BLD AUTO: 10.2 % — SIGNIFICANT CHANGE UP (ref 2–14)
NEUTROPHILS # BLD AUTO: 4.96 K/UL — SIGNIFICANT CHANGE UP (ref 1.8–7.4)
NEUTROPHILS NFR BLD AUTO: 43.3 % — SIGNIFICANT CHANGE UP (ref 43–77)
NRBC # BLD: 0 /100 WBCS — SIGNIFICANT CHANGE UP (ref 0–0)
PHOSPHATE SERPL-MCNC: 5 MG/DL — HIGH (ref 2.5–4.5)
PLATELET # BLD AUTO: 132 K/UL — LOW (ref 150–400)
POTASSIUM SERPL-MCNC: 4.3 MMOL/L — SIGNIFICANT CHANGE UP (ref 3.5–5.3)
POTASSIUM SERPL-SCNC: 4.3 MMOL/L — SIGNIFICANT CHANGE UP (ref 3.5–5.3)
PROT SERPL-MCNC: 6.3 G/DL — SIGNIFICANT CHANGE UP (ref 6–8.3)
RBC # BLD: 3.04 M/UL — LOW (ref 3.8–5.2)
RBC # FLD: 14.6 % — HIGH (ref 10.3–14.5)
SODIUM SERPL-SCNC: 133 MMOL/L — LOW (ref 135–145)
WBC # BLD: 11.41 K/UL — HIGH (ref 3.8–10.5)
WBC # FLD AUTO: 11.41 K/UL — HIGH (ref 3.8–10.5)

## 2024-01-22 PROCEDURE — 96372 THER/PROPH/DIAG INJ SC/IM: CPT | Mod: XU

## 2024-01-22 PROCEDURE — 87340 HEPATITIS B SURFACE AG IA: CPT

## 2024-01-22 PROCEDURE — 93005 ELECTROCARDIOGRAM TRACING: CPT

## 2024-01-22 PROCEDURE — 99285 EMERGENCY DEPT VISIT HI MDM: CPT

## 2024-01-22 PROCEDURE — 73552 X-RAY EXAM OF FEMUR 2/>: CPT

## 2024-01-22 PROCEDURE — 70450 CT HEAD/BRAIN W/O DYE: CPT

## 2024-01-22 PROCEDURE — 80053 COMPREHEN METABOLIC PANEL: CPT

## 2024-01-22 PROCEDURE — 71045 X-RAY EXAM CHEST 1 VIEW: CPT

## 2024-01-22 PROCEDURE — 83735 ASSAY OF MAGNESIUM: CPT

## 2024-01-22 PROCEDURE — 96374 THER/PROPH/DIAG INJ IV PUSH: CPT

## 2024-01-22 PROCEDURE — 82962 GLUCOSE BLOOD TEST: CPT

## 2024-01-22 PROCEDURE — 83036 HEMOGLOBIN GLYCOSYLATED A1C: CPT

## 2024-01-22 PROCEDURE — 87637 SARSCOV2&INF A&B&RSV AMP PRB: CPT

## 2024-01-22 PROCEDURE — 72170 X-RAY EXAM OF PELVIS: CPT

## 2024-01-22 PROCEDURE — 36415 COLL VENOUS BLD VENIPUNCTURE: CPT

## 2024-01-22 PROCEDURE — 73562 X-RAY EXAM OF KNEE 3: CPT

## 2024-01-22 PROCEDURE — 99261: CPT

## 2024-01-22 PROCEDURE — 97116 GAIT TRAINING THERAPY: CPT

## 2024-01-22 PROCEDURE — 85025 COMPLETE CBC W/AUTO DIFF WBC: CPT

## 2024-01-22 PROCEDURE — 97161 PT EVAL LOW COMPLEX 20 MIN: CPT

## 2024-01-22 PROCEDURE — 84100 ASSAY OF PHOSPHORUS: CPT

## 2024-01-22 RX ORDER — ERYTHROPOIETIN 10000 [IU]/ML
100000 INJECTION, SOLUTION INTRAVENOUS; SUBCUTANEOUS
Qty: 3 | Refills: 0
Start: 2024-01-22 | End: 2024-01-24

## 2024-01-22 RX ADMIN — SEVELAMER CARBONATE 1600 MILLIGRAM(S): 2400 POWDER, FOR SUSPENSION ORAL at 09:30

## 2024-01-22 RX ADMIN — PANTOPRAZOLE SODIUM 40 MILLIGRAM(S): 20 TABLET, DELAYED RELEASE ORAL at 05:40

## 2024-01-22 RX ADMIN — CARVEDILOL PHOSPHATE 12.5 MILLIGRAM(S): 80 CAPSULE, EXTENDED RELEASE ORAL at 07:47

## 2024-01-22 RX ADMIN — SEVELAMER CARBONATE 1600 MILLIGRAM(S): 2400 POWDER, FOR SUSPENSION ORAL at 13:26

## 2024-01-22 RX ADMIN — Medication 50 MILLIGRAM(S): at 05:37

## 2024-01-22 RX ADMIN — Medication 1: at 12:07

## 2024-01-22 RX ADMIN — Medication 50 MILLIGRAM(S): at 13:44

## 2024-01-22 RX ADMIN — HEPARIN SODIUM 5000 UNIT(S): 5000 INJECTION INTRAVENOUS; SUBCUTANEOUS at 05:37

## 2024-01-22 RX ADMIN — LOSARTAN POTASSIUM 100 MILLIGRAM(S): 100 TABLET, FILM COATED ORAL at 05:36

## 2024-01-22 NOTE — DISCHARGE NOTE NURSING/CASE MANAGEMENT/SOCIAL WORK - PATIENT PORTAL LINK FT
You can access the FollowMyHealth Patient Portal offered by [facility] by registering at the following website: [email or web address]. By joining [employer]’s FollowMyHealth portal, you will also be able to view your health information using other applications (apps) compatible with our system.

## 2024-01-22 NOTE — PROGRESS NOTE ADULT - ASSESSMENT
Patient is a 84yo Female with ESRD on HD, h/o Hep C s/p tx, CAD s/p stent, HTN, HLD presents with left knee pain with difficulty ambulating s/p fall. Nephrology consulted for ESRD status.    1) ESRD: Last HD 1/20, terminated after 1.5 hrs due to intradialytic hypotension with net 1.7L removed. Plan for next maintenance HD 1/23.  Monitor electrolytes.  2) HTN with ESRD: BP borderline Recc to increase Hydralazine to 75 mg PO TID, titrate as needed. c/w Losartan 100mg PO daily and Coreg 12.5mg PO bid. c/w low sodium diet. Monitor BP.  3) Anemia of renal disease: Hb low. Continue with Epogen 26288 units IV tiw. Monitor Hb.  4) Hyperphosphatemia: serum phosphorus acceptable  c/w sevelamer 1600mg PO tid with meals. Continue with renal diet. Monitor serum calcium and phosphorus.      [facility] NEPHROLOGY  Vin Sommers M.D.  Jose Raul Llanos D.O.  Mercedes De La Vega M.D.  Tammy Nguyen, MSN, ANP-C  [phone]  Fax: [phone]    [facility] [place] [address], [address]  [place] [address]

## 2024-01-22 NOTE — DISCHARGE NOTE NURSING/CASE MANAGEMENT/SOCIAL WORK - NSDCPEFALRISK_GEN_ALL_CORE
For information on Fall & Injury Prevention, visit: [email or web address] OR  [email or web address] OR  https://www.cdc.gov/steadi/patient.html

## 2024-01-22 NOTE — PROGRESS NOTE ADULT - SUBJECTIVE AND OBJECTIVE BOX
[facility] NEPHROLOGY- PROGRESS NOTE    Patient is a 82yo Female with ESRD on HD, h/o Hep C s/p tx, CAD s/p stent, HTN, HLD presents with left knee pain with difficulty ambulating s/p fall. Nephrology consulted for ESRD status.      Hospital Medications: Medications reviewed.  REVIEW OF SYSTEMS:  CONSTITUTIONAL: No fevers or chills  RESPIRATORY: No shortness of breath  CARDIOVASCULAR: No chest pain.  GASTROINTESTINAL: No nausea, vomiting, diarrhea or abdominal pain.   VASCULAR: No bilateral lower extremity edema.  +left knee pain    VITALS:  T(F): 98.5 (01-22-24 @ 13:12), Max: 98.5 (01-22-24 @ 05:04)  HR: 63 (01-22-24 @ 13:43)  BP: 171/75 (01-22-24 @ 13:43)  RR: 17 (01-22-24 @ 13:12)  SpO2: 95% (01-22-24 @ 13:43)  Wt(kg): --    Height (cm): 157.5 (01-22 @ 09:49)      PHYSICAL EXAM:  Gen: NAD, calm  Cards: RRR, +S1/S2, no M/G/R  Resp: CTA B/L  GI: soft, NT/ND, NABS  Extremities: no LE edema B/L  Left knee wrapped  Access: Left AVF +thrill +bruit    LABS:  01-22    133<L>  |  98  |  37<H>  ----------------------------<  116<H>  4.3   |  29  |  5.92<H>    Ca    8.8      22 Jan 2024 07:40  Phos  5.0     01-22  Mg     2.6     01-22    TPro  6.3  /  Alb  2.9<L>  /  TBili  0.9  /  DBili      /  AST  12  /  ALT  12  /  AlkPhos  123<H>  01-22    Creatinine Trend: 5.92 <--, 4.54 <--, 6.18 <--                        8.0    11.41 )-----------( 132      ( 22 Jan 2024 07:40 )             24.7     Urine Studies:  Urinalysis Basic - ( 22 Jan 2024 07:40 )    Color:  / Appearance:  / SG:  / pH:   Gluc: 116 mg/dL / Ketone:   / Bili:  / Urobili:    Blood:  / Protein:  / Nitrite:    Leuk Esterase:  / RBC:  / WBC    Sq Epi:  / Non Sq Epi:  / Bacteria:         
Date of Service  : 01-21-24     INTERVAL HPI/OVERNIGHT EVENTS: I feel fine now and want to go home.   Vital Signs Last 24 Hrs  T(C): 36.8 (21 Jan 2024 12:50), Max: 36.8 (21 Jan 2024 12:50)  T(F): 98.2 (21 Jan 2024 12:50), Max: 98.2 (21 Jan 2024 12:50)  HR: 74 (21 Jan 2024 12:50) (56 - 74)  BP: 158/60 (21 Jan 2024 12:50) (84/38 - 179/54)  BP(mean): --  RR: 20 (21 Jan 2024 12:50) (18 - 20)  SpO2: 97% (21 Jan 2024 12:50) (94% - 98%)    Parameters below as of 21 Jan 2024 12:50  Patient On (Oxygen Delivery Method): room air      I&O's Summary    20 Jan 2024 07:01  -  21 Jan 2024 07:00  --------------------------------------------------------  IN: 500 mL / OUT: 2200 mL / NET: -1700 mL      MEDICATIONS  (STANDING):  carvedilol 12.5 milliGRAM(s) Oral every 12 hours  epoetin ranjan (PROCRIT) Injectable 50449 Unit(s) IV Push <User Schedule>  ferrous    sulfate 325 milliGRAM(s) Oral daily  heparin   Injectable 5000 Unit(s) SubCutaneous every 12 hours  hydrALAZINE 50 milliGRAM(s) Oral every 8 hours  insulin lispro (ADMELOG) corrective regimen sliding scale   SubCutaneous three times a day before meals  insulin lispro (ADMELOG) corrective regimen sliding scale   SubCutaneous at bedtime  losartan 100 milliGRAM(s) Oral daily  mirtazapine 7.5 milliGRAM(s) Oral at bedtime  pantoprazole    Tablet 40 milliGRAM(s) Oral before breakfast  sevelamer carbonate 1600 milliGRAM(s) Oral three times a day with meals    MEDICATIONS  (PRN):  acetaminophen     Tablet .. 650 milliGRAM(s) Oral every 6 hours PRN Temp greater or equal to 38C (100.4F), Mild Pain (1 - 3)  melatonin 3 milliGRAM(s) Oral at bedtime PRN Insomnia  ondansetron Injectable 4 milliGRAM(s) IV Push every 8 hours PRN Nausea and/or Vomiting    LABS:                        8.2    10.97 )-----------( 122      ( 21 Jan 2024 07:28 )             25.2     01-21    134<L>  |  98  |  23<H>  ----------------------------<  145<H>  4.1   |  31  |  4.54<H>    Ca    9.2      21 Jan 2024 07:28  Phos  4.1     01-21  Mg     2.4     01-21    TPro  6.8  /  Alb  2.9<L>  /  TBili  0.9  /  DBili  x   /  AST  13  /  ALT  9<L>  /  AlkPhos  142<H>  01-21      Urinalysis Basic - ( 21 Jan 2024 07:28 )    Color: x / Appearance: x / SG: x / pH: x  Gluc: 145 mg/dL / Ketone: x  / Bili: x / Urobili: x   Blood: x / Protein: x / Nitrite: x   Leuk Esterase: x / RBC: x / WBC x   Sq Epi: x / Non Sq Epi: x / Bacteria: x      CAPILLARY BLOOD GLUCOSE      POCT Blood Glucose.: 187 mg/dL (21 Jan 2024 16:34)  POCT Blood Glucose.: 195 mg/dL (21 Jan 2024 11:24)  POCT Blood Glucose.: 145 mg/dL (21 Jan 2024 08:01)  POCT Blood Glucose.: 209 mg/dL (20 Jan 2024 23:34)        Urinalysis Basic - ( 21 Jan 2024 07:28 )    Color: x / Appearance: x / SG: x / pH: x  Gluc: 145 mg/dL / Ketone: x  / Bili: x / Urobili: x   Blood: x / Protein: x / Nitrite: x   Leuk Esterase: x / RBC: x / WBC x   Sq Epi: x / Non Sq Epi: x / Bacteria: x      REVIEW OF SYSTEMS:  CONSTITUTIONAL: No fever, weight loss, or fatigue  EYES: No eye pain, visual disturbances, or discharge  ENMT:  No difficulty hearing, tinnitus, vertigo; No sinus or throat pain  NECK: No pain or stiffness  RESPIRATORY: No cough, wheezing, chills or hemoptysis; No shortness of breath  CARDIOVASCULAR: No chest pain, palpitations, dizziness, or leg swelling  GASTROINTESTINAL: No abdominal or epigastric pain. No nausea, vomiting, or hematemesis; No diarrhea or constipation. No melena or hematochezia.  GENITOURINARY: No dysuria, frequency, hematuria, or incontinence  NEUROLOGICAL: No headaches, memory loss, loss of strength, numbness, or tremors      RADIOLOGY & ADDITIONAL TESTS:    Consultant(s) Notes Reviewed:  [x ] YES  [ ] NO    PHYSICAL EXAM:  GENERAL: NAD, well-groomed, well-developed,not in any distress ,  HEAD:  Atraumatic, Normocephalic  EYES: EOMI, PERRLA, conjunctiva and sclera clear  ENMT: No tonsillar erythema, exudates, or enlargement; Moist mucous membranes, Good dentition, No lesions  NECK: Supple, No JVD, Normal thyroid  NERVOUS SYSTEM:  Alert & Oriented X3, No focal deficit   CHEST/LUNG: Good air entry bilateral with no  rales, rhonchi, wheezing, or rubs  HEART: Regular rate and rhythm; No murmurs, rubs, or gallops  ABDOMEN: Soft, Nontender, Nondistended; Bowel sounds present  EXTREMITIES:  2+ Peripheral Pulses, No clubbing, cyanosis, or edema    Care Discussed with Consultants/Other Providers [ x] YES  [ ] NO
[facility] NEPHROLOGY- PROGRESS NOTE    Patient is a 82yo Female with ESRD on HD, h/o Hep C s/p tx, CAD s/p stent, HTN, HLD presents with left knee pain with difficulty ambulating s/p fall. Nephrology consulted for ESRD status.      Hospital Medications: Medications reviewed.  REVIEW OF SYSTEMS:  CONSTITUTIONAL: No fevers or chills  RESPIRATORY: No shortness of breath  CARDIOVASCULAR: No chest pain.  GASTROINTESTINAL: No nausea, vomiting, diarrhea or abdominal pain.   VASCULAR: No bilateral lower extremity edema.  +left knee pain    VITALS:  T(F): 98.2 (01-21-24 @ 12:50), Max: 98.2 (01-20-24 @ 17:29)  HR: 74 (01-21-24 @ 12:50)  BP: 158/60 (01-21-24 @ 12:50)  RR: 20 (01-21-24 @ 12:50)  SpO2: 97% (01-21-24 @ 12:50)  Wt(kg): --    Weight (kg): 59 (01-20 @ 08:40)    01-20 @ 07:01  -  01-21 @ 07:00  --------------------------------------------------------  IN: 500 mL / OUT: 2200 mL / NET: -1700 mL      PHYSICAL EXAM:  Gen: NAD, calm  Cards: RRR, +S1/S2, no M/G/R  Resp: CTA B/L  GI: soft, NT/ND, NABS  Extremities: no LE edema B/L  Left knee wrapped  Access: Left AVF +thrill +bruit    LABS:  01-21    134<L>  |  98  |  23<H>  ----------------------------<  145<H>  4.1   |  31  |  4.54<H>    Ca    9.2      21 Jan 2024 07:28  Phos  4.1     01-21  Mg     2.4     01-21    TPro  6.8  /  Alb  2.9<L>  /  TBili  0.9  /  DBili      /  AST  13  /  ALT  9<L>  /  AlkPhos  142<H>  01-21    Creatinine Trend: 4.54 <--, 6.18 <--                        8.2    10.97 )-----------( 122      ( 21 Jan 2024 07:28 )             25.2     Urine Studies:  Urinalysis Basic - ( 21 Jan 2024 07:28 )    Color:  / Appearance:  / SG:  / pH:   Gluc: 145 mg/dL / Ketone:   / Bili:  / Urobili:    Blood:  / Protein:  / Nitrite:    Leuk Esterase:  / RBC:  / WBC    Sq Epi:  / Non Sq Epi:  / Bacteria:         RADIOLOGY & ADDITIONAL STUDIES:

## 2024-01-22 NOTE — PROGRESS NOTE ADULT - REASON FOR ADMISSION
After speaking with Mr. Posey given the name of the cardiologist.  Hong Mcelroy s/p Fall

## 2024-03-29 ENCOUNTER — NON-APPOINTMENT (OUTPATIENT)
Age: 84
End: 2024-03-29

## 2024-05-10 ENCOUNTER — APPOINTMENT (OUTPATIENT)
Dept: GASTROENTEROLOGY | Facility: CLINIC | Age: 84
End: 2024-05-10

## 2024-06-14 NOTE — ED PROVIDER NOTE - OBJECTIVE STATEMENT
Please inform patient     1. LungRADS CATEGORY: 2 - Benign appearance or behavior - Nodules with a  very low likelihood of becoming a clinically active cancer due to size or  lack of growth. MANAGEMENT: Continue annual screening with LDCT in 12  months.    2. 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    3. Background mild degenerative change of both lungs.    4. Old granulomatous disease.          Stable study  Repeat study 1 year to monitor      CPM (continue present management, no changes)  Call for questions or concerns    Follow up as scheduled,  Thanks [place]   83-year-old female with PMHx of HTN, ESRD on hemodialysis (Tuesday Thursday Saturday) last dialysis was on Thursday night and missed her scheduled appointment this morning, presents for left knee pain.  States that she fell on Tuesday unclear mechanism.  Does not recall the cause of her fall.  There is no associated fever or chills.  No nausea vomiting diarrhea.  There is no abdominal pain.  There is no heart palpitations.  Has been taking Tylenol with minimal relief as well as Alleve at home. Son in law with her at the bedside. Fall was unwitnessed. denies any chest pain pre or post. no head strike.

## 2024-08-31 ENCOUNTER — INPATIENT (INPATIENT)
Facility: HOSPITAL | Age: 84
LOS: 4 days | Discharge: HOME | End: 2024-09-05
Attending: INTERNAL MEDICINE | Admitting: INTERNAL MEDICINE
Payer: MEDICARE

## 2024-08-31 VITALS
OXYGEN SATURATION: 98 % | WEIGHT: 130.07 LBS | HEART RATE: 85 BPM | RESPIRATION RATE: 20 BRPM | SYSTOLIC BLOOD PRESSURE: 188 MMHG | TEMPERATURE: 98 F | HEIGHT: 63 IN | DIASTOLIC BLOOD PRESSURE: 69 MMHG

## 2024-08-31 DIAGNOSIS — K81.0 ACUTE CHOLECYSTITIS: ICD-10-CM

## 2024-08-31 DIAGNOSIS — I10 ESSENTIAL (PRIMARY) HYPERTENSION: ICD-10-CM

## 2024-08-31 DIAGNOSIS — R56.9 UNSPECIFIED CONVULSIONS: ICD-10-CM

## 2024-08-31 DIAGNOSIS — K65.4 SCLEROSING MESENTERITIS: ICD-10-CM

## 2024-08-31 DIAGNOSIS — N30.00 ACUTE CYSTITIS WITHOUT HEMATURIA: ICD-10-CM

## 2024-08-31 DIAGNOSIS — Z98.49 CATARACT EXTRACTION STATUS, UNSPECIFIED EYE: Chronic | ICD-10-CM

## 2024-08-31 DIAGNOSIS — N18.6 END STAGE RENAL DISEASE: ICD-10-CM

## 2024-08-31 DIAGNOSIS — E11.9 TYPE 2 DIABETES MELLITUS WITHOUT COMPLICATIONS: ICD-10-CM

## 2024-08-31 DIAGNOSIS — S32.010A WEDGE COMPRESSION FRACTURE OF FIRST LUMBAR VERTEBRA, INITIAL ENCOUNTER FOR CLOSED FRACTURE: ICD-10-CM

## 2024-08-31 LAB
ALBUMIN SERPL ELPH-MCNC: 3.2 G/DL — LOW (ref 3.3–5)
ALP SERPL-CCNC: 136 U/L — HIGH (ref 40–120)
ALT FLD-CCNC: 12 U/L — SIGNIFICANT CHANGE UP (ref 12–78)
ANION GAP SERPL CALC-SCNC: 4 MMOL/L — LOW (ref 5–17)
APTT BLD: 31.7 SEC — SIGNIFICANT CHANGE UP (ref 24.5–35.6)
AST SERPL-CCNC: 34 U/L — SIGNIFICANT CHANGE UP (ref 15–37)
BASOPHILS # BLD AUTO: 0 K/UL — SIGNIFICANT CHANGE UP (ref 0–0.2)
BASOPHILS NFR BLD AUTO: 0 % — SIGNIFICANT CHANGE UP (ref 0–2)
BILIRUB SERPL-MCNC: 0.9 MG/DL — SIGNIFICANT CHANGE UP (ref 0.2–1.2)
BUN SERPL-MCNC: 16 MG/DL — SIGNIFICANT CHANGE UP (ref 7–23)
CALCIUM SERPL-MCNC: 9.5 MG/DL — SIGNIFICANT CHANGE UP (ref 8.5–10.1)
CHLORIDE SERPL-SCNC: 101 MMOL/L — SIGNIFICANT CHANGE UP (ref 96–108)
CO2 SERPL-SCNC: 32 MMOL/L — HIGH (ref 22–31)
CREAT SERPL-MCNC: 3.52 MG/DL — HIGH (ref 0.5–1.3)
EGFR: 12 ML/MIN/1.73M2 — LOW
EOSINOPHIL # BLD AUTO: 2.04 K/UL — HIGH (ref 0–0.5)
EOSINOPHIL NFR BLD AUTO: 11 % — HIGH (ref 0–6)
FLUAV AG NPH QL: SIGNIFICANT CHANGE UP
FLUBV AG NPH QL: SIGNIFICANT CHANGE UP
GAS PNL BLDV: SIGNIFICANT CHANGE UP
GLUCOSE BLDC GLUCOMTR-MCNC: 114 MG/DL — HIGH (ref 70–99)
GLUCOSE SERPL-MCNC: 129 MG/DL — HIGH (ref 70–99)
HCT VFR BLD CALC: 30 % — LOW (ref 34.5–45)
HGB BLD-MCNC: 9.8 G/DL — LOW (ref 11.5–15.5)
INR BLD: 0.91 RATIO — SIGNIFICANT CHANGE UP (ref 0.85–1.18)
LIDOCAIN IGE QN: 86 U/L — HIGH (ref 13–75)
LYMPHOCYTES # BLD AUTO: 1.86 K/UL — SIGNIFICANT CHANGE UP (ref 1–3.3)
LYMPHOCYTES # BLD AUTO: 10 % — LOW (ref 13–44)
MAGNESIUM SERPL-MCNC: 2.3 MG/DL — SIGNIFICANT CHANGE UP (ref 1.6–2.6)
MCHC RBC-ENTMCNC: 25.3 PG — LOW (ref 27–34)
MCHC RBC-ENTMCNC: 32.7 G/DL — SIGNIFICANT CHANGE UP (ref 32–36)
MCV RBC AUTO: 77.5 FL — LOW (ref 80–100)
MONOCYTES # BLD AUTO: 1.3 K/UL — HIGH (ref 0–0.9)
MONOCYTES NFR BLD AUTO: 7 % — SIGNIFICANT CHANGE UP (ref 2–14)
NEUTROPHILS # BLD AUTO: 13.37 K/UL — HIGH (ref 1.8–7.4)
NEUTROPHILS NFR BLD AUTO: 72 % — SIGNIFICANT CHANGE UP (ref 43–77)
NRBC # BLD: SIGNIFICANT CHANGE UP /100 WBCS (ref 0–0)
PLATELET # BLD AUTO: 187 K/UL — SIGNIFICANT CHANGE UP (ref 150–400)
POTASSIUM SERPL-MCNC: 3.9 MMOL/L — SIGNIFICANT CHANGE UP (ref 3.5–5.3)
POTASSIUM SERPL-SCNC: 3.9 MMOL/L — SIGNIFICANT CHANGE UP (ref 3.5–5.3)
PROT SERPL-MCNC: 7.3 GM/DL — SIGNIFICANT CHANGE UP (ref 6–8.3)
PROTHROM AB SERPL-ACNC: 10.9 SEC — SIGNIFICANT CHANGE UP (ref 9.5–13)
RBC # BLD: 3.87 M/UL — SIGNIFICANT CHANGE UP (ref 3.8–5.2)
RBC # FLD: 17.6 % — HIGH (ref 10.3–14.5)
SARS-COV-2 RNA SPEC QL NAA+PROBE: SIGNIFICANT CHANGE UP
SODIUM SERPL-SCNC: 137 MMOL/L — SIGNIFICANT CHANGE UP (ref 135–145)
WBC # BLD: 18.57 K/UL — HIGH (ref 3.8–10.5)
WBC # FLD AUTO: 18.57 K/UL — HIGH (ref 3.8–10.5)

## 2024-08-31 PROCEDURE — 74176 CT ABD & PELVIS W/O CONTRAST: CPT | Mod: 26,MC

## 2024-08-31 PROCEDURE — 36410 VNPNXR 3YR/> PHY/QHP DX/THER: CPT

## 2024-08-31 PROCEDURE — 76705 ECHO EXAM OF ABDOMEN: CPT | Mod: 26

## 2024-08-31 PROCEDURE — 99285 EMERGENCY DEPT VISIT HI MDM: CPT | Mod: 25

## 2024-08-31 PROCEDURE — 76937 US GUIDE VASCULAR ACCESS: CPT | Mod: 26,59

## 2024-08-31 PROCEDURE — 36000 PLACE NEEDLE IN VEIN: CPT

## 2024-08-31 PROCEDURE — 99223 1ST HOSP IP/OBS HIGH 75: CPT

## 2024-08-31 RX ORDER — HEPARIN SODIUM,BOVINE 1000/ML
5000 VIAL (ML) INJECTION EVERY 12 HOURS
Refills: 0 | Status: DISCONTINUED | OUTPATIENT
Start: 2024-08-31 | End: 2024-09-05

## 2024-08-31 RX ORDER — IBUPROFEN 600 MG
400 TABLET ORAL ONCE
Refills: 0 | Status: DISCONTINUED | OUTPATIENT
Start: 2024-08-31 | End: 2024-08-31

## 2024-08-31 RX ORDER — PIPERACILLIN SODIUM AND TAZOBACTAM SODIUM 3; .375 G/15ML; G/15ML
3.38 INJECTION, POWDER, FOR SOLUTION INTRAVENOUS ONCE
Refills: 0 | Status: COMPLETED | OUTPATIENT
Start: 2024-08-31 | End: 2024-08-31

## 2024-08-31 RX ORDER — ONDANSETRON 2 MG/ML
4 INJECTION, SOLUTION INTRAMUSCULAR; INTRAVENOUS EVERY 8 HOURS
Refills: 0 | Status: DISCONTINUED | OUTPATIENT
Start: 2024-08-31 | End: 2024-09-05

## 2024-08-31 RX ORDER — DEXTROSE 15 G/33 G
15 GEL IN PACKET (GRAM) ORAL ONCE
Refills: 0 | Status: DISCONTINUED | OUTPATIENT
Start: 2024-08-31 | End: 2024-09-05

## 2024-08-31 RX ORDER — ACETAMINOPHEN 325 MG/1
650 TABLET ORAL EVERY 6 HOURS
Refills: 0 | Status: DISCONTINUED | OUTPATIENT
Start: 2024-08-31 | End: 2024-09-05

## 2024-08-31 RX ORDER — ACETAMINOPHEN 325 MG/1
975 TABLET ORAL ONCE
Refills: 0 | Status: DISCONTINUED | OUTPATIENT
Start: 2024-08-31 | End: 2024-08-31

## 2024-08-31 RX ORDER — EPOETIN ALFA 3000 [IU]/ML
10000 SOLUTION INTRAVENOUS; SUBCUTANEOUS
Refills: 0 | Status: DISCONTINUED | OUTPATIENT
Start: 2024-08-31 | End: 2024-08-31

## 2024-08-31 RX ORDER — LEVETIRACETAM 1000 MG/1
500 TABLET ORAL ONCE
Refills: 0 | Status: COMPLETED | OUTPATIENT
Start: 2024-08-31 | End: 2024-08-31

## 2024-08-31 RX ORDER — MAGNESIUM, ALUMINUM HYDROXIDE 200-225/5
30 SUSPENSION, ORAL (FINAL DOSE FORM) ORAL EVERY 4 HOURS
Refills: 0 | Status: DISCONTINUED | OUTPATIENT
Start: 2024-08-31 | End: 2024-09-05

## 2024-08-31 RX ORDER — HYDRALAZINE HCL 50 MG
50 TABLET ORAL EVERY 8 HOURS
Refills: 0 | Status: DISCONTINUED | OUTPATIENT
Start: 2024-08-31 | End: 2024-09-03

## 2024-08-31 RX ORDER — LOSARTAN POTASSIUM 50 MG/1
100 TABLET ORAL DAILY
Refills: 0 | Status: DISCONTINUED | OUTPATIENT
Start: 2024-08-31 | End: 2024-09-05

## 2024-08-31 RX ORDER — SEVELAMER CARBONATE 800 MG/1
800 TABLET, FILM COATED ORAL EVERY 8 HOURS
Refills: 0 | Status: DISCONTINUED | OUTPATIENT
Start: 2024-08-31 | End: 2024-09-05

## 2024-08-31 RX ORDER — FERROUS SULFATE 325(65) MG
325 TABLET ORAL DAILY
Refills: 0 | Status: DISCONTINUED | OUTPATIENT
Start: 2024-08-31 | End: 2024-09-05

## 2024-08-31 RX ORDER — MIRTAZAPINE 30 MG
7.5 TABLET ORAL AT BEDTIME
Refills: 0 | Status: DISCONTINUED | OUTPATIENT
Start: 2024-08-31 | End: 2024-09-05

## 2024-08-31 RX ORDER — GLUCAGON INJECTION, SOLUTION 1 MG/.2ML
1 INJECTION, SOLUTION SUBCUTANEOUS ONCE
Refills: 0 | Status: DISCONTINUED | OUTPATIENT
Start: 2024-08-31 | End: 2024-09-05

## 2024-08-31 RX ORDER — PANTOPRAZOLE SODIUM 40 MG
40 TABLET, DELAYED RELEASE (ENTERIC COATED) ORAL
Refills: 0 | Status: DISCONTINUED | OUTPATIENT
Start: 2024-08-31 | End: 2024-09-05

## 2024-08-31 RX ORDER — CARVEDILOL 6.25 MG/1
12.5 TABLET ORAL EVERY 12 HOURS
Refills: 0 | Status: DISCONTINUED | OUTPATIENT
Start: 2024-08-31 | End: 2024-09-05

## 2024-08-31 RX ORDER — DEXTROSE 15 G/33 G
25 GEL IN PACKET (GRAM) ORAL ONCE
Refills: 0 | Status: DISCONTINUED | OUTPATIENT
Start: 2024-08-31 | End: 2024-09-05

## 2024-08-31 RX ORDER — LEVETIRACETAM 1000 MG/1
1500 TABLET ORAL DAILY
Refills: 0 | Status: ACTIVE | OUTPATIENT
Start: 2024-08-31 | End: 2025-07-30

## 2024-08-31 RX ORDER — EPOETIN ALFA 3000 [IU]/ML
10000 SOLUTION INTRAVENOUS; SUBCUTANEOUS
Refills: 0 | Status: DISCONTINUED | OUTPATIENT
Start: 2024-08-31 | End: 2024-09-05

## 2024-08-31 RX ADMIN — Medication 7.5 MILLIGRAM(S): at 21:37

## 2024-08-31 RX ADMIN — EPOETIN ALFA 10000 UNIT(S): 3000 SOLUTION INTRAVENOUS; SUBCUTANEOUS at 21:38

## 2024-08-31 RX ADMIN — Medication 325 MILLIGRAM(S): at 21:37

## 2024-08-31 RX ADMIN — Medication 50 MILLIGRAM(S): at 21:38

## 2024-08-31 RX ADMIN — CARVEDILOL 12.5 MILLIGRAM(S): 6.25 TABLET ORAL at 20:55

## 2024-08-31 RX ADMIN — LOSARTAN POTASSIUM 100 MILLIGRAM(S): 50 TABLET ORAL at 19:58

## 2024-08-31 RX ADMIN — PIPERACILLIN SODIUM AND TAZOBACTAM SODIUM 200 GRAM(S): 3; .375 INJECTION, POWDER, FOR SOLUTION INTRAVENOUS at 17:39

## 2024-08-31 RX ADMIN — Medication 40 MILLIGRAM(S): at 20:55

## 2024-08-31 RX ADMIN — Medication 5000 UNIT(S): at 21:37

## 2024-08-31 RX ADMIN — LEVETIRACETAM 400 MILLIGRAM(S): 1000 TABLET ORAL at 12:22

## 2024-08-31 RX ADMIN — SEVELAMER CARBONATE 800 MILLIGRAM(S): 800 TABLET, FILM COATED ORAL at 21:38

## 2024-08-31 NOTE — ED ADULT TRIAGE NOTE - PAIN RATING/NUMBER SCALE (0-10): ACTIVITY
----- Message from Rolando Monterroso sent at 12/7/2020  2:42 PM EST -----  Subject: Message to Provider    QUESTIONS  Information for Provider? Patients grandmother called in stated she sent a   message to DR. Rush King and would like to speak with him if possible prior to   the pts appointment this afternoon  ---------------------------------------------------------------------------  --------------  CALL BACK INFO  What is the best way for the office to contact you? OK to leave message on   voicemail  Preferred Call Back Phone Number? [phone]  ---------------------------------------------------------------------------  --------------  SCRIPT ANSWERS  Relationship to Patient? Other  Representative Name? Ar Mace  Is the Representative on the appropriate HIPAA document in Epic?  Yes 3 (mild pain)

## 2024-08-31 NOTE — CONSULT NOTE ADULT - SUBJECTIVE AND OBJECTIVE BOX
General Surgery Consult      HPI: 82 y/o F PMHx of DM, ESRD on HD (TTS), CAD (s/p stent), HTN, Hep c, liver failure and HLD who presents with weakness and abd pain. Pt recently hospitalized in [facility] with gallstone pancreatitis, pna and UTI, pt still being treated for UTI. Pt states abd pain is all over abdomen, started suddenly while she was at HD. Denies n/v, no f/c      REVIEW OF SYSTEMS:  Gen: no changes in weight  HEENT: no rhinorrhea, jaundice  Neck: no sore throat  Cards: no chest pain  Resp: no dyspnea  GI: no nausea or vomiting or diarrhea  : no dysuria or hematuria  Vascular: no LE edema +left knee pain  Derm: no rashes  Neuro: no numbness/tingling  All other review of systems is negative unless indicated above.    PAST MEDICAL HISTORY:  HTN (hypertension)    HLD (hyperlipidemia)    DM type 2 (diabetes mellitus, type 2)    CKD (chronic kidney disease)    Asthma    H/O vitamin D deficiency    GERD (gastroesophageal reflux disease)    ESRD on dialysis    HLD (hyperlipidemia)    DM (diabetes mellitus)    HTN (hypertension)        PAST SURGICAL HISTORY:  History of cataract surgery        MEDICATIONS:  acetaminophen     Tablet .. 650 milliGRAM(s) Oral every 6 hours PRN  aluminum hydroxide/magnesium hydroxide/simethicone Suspension 30 milliLiter(s) Oral every 4 hours PRN  carvedilol 12.5 milliGRAM(s) Oral every 12 hours  dextrose 5%. 1000 milliLiter(s) IV Continuous <Continuous>  dextrose 50% Injectable 25 Gram(s) IV Push once  dextrose Oral Gel 15 Gram(s) Oral once PRN  epoetin ranjan (EPOGEN) Injectable 22210 Unit(s) IV Push every 7 days  ferrous    sulfate 325 milliGRAM(s) Oral daily  glucagon  Injectable 1 milliGRAM(s) IntraMuscular once  heparin   Injectable 5000 Unit(s) SubCutaneous every 12 hours  hydrALAZINE 50 milliGRAM(s) Oral every 8 hours  insulin lispro (ADMELOG) corrective regimen sliding scale   SubCutaneous three times a day before meals  losartan 100 milliGRAM(s) Oral daily  melatonin 3 milliGRAM(s) Oral at bedtime PRN  mirtazapine 7.5 milliGRAM(s) Oral at bedtime  ondansetron Injectable 4 milliGRAM(s) IV Push every 8 hours PRN  pantoprazole    Tablet 40 milliGRAM(s) Oral before breakfast  sevelamer carbonate 800 milliGRAM(s) Oral every 8 hours      ALLERGIES:  No Known Allergies      VITALS & I/Os:  Vital Signs Last 24 Hrs  T(C): 36.4 (31 Aug 2024 15:00), Max: 36.6 (31 Aug 2024 11:01)  T(F): 97.5 (31 Aug 2024 15:00), Max: 97.8 (31 Aug 2024 11:01)  HR: 77 (31 Aug 2024 15:00) (77 - 85)  BP: 180/58 (31 Aug 2024 15:00) (180/58 - 188/69)  BP(mean): --  RR: 25 (31 Aug 2024 15:00) (20 - 25)  SpO2: 98% (31 Aug 2024 15:00) (98% - 98%)    Parameters below as of 31 Aug 2024 15:00  Patient On (Oxygen Delivery Method): room air        I&O's Summary      PHYSICAL EXAM:  Physical Exam: GENERAL: NAD, regular build  HEAD:  Atraumatic, Normocephalic  EYES: EOMI, PERRLA, conjunctiva and sclera clear  NECK: Supple  CHEST/LUNG: Clear to auscultation bilaterally, no RRW  HEART: Regular rate and rhythm; Holosystolic murmur   ABDOMEN: Soft, Nondistended; Bowel sounds present, mild ruq TTP, no guarding or rebound  EXTREMITIES:  2+ Peripheral Pulses, No edema. LUE AVF. Tenderness to left knee  PSYCH: AAOx3  NEUROLOGY: non-focal. Strength intact.  SKIN: No rashes or lesions      LABS:                        9.8    18.57 )-----------( 187      ( 31 Aug 2024 12:20 )             30.0     08-31    137  |  101  |  16  ----------------------------<  129<H>  3.9   |  32<H>  |  3.52<H>    Ca    9.5      31 Aug 2024 12:20  Mg     2.3     08-31    TPro  7.3  /  Alb  3.2<L>  /  TBili  0.9  /  DBili  x   /  AST  34  /  ALT  12  /  AlkPhos  136<H>  08-31    Lactate:  08-31 @ 12:43  1.50    PT/INR - ( 31 Aug 2024 12:20 )   PT: 10.9 sec;   INR: 0.91 ratio         PTT - ( 31 Aug 2024 12:20 )  PTT:31.7 sec          Urinalysis Basic - ( 31 Aug 2024 12:20 )    Color: x / Appearance: x / SG: x / pH: x  Gluc: 129 mg/dL / Ketone: x  / Bili: x / Urobili: x   Blood: x / Protein: x / Nitrite: x   Leuk Esterase: x / RBC: x / WBC x   Sq Epi: x / Non Sq Epi: x / Bacteria: x        IMAGING:

## 2024-08-31 NOTE — ED ADULT NURSE NOTE - OBJECTIVE STATEMENT
Pt presents to ED A&O x 3 from dialysis with son present at bedside c/o generalized weakness and upper abd pain that began while getting dialyzed. Pt dialysis lasted 2 hours, pt's session normally last 3hrs and 15mins. Pt reports feeling nauseous but did not experience any emesis. Pt dialysis access is to the L upper arm no s/s of infection or active bleeding.

## 2024-08-31 NOTE — H&P ADULT - PROBLEM SELECTOR PLAN 6
- HD on t/t/s as per nephrology  - Last session on 8/31. Pt got  2 out 3 hours  - resume sevelamer and epo

## 2024-08-31 NOTE — H&P ADULT - NSHPPHYSICALEXAM_GEN_ALL_CORE
GENERAL: NAD, lying in bed comfortably  HEAD:  Atraumatic, normocephalic  EYES: EOMI, PERRL  NECK: Supple, trachea midline, no JVD  HEART: tachy  LUNGS: Unlabored respirations.  Clear to auscultation bilaterally, no crackles, wheezing, or rhonchi  ABDOMEN: Soft, mild tenderness of RUQ and epigastric region, nondistended, +BS  EXTREMITIES: 2+ peripheral pulses bilaterally. No clubbing, cyanosis, or edema  NERVOUS SYSTEM:  A&Ox3, moving all extremities, no focal deficits

## 2024-08-31 NOTE — ED ADULT NURSE NOTE - NSFALLUNIVINTERV_ED_ALL_ED
Bed/Stretcher in lowest position, wheels locked, appropriate side rails in place/Call bell, personal items and telephone in reach/Instruct patient to call for assistance before getting out of bed/chair/stretcher/Non-slip footwear applied when patient is off stretcher/[place] to call system/Physically safe environment - no spills, clutter or unnecessary equipment/Purposeful proactive rounding/Room/bathroom lighting operational, light cord in reach

## 2024-08-31 NOTE — H&P ADULT - NSHPLABSRESULTS_GEN_ALL_CORE
Stable with use of Tramadol and Norco as needed for pain.  PDMP is checked prior to filling script today.  Will obtain a urine drug screen at next visit.  Continue to monitor.   LABS:  cret                        9.8    18.57 )-----------( 187      ( 31 Aug 2024 12:20 )             30.0     08-31    137  |  101  |  16  ----------------------------<  129<H>  3.9   |  32<H>  |  3.52<H>    Ca    9.5      31 Aug 2024 12:20  Mg     2.3     08-31    TPro  7.3  /  Alb  3.2<L>  /  TBili  0.9  /  DBili  x   /  AST  34  /  ALT  12  /  AlkPhos  136<H>  08-31    PT/INR - ( 31 Aug 2024 12:20 )   PT: 10.9 sec;   INR: 0.91 ratio         PTT - ( 31 Aug 2024 12:20 )  PTT:31.7 sec    < from: CT Abdomen and Pelvis No Cont (08.31.24 @ 13:10) >    IMPRESSION:  1. Gallbladder wall edema and cholelithiasis. Correlate for cholecystitis.  2. Bladder wall thickening versus normal collapsed bladder. Correlate for   cystitis.  3. 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 Malignancy is considered less likely given stability over   time.  4. Chronic severe compression deformity of vertebral body L1.        < end of copied text >

## 2024-08-31 NOTE — H&P ADULT - ASSESSMENT
83-year-old female pmhx of ESRD on dialysis(T/T/S), hypertension, diabetes, anemia(ANA and ACD),  seizure on Keppra, liver failure/jaundice, recent pneumonia, recent hepatitis C(treated),  recent pancreatitis comes to ED w/ generalized weakness and generalized abdominal pain while in HD earlier this morning. Admit tomedicine for further management. 83-year-old female pmhx of ESRD on dialysis(T/T/S), hypertension, diabetes, anemia(ANA and ACD),  seizure on Keppra, liver failure/jaundice, recent pneumonia, recent hepatitis C(treated),  recent pancreatitis comes to ED w/ generalized weakness and generalized abdominal pain while in HD earlier this morning. Found with acute cholecystitis and cystitis.

## 2024-08-31 NOTE — CONSULT NOTE ADULT - ASSESSMENT
84 y/o F PMHx of DM, ESRD on HD (TTS), CAD (s/p stent), HTN, Hep c, liver failure and HLD who presents with weakness and abd pain found to have US findings equivocal for acute cholecystitis, discussed with pt and family at bedside that given pt's comorbidities she is a poor surgical candidate, they also expressed they wouldn't opt for surgery at this time and would like to see if she will improve with abx as she have done in the past    as discussed with Dr. Prado, recommend medical admission  NPO/IVF/ IV ABX  medical management  will follow

## 2024-08-31 NOTE — H&P ADULT - HISTORY OF PRESENT ILLNESS
83-year-old female pmhx of ESRD on dialysis(T/T/S), hypertension, diabetes, anemia(ANA and ACD),  seizure on Keppra, liver failure/jaundice, recent pneumonia, recent hepatitis C(treated),  recent pancreatitis comes to ED w/ generalized weakness and generalized abdominal pain while in HD earlier this morning. Pt describes the pain as sharp, diffuse, 10/10, non radiating, intermittent, sever to the point that HD was stopped after 3 hours. Pt is mostly anuric, but this am had dysuria with small amount of urine. At this time, she denies any abdo/ chest pain, n/v/d/c, SOB, hematuria, blood in stool, fever, or chills.    In the ED, she was hypertensive otherwise stable VS. Labs significant for leukocytosis and anemia. CT a/p  consistent with acute cholecystitis and cystitis; also showing chronic findings consistent with mesenteric panniculitis and chronic L1 compression fracture. Surgery was consulted; but states that patient is not a surgical candidate at this time given extensive comorbidities; recommends medical management.

## 2024-08-31 NOTE — ED PROVIDER NOTE - CLINICAL SUMMARY MEDICAL DECISION MAKING FREE TEXT BOX
Patient offered translation services however preferred son to translate.    83-year-old female pmhx of CKD on dialysis, hypertension, diabetes, seizure on Keppra, liver failure/jaundice, recent pneumonia, recent pancreatitis comes to ED w/ generalized weakness and generalized abdominal pain. Started while patient was in dialysis.  Due to symptoms patient was brought to emergency department. Their pain/symptom is moderate, constant, non mediating with rest. Otherwise ROS negative.    General: non-toxic, NAD   HEENT: NCAT, PERRL   Cardiac: RRR, no murmurs, 2+ peripheral pulses   Chest: CTAB  Abdomen: soft, non-distended, bowel sounds present, no ttp, no rebound or guarding   Extremities: no peripheral edema, calf tenderness, or leg size discrepancies   Skin: no rashes   Neuro: Awake and alert, 5+motor, sensory grossly intact   Psych: mood and affect appropriate    Impression: 83-year-old female pmhx of CKD on dialysis, hypertension, diabetes, seizure on Keppra, liver failure/jaundice, recent pneumonia, recent pancreatitis comes to ED w/ generalized weakness and generalized abdominal pain. Plan to workup for etiology of abdominal pain and weakness.    Ordered labs, imaging, medications for diagnosis, management, and treatment.

## 2024-08-31 NOTE — CONSULT NOTE ADULT - NS ATTEND AMEND GEN_ALL_CORE FT
Patient seen and examined with PA/NP  Labs and imaging reviewed  Multiple co-morbidities, including but not exclusive to: CAD, aortic stenosis, ESRD, DM  Poor surgical candidate, antibiotics for cholecystitis  Will continue to follow

## 2024-08-31 NOTE — H&P ADULT - PROBLEM SELECTOR PLAN 1
- CT a/p reviewed as above   - Surgery consulted; recommends medical management  - C/w iv Zosyn   - Judicious IVF - CT a/p  and abdo u/s reviewed as above   - Surgery consulted; recommends medical management  - C/w iv Zosyn   - Judicious IVF int he seting of ESRD  - f/u cultures  - f/u am labs

## 2024-08-31 NOTE — ED ADULT TRIAGE NOTE - CHIEF COMPLAINT QUOTE
General weakness with abd pain half way through dialysis today, dialysis shunt in left arm H/O Seizures HTN Jaundice Pancreas problem

## 2024-09-01 LAB
A1C WITH ESTIMATED AVERAGE GLUCOSE RESULT: 4.6 % — SIGNIFICANT CHANGE UP (ref 4–5.6)
ALBUMIN SERPL ELPH-MCNC: 2.7 G/DL — LOW (ref 3.3–5)
ALP SERPL-CCNC: 97 U/L — SIGNIFICANT CHANGE UP (ref 40–120)
ALT FLD-CCNC: 10 U/L — LOW (ref 12–78)
ANION GAP SERPL CALC-SCNC: 9 MMOL/L — SIGNIFICANT CHANGE UP (ref 5–17)
AST SERPL-CCNC: 19 U/L — SIGNIFICANT CHANGE UP (ref 15–37)
BASOPHILS # BLD AUTO: 0.1 K/UL — SIGNIFICANT CHANGE UP (ref 0–0.2)
BASOPHILS NFR BLD AUTO: 0.8 % — SIGNIFICANT CHANGE UP (ref 0–2)
BILIRUB SERPL-MCNC: 0.9 MG/DL — SIGNIFICANT CHANGE UP (ref 0.2–1.2)
BUN SERPL-MCNC: 24 MG/DL — HIGH (ref 7–23)
CALCIUM SERPL-MCNC: 9 MG/DL — SIGNIFICANT CHANGE UP (ref 8.5–10.1)
CHLORIDE SERPL-SCNC: 102 MMOL/L — SIGNIFICANT CHANGE UP (ref 96–108)
CO2 SERPL-SCNC: 28 MMOL/L — SIGNIFICANT CHANGE UP (ref 22–31)
CREAT SERPL-MCNC: 4.5 MG/DL — HIGH (ref 0.5–1.3)
EGFR: 9 ML/MIN/1.73M2 — LOW
EOSINOPHIL # BLD AUTO: 3.63 K/UL — HIGH (ref 0–0.5)
EOSINOPHIL NFR BLD AUTO: 27.3 % — HIGH (ref 0–6)
ESTIMATED AVERAGE GLUCOSE: 85 MG/DL — SIGNIFICANT CHANGE UP (ref 68–114)
GLUCOSE BLDC GLUCOMTR-MCNC: 81 MG/DL — SIGNIFICANT CHANGE UP (ref 70–99)
GLUCOSE BLDC GLUCOMTR-MCNC: 87 MG/DL — SIGNIFICANT CHANGE UP (ref 70–99)
GLUCOSE BLDC GLUCOMTR-MCNC: 89 MG/DL — SIGNIFICANT CHANGE UP (ref 70–99)
GLUCOSE BLDC GLUCOMTR-MCNC: 94 MG/DL — SIGNIFICANT CHANGE UP (ref 70–99)
GLUCOSE SERPL-MCNC: 90 MG/DL — SIGNIFICANT CHANGE UP (ref 70–99)
HCT VFR BLD CALC: 28.8 % — LOW (ref 34.5–45)
HGB BLD-MCNC: 9.3 G/DL — LOW (ref 11.5–15.5)
IMM GRANULOCYTES NFR BLD AUTO: 0.3 % — SIGNIFICANT CHANGE UP (ref 0–0.9)
LYMPHOCYTES # BLD AUTO: 17 % — SIGNIFICANT CHANGE UP (ref 13–44)
LYMPHOCYTES # BLD AUTO: 2.26 K/UL — SIGNIFICANT CHANGE UP (ref 1–3.3)
MCHC RBC-ENTMCNC: 25 PG — LOW (ref 27–34)
MCHC RBC-ENTMCNC: 32.3 G/DL — SIGNIFICANT CHANGE UP (ref 32–36)
MCV RBC AUTO: 77.4 FL — LOW (ref 80–100)
MONOCYTES # BLD AUTO: 0.92 K/UL — HIGH (ref 0–0.9)
MONOCYTES NFR BLD AUTO: 6.9 % — SIGNIFICANT CHANGE UP (ref 2–14)
NEUTROPHILS # BLD AUTO: 6.35 K/UL — SIGNIFICANT CHANGE UP (ref 1.8–7.4)
NEUTROPHILS NFR BLD AUTO: 47.7 % — SIGNIFICANT CHANGE UP (ref 43–77)
NRBC # BLD: 0 /100 WBCS — SIGNIFICANT CHANGE UP (ref 0–0)
PLATELET # BLD AUTO: 155 K/UL — SIGNIFICANT CHANGE UP (ref 150–400)
POTASSIUM SERPL-MCNC: 4.1 MMOL/L — SIGNIFICANT CHANGE UP (ref 3.5–5.3)
POTASSIUM SERPL-SCNC: 4.1 MMOL/L — SIGNIFICANT CHANGE UP (ref 3.5–5.3)
PROT SERPL-MCNC: 6.1 GM/DL — SIGNIFICANT CHANGE UP (ref 6–8.3)
RBC # BLD: 3.72 M/UL — LOW (ref 3.8–5.2)
RBC # FLD: 17.1 % — HIGH (ref 10.3–14.5)
SODIUM SERPL-SCNC: 139 MMOL/L — SIGNIFICANT CHANGE UP (ref 135–145)
WBC # BLD: 13.3 K/UL — HIGH (ref 3.8–10.5)
WBC # FLD AUTO: 13.3 K/UL — HIGH (ref 3.8–10.5)

## 2024-09-01 PROCEDURE — 99232 SBSQ HOSP IP/OBS MODERATE 35: CPT

## 2024-09-01 RX ORDER — PIPERACILLIN SODIUM AND TAZOBACTAM SODIUM 3; .375 G/15ML; G/15ML
3.38 INJECTION, POWDER, FOR SOLUTION INTRAVENOUS EVERY 12 HOURS
Refills: 0 | Status: DISCONTINUED | OUTPATIENT
Start: 2024-09-01 | End: 2024-09-05

## 2024-09-01 RX ORDER — METOPROLOL TARTRATE 100 MG/1
5 TABLET ORAL ONCE
Refills: 0 | Status: COMPLETED | OUTPATIENT
Start: 2024-09-01 | End: 2024-09-01

## 2024-09-01 RX ADMIN — METOPROLOL TARTRATE 5 MILLIGRAM(S): 100 TABLET ORAL at 17:58

## 2024-09-01 RX ADMIN — PIPERACILLIN SODIUM AND TAZOBACTAM SODIUM 25 GRAM(S): 3; .375 INJECTION, POWDER, FOR SOLUTION INTRAVENOUS at 05:59

## 2024-09-01 RX ADMIN — SEVELAMER CARBONATE 800 MILLIGRAM(S): 800 TABLET, FILM COATED ORAL at 13:30

## 2024-09-01 RX ADMIN — Medication 7.5 MILLIGRAM(S): at 21:46

## 2024-09-01 RX ADMIN — PIPERACILLIN SODIUM AND TAZOBACTAM SODIUM 25 GRAM(S): 3; .375 INJECTION, POWDER, FOR SOLUTION INTRAVENOUS at 18:03

## 2024-09-01 RX ADMIN — LEVETIRACETAM 1500 MILLIGRAM(S): 1000 TABLET ORAL at 11:50

## 2024-09-01 RX ADMIN — SEVELAMER CARBONATE 800 MILLIGRAM(S): 800 TABLET, FILM COATED ORAL at 21:46

## 2024-09-01 RX ADMIN — CARVEDILOL 12.5 MILLIGRAM(S): 6.25 TABLET ORAL at 05:59

## 2024-09-01 RX ADMIN — Medication 325 MILLIGRAM(S): at 11:50

## 2024-09-01 RX ADMIN — ONDANSETRON 4 MILLIGRAM(S): 2 INJECTION, SOLUTION INTRAMUSCULAR; INTRAVENOUS at 17:59

## 2024-09-01 RX ADMIN — SEVELAMER CARBONATE 800 MILLIGRAM(S): 800 TABLET, FILM COATED ORAL at 06:00

## 2024-09-01 RX ADMIN — Medication 50 MILLIGRAM(S): at 13:30

## 2024-09-01 RX ADMIN — Medication 50 MILLIGRAM(S): at 21:46

## 2024-09-01 RX ADMIN — Medication 5000 UNIT(S): at 06:00

## 2024-09-01 RX ADMIN — Medication 50 MILLIGRAM(S): at 06:00

## 2024-09-01 RX ADMIN — Medication 5000 UNIT(S): at 18:00

## 2024-09-01 NOTE — CONSULT NOTE ADULT - SUBJECTIVE AND OBJECTIVE BOX
Patient chart reviewed, full consult to follow.     Thank you for the courtesy of this consultation. [facility], [facility]  NEPHROLOGY AND HYPERTENSION  [address]  [address]  [place] [address]  [phone]    MD AISHA FLOWER MD YELENA ROSENBERG, MD BINNY KOSHY, MD CHRISTOPHER CAPUTO, MD EDWARD BOVER, MD      Information from chart:  "Patient is a 83y old  Female who presents with a chief complaint of Acute cholecystitis and cystitis (01 Sep 2024 16:34)    HPI:  83-year-old female pmhx of ESRD on dialysis(T/T/S), hypertension, diabetes, anemia(ANA and ACD),  seizure on Keppra, liver failure/jaundice, recent pneumonia, recent hepatitis C(treated),  recent pancreatitis comes to ED w/ generalized weakness and generalized abdominal pain while in HD earlier this morning. Pt describes the pain as sharp, diffuse, 10/10, non radiating, intermittent, sever to the point that HD was stopped after 3 hours. Pt is mostly anuric, but this am had dysuria with small amount of urine. At this time, she denies any abdo/ chest pain, n/v/d/c, SOB, hematuria, blood in stool, fever, or chills.    In the ED, she was hypertensive otherwise stable VS. Labs significant for leukocytosis and anemia. CT a/p  consistent with acute cholecystitis and cystitis; also showing chronic findings consistent with mesenteric panniculitis and chronic L1 compression fracture. Surgery was consulted; but states that patient is not a surgical candidate at this time given extensive comorbidities; recommends medical management.  (31 Aug 2024 19:58)   "  No distress  Had only 2 hours HD yesterday   No distress      PAST MEDICAL & SURGICAL HISTORY:  HTN (hypertension)      HLD (hyperlipidemia)      DM type 2 (diabetes mellitus, type 2)      CKD (chronic kidney disease)      Asthma      H/O vitamin D deficiency      GERD (gastroesophageal reflux disease)      ESRD on dialysis      HLD (hyperlipidemia)      DM (diabetes mellitus)      HTN (hypertension)      History of cataract surgery        FAMILY HISTORY:    Allergies    No Known Allergies    Intolerances      Home Medications:  ferrous sulfate 325 mg (65 mg elemental iron) oral tablet: 1 tab(s) orally once a day (20 Jan 2024 14:30)  Januvia 100 mg oral tablet: 1 tab(s) orally once a day (20 Jan 2024 14:30)  levETIRAcetam 1500 mg oral tablet, extended release: 1 tab(s) orally once a day (31 Aug 2024 21:29)  mirtazapine 7.5 mg oral tablet: 1 tab(s) orally once a day (20 Jan 2024 14:30)  pantoprazole 40 mg oral delayed release tablet: 1 tab(s) orally once a day (20 Jan 2024 14:32)  sevelamer carbonate 800 mg oral tablet: 2 tab(s) orally 3 times a day (with meals) (20 Jan 2024 14:30)    MEDICATIONS  (STANDING):  carvedilol 12.5 milliGRAM(s) Oral every 12 hours  dextrose 5%. 1000 milliLiter(s) (50 mL/Hr) IV Continuous <Continuous>  dextrose 50% Injectable 25 Gram(s) IV Push once  epoetin ranjan-epbx (RETACRIT) Injectable 95303 Unit(s) IV Push <User Schedule>  ferrous    sulfate 325 milliGRAM(s) Oral daily  glucagon  Injectable 1 milliGRAM(s) IntraMuscular once  heparin   Injectable 5000 Unit(s) SubCutaneous every 12 hours  hydrALAZINE 50 milliGRAM(s) Oral every 8 hours  insulin lispro (ADMELOG) corrective regimen sliding scale   SubCutaneous three times a day before meals  levETIRAcetam 1500 milliGRAM(s) Oral daily  losartan 100 milliGRAM(s) Oral daily  metoprolol tartrate Injectable 5 milliGRAM(s) IV Push once  mirtazapine 7.5 milliGRAM(s) Oral at bedtime  pantoprazole    Tablet 40 milliGRAM(s) Oral before breakfast  piperacillin/tazobactam IVPB.. 3.375 Gram(s) IV Intermittent every 12 hours  sevelamer carbonate 800 milliGRAM(s) Oral every 8 hours    MEDICATIONS  (PRN):  acetaminophen     Tablet .. 650 milliGRAM(s) Oral every 6 hours PRN Temp greater or equal to 38C (100.4F), Mild Pain (1 - 3)  aluminum hydroxide/magnesium hydroxide/simethicone Suspension 30 milliLiter(s) Oral every 4 hours PRN Dyspepsia  dextrose Oral Gel 15 Gram(s) Oral once PRN Blood Glucose LESS THAN 70 milliGRAM(s)/deciliter  melatonin 3 milliGRAM(s) Oral at bedtime PRN Insomnia  ondansetron Injectable 4 milliGRAM(s) IV Push every 8 hours PRN Nausea and/or Vomiting    Vital Signs Last 24 Hrs  T(C): 36.6 (01 Sep 2024 16:48), Max: 36.9 (01 Sep 2024 05:19)  T(F): 97.8 (01 Sep 2024 16:48), Max: 98.4 (01 Sep 2024 05:19)  HR: 73 (01 Sep 2024 16:48) (65 - 76)  BP: 199/67 (01 Sep 2024 16:48) (170/68 - 199/67)  BP(mean): 101 (31 Aug 2024 22:02) (101 - 108)  RR: 18 (01 Sep 2024 16:48) (18 - 20)  SpO2: 94% (01 Sep 2024 16:48) (90% - 98%)    Parameters below as of 01 Sep 2024 16:48  Patient On (Oxygen Delivery Method): room air        Daily     Daily     CAPILLARY BLOOD GLUCOSE      POCT Blood Glucose.: 87 mg/dL (01 Sep 2024 15:50)  POCT Blood Glucose.: 94 mg/dL (01 Sep 2024 11:27)  POCT Blood Glucose.: 89 mg/dL (01 Sep 2024 07:46)  POCT Blood Glucose.: 114 mg/dL (31 Aug 2024 21:22)    PHYSICAL EXAM:      T(C): 36.6 (09-01-24 @ 16:48), Max: 36.9 (09-01-24 @ 05:19)  HR: 73 (09-01-24 @ 16:48) (65 - 76)  BP: 199/67 (09-01-24 @ 16:48) (170/68 - 199/67)  RR: 18 (09-01-24 @ 16:48) (18 - 20)  SpO2: 94% (09-01-24 @ 16:48) (90% - 98%)  Wt(kg): --  Lungs clear  Heart S1S2  Abd soft NT ND  Extremities:   tr edema              09-01    139  |  102  |  24<H>  ----------------------------<  90  4.1   |  28  |  4.50<H>    Ca    9.0      01 Sep 2024 07:08  Mg     2.3     08-31    TPro  6.1  /  Alb  2.7<L>  /  TBili  0.9  /  DBili  x   /  AST  19  /  ALT  10<L>  /  AlkPhos  97  09-01                          9.3    13.30 )-----------( 155      ( 01 Sep 2024 07:08 )             28.8     Creatinine Trend: 4.50<--, 3.52<--  Urinalysis Basic - ( 01 Sep 2024 07:08 )    Color: x / Appearance: x / SG: x / pH: x  Gluc: 90 mg/dL / Ketone: x  / Bili: x / Urobili: x   Blood: x / Protein: x / Nitrite: x   Leuk Esterase: x / RBC: x / WBC x   Sq Epi: x / Non Sq Epi: x / Bacteria: x      < from: CT Abdomen and Pelvis No Cont (08.31.24 @ 13:10) >  IMPRESSION:  1. Gallbladder wall edema and cholelithiasis. Correlate for cholecystitis.  2. Bladder wall thickening versus normal collapsed bladder. Correlate for   cystitis.  3. 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 Malignancy is considered less likely given stability over   time.  4. Chronic severe compression deformity of vertebral body L1.      < end of copied text >    < from: US Abdomen Upper Quadrant Right (08.31.24 @ 15:47) >  IMPRESSION:  Cholelithiasis. Findings equivocal for acute cholecystitis. If clinical   suspicion persists, consider biliary scintigraphy for further evaluation.    < end of copied text >      Assessment   ESRD, gallbladder edema and cholelithiasis     Plan  HD for tomorrow    Linden Pastrana MD

## 2024-09-01 NOTE — PATIENT PROFILE ADULT - FALL HARM RISK - HARM RISK INTERVENTIONS

## 2024-09-02 LAB
GLUCOSE BLDC GLUCOMTR-MCNC: 110 MG/DL — HIGH (ref 70–99)
GLUCOSE BLDC GLUCOMTR-MCNC: 110 MG/DL — HIGH (ref 70–99)
GLUCOSE BLDC GLUCOMTR-MCNC: 57 MG/DL — LOW (ref 70–99)
GLUCOSE BLDC GLUCOMTR-MCNC: 63 MG/DL — LOW (ref 70–99)
GLUCOSE BLDC GLUCOMTR-MCNC: 69 MG/DL — LOW (ref 70–99)
GLUCOSE BLDC GLUCOMTR-MCNC: 92 MG/DL — SIGNIFICANT CHANGE UP (ref 70–99)
GLUCOSE BLDC GLUCOMTR-MCNC: 94 MG/DL — SIGNIFICANT CHANGE UP (ref 70–99)

## 2024-09-02 PROCEDURE — 99232 SBSQ HOSP IP/OBS MODERATE 35: CPT

## 2024-09-02 PROCEDURE — 99231 SBSQ HOSP IP/OBS SF/LOW 25: CPT

## 2024-09-02 RX ORDER — DEXTROSE 15 G/33 G
15 GEL IN PACKET (GRAM) ORAL ONCE
Refills: 0 | Status: COMPLETED | OUTPATIENT
Start: 2024-09-02 | End: 2024-09-02

## 2024-09-02 RX ADMIN — Medication 40 MILLIGRAM(S): at 08:18

## 2024-09-02 RX ADMIN — Medication 15 GRAM(S): at 08:18

## 2024-09-02 RX ADMIN — Medication 50 MILLIGRAM(S): at 05:15

## 2024-09-02 RX ADMIN — SEVELAMER CARBONATE 800 MILLIGRAM(S): 800 TABLET, FILM COATED ORAL at 21:46

## 2024-09-02 RX ADMIN — CARVEDILOL 12.5 MILLIGRAM(S): 6.25 TABLET ORAL at 05:15

## 2024-09-02 RX ADMIN — LOSARTAN POTASSIUM 100 MILLIGRAM(S): 50 TABLET ORAL at 05:15

## 2024-09-02 RX ADMIN — Medication 5000 UNIT(S): at 05:25

## 2024-09-02 RX ADMIN — Medication 5000 UNIT(S): at 18:16

## 2024-09-02 RX ADMIN — Medication 50 MILLIGRAM(S): at 14:42

## 2024-09-02 RX ADMIN — Medication 50 MILLIGRAM(S): at 21:46

## 2024-09-02 RX ADMIN — PIPERACILLIN SODIUM AND TAZOBACTAM SODIUM 25 GRAM(S): 3; .375 INJECTION, POWDER, FOR SOLUTION INTRAVENOUS at 05:14

## 2024-09-02 RX ADMIN — Medication 7.5 MILLIGRAM(S): at 21:47

## 2024-09-02 RX ADMIN — CARVEDILOL 12.5 MILLIGRAM(S): 6.25 TABLET ORAL at 18:16

## 2024-09-02 RX ADMIN — LEVETIRACETAM 1500 MILLIGRAM(S): 1000 TABLET ORAL at 11:57

## 2024-09-02 RX ADMIN — Medication 325 MILLIGRAM(S): at 11:56

## 2024-09-02 RX ADMIN — SEVELAMER CARBONATE 800 MILLIGRAM(S): 800 TABLET, FILM COATED ORAL at 14:42

## 2024-09-02 RX ADMIN — SEVELAMER CARBONATE 800 MILLIGRAM(S): 800 TABLET, FILM COATED ORAL at 05:15

## 2024-09-02 RX ADMIN — PIPERACILLIN SODIUM AND TAZOBACTAM SODIUM 25 GRAM(S): 3; .375 INJECTION, POWDER, FOR SOLUTION INTRAVENOUS at 18:16

## 2024-09-02 NOTE — PROGRESS NOTE ADULT - NS ATTEND AMEND GEN_ALL_CORE FT
Patient seen and examined with PA/NP  Pain improved, leukocytosis improved  Tolerated some PO intake  Abd is soft, not tender and not distended    - diet as tolerated  - continue antibiotics  - will continue to follow
Patient seen and examined with PA/NP  Doing well  NO nausea, vomiting, fever or chills  No abdominal pain  Tolerating PO intake    Awake, alert  Breathing comfortably on room air  Abd is soft, not distended, and not tender  No rebound, no guarding    Cholecystitis improved on antibiotics  - no contra-indication for discharge from general surgery standpoint in regards to cholecystitis. Would complete a 7 day course of antibiotics. When discharged, can switch to Augmentin 875mg PO BID  - will sign off, please reconsult as needed

## 2024-09-03 LAB
ALBUMIN SERPL ELPH-MCNC: 2.6 G/DL — LOW (ref 3.3–5)
ALP SERPL-CCNC: 83 U/L — SIGNIFICANT CHANGE UP (ref 40–120)
ALT FLD-CCNC: 8 U/L — LOW (ref 12–78)
ANION GAP SERPL CALC-SCNC: 10 MMOL/L — SIGNIFICANT CHANGE UP (ref 5–17)
AST SERPL-CCNC: 17 U/L — SIGNIFICANT CHANGE UP (ref 15–37)
BILIRUB SERPL-MCNC: 0.6 MG/DL — SIGNIFICANT CHANGE UP (ref 0.2–1.2)
BUN SERPL-MCNC: 37 MG/DL — HIGH (ref 7–23)
CALCIUM SERPL-MCNC: 8.7 MG/DL — SIGNIFICANT CHANGE UP (ref 8.5–10.1)
CHLORIDE SERPL-SCNC: 98 MMOL/L — SIGNIFICANT CHANGE UP (ref 96–108)
CO2 SERPL-SCNC: 25 MMOL/L — SIGNIFICANT CHANGE UP (ref 22–31)
CREAT SERPL-MCNC: 6.82 MG/DL — HIGH (ref 0.5–1.3)
EGFR: 6 ML/MIN/1.73M2 — LOW
GLUCOSE BLDC GLUCOMTR-MCNC: 102 MG/DL — HIGH (ref 70–99)
GLUCOSE BLDC GLUCOMTR-MCNC: 132 MG/DL — HIGH (ref 70–99)
GLUCOSE BLDC GLUCOMTR-MCNC: 94 MG/DL — SIGNIFICANT CHANGE UP (ref 70–99)
GLUCOSE BLDC GLUCOMTR-MCNC: 96 MG/DL — SIGNIFICANT CHANGE UP (ref 70–99)
GLUCOSE SERPL-MCNC: 87 MG/DL — SIGNIFICANT CHANGE UP (ref 70–99)
HCT VFR BLD CALC: 30 % — LOW (ref 34.5–45)
HGB BLD-MCNC: 9.7 G/DL — LOW (ref 11.5–15.5)
MCHC RBC-ENTMCNC: 25 PG — LOW (ref 27–34)
MCHC RBC-ENTMCNC: 32.3 G/DL — SIGNIFICANT CHANGE UP (ref 32–36)
MCV RBC AUTO: 77.3 FL — LOW (ref 80–100)
NRBC # BLD: 0 /100 WBCS — SIGNIFICANT CHANGE UP (ref 0–0)
PLATELET # BLD AUTO: 174 K/UL — SIGNIFICANT CHANGE UP (ref 150–400)
POTASSIUM SERPL-MCNC: 5.2 MMOL/L — SIGNIFICANT CHANGE UP (ref 3.5–5.3)
POTASSIUM SERPL-SCNC: 5.2 MMOL/L — SIGNIFICANT CHANGE UP (ref 3.5–5.3)
PROT SERPL-MCNC: 6.1 GM/DL — SIGNIFICANT CHANGE UP (ref 6–8.3)
RBC # BLD: 3.88 M/UL — SIGNIFICANT CHANGE UP (ref 3.8–5.2)
RBC # FLD: 16.6 % — HIGH (ref 10.3–14.5)
SODIUM SERPL-SCNC: 133 MMOL/L — LOW (ref 135–145)
WBC # BLD: 11.17 K/UL — HIGH (ref 3.8–10.5)
WBC # FLD AUTO: 11.17 K/UL — HIGH (ref 3.8–10.5)

## 2024-09-03 PROCEDURE — 99232 SBSQ HOSP IP/OBS MODERATE 35: CPT

## 2024-09-03 RX ORDER — HYDRALAZINE HCL 50 MG
100 TABLET ORAL EVERY 8 HOURS
Refills: 0 | Status: DISCONTINUED | OUTPATIENT
Start: 2024-09-03 | End: 2024-09-05

## 2024-09-03 RX ORDER — AMLODIPINE BESYLATE 10 MG/1
5 TABLET ORAL DAILY
Refills: 0 | Status: DISCONTINUED | OUTPATIENT
Start: 2024-09-03 | End: 2024-09-05

## 2024-09-03 RX ADMIN — SEVELAMER CARBONATE 800 MILLIGRAM(S): 800 TABLET, FILM COATED ORAL at 22:02

## 2024-09-03 RX ADMIN — LEVETIRACETAM 1500 MILLIGRAM(S): 1000 TABLET ORAL at 11:25

## 2024-09-03 RX ADMIN — LOSARTAN POTASSIUM 100 MILLIGRAM(S): 50 TABLET ORAL at 05:22

## 2024-09-03 RX ADMIN — Medication 5000 UNIT(S): at 17:52

## 2024-09-03 RX ADMIN — Medication 5000 UNIT(S): at 05:23

## 2024-09-03 RX ADMIN — AMLODIPINE BESYLATE 5 MILLIGRAM(S): 10 TABLET ORAL at 22:34

## 2024-09-03 RX ADMIN — PIPERACILLIN SODIUM AND TAZOBACTAM SODIUM 25 GRAM(S): 3; .375 INJECTION, POWDER, FOR SOLUTION INTRAVENOUS at 17:52

## 2024-09-03 RX ADMIN — Medication 50 MILLIGRAM(S): at 22:02

## 2024-09-03 RX ADMIN — CARVEDILOL 12.5 MILLIGRAM(S): 6.25 TABLET ORAL at 05:23

## 2024-09-03 RX ADMIN — Medication 7.5 MILLIGRAM(S): at 22:05

## 2024-09-03 RX ADMIN — Medication 40 MILLIGRAM(S): at 05:24

## 2024-09-03 RX ADMIN — Medication 325 MILLIGRAM(S): at 11:25

## 2024-09-03 RX ADMIN — ONDANSETRON 4 MILLIGRAM(S): 2 INJECTION, SOLUTION INTRAMUSCULAR; INTRAVENOUS at 12:08

## 2024-09-03 RX ADMIN — SEVELAMER CARBONATE 800 MILLIGRAM(S): 800 TABLET, FILM COATED ORAL at 05:23

## 2024-09-03 RX ADMIN — CARVEDILOL 12.5 MILLIGRAM(S): 6.25 TABLET ORAL at 17:52

## 2024-09-03 RX ADMIN — PIPERACILLIN SODIUM AND TAZOBACTAM SODIUM 25 GRAM(S): 3; .375 INJECTION, POWDER, FOR SOLUTION INTRAVENOUS at 05:22

## 2024-09-03 RX ADMIN — Medication 50 MILLIGRAM(S): at 05:23

## 2024-09-03 NOTE — PHYSICAL THERAPY INITIAL EVALUATION ADULT - BALANCE TRAINING, PT EVAL
Pt will improve static & dynamic standing balance to Good- using [Rolling walker]  to perform  Gait with CGA in 2 weeks

## 2024-09-03 NOTE — PHYSICAL THERAPY INITIAL EVALUATION ADULT - TRANSFER TRAINING, PT EVAL
Pt will be able to perform sit to stand, stand pivot transfer using [RW] with CGA  in 2 to 3 weeks to improve functional transfers between any 2 surfaces

## 2024-09-03 NOTE — PHYSICAL THERAPY INITIAL EVALUATION ADULT - ADDITIONAL COMMENTS
As per pt she lives with a partner in a house with 5 steps to enter with 1 rail, she can stay at the main level, she has a wheelchair, she needs Ax1 to ambulate for short distances, RW, most of the time 2 person assist to do OOB transfers to , pt is poor historian
yes

## 2024-09-03 NOTE — PHYSICAL THERAPY INITIAL EVALUATION ADULT - PLANNED THERAPY INTERVENTIONS, PT EVAL
balance training/bed mobility training/strengthening/transfer training balance training/bed mobility training/gait training/strengthening/transfer training

## 2024-09-03 NOTE — PHYSICAL THERAPY INITIAL EVALUATION ADULT - PERTINENT HX OF CURRENT PROBLEM, REHAB EVAL
As per H& P "83-year-old female pmhx of ESRD on dialysis(T/T/S), hypertension, diabetes, anemia(ANA and ACD),  seizure on Keppra, liver failure/jaundice, recent pneumonia, recent hepatitis C(treated),  recent pancreatitis comes to ED w/ generalized weakness and generalized abdominal pain while in HD earlier this morning. Pt describes the pain as sharp, diffuse, 10/10, non radiating, intermittent, sever to the point that HD was stopped after 3 hours. Pt is mostly anuric, but this am had dysuria with small amount of urine"  CT Abdomen : "1. Gallbladder wall edema and cholelithiasis. Correlate for cholecystitis.  2. Bladder wall thickening versus normal collapsed bladder. Correlate for   cystitis.  3. Numerous nodular soft tissue densities in the upper abdomen, also   demonstrated on prior CT from 2023, probably representing mesenteric   panniculitis"

## 2024-09-04 LAB
ALBUMIN SERPL ELPH-MCNC: 2.7 G/DL — LOW (ref 3.3–5)
ALP SERPL-CCNC: 81 U/L — SIGNIFICANT CHANGE UP (ref 40–120)
ALT FLD-CCNC: 10 U/L — LOW (ref 12–78)
ANION GAP SERPL CALC-SCNC: 7 MMOL/L — SIGNIFICANT CHANGE UP (ref 5–17)
AST SERPL-CCNC: 12 U/L — LOW (ref 15–37)
BILIRUB SERPL-MCNC: 0.7 MG/DL — SIGNIFICANT CHANGE UP (ref 0.2–1.2)
BUN SERPL-MCNC: 16 MG/DL — SIGNIFICANT CHANGE UP (ref 7–23)
CALCIUM SERPL-MCNC: 8.3 MG/DL — LOW (ref 8.5–10.1)
CHLORIDE SERPL-SCNC: 95 MMOL/L — LOW (ref 96–108)
CO2 SERPL-SCNC: 30 MMOL/L — SIGNIFICANT CHANGE UP (ref 22–31)
CREAT SERPL-MCNC: 4.41 MG/DL — HIGH (ref 0.5–1.3)
EGFR: 9 ML/MIN/1.73M2 — LOW
GLUCOSE BLDC GLUCOMTR-MCNC: 116 MG/DL — HIGH (ref 70–99)
GLUCOSE BLDC GLUCOMTR-MCNC: 194 MG/DL — HIGH (ref 70–99)
GLUCOSE BLDC GLUCOMTR-MCNC: 80 MG/DL — SIGNIFICANT CHANGE UP (ref 70–99)
GLUCOSE BLDC GLUCOMTR-MCNC: 85 MG/DL — SIGNIFICANT CHANGE UP (ref 70–99)
GLUCOSE SERPL-MCNC: 89 MG/DL — SIGNIFICANT CHANGE UP (ref 70–99)
HCT VFR BLD CALC: 29.5 % — LOW (ref 34.5–45)
HGB BLD-MCNC: 9.7 G/DL — LOW (ref 11.5–15.5)
MCHC RBC-ENTMCNC: 25.1 PG — LOW (ref 27–34)
MCHC RBC-ENTMCNC: 32.9 G/DL — SIGNIFICANT CHANGE UP (ref 32–36)
MCV RBC AUTO: 76.2 FL — LOW (ref 80–100)
NRBC # BLD: 0 /100 WBCS — SIGNIFICANT CHANGE UP (ref 0–0)
PLATELET # BLD AUTO: 175 K/UL — SIGNIFICANT CHANGE UP (ref 150–400)
POTASSIUM SERPL-MCNC: 3.8 MMOL/L — SIGNIFICANT CHANGE UP (ref 3.5–5.3)
POTASSIUM SERPL-SCNC: 3.8 MMOL/L — SIGNIFICANT CHANGE UP (ref 3.5–5.3)
PROT SERPL-MCNC: 6.3 GM/DL — SIGNIFICANT CHANGE UP (ref 6–8.3)
RBC # BLD: 3.87 M/UL — SIGNIFICANT CHANGE UP (ref 3.8–5.2)
RBC # FLD: 16.5 % — HIGH (ref 10.3–14.5)
SODIUM SERPL-SCNC: 132 MMOL/L — LOW (ref 135–145)
WBC # BLD: 10.47 K/UL — SIGNIFICANT CHANGE UP (ref 3.8–10.5)
WBC # FLD AUTO: 10.47 K/UL — SIGNIFICANT CHANGE UP (ref 3.8–10.5)

## 2024-09-04 PROCEDURE — 99232 SBSQ HOSP IP/OBS MODERATE 35: CPT

## 2024-09-04 RX ADMIN — Medication 100 MILLIGRAM(S): at 22:43

## 2024-09-04 RX ADMIN — SEVELAMER CARBONATE 800 MILLIGRAM(S): 800 TABLET, FILM COATED ORAL at 22:33

## 2024-09-04 RX ADMIN — Medication 1: at 12:01

## 2024-09-04 RX ADMIN — LOSARTAN POTASSIUM 100 MILLIGRAM(S): 50 TABLET ORAL at 05:16

## 2024-09-04 RX ADMIN — AMLODIPINE BESYLATE 5 MILLIGRAM(S): 10 TABLET ORAL at 05:17

## 2024-09-04 RX ADMIN — Medication 5000 UNIT(S): at 05:17

## 2024-09-04 RX ADMIN — LEVETIRACETAM 1500 MILLIGRAM(S): 1000 TABLET ORAL at 12:01

## 2024-09-04 RX ADMIN — CARVEDILOL 12.5 MILLIGRAM(S): 6.25 TABLET ORAL at 05:17

## 2024-09-04 RX ADMIN — Medication 5000 UNIT(S): at 17:43

## 2024-09-04 RX ADMIN — SEVELAMER CARBONATE 800 MILLIGRAM(S): 800 TABLET, FILM COATED ORAL at 13:16

## 2024-09-04 RX ADMIN — Medication 7.5 MILLIGRAM(S): at 22:34

## 2024-09-04 RX ADMIN — PIPERACILLIN SODIUM AND TAZOBACTAM SODIUM 25 GRAM(S): 3; .375 INJECTION, POWDER, FOR SOLUTION INTRAVENOUS at 17:44

## 2024-09-04 RX ADMIN — Medication 40 MILLIGRAM(S): at 08:43

## 2024-09-04 RX ADMIN — Medication 325 MILLIGRAM(S): at 12:01

## 2024-09-04 RX ADMIN — Medication 100 MILLIGRAM(S): at 05:16

## 2024-09-04 RX ADMIN — Medication 100 MILLIGRAM(S): at 13:16

## 2024-09-04 RX ADMIN — SEVELAMER CARBONATE 800 MILLIGRAM(S): 800 TABLET, FILM COATED ORAL at 05:16

## 2024-09-04 RX ADMIN — PIPERACILLIN SODIUM AND TAZOBACTAM SODIUM 25 GRAM(S): 3; .375 INJECTION, POWDER, FOR SOLUTION INTRAVENOUS at 05:16

## 2024-09-04 RX ADMIN — CARVEDILOL 12.5 MILLIGRAM(S): 6.25 TABLET ORAL at 17:44

## 2024-09-04 NOTE — PROGRESS NOTE ADULT - ASSESSMENT
82 Y/O female with a PMHx of DM, ESRD on HD (TTS), CAD (s/p stent), HTN, Hep c, liver failure and HLD. US findings equivocal for acute cholecystitis  Patient is poor surgical candidate and family declining surgery and electing for conservative management.   Afebrile.       PLAN:     - Continue ABx   - Continue diet as tolerated   - F/U AM labs   - Medical management   - DVT ppx; OOB/AAT 
82 y/o F PMHx of DM, ESRD on HD (TTS), CAD (s/p stent), HTN, Hep c, liver failure and HLD, US findings equivocal for acute cholecystitis,  poor surgical candiidate, family electing not to have surgery  manage with abx  NPO/IVF/ IV ABX  medical management      
83-year-old female pmhx of ESRD on dialysis(T/T/S), hypertension, diabetes, anemia(ANA and ACD),  seizure on Keppra, liver failure/jaundice, recent pneumonia, recent hepatitis C(treated),  recent pancreatitis comes to ED w/ generalized weakness and generalized abdominal pain while in HD earlier this morning. Found with acute cholecystitis and cystitis.     Problem/Plan - 1:  ·  Problem: Acute cholecystitis.   ·  Plan: - CT a/p  and abdo u/s reviewed as above   - Surgery consulted; recommends medical management  - C/w iv Zosyn   - Judicious IVF int he seting of ESRD  - f/u cultures    Problem/Plan - 2:  ·  Problem: Acute cystitis.   ·  Plan: - Pt is mostly anuric; did c/o dysuria with small a mount of urine this am   - continue with zosyn.    Problem/Plan - 3:  ·  Problem: Mesenteric panniculitis.   ·  Plan: - chronic findings  - f/u outpatient.    Problem/Plan - 4:  ·  Problem: Compression fracture of L1 vertebra.   ·  Plan: Chronic.    Problem/Plan - 5:  ·  Problem: Seizures.   ·  Plan: c/w keppra 1500 mg po daily.    Problem/Plan - 6:  ·  Problem: ESRD on dialysis.   ·  Plan: - HD on t/t/s as per nephrology  - Last session on 8/31. Pt got  2 out 3 hours  - resume sevelamer and epo.  - Renal following .     Problem/Plan - 7:  ·  Problem: HTN (hypertension).   ·  Plan: Resume home meds: losartan, carvedilol and hydralazine.    Problem/Plan - 8:  ·  Problem: DM type 2 (diabetes mellitus, type 2).   ·  Plan: - home meds: unknown  - iss for now  - hypoglycemia protocol in place  - POC BG.      OOB with PT. 
83-year-old female pmhx of ESRD on dialysis(T/T/S), hypertension, diabetes, anemia(ANA and ACD),  seizure on Keppra, liver failure/jaundice, recent pneumonia, recent hepatitis C(treated),  recent pancreatitis comes to ED w/ generalized weakness and generalized abdominal pain while in HD earlier this morning. Found with acute cholecystitis and cystitis.     Problem/Plan - 1:  ·  Problem: Acute cholecystitis.   ·  Plan: - CT a/p  and abdo u/s reviewed as above   - Surgery consulted; recommends medical management  - C/w iv Zosyn .  Change to Augmentin on discharge to complete 7d course.   - Judicious IVF int he seting of ESRD      Problem/Plan - 2:  ·  Problem: Acute cystitis.   ·  Plan: - Pt is mostly anuric; did c/o dysuria with small a mount of urine this am   - continue with zosyn.    Problem/Plan - 3:  ·  Problem: Mesenteric panniculitis.   ·  Plan: - chronic findings  - f/u outpatient.    Problem/Plan - 4:  ·  Problem: Compression fracture of L1 vertebra.   ·  Plan: Chronic.    Problem/Plan - 5:  ·  Problem: Seizures.   ·  Plan: c/w keppra 1500 mg po daily.    Problem/Plan - 6:  ·  Problem: ESRD on dialysis.   ·  Plan: - HD on t/t/s as per nephrology  - Last session on 8/31. Pt got  2 out 3 hours  - resume sevelamer and epo.  - Renal following .     Problem/Plan - 7:  ·  Problem: HTN (hypertension).   ·  Plan: Resume home meds: losartan, carvedilol and hydralazine.  Will increase Hydralazine, add Norvasc as BP elevated.     Problem/Plan - 8:  ·  Problem: DM type 2 (diabetes mellitus, type 2).   ·  Plan: - home meds: unknown  - iss for now  - hypoglycemia protocol in place  - POC BG.      Stable for AURE placement. 
83-year-old female pmhx of ESRD on dialysis(T/T/S), hypertension, diabetes, anemia(ANA and ACD),  seizure on Keppra, liver failure/jaundice, recent pneumonia, recent hepatitis C(treated),  recent pancreatitis comes to ED w/ generalized weakness and generalized abdominal pain while in HD earlier this morning. Found with acute cholecystitis and cystitis.     ##Acute cholecystitis   ·  CT a/p and abdo u/s reviewed as above   - Surgery consulted; recommends medical management  - C/w IV Zosyn; Change to Augmentin on discharge to complete 7d course.   - Judicious IVF int he seting of ESRD    #Acute cystitis   · Pt is mostly anuric; did c/o dysuria with small a mount of urine this am   - Continue with zosyn.    #Mesenteric panniculitis   ·Chronic findings; f/u outpatient    #Compression fracture of L1 vertebra.   · Chronic.    # Seizures.   · C/w keppra 1500 mg po daily.    #ESRD on dialysis.   · HD on t/t/s as per nephrology  - C/w Dialysis   - resume sevelamer and epo.  - Renal following .     #HTN (hypertension).   · Resume current regimen     #Type 2 Diabetes Mellitus   · home meds unknown  - C/w ISS  - hypoglycemia protocol in place    Diet: Renal CCD   DVT prophylaxis: Heparin   Dispo: Admit   Code Status: FC     I have spent a total of  45 minutes to prepare to see the patient, obtaining and reviewing history, physical examination, explaining the diagnosis, prognosis and treatment plan with the patient/family/caregiver. I also have spent the time ordering studies and testing, interpreting results, medicine reconciliation, IDRs, subspecialty consultation and documentation as above.    Stable for AURE placement.   
83-year-old female pmhx of ESRD on dialysis(T/T/S), hypertension, diabetes, anemia(ANA and ACD),  seizure on Keppra, liver failure/jaundice, recent pneumonia, recent hepatitis C(treated),  recent pancreatitis comes to ED w/ generalized weakness and generalized abdominal pain while in HD earlier this morning. Found with acute cholecystitis and cystitis.     Problem/Plan - 1:  ·  Problem: Acute cholecystitis.   ·  Plan: - CT a/p  and abdo u/s reviewed as above   - Surgery consulted; recommends medical management  - C/w iv Zosyn   - Judicious IVF int he seting of ESRD  - f/u cultures    Problem/Plan - 2:  ·  Problem: Acute cystitis.   ·  Plan: - Pt is mostly anuric; did c/o dysuria with small a mount of urine this am   - continue with zosyn.    Problem/Plan - 3:  ·  Problem: Mesenteric panniculitis.   ·  Plan: - chronic findings  - f/u outpatient.    Problem/Plan - 4:  ·  Problem: Compression fracture of L1 vertebra.   ·  Plan: Chronic.    Problem/Plan - 5:  ·  Problem: Seizures.   ·  Plan: c/w keppra 1500 mg po daily.    Problem/Plan - 6:  ·  Problem: ESRD on dialysis.   ·  Plan: - HD on t/t/s as per nephrology  - Last session on 8/31. Pt got  2 out 3 hours  - resume sevelamer and epo.  - Renal evaluation requested.     Problem/Plan - 7:  ·  Problem: HTN (hypertension).   ·  Plan: Resume home meds: losartan, carvedilol and hydralazine.    Problem/Plan - 8:  ·  Problem: DM type 2 (diabetes mellitus, type 2).   ·  Plan: - home meds: unknown  - iss for now  - hypoglycemia protocol in place  - POC BG.

## 2024-09-05 ENCOUNTER — TRANSCRIPTION ENCOUNTER (OUTPATIENT)
Age: 84
End: 2024-09-05

## 2024-09-05 VITALS — HEART RATE: 84 BPM | SYSTOLIC BLOOD PRESSURE: 147 MMHG | DIASTOLIC BLOOD PRESSURE: 80 MMHG

## 2024-09-05 LAB
ALBUMIN SERPL ELPH-MCNC: 2.6 G/DL — LOW (ref 3.3–5)
ALP SERPL-CCNC: 75 U/L — SIGNIFICANT CHANGE UP (ref 40–120)
ALT FLD-CCNC: 9 U/L — LOW (ref 12–78)
ANION GAP SERPL CALC-SCNC: 8 MMOL/L — SIGNIFICANT CHANGE UP (ref 5–17)
AST SERPL-CCNC: 15 U/L — SIGNIFICANT CHANGE UP (ref 15–37)
BILIRUB SERPL-MCNC: 0.6 MG/DL — SIGNIFICANT CHANGE UP (ref 0.2–1.2)
BUN SERPL-MCNC: 23 MG/DL — SIGNIFICANT CHANGE UP (ref 7–23)
CALCIUM SERPL-MCNC: 8.4 MG/DL — LOW (ref 8.5–10.1)
CHLORIDE SERPL-SCNC: 94 MMOL/L — LOW (ref 96–108)
CO2 SERPL-SCNC: 28 MMOL/L — SIGNIFICANT CHANGE UP (ref 22–31)
CREAT SERPL-MCNC: 6.03 MG/DL — HIGH (ref 0.5–1.3)
EGFR: 6 ML/MIN/1.73M2 — LOW
GLUCOSE BLDC GLUCOMTR-MCNC: 103 MG/DL — HIGH (ref 70–99)
GLUCOSE BLDC GLUCOMTR-MCNC: 111 MG/DL — HIGH (ref 70–99)
GLUCOSE BLDC GLUCOMTR-MCNC: 143 MG/DL — HIGH (ref 70–99)
GLUCOSE SERPL-MCNC: 112 MG/DL — HIGH (ref 70–99)
HCT VFR BLD CALC: 29.1 % — LOW (ref 34.5–45)
HGB BLD-MCNC: 9.7 G/DL — LOW (ref 11.5–15.5)
MCHC RBC-ENTMCNC: 25.1 PG — LOW (ref 27–34)
MCHC RBC-ENTMCNC: 33.3 G/DL — SIGNIFICANT CHANGE UP (ref 32–36)
MCV RBC AUTO: 75.2 FL — LOW (ref 80–100)
NRBC # BLD: 0 /100 WBCS — SIGNIFICANT CHANGE UP (ref 0–0)
PLATELET # BLD AUTO: 195 K/UL — SIGNIFICANT CHANGE UP (ref 150–400)
POTASSIUM SERPL-MCNC: 4.1 MMOL/L — SIGNIFICANT CHANGE UP (ref 3.5–5.3)
POTASSIUM SERPL-SCNC: 4.1 MMOL/L — SIGNIFICANT CHANGE UP (ref 3.5–5.3)
PROT SERPL-MCNC: 6 GM/DL — SIGNIFICANT CHANGE UP (ref 6–8.3)
RBC # BLD: 3.87 M/UL — SIGNIFICANT CHANGE UP (ref 3.8–5.2)
RBC # FLD: 16.6 % — HIGH (ref 10.3–14.5)
SODIUM SERPL-SCNC: 130 MMOL/L — LOW (ref 135–145)
WBC # BLD: 9.82 K/UL — SIGNIFICANT CHANGE UP (ref 3.8–10.5)
WBC # FLD AUTO: 9.82 K/UL — SIGNIFICANT CHANGE UP (ref 3.8–10.5)

## 2024-09-05 PROCEDURE — 99239 HOSP IP/OBS DSCHRG MGMT >30: CPT

## 2024-09-05 RX ORDER — AMOXICILLIN AND CLAVULANATE POTASSIUM 250; 125 MG/1; MG/1
875 TABLET, FILM COATED ORAL
Qty: 14 | Refills: 0
Start: 2024-09-05 | End: 2024-09-11

## 2024-09-05 RX ORDER — FERROUS SULFATE 325(65) MG
1 TABLET ORAL
Qty: 0 | Refills: 0 | DISCHARGE
Start: 2024-09-05

## 2024-09-05 RX ORDER — LEVETIRACETAM 1000 MG/1
1 TABLET ORAL
Qty: 30 | Refills: 1
Start: 2024-09-05 | End: 2024-11-03

## 2024-09-05 RX ORDER — LEVETIRACETAM 1000 MG/1
1 TABLET ORAL
Refills: 0 | DISCHARGE

## 2024-09-05 RX ORDER — LEVETIRACETAM 1000 MG/1
1000 TABLET ORAL DAILY
Refills: 0 | Status: DISCONTINUED | OUTPATIENT
Start: 2024-09-06 | End: 2024-09-05

## 2024-09-05 RX ORDER — CARVEDILOL 6.25 MG/1
25 TABLET ORAL EVERY 12 HOURS
Refills: 0 | Status: DISCONTINUED | OUTPATIENT
Start: 2024-09-05 | End: 2024-09-05

## 2024-09-05 RX ORDER — AMLODIPINE BESYLATE 10 MG/1
10 TABLET ORAL DAILY
Refills: 0 | Status: DISCONTINUED | OUTPATIENT
Start: 2024-09-05 | End: 2024-09-05

## 2024-09-05 RX ORDER — AMLODIPINE BESYLATE 10 MG/1
1 TABLET ORAL
Qty: 0 | Refills: 0 | DISCHARGE
Start: 2024-09-05

## 2024-09-05 RX ADMIN — CARVEDILOL 25 MILLIGRAM(S): 6.25 TABLET ORAL at 18:20

## 2024-09-05 RX ADMIN — AMLODIPINE BESYLATE 5 MILLIGRAM(S): 10 TABLET ORAL at 06:58

## 2024-09-05 RX ADMIN — PIPERACILLIN SODIUM AND TAZOBACTAM SODIUM 25 GRAM(S): 3; .375 INJECTION, POWDER, FOR SOLUTION INTRAVENOUS at 06:58

## 2024-09-05 RX ADMIN — CARVEDILOL 12.5 MILLIGRAM(S): 6.25 TABLET ORAL at 06:58

## 2024-09-05 RX ADMIN — SEVELAMER CARBONATE 800 MILLIGRAM(S): 800 TABLET, FILM COATED ORAL at 06:58

## 2024-09-05 RX ADMIN — EPOETIN ALFA 10000 UNIT(S): 3000 SOLUTION INTRAVENOUS; SUBCUTANEOUS at 15:05

## 2024-09-05 RX ADMIN — SEVELAMER CARBONATE 800 MILLIGRAM(S): 800 TABLET, FILM COATED ORAL at 21:18

## 2024-09-05 RX ADMIN — Medication 7.5 MILLIGRAM(S): at 21:18

## 2024-09-05 RX ADMIN — Medication 5000 UNIT(S): at 06:58

## 2024-09-05 RX ADMIN — PIPERACILLIN SODIUM AND TAZOBACTAM SODIUM 25 GRAM(S): 3; .375 INJECTION, POWDER, FOR SOLUTION INTRAVENOUS at 18:15

## 2024-09-05 RX ADMIN — LOSARTAN POTASSIUM 100 MILLIGRAM(S): 50 TABLET ORAL at 06:58

## 2024-09-05 RX ADMIN — Medication 40 MILLIGRAM(S): at 06:57

## 2024-09-05 RX ADMIN — Medication 100 MILLIGRAM(S): at 21:25

## 2024-09-05 RX ADMIN — Medication 100 MILLIGRAM(S): at 06:57

## 2024-09-05 NOTE — DISCHARGE NOTE PROVIDER - NSDCCAREPROVSEEN_GEN_ALL_CORE_FT
Evie, Lexie Ceja, Mychal Lopez, Deshawn Singer, Laith Rios, Puma Pastrana, Linden Llanos, Jose Raul Haider, Cher Toussaint, Chao

## 2024-09-05 NOTE — DISCHARGE NOTE PROVIDER - NSDCMRMEDTOKEN_GEN_ALL_CORE_FT
amLODIPine 10 mg oral tablet: 1 tab(s) orally once a day  amoxicillin-clavulanate 875 mg-125 mg oral tablet: 875 milligram(s) orally 2 times a day  Coreg 12.5 mg oral tablet: 1 tab(s) orally every 12 hours  epoetin ranjan 10,000 units/mL preservative-free injectable solution: 100,000 unit(s) intravenously 3 times a week  ferrous sulfate 325 mg (65 mg elemental iron) oral tablet: 1 tab(s) orally once a day  hydrALAZINE 50 mg oral tablet: 1 tab(s) orally every 8 hours  Januvia 100 mg oral tablet: 1 tab(s) orally once a day  levETIRAcetam 1500 mg oral tablet, extended release: 1 tab(s) orally once a day  losartan 100 mg oral tablet: 1 tab(s) orally once a day  mirtazapine 7.5 mg oral tablet: 1 tab(s) orally once a day  pantoprazole 40 mg oral delayed release tablet: 1 tab(s) orally once a day  sevelamer carbonate 800 mg oral tablet: 2 tab(s) orally 3 times a day (with meals)

## 2024-09-05 NOTE — DISCHARGE NOTE PROVIDER - HOSPITAL COURSE
83-year-old female pmhx of ESRD on dialysis(T/T/S), hypertension, diabetes, anemia(ANA and ACD),  seizure on Keppra, liver failure/jaundice, recent pneumonia, recent hepatitis C(treated),  recent pancreatitis comes to ED w/ generalized weakness and generalized abdominal pain while in HD earlier this morning. Found with acute cholecystitis and cystitis.     ##Acute cholecystitis   ·  CT a/p and abdo u/s reviewed. Surgery consulted but family has elected against surgical intervention given risk; Family wants medical management. Treated with IV Zosyn; Change to Augmentin on discharge to complete 7d course.   - Judicious IVF int he seting of ESRD    #Acute cystitis   · Pt is mostly anuric; did c/o dysuria with small a mount of urine. Treated with antibiotics     #Mesenteric panniculitis   ·Chronic findings; f/u outpatient    #Compression fracture of L1 vertebra.   · Chronic.    # Seizures.   · C/w keppra 1500 mg po daily.    #ESRD on dialysis.   · HD on t/t/s as per nephrology. Resume dialysis on saturday     #HTN (hypertension).   · Continue current regimen     #Type 2 Diabetes Mellitus   · resume home meds     Stable for discharge. Family wants to take patient home

## 2024-09-05 NOTE — PROGRESS NOTE ADULT - SUBJECTIVE AND OBJECTIVE BOX
NEPHROLOGY PROGRESS NOTE    CHIEF COMPLAINT:  ESRD    HPI:  Seen on dialysis.  Access working well.  BP stable.    EXAM:  Vital Signs Last 24 Hrs  T(C): 36.3 (05 Sep 2024 11:14), Max: 36.6 (05 Sep 2024 00:27)  T(F): 97.3 (05 Sep 2024 11:14), Max: 97.9 (05 Sep 2024 00:27)  HR: 69 (05 Sep 2024 11:14) (64 - 74)  BP: 163/71 (05 Sep 2024 11:14) (163/71 - 193/70)  BP(mean): --  RR: 18 (05 Sep 2024 11:14) (18 - 19)  SpO2: 93% (05 Sep 2024 11:14) (93% - 100%)    Parameters below as of 05 Sep 2024 11:14  Patient On (Oxygen Delivery Method): room air      Conversant, in no apparent distress  Normal respiratory effort, lungs clear bilaterally  Heart RRR with no murmur, no peripheral edema    LABS                        9.7    9.82  )-----------( 195      ( 05 Sep 2024 13:45 )             29.1     09-05    130<L>  |  94<L>  |  23  ----------------------------<  112<H>  4.1   |  28  |  6.03<H>    Ca    8.4<L>      05 Sep 2024 13:45    TPro  6.0  /  Alb  2.6<L>  /  TBili  0.6  /  DBili  x   /  AST  15  /  ALT  9<L>  /  AlkPhos  75  09-05        Assessment   ESRD, maintenance    Plan:  HD for today          
[facility] NEPHROLOGY SERVICES, [facility]  NEPHROLOGY AND HYPERTENSION  [address]  [address]  [place] [address]  [phone]    MD AISHA FLOWER MD YELENA ROSENBERG, MD BINNY KOSHY, MD CHRISTOPHER CAPUTO, MD EDWARD BOVER, MD          Patient events noted    MEDICATIONS  (STANDING):  amLODIPine   Tablet 5 milliGRAM(s) Oral daily  carvedilol 12.5 milliGRAM(s) Oral every 12 hours  dextrose 5%. 1000 milliLiter(s) (50 mL/Hr) IV Continuous <Continuous>  dextrose 50% Injectable 25 Gram(s) IV Push once  epoetin ranjan-epbx (RETACRIT) Injectable 43536 Unit(s) IV Push <User Schedule>  ferrous    sulfate 325 milliGRAM(s) Oral daily  glucagon  Injectable 1 milliGRAM(s) IntraMuscular once  heparin   Injectable 5000 Unit(s) SubCutaneous every 12 hours  hydrALAZINE 100 milliGRAM(s) Oral every 8 hours  insulin lispro (ADMELOG) corrective regimen sliding scale   SubCutaneous three times a day before meals  levETIRAcetam 1500 milliGRAM(s) Oral daily  losartan 100 milliGRAM(s) Oral daily  mirtazapine 7.5 milliGRAM(s) Oral at bedtime  pantoprazole    Tablet 40 milliGRAM(s) Oral before breakfast  piperacillin/tazobactam IVPB.. 3.375 Gram(s) IV Intermittent every 12 hours  sevelamer carbonate 800 milliGRAM(s) Oral every 8 hours    MEDICATIONS  (PRN):  acetaminophen     Tablet .. 650 milliGRAM(s) Oral every 6 hours PRN Temp greater or equal to 38C (100.4F), Mild Pain (1 - 3)  aluminum hydroxide/magnesium hydroxide/simethicone Suspension 30 milliLiter(s) Oral every 4 hours PRN Dyspepsia  dextrose Oral Gel 15 Gram(s) Oral once PRN Blood Glucose LESS THAN 70 milliGRAM(s)/deciliter  melatonin 3 milliGRAM(s) Oral at bedtime PRN Insomnia  ondansetron Injectable 4 milliGRAM(s) IV Push every 8 hours PRN Nausea and/or Vomiting      09-03-24 @ 07:01  -  09-04-24 @ 07:00  --------------------------------------------------------  IN: 0 mL / OUT: 4000 mL / NET: -4000 mL      PHYSICAL EXAM:      T(C): 36.4 (09-04-24 @ 17:15), Max: 36.8 (09-04-24 @ 05:12)  HR: 74 (09-04-24 @ 17:40) (62 - 74)  BP: 176/61 (09-04-24 @ 17:40) (138/72 - 187/60)  RR: 18 (09-04-24 @ 17:40) (18 - 18)  SpO2: 95% (09-04-24 @ 17:40) (94% - 100%)  Wt(kg): --  Lungs clear  Heart S1S2  Abd soft NT ND  Extremities:   tr edema                                    9.7    10.47 )-----------( 175      ( 04 Sep 2024 06:35 )             29.5     09-04    132<L>  |  95<L>  |  16  ----------------------------<  89  3.8   |  30  |  4.41<H>    Ca    8.3<L>      04 Sep 2024 06:35    TPro  6.3  /  Alb  2.7<L>  /  TBili  0.7  /  DBili  x   /  AST  12<L>  /  ALT  10<L>  /  AlkPhos  81  09-04      LIVER FUNCTIONS - ( 04 Sep 2024 06:35 )  Alb: 2.7 g/dL / Pro: 6.3 gm/dL / ALK PHOS: 81 U/L / ALT: 10 U/L / AST: 12 U/L / GGT: x           Creatinine Trend: 4.41<--, 6.82<--, 4.50<--, 3.52<--      Assessment   ESRD, maintenance    Plan:  HD for today  Linden Pastrana MD
                          Patient: AMAURY MORENO [id number] 83y Female                            Hospitalist Attending Note    No complaints  Denies pain  Appears comfortable  ____________________PHYSICAL EXAM:  GENERAL:  NAD Alert, responsive  HEENT: NCAT  CARDIOVASCULAR:  S1, S2  LUNGS: CTAB  ABDOMEN:  soft, (-) tenderness, (-) distension, (+) bowel sounds, (-) guarding, (-) rebound (-) rigidity  EXTREMITIES:  no cyanosis / clubbing / edema.   ____________________      VITALS:  Vital Signs Last 24 Hrs  T(C): 36.6 (03 Sep 2024 17:45), Max: 36.6 (03 Sep 2024 05:30)  T(F): 97.8 (03 Sep 2024 17:45), Max: 97.9 (03 Sep 2024 05:30)  HR: 70 (03 Sep 2024 17:45) (61 - 70)  BP: 168/65 (03 Sep 2024 17:45) (157/74 - 190/61)  BP(mean): --  RR: 18 (03 Sep 2024 17:45) (17 - 18)  SpO2: 100% (03 Sep 2024 17:45) (95% - 100%)    Parameters below as of 03 Sep 2024 17:45  Patient On (Oxygen Delivery Method): nasal cannula  O2 Flow (L/min): 2   Daily     Daily Weight in k (03 Sep 2024 17:10)  CAPILLARY BLOOD GLUCOSE      POCT Blood Glucose.: 132 mg/dL (03 Sep 2024 21:59)  POCT Blood Glucose.: 96 mg/dL (03 Sep 2024 17:29)  POCT Blood Glucose.: 102 mg/dL (03 Sep 2024 10:58)  POCT Blood Glucose.: 94 mg/dL (03 Sep 2024 07:39)    I&O's Summary    02 Sep 2024 07:01  -  03 Sep 2024 07:00  --------------------------------------------------------  IN: 720 mL / OUT: 0 mL / NET: 720 mL    03 Sep 2024 07:01  -  03 Sep 2024 22:01  --------------------------------------------------------  IN: 0 mL / OUT: 4000 mL / NET: -4000 mL        LABS:                        9.7    11.17 )-----------( 174      ( 03 Sep 2024 06:55 )             30.0         133<L>  |  98  |  37<H>  ----------------------------<  87  5.2   |  25  |  6.82<H>    Ca    8.7      03 Sep 2024 06:55    TPro  6.1  /  Alb  2.6<L>  /  TBili  0.6  /  DBili  x   /  AST  17  /  ALT  8<L>  /  AlkPhos  83  09-03      LIVER FUNCTIONS - ( 03 Sep 2024 06:55 )  Alb: 2.6 g/dL / Pro: 6.1 gm/dL / ALK PHOS: 83 U/L / ALT: 8 U/L / AST: 17 U/L / GGT: x           Urinalysis Basic - ( 03 Sep 2024 06:55 )    Color: x / Appearance: x / SG: x / pH: x  Gluc: 87 mg/dL / Ketone: x  / Bili: x / Urobili: x   Blood: x / Protein: x / Nitrite: x   Leuk Esterase: x / RBC: x / WBC x   Sq Epi: x / Non Sq Epi: x / Bacteria: x              MEDICATIONS:  acetaminophen     Tablet .. 650 milliGRAM(s) Oral every 6 hours PRN  aluminum hydroxide/magnesium hydroxide/simethicone Suspension 30 milliLiter(s) Oral every 4 hours PRN  carvedilol 12.5 milliGRAM(s) Oral every 12 hours  dextrose 5%. 1000 milliLiter(s) IV Continuous <Continuous>  dextrose 50% Injectable 25 Gram(s) IV Push once  dextrose Oral Gel 15 Gram(s) Oral once PRN  epoetin ranjan-epbx (RETACRIT) Injectable 53846 Unit(s) IV Push <User Schedule>  ferrous    sulfate 325 milliGRAM(s) Oral daily  glucagon  Injectable 1 milliGRAM(s) IntraMuscular once  heparin   Injectable 5000 Unit(s) SubCutaneous every 12 hours  hydrALAZINE 50 milliGRAM(s) Oral every 8 hours  insulin lispro (ADMELOG) corrective regimen sliding scale   SubCutaneous three times a day before meals  levETIRAcetam 1500 milliGRAM(s) Oral daily  losartan 100 milliGRAM(s) Oral daily  melatonin 3 milliGRAM(s) Oral at bedtime PRN  mirtazapine 7.5 milliGRAM(s) Oral at bedtime  ondansetron Injectable 4 milliGRAM(s) IV Push every 8 hours PRN  pantoprazole    Tablet 40 milliGRAM(s) Oral before breakfast  piperacillin/tazobactam IVPB.. 3.375 Gram(s) IV Intermittent every 12 hours  sevelamer carbonate 800 milliGRAM(s) Oral every 8 hours    
[facility] NEPHROLOGY SERVICES, [facility]  NEPHROLOGY AND HYPERTENSION  [address]  [address]  [place] [address]  [phone]    MD AISHA FLOWER MD YELENA ROSENBERG, MD BINNY KOSHY, MD CHRISTOPHER CAPUTO, MD EDWARD BOVER, MD          Patient events noted  No distress      MEDICATIONS  (STANDING):  carvedilol 12.5 milliGRAM(s) Oral every 12 hours  dextrose 5%. 1000 milliLiter(s) (50 mL/Hr) IV Continuous <Continuous>  dextrose 50% Injectable 25 Gram(s) IV Push once  epoetin ranjan-epbx (RETACRIT) Injectable 00601 Unit(s) IV Push <User Schedule>  ferrous    sulfate 325 milliGRAM(s) Oral daily  glucagon  Injectable 1 milliGRAM(s) IntraMuscular once  heparin   Injectable 5000 Unit(s) SubCutaneous every 12 hours  hydrALAZINE 50 milliGRAM(s) Oral every 8 hours  insulin lispro (ADMELOG) corrective regimen sliding scale   SubCutaneous three times a day before meals  levETIRAcetam 1500 milliGRAM(s) Oral daily  losartan 100 milliGRAM(s) Oral daily  mirtazapine 7.5 milliGRAM(s) Oral at bedtime  pantoprazole    Tablet 40 milliGRAM(s) Oral before breakfast  piperacillin/tazobactam IVPB.. 3.375 Gram(s) IV Intermittent every 12 hours  sevelamer carbonate 800 milliGRAM(s) Oral every 8 hours    MEDICATIONS  (PRN):  acetaminophen     Tablet .. 650 milliGRAM(s) Oral every 6 hours PRN Temp greater or equal to 38C (100.4F), Mild Pain (1 - 3)  aluminum hydroxide/magnesium hydroxide/simethicone Suspension 30 milliLiter(s) Oral every 4 hours PRN Dyspepsia  dextrose Oral Gel 15 Gram(s) Oral once PRN Blood Glucose LESS THAN 70 milliGRAM(s)/deciliter  melatonin 3 milliGRAM(s) Oral at bedtime PRN Insomnia  ondansetron Injectable 4 milliGRAM(s) IV Push every 8 hours PRN Nausea and/or Vomiting      09-02-24 @ 07:01  -  09-03-24 @ 07:00  --------------------------------------------------------  IN: 720 mL / OUT: 0 mL / NET: 720 mL    09-03-24 @ 07:01  -  09-03-24 @ 18:38  --------------------------------------------------------  IN: 0 mL / OUT: 4000 mL / NET: -4000 mL      PHYSICAL EXAM:      T(C): 36.6 (09-03-24 @ 17:45), Max: 36.6 (09-03-24 @ 05:30)  HR: 70 (09-03-24 @ 17:45) (61 - 70)  BP: 168/65 (09-03-24 @ 17:45) (157/74 - 190/61)  RR: 18 (09-03-24 @ 17:45) (17 - 18)  SpO2: 100% (09-03-24 @ 17:45) (95% - 100%)  Wt(kg): --  Lungs clear  Heart S1S2  Abd soft NT ND  Extremities:   tr edema                                    9.7    11.17 )-----------( 174      ( 03 Sep 2024 06:55 )             30.0     09-03    133<L>  |  98  |  37<H>  ----------------------------<  87  5.2   |  25  |  6.82<H>    Ca    8.7      03 Sep 2024 06:55    TPro  6.1  /  Alb  2.6<L>  /  TBili  0.6  /  DBili  x   /  AST  17  /  ALT  8<L>  /  AlkPhos  83  09-03      LIVER FUNCTIONS - ( 03 Sep 2024 06:55 )  Alb: 2.6 g/dL / Pro: 6.1 gm/dL / ALK PHOS: 83 U/L / ALT: 8 U/L / AST: 17 U/L / GGT: x           Creatinine Trend: 6.82<--, 4.50<--, 3.52<--      Assessment   ESRD, maintenance     Plan:  HD for today  UF as tolerated      Linden Zeina, MD
[facility] NEPHROLOGY SERVICES, [facility]  NEPHROLOGY AND HYPERTENSION  [address]  [address]  [place] [address]  [phone]    MD AISHA FLOWER MD YELENA ROSENBERG, MD BINNY KOSHY, MD CHRISTOPHER CAPUTO, MD EDWARD BOVER, MD          Patient events noted  No distress    MEDICATIONS  (STANDING):  carvedilol 12.5 milliGRAM(s) Oral every 12 hours  dextrose 5%. 1000 milliLiter(s) (50 mL/Hr) IV Continuous <Continuous>  dextrose 50% Injectable 25 Gram(s) IV Push once  epoetin ranjan-epbx (RETACRIT) Injectable 87371 Unit(s) IV Push <User Schedule>  ferrous    sulfate 325 milliGRAM(s) Oral daily  glucagon  Injectable 1 milliGRAM(s) IntraMuscular once  heparin   Injectable 5000 Unit(s) SubCutaneous every 12 hours  hydrALAZINE 50 milliGRAM(s) Oral every 8 hours  insulin lispro (ADMELOG) corrective regimen sliding scale   SubCutaneous three times a day before meals  levETIRAcetam 1500 milliGRAM(s) Oral daily  losartan 100 milliGRAM(s) Oral daily  mirtazapine 7.5 milliGRAM(s) Oral at bedtime  pantoprazole    Tablet 40 milliGRAM(s) Oral before breakfast  piperacillin/tazobactam IVPB.. 3.375 Gram(s) IV Intermittent every 12 hours  sevelamer carbonate 800 milliGRAM(s) Oral every 8 hours    MEDICATIONS  (PRN):  acetaminophen     Tablet .. 650 milliGRAM(s) Oral every 6 hours PRN Temp greater or equal to 38C (100.4F), Mild Pain (1 - 3)  aluminum hydroxide/magnesium hydroxide/simethicone Suspension 30 milliLiter(s) Oral every 4 hours PRN Dyspepsia  dextrose Oral Gel 15 Gram(s) Oral once PRN Blood Glucose LESS THAN 70 milliGRAM(s)/deciliter  melatonin 3 milliGRAM(s) Oral at bedtime PRN Insomnia  ondansetron Injectable 4 milliGRAM(s) IV Push every 8 hours PRN Nausea and/or Vomiting      09-02-24 @ 07:01  -  09-02-24 @ 16:02  --------------------------------------------------------  IN: 720 mL / OUT: 0 mL / NET: 720 mL      PHYSICAL EXAM:      T(C): 36.6 (09-02-24 @ 11:44), Max: 36.9 (09-02-24 @ 05:11)  HR: 68 (09-02-24 @ 15:29) (64 - 73)  BP: 176/77 (09-02-24 @ 15:29) (153/66 - 199/67)  RR: 18 (09-02-24 @ 11:44) (18 - 19)  SpO2: 93% (09-02-24 @ 11:44) (93% - 94%)  Wt(kg): --  Lungs clear  Heart S1S2  Abd soft NT ND  Extremities:   tr edema                                    9.3    13.30 )-----------( 155      ( 01 Sep 2024 07:08 )             28.8     09-01    139  |  102  |  24<H>  ----------------------------<  90  4.1   |  28  |  4.50<H>    Ca    9.0      01 Sep 2024 07:08    TPro  6.1  /  Alb  2.7<L>  /  TBili  0.9  /  DBili  x   /  AST  19  /  ALT  10<L>  /  AlkPhos  97  09-01      LIVER FUNCTIONS - ( 01 Sep 2024 07:08 )  Alb: 2.7 g/dL / Pro: 6.1 gm/dL / ALK PHOS: 97 U/L / ALT: 10 U/L / AST: 19 U/L / GGT: x           Creatinine Trend: 4.50<--, 3.52<--      Assessment   ESRD, thickened GB;       Plan:  HD for tomorrow maintenance  GI follow up appreciated    Linden Pastrana MD
SURGERY PROGRESS HPI:  Pt seen and examined at bedside. No acute events overnight.       Vital Signs Last 24 Hrs  T(C): 36.9 (01 Sep 2024 05:19), Max: 36.9 (01 Sep 2024 05:19)  T(F): 98.4 (01 Sep 2024 05:19), Max: 98.4 (01 Sep 2024 05:19)  HR: 76 (01 Sep 2024 05:19) (70 - 85)  BP: 196/63 (01 Sep 2024 05:19) (173/66 - 196/63)  BP(mean): 101 (31 Aug 2024 22:02) (101 - 108)  RR: 19 (01 Sep 2024 05:19) (18 - 25)  SpO2: 90% (01 Sep 2024 05:19) (90% - 98%)    Parameters below as of 01 Sep 2024 05:19  Patient On (Oxygen Delivery Method): room air          PHYSICAL EXAM:    GENERAL: NAD  HEAD:  Atraumatic, Normocephalic  CHEST/LUNG: normal work of breathing  HEART: RRR  ABDOMEN: non distended, soft, tender at RUQ, no guarding  
                          Patient: AMAURY MORENO [id number] 83y Female                            Hospitalist Attending Note    Initial HPI:  83-year-old female pmhx of ESRD on dialysis(T/T/S), hypertension, diabetes, anemia(ANA and ACD),  seizure on Keppra, liver failure/jaundice, recent pneumonia, recent hepatitis C(treated),  recent pancreatitis comes to ED w/ generalized weakness and generalized abdominal pain while in HD earlier this morning. Pt describes the pain as sharp, diffuse, 10/10, non radiating, intermittent, sever to the point that HD was stopped after 3 hours. Pt is mostly anuric, but this am had dysuria with small amount of urine. At this time, she denies any abdo/ chest pain, n/v/d/c, SOB, hematuria, blood in stool, fever, or chills.    In the ED, she was hypertensive otherwise stable VS. Labs significant for leukocytosis and anemia. CT a/p  consistent with acute cholecystitis and cystitis; also showing chronic findings consistent with mesenteric panniculitis and chronic L1 compression fracture. Surgery was consulted; but states that patient is not a surgical candidate at this time given extensive comorbidities; recommends medical management.  (31 Aug 2024 19:58)      ____________________PHYSICAL EXAM:  GENERAL:  NAD  HEENT: NCAT  CARDIOVASCULAR:  S1, S2  LUNGS: CTAB  ABDOMEN:  soft, (-) tenderness, (-) distension, (+) bowel sounds, (-) guarding, (-) rebound (-) rigidity  EXTREMITIES:  no cyanosis / clubbing / edema.   ____________________     VITALS:  Vital Signs Last 24 Hrs  T(C): 36.6 (02 Sep 2024 17:05), Max: 36.9 (02 Sep 2024 05:11)  T(F): 97.9 (02 Sep 2024 17:05), Max: 98.5 (02 Sep 2024 05:11)  HR: 67 (02 Sep 2024 17:05) (64 - 72)  BP: 175/67 (02 Sep 2024 17:05) (153/66 - 186/66)  BP(mean): --  RR: 18 (02 Sep 2024 17:05) (18 - 19)  SpO2: 98% (02 Sep 2024 17:05) (93% - 98%)    Parameters below as of 02 Sep 2024 17:05  Patient On (Oxygen Delivery Method): room air     Daily     Daily   CAPILLARY BLOOD GLUCOSE      POCT Blood Glucose.: 94 mg/dL (02 Sep 2024 16:13)  POCT Blood Glucose.: 92 mg/dL (02 Sep 2024 11:16)  POCT Blood Glucose.: 110 mg/dL (02 Sep 2024 08:54)  POCT Blood Glucose.: 63 mg/dL (02 Sep 2024 08:06)  POCT Blood Glucose.: 57 mg/dL (02 Sep 2024 07:43)  POCT Blood Glucose.: 69 mg/dL (02 Sep 2024 07:41)  POCT Blood Glucose.: 81 mg/dL (01 Sep 2024 21:09)    I&O's Summary    02 Sep 2024 07:01  -  02 Sep 2024 17:17  --------------------------------------------------------  IN: 720 mL / OUT: 0 mL / NET: 720 mL        HISTORY:  PAST MEDICAL & SURGICAL HISTORY:  HTN (hypertension)      HLD (hyperlipidemia)      DM type 2 (diabetes mellitus, type 2)      CKD (chronic kidney disease)      Asthma      H/O vitamin D deficiency      GERD (gastroesophageal reflux disease)      ESRD on dialysis      HLD (hyperlipidemia)      DM (diabetes mellitus)      HTN (hypertension)      History of cataract surgery      Allergies    No Known Allergies    Intolerances       LABS:                        9.3    13.30 )-----------( 155      ( 01 Sep 2024 07:08 )             28.8       Culture - Blood (collected 31 Aug 2024 19:37)  Source: .Blood Blood-Venous  Preliminary Report (02 Sep 2024 02:02):    No growth at 24 hours    Culture - Blood (collected 31 Aug 2024 19:33)  Source: .Blood Blood-Peripheral  Preliminary Report (02 Sep 2024 03:02):    No growth at 24 hours    09-01    139  |  102  |  24<H>  ----------------------------<  90  4.1   |  28  |  4.50<H>    Ca    9.0      01 Sep 2024 07:08    TPro  6.1  /  Alb  2.7<L>  /  TBili  0.9  /  DBili  x   /  AST  19  /  ALT  10<L>  /  AlkPhos  97  09-01      LIVER FUNCTIONS - ( 01 Sep 2024 07:08 )  Alb: 2.7 g/dL / Pro: 6.1 gm/dL / ALK PHOS: 97 U/L / ALT: 10 U/L / AST: 19 U/L / GGT: x           Urinalysis Basic - ( 01 Sep 2024 07:08 )    Color: x / Appearance: x / SG: x / pH: x  Gluc: 90 mg/dL / Ketone: x  / Bili: x / Urobili: x   Blood: x / Protein: x / Nitrite: x   Leuk Esterase: x / RBC: x / WBC x   Sq Epi: x / Non Sq Epi: x / Bacteria: x          Culture - Blood (collected 31 Aug 2024 19:37)  Source: .Blood Blood-Venous  Preliminary Report (02 Sep 2024 02:02):    No growth at 24 hours    Culture - Blood (collected 31 Aug 2024 19:33)  Source: .Blood Blood-Peripheral  Preliminary Report (02 Sep 2024 03:02):    No growth at 24 hours          MEDICATIONS:  MEDICATIONS  (STANDING):  carvedilol 12.5 milliGRAM(s) Oral every 12 hours  dextrose 5%. 1000 milliLiter(s) (50 mL/Hr) IV Continuous <Continuous>  dextrose 50% Injectable 25 Gram(s) IV Push once  epoetin ranjan-epbx (RETACRIT) Injectable 12546 Unit(s) IV Push <User Schedule>  ferrous    sulfate 325 milliGRAM(s) Oral daily  glucagon  Injectable 1 milliGRAM(s) IntraMuscular once  heparin   Injectable 5000 Unit(s) SubCutaneous every 12 hours  hydrALAZINE 50 milliGRAM(s) Oral every 8 hours  insulin lispro (ADMELOG) corrective regimen sliding scale   SubCutaneous three times a day before meals  levETIRAcetam 1500 milliGRAM(s) Oral daily  losartan 100 milliGRAM(s) Oral daily  mirtazapine 7.5 milliGRAM(s) Oral at bedtime  pantoprazole    Tablet 40 milliGRAM(s) Oral before breakfast  piperacillin/tazobactam IVPB.. 3.375 Gram(s) IV Intermittent every 12 hours  sevelamer carbonate 800 milliGRAM(s) Oral every 8 hours    MEDICATIONS  (PRN):  acetaminophen     Tablet .. 650 milliGRAM(s) Oral every 6 hours PRN Temp greater or equal to 38C (100.4F), Mild Pain (1 - 3)  aluminum hydroxide/magnesium hydroxide/simethicone Suspension 30 milliLiter(s) Oral every 4 hours PRN Dyspepsia  dextrose Oral Gel 15 Gram(s) Oral once PRN Blood Glucose LESS THAN 70 milliGRAM(s)/deciliter  melatonin 3 milliGRAM(s) Oral at bedtime PRN Insomnia  ondansetron Injectable 4 milliGRAM(s) IV Push every 8 hours PRN Nausea and/or Vomiting  
                          Patient: AMAURY MORENO [id number] 83y Female                            Hospitalist Attending Note    No complaints.  No Abdominal pain, nausea, vomiting, diarrhea, nor constipation.   No fever / chills     ____________________PHYSICAL EXAM:  GENERAL:  NAD Alert and Oriented x 3   HEENT: NCAT  CARDIOVASCULAR:  S1, S2  LUNGS: CTAB  ABDOMEN:  soft, (-) tenderness, (-) distension, (+) bowel sounds, (-) guarding, (-) rebound (-) rigidity  EXTREMITIES:  no cyanosis / clubbing / edema.   ____________________     VITALS:  Vital Signs Last 24 Hrs  T(C): 36.8 (01 Sep 2024 11:29), Max: 36.9 (01 Sep 2024 05:19)  T(F): 98.2 (01 Sep 2024 11:29), Max: 98.4 (01 Sep 2024 05:19)  HR: 68 (01 Sep 2024 13:28) (65 - 76)  BP: 184/62 (01 Sep 2024 13:28) (170/68 - 196/63)  BP(mean): 101 (31 Aug 2024 22:02) (101 - 108)  RR: 18 (01 Sep 2024 11:29) (18 - 20)  SpO2: 93% (01 Sep 2024 11:29) (90% - 98%)    Parameters below as of 01 Sep 2024 11:29  Patient On (Oxygen Delivery Method): room air     Daily     Daily   CAPILLARY BLOOD GLUCOSE      POCT Blood Glucose.: 87 mg/dL (01 Sep 2024 15:50)  POCT Blood Glucose.: 94 mg/dL (01 Sep 2024 11:27)  POCT Blood Glucose.: 89 mg/dL (01 Sep 2024 07:46)  POCT Blood Glucose.: 114 mg/dL (31 Aug 2024 21:22)    I&O's Summary      HISTORY:  PAST MEDICAL & SURGICAL HISTORY:  HTN (hypertension)      HLD (hyperlipidemia)      DM type 2 (diabetes mellitus, type 2)      CKD (chronic kidney disease)      Asthma      H/O vitamin D deficiency      GERD (gastroesophageal reflux disease)      ESRD on dialysis      HLD (hyperlipidemia)      DM (diabetes mellitus)      HTN (hypertension)      History of cataract surgery      Allergies    No Known Allergies    Intolerances       LABS:                        9.3    13.30 )-----------( 155      ( 01 Sep 2024 07:08 )             28.8     09-01    139  |  102  |  24<H>  ----------------------------<  90  4.1   |  28  |  4.50<H>    Ca    9.0      01 Sep 2024 07:08  Mg     2.3     08-31    TPro  6.1  /  Alb  2.7<L>  /  TBili  0.9  /  DBili  x   /  AST  19  /  ALT  10<L>  /  AlkPhos  97  09-01    PT/INR - ( 31 Aug 2024 12:20 )   PT: 10.9 sec;   INR: 0.91 ratio         PTT - ( 31 Aug 2024 12:20 )  PTT:31.7 sec  LIVER FUNCTIONS - ( 01 Sep 2024 07:08 )  Alb: 2.7 g/dL / Pro: 6.1 gm/dL / ALK PHOS: 97 U/L / ALT: 10 U/L / AST: 19 U/L / GGT: x           Urinalysis Basic - ( 01 Sep 2024 07:08 )    Color: x / Appearance: x / SG: x / pH: x  Gluc: 90 mg/dL / Ketone: x  / Bili: x / Urobili: x   Blood: x / Protein: x / Nitrite: x   Leuk Esterase: x / RBC: x / WBC x   Sq Epi: x / Non Sq Epi: x / Bacteria: x              MEDICATIONS:  MEDICATIONS  (STANDING):  carvedilol 12.5 milliGRAM(s) Oral every 12 hours  dextrose 5%. 1000 milliLiter(s) (50 mL/Hr) IV Continuous <Continuous>  dextrose 50% Injectable 25 Gram(s) IV Push once  epoetin ranjan-epbx (RETACRIT) Injectable 97237 Unit(s) IV Push <User Schedule>  ferrous    sulfate 325 milliGRAM(s) Oral daily  glucagon  Injectable 1 milliGRAM(s) IntraMuscular once  heparin   Injectable 5000 Unit(s) SubCutaneous every 12 hours  hydrALAZINE 50 milliGRAM(s) Oral every 8 hours  insulin lispro (ADMELOG) corrective regimen sliding scale   SubCutaneous three times a day before meals  levETIRAcetam 1500 milliGRAM(s) Oral daily  losartan 100 milliGRAM(s) Oral daily  mirtazapine 7.5 milliGRAM(s) Oral at bedtime  pantoprazole    Tablet 40 milliGRAM(s) Oral before breakfast  piperacillin/tazobactam IVPB.. 3.375 Gram(s) IV Intermittent every 12 hours  sevelamer carbonate 800 milliGRAM(s) Oral every 8 hours    MEDICATIONS  (PRN):  acetaminophen     Tablet .. 650 milliGRAM(s) Oral every 6 hours PRN Temp greater or equal to 38C (100.4F), Mild Pain (1 - 3)  aluminum hydroxide/magnesium hydroxide/simethicone Suspension 30 milliLiter(s) Oral every 4 hours PRN Dyspepsia  dextrose Oral Gel 15 Gram(s) Oral once PRN Blood Glucose LESS THAN 70 milliGRAM(s)/deciliter  melatonin 3 milliGRAM(s) Oral at bedtime PRN Insomnia  ondansetron Injectable 4 milliGRAM(s) IV Push every 8 hours PRN Nausea and/or Vomiting  
Hospitalist Daily Progress Note     *SUBJECTIVE*    Interval Events:  NAEON, Resting comfortably. HD Stable.      *OBJECTIVE*    PHYSICAL EXAM:  GENERAL:  NAD Alert, responsive  HEENT: NCAT  CARDIOVASCULAR:  S1, S2  LUNGS: CTAB  ABDOMEN:  soft, (-) tenderness, (-) distension, (+) bowel sounds, (-) guarding, (-) rebound (-) rigidity  EXTREMITIES:  no cyanosis / clubbing / edema.     OBJECTIVE DATA:   Vital Signs Last 24 Hrs  T(C): 36.4 (04 Sep 2024 11:27), Max: 36.8 (04 Sep 2024 05:12)  T(F): 97.5 (04 Sep 2024 11:), Max: 98.2 (04 Sep 2024 05:12)  HR: 62 (04 Sep 2024 11:) (61 - 74)  BP: 138/72 (04 Sep 2024 11:27) (138/72 - 187/60)  BP(mean): --  RR: 18 (04 Sep 2024 11:) (17 - 18)  SpO2: 98% (04 Sep 2024 11:) (94% - 100%)    Parameters below as of 04 Sep 2024 11:27  Patient On (Oxygen Delivery Method): room air               Daily     Daily Weight in k (03 Sep 2024 17:10)    Labs, Interval Radiology studies, Medications reviewed by me        
Patient seen and examined at bedside with no complaints.   Denies pain, nausea/ vomiting, chills, SOB, chest pain, calf tenderness.          Vital Signs Last 24 Hrs  T(F): 98.5 (09-02-24 @ 05:11), Max: 98.5 (09-02-24 @ 05:11)  HR: 69 (09-02-24 @ 05:11)  BP: 186/66 (09-02-24 @ 05:11)  RR: 19 (09-02-24 @ 05:11)  SpO2: 94% (09-02-24 @ 05:11)  Wt(kg): --   CAPILLARY BLOOD GLUCOSE      POCT Blood Glucose.: 81 mg/dL (01 Sep 2024 21:09)      GENERAL: Sleepy, NAD  CHEST/LUNG: Respirations non labored, good inspiratory effort  HEART: S1S2  HEENT: NCAT, EOMI, conjunctiva clear   ABDOMEN: Nondistended, + bowel sounds, nontender  EXTREMITIES:  No calf tenderness, no edema    I&O's Detail      LABS:                        9.3    13.30 )-----------( 155      ( 01 Sep 2024 07:08 )             28.8     09-01    139  |  102  |  24<H>  ----------------------------<  90  4.1   |  28  |  4.50<H>    Ca    9.0      01 Sep 2024 07:08  Mg     2.3     08-31    TPro  6.1  /  Alb  2.7<L>  /  TBili  0.9  /  DBili  x   /  AST  19  /  ALT  10<L>  /  AlkPhos  97  09-01    PT/INR - ( 31 Aug 2024 12:20 )   PT: 10.9 sec;   INR: 0.91 ratio         PTT - ( 31 Aug 2024 12:20 )  PTT:31.7 sec

## 2024-09-05 NOTE — PROGRESS NOTE ADULT - PROVIDER SPECIALTY LIST ADULT
Hospitalist
Nephrology
Nephrology
Surgery
Hospitalist
Nephrology
Nephrology
Surgery

## 2024-09-05 NOTE — DISCHARGE NOTE PROVIDER - NSDCCPCAREPLAN_GEN_ALL_CORE_FT
PRINCIPAL DISCHARGE DIAGNOSIS  Diagnosis: Acute cholecystitis  Assessment and Plan of Treatment: finish antibiotics course

## 2024-09-05 NOTE — DISCHARGE NOTE NURSING/CASE MANAGEMENT/SOCIAL WORK - PATIENT PORTAL LINK FT
You can access the FollowMyHealth Patient Portal offered by [facility] by registering at the following website: [email or web address]. By joining [employer]’s FollowMyHealth portal, you will also be able to view your health information using other applications (apps) compatible with our system.

## 2024-09-05 NOTE — PHARMACOTHERAPY INTERVENTION NOTE - COMMENTS
Counseled patient while in dialysis with regards to discharge meds, patient didn't seem to fully understand. So I followed up by calling her son, wayne, with her approval, whom she resides with. I counseled him as follows:  all meds send to [facility] pharmacy:  augmentin 875/125mg 1 tablet BID, antibiotic taken twice a day with meals  keppra new dose, 1000mg daily (to be taken after HD on dialysis days), aprox same time every day. decreased dose approved by dr zimmerman. I informed the son to discontinue previous home med dose of 1500mg ER daily keppra due to renal function for patient and to follow up with her home neurologist.

## 2024-09-05 NOTE — PROGRESS NOTE ADULT - REASON FOR ADMISSION
Acute cholecystitis and cystitis

## 2024-09-05 NOTE — DISCHARGE NOTE PROVIDER - ATTENDING DISCHARGE PHYSICAL EXAMINATION:
GENERAL:  NAD Alert, responsive  HEENT: NCAT  CARDIOVASCULAR:  S1, S2  LUNGS: CTAB  ABDOMEN:  soft, (-) tenderness, (-) distension, (+) bowel sounds, (-) guarding, (-) rebound (-) rigidity  EXTREMITIES:  no cyanosis / clubbing / edema.

## 2024-09-05 NOTE — DISCHARGE NOTE PROVIDER - NSDCFUADDAPPT_GEN_ALL_CORE_FT
It is important to see your primary physician as well as the physicians noted below within the next week to perform a comprehensive medical review.  Call their offices for an appointment as soon as you leave the hospital.  You will also need to see them for renewal of your medications.  If you do not have a primary physician, contact the [facility] Physician Referral Service at [phone].  To obtain your results, you can access the [facility] Patient Portal at [email or web address].  Your medical issues appear to be stable at this time, but if your symptoms recur or worsen, contact your physicians and/or return to the hospital if necessary.  If you encounter any issues or questions with your medication, call your physicians before stopping the medication.    APPTS ARE READY TO BE MADE: [X] YES

## 2024-09-05 NOTE — PHARMACOTHERAPY INTERVENTION NOTE - COMMENTS
changed keppra 1500mg daily to 1000mg daily (on HD days, dose is given after dialysis) due to renal function. Dr Cross approved change in dosing.

## 2024-09-05 NOTE — DISCHARGE NOTE NURSING/CASE MANAGEMENT/SOCIAL WORK - NSDCFUADDAPPT_GEN_ALL_CORE_FT
It is important to see your primary physician as well as the physicians noted below within the next week to perform a comprehensive medical review.  Call their offices for an appointment as soon as you leave the hospital.  You will also need to see them for renewal of your medications.  If you do not have a primary physician, contact the [facility] Physician Referral Service at [phone].  To obtain your results, you can access the [facility] Patient Portal at [email or web address].  Your medical issues appear to be stable at this time, but if your symptoms recur or worsen, contact your physicians and/or return to the hospital if necessary.  If you encounter any issues or questions with your medication, call your physicians before stopping the medication.    APPTS ARE READY TO BE MADE: [X] YES

## 2024-09-06 LAB
CULTURE RESULTS: SIGNIFICANT CHANGE UP
CULTURE RESULTS: SIGNIFICANT CHANGE UP
SPECIMEN SOURCE: SIGNIFICANT CHANGE UP
SPECIMEN SOURCE: SIGNIFICANT CHANGE UP

## 2024-09-12 DIAGNOSIS — M48.56XA COLLAPSED VERTEBRA, NOT ELSEWHERE CLASSIFIED, LUMBAR REGION, INITIAL ENCOUNTER FOR FRACTURE: ICD-10-CM

## 2024-09-12 DIAGNOSIS — Z99.2 DEPENDENCE ON RENAL DIALYSIS: ICD-10-CM

## 2024-09-12 DIAGNOSIS — K65.4 SCLEROSING MESENTERITIS: ICD-10-CM

## 2024-09-12 DIAGNOSIS — Z79.84 LONG TERM (CURRENT) USE OF ORAL HYPOGLYCEMIC DRUGS: ICD-10-CM

## 2024-09-12 DIAGNOSIS — K21.9 GASTRO-ESOPHAGEAL REFLUX DISEASE WITHOUT ESOPHAGITIS: ICD-10-CM

## 2024-09-12 DIAGNOSIS — K81.0 ACUTE CHOLECYSTITIS: ICD-10-CM

## 2024-09-12 DIAGNOSIS — G40.909 EPILEPSY, UNSPECIFIED, NOT INTRACTABLE, WITHOUT STATUS EPILEPTICUS: ICD-10-CM

## 2024-09-12 DIAGNOSIS — Z95.5 PRESENCE OF CORONARY ANGIOPLASTY IMPLANT AND GRAFT: ICD-10-CM

## 2024-09-12 DIAGNOSIS — I12.0 HYPERTENSIVE CHRONIC KIDNEY DISEASE WITH STAGE 5 CHRONIC KIDNEY DISEASE OR END STAGE RENAL DISEASE: ICD-10-CM

## 2024-09-12 DIAGNOSIS — E11.22 TYPE 2 DIABETES MELLITUS WITH DIABETIC CHRONIC KIDNEY DISEASE: ICD-10-CM

## 2024-09-12 DIAGNOSIS — E78.5 HYPERLIPIDEMIA, UNSPECIFIED: ICD-10-CM
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
## 2024-09-12 DIAGNOSIS — I25.10 ATHEROSCLEROTIC HEART DISEASE OF NATIVE CORONARY ARTERY WITHOUT ANGINA PECTORIS: ICD-10-CM

## 2024-09-12 DIAGNOSIS — D63.1 ANEMIA IN CHRONIC KIDNEY DISEASE: ICD-10-CM

## 2024-09-12 DIAGNOSIS — N18.6 END STAGE RENAL DISEASE: ICD-10-CM

## 2024-09-12 DIAGNOSIS — N30.00 ACUTE CYSTITIS WITHOUT HEMATURIA: ICD-10-CM

## 2024-09-28 ENCOUNTER — EMERGENCY (EMERGENCY)
Facility: HOSPITAL | Age: 84
LOS: 0 days | Discharge: HOME | End: 2024-09-29
Attending: STUDENT IN AN ORGANIZED HEALTH CARE EDUCATION/TRAINING PROGRAM
Payer: MEDICARE

## 2024-09-28 VITALS
OXYGEN SATURATION: 100 % | WEIGHT: 119.05 LBS | DIASTOLIC BLOOD PRESSURE: 77 MMHG | TEMPERATURE: 98 F | HEART RATE: 86 BPM | SYSTOLIC BLOOD PRESSURE: 160 MMHG | HEIGHT: 63 IN | RESPIRATION RATE: 18 BRPM

## 2024-09-28 DIAGNOSIS — R56.9 UNSPECIFIED CONVULSIONS: ICD-10-CM

## 2024-09-28 DIAGNOSIS — E78.5 HYPERLIPIDEMIA, UNSPECIFIED: ICD-10-CM

## 2024-09-28 DIAGNOSIS — E11.22 TYPE 2 DIABETES MELLITUS WITH DIABETIC CHRONIC KIDNEY DISEASE: ICD-10-CM

## 2024-09-28 DIAGNOSIS — Z99.2 DEPENDENCE ON RENAL DIALYSIS: ICD-10-CM

## 2024-09-28 DIAGNOSIS — I12.0 HYPERTENSIVE CHRONIC KIDNEY DISEASE WITH STAGE 5 CHRONIC KIDNEY DISEASE OR END STAGE RENAL DISEASE: ICD-10-CM

## 2024-09-28 DIAGNOSIS — N18.6 END STAGE RENAL DISEASE: ICD-10-CM

## 2024-09-28 DIAGNOSIS — Z98.49 CATARACT EXTRACTION STATUS, UNSPECIFIED EYE: Chronic | ICD-10-CM

## 2024-09-28 DIAGNOSIS — Z79.84 LONG TERM (CURRENT) USE OF ORAL HYPOGLYCEMIC DRUGS: ICD-10-CM

## 2024-09-28 LAB
ALBUMIN SERPL ELPH-MCNC: 3.1 G/DL — LOW (ref 3.3–5)
ALP SERPL-CCNC: 108 U/L — SIGNIFICANT CHANGE UP (ref 40–120)
ALT FLD-CCNC: 13 U/L — SIGNIFICANT CHANGE UP (ref 12–78)
ANION GAP SERPL CALC-SCNC: 7 MMOL/L — SIGNIFICANT CHANGE UP (ref 5–17)
AST SERPL-CCNC: 24 U/L — SIGNIFICANT CHANGE UP (ref 15–37)
BASOPHILS # BLD AUTO: 0.11 K/UL — SIGNIFICANT CHANGE UP (ref 0–0.2)
BASOPHILS NFR BLD AUTO: 1 % — SIGNIFICANT CHANGE UP (ref 0–2)
BILIRUB SERPL-MCNC: 0.7 MG/DL — SIGNIFICANT CHANGE UP (ref 0.2–1.2)
BUN SERPL-MCNC: 20 MG/DL — SIGNIFICANT CHANGE UP (ref 7–23)
CALCIUM SERPL-MCNC: 9.5 MG/DL — SIGNIFICANT CHANGE UP (ref 8.5–10.1)
CHLORIDE SERPL-SCNC: 98 MMOL/L — SIGNIFICANT CHANGE UP (ref 96–108)
CO2 SERPL-SCNC: 33 MMOL/L — HIGH (ref 22–31)
CREAT SERPL-MCNC: 3.57 MG/DL — HIGH (ref 0.5–1.3)
EGFR: 12 ML/MIN/1.73M2 — LOW
EGFR: 12 ML/MIN/1.73M2 — LOW
EOSINOPHIL # BLD AUTO: 1.81 K/UL — HIGH (ref 0–0.5)
EOSINOPHIL NFR BLD AUTO: 15.7 % — HIGH (ref 0–6)
GLUCOSE SERPL-MCNC: 174 MG/DL — HIGH (ref 70–99)
HCT VFR BLD CALC: 37.6 % — SIGNIFICANT CHANGE UP (ref 34.5–45)
HGB BLD-MCNC: 12.2 G/DL — SIGNIFICANT CHANGE UP (ref 11.5–15.5)
IMM GRANULOCYTES NFR BLD AUTO: 0.2 % — SIGNIFICANT CHANGE UP (ref 0–0.9)
LIDOCAIN IGE QN: 73 U/L — SIGNIFICANT CHANGE UP (ref 13–75)
LYMPHOCYTES # BLD AUTO: 2.56 K/UL — SIGNIFICANT CHANGE UP (ref 1–3.3)
LYMPHOCYTES # BLD AUTO: 22.2 % — SIGNIFICANT CHANGE UP (ref 13–44)
MCHC RBC-ENTMCNC: 24.9 PG — LOW (ref 27–34)
MCHC RBC-ENTMCNC: 32.4 G/DL — SIGNIFICANT CHANGE UP (ref 32–36)
MCV RBC AUTO: 76.9 FL — LOW (ref 80–100)
MONOCYTES # BLD AUTO: 0.98 K/UL — HIGH (ref 0–0.9)
MONOCYTES NFR BLD AUTO: 8.5 % — SIGNIFICANT CHANGE UP (ref 2–14)
NEUTROPHILS # BLD AUTO: 6.03 K/UL — SIGNIFICANT CHANGE UP (ref 1.8–7.4)
NEUTROPHILS NFR BLD AUTO: 52.4 % — SIGNIFICANT CHANGE UP (ref 43–77)
NRBC # BLD: 0 /100 WBCS — SIGNIFICANT CHANGE UP (ref 0–0)
NRBC BLD-RTO: 0 /100 WBCS — SIGNIFICANT CHANGE UP (ref 0–0)
PLATELET # BLD AUTO: 160 K/UL — SIGNIFICANT CHANGE UP (ref 150–400)
POTASSIUM SERPL-MCNC: 4.3 MMOL/L — SIGNIFICANT CHANGE UP (ref 3.5–5.3)
POTASSIUM SERPL-SCNC: 4.3 MMOL/L — SIGNIFICANT CHANGE UP (ref 3.5–5.3)
PROT SERPL-MCNC: 6.9 GM/DL — SIGNIFICANT CHANGE UP (ref 6–8.3)
RBC # BLD: 4.89 M/UL — SIGNIFICANT CHANGE UP (ref 3.8–5.2)
RBC # FLD: 17.8 % — HIGH (ref 10.3–14.5)
SODIUM SERPL-SCNC: 138 MMOL/L — SIGNIFICANT CHANGE UP (ref 135–145)
WBC # BLD: 11.51 K/UL — HIGH (ref 3.8–10.5)
WBC # FLD AUTO: 11.51 K/UL — HIGH (ref 3.8–10.5)

## 2024-09-28 PROCEDURE — 93010 ELECTROCARDIOGRAM REPORT: CPT

## 2024-09-28 PROCEDURE — 99284 EMERGENCY DEPT VISIT MOD MDM: CPT

## 2024-09-28 RX ORDER — LEVETIRACETAM 10 MG/ML
2 INJECTION, SOLUTION INTRAVENOUS
Qty: 28 | Refills: 0
Start: 2024-09-28 | End: 2024-10-11

## 2024-09-28 RX ORDER — AMLODIPINE BESYLATE 10 MG/1
10 TABLET ORAL ONCE
Refills: 0 | Status: COMPLETED | OUTPATIENT
Start: 2024-09-28 | End: 2024-09-28

## 2024-09-28 RX ORDER — LEVETIRACETAM 10 MG/ML
1500 INJECTION, SOLUTION INTRAVENOUS ONCE
Refills: 0 | Status: COMPLETED | OUTPATIENT
Start: 2024-09-28 | End: 2024-09-28

## 2024-09-28 RX ADMIN — LEVETIRACETAM 1500 MILLIGRAM(S): 10 INJECTION, SOLUTION INTRAVENOUS at 16:46

## 2024-09-29 VITALS
DIASTOLIC BLOOD PRESSURE: 80 MMHG | HEART RATE: 70 BPM | SYSTOLIC BLOOD PRESSURE: 170 MMHG | OXYGEN SATURATION: 99 % | RESPIRATION RATE: 18 BRPM

## 2024-09-29 RX ADMIN — AMLODIPINE BESYLATE 10 MILLIGRAM(S): 10 TABLET ORAL at 00:01

## 2024-09-29 RX ADMIN — Medication 50 MILLIGRAM(S): at 00:01

## 2024-09-29 NOTE — ED ADULT TRIAGE NOTE - TEMPERATURE IN CELSIUS (DEGREES C)
Verbal/written post procedure instructions were given to patient/caregiver./Instructed patient/caregiver to follow-up with primary care physician./Instructed patient/caregiver regarding signs and symptoms of infection./Elevate the injured extremity as instructed./Keep the cast/splint/dressing clean and dry. 36.7

## 2024-09-30 LAB — LEVETIRACETAM SERPL-MCNC: 3.5 UG/ML — LOW (ref 10–40)

## 2025-01-20 NOTE — ED ADULT NURSE NOTE - TEMPLATE LIST FOR HEAD TO TOE ASSESSMENT
Acute Care - Speech Language Pathology   Swallow Initial Evaluation [facility]  Clinical Swallow Evaluation       Patient Name: Natalia Arzate  : 1986  MRN: [id number]  Today's Date: 2025               Admit Date: 2025    Visit Dx:     ICD-10-CM ICD-9-CM   1. Respiratory acidosis with metabolic acidosis  E87.4 276.3   2. Acute respiratory failure with hypoxia  J96.01 518.81   3. MARIAA (acute kidney injury)  N17.9 584.9   4. Dysphagia, unspecified type  R13.10 787.20     Patient Active Problem List   Diagnosis    Nephrolithiasis    Ovarian teratoma, right    Other chronic pain    Pelvic pain    History of DVT in adulthood    History of pulmonary embolism    Ureteral calculus    Ischemic cerebrovascular accident (CVA)    Primary hypertension    Left lumbar radiculopathy    PTSD (post-traumatic stress disorder)    MARIAA (acute kidney injury)    Anemia    Dyslipidemia    Hypothyroid    Other secondary gout, multiple sites    Factor 5 Leiden mutation, heterozygous    Vitamin D deficiency    Vitamin B 12 deficiency    Type 2 diabetes mellitus with hyperglycemia    Hoarseness, persistent    Ureterolithiasis    Acute cystitis without hematuria    Hepatic steatosis    Right flank pain, chronic    Detrusor instability of bladder    Frequency of micturition    Acute respiratory failure with hypercapnia    Hyperkalemia    Sepsis    Respiratory acidosis with metabolic acidosis    Acute respiratory failure     Past Medical History:   Diagnosis Date    A-fib     Abnormal ECG     Anemia     Anxiety     Asthma     Cancer     Ovarian    Depression     Diabetes mellitus     DVT (deep venous thrombosis)     Factor 5 Leiden mutation, heterozygous     Fibroid     GERD (gastroesophageal reflux disease)     Gout     H/O abdominal abscess     History of sepsis     History of transfusion     Hyperlipidemia     Hypothyroid     Kidney stone     Migraines     Neuropathy     Ovarian cancer 2021    Ovarian cyst     PE  (pulmonary embolism)     Polycystic ovary syndrome     Preeclampsia     Rh incompatibility     Stroke     TIA (transient ischemic attack)     Urinary tract infection     Varicella      Past Surgical History:   Procedure Laterality Date    ABDOMINAL SURGERY      CARDIAC CATHETERIZATION       SECTION      CHOLECYSTECTOMY      COLONOSCOPY      ENDOSCOPY      EXTRACORPOREAL SHOCK WAVE LITHOTRIPSY (ESWL) Left 10/22/2021    Procedure: EXTRACORPOREAL SHOCKWAVE LITHOTRIPSY;  Surgeon: Milan Motley MD;  Location: [facility] OR;  Service: Urology;  Laterality: Left;    HYSTERECTOMY  2017    ovarian ca    INSERT CENTRAL LINE AT BEDSIDE  2025    LITHOTRIPSY      NEPHRECTOMY Left 10/2022    RIGHT OOPHORECTOMY      URETEROSCOPY LASER LITHOTRIPSY WITH STENT INSERTION Left 10/01/2021    Procedure: URETEROSCOPY WITH STENT PLACEMENT;  Surgeon: Milan Motley MD;  Location: [facility] OR;  Service: Urology;  Laterality: Left;    URETEROSCOPY LASER LITHOTRIPSY WITH STENT INSERTION Left 2022    Procedure: URETEROSCOPY STENT REMOVAL;  Surgeon: Milan Motley MD;  Location: [facility] OR;  Service: Urology;  Laterality: Left;    URETEROSCOPY LASER LITHOTRIPSY WITH STENT INSERTION Left 2022    Procedure: CYSTOSCOPY RETROGRADE LEFT WITH STENT PLACEMENT;  Surgeon: Milan Motley MD;  Location: [facility] OR;  Service: Urology;  Laterality: Left;       SLP Recommendation and Plan  SLP Swallowing Diagnosis: suspected pharyngeal dysphagia (25)  SLP Diet Recommendation: NPO, temporary alternate methods of nutrition/hydration, other (see comments) (Ok for 3-4 ice chips/hr after oral care as tolerated) (25 110)  Recommended Precautions and Strategies: general aspiration precautions (25 110)  SLP Rec. for Method of Medication Administration: meds via alternate route (25 110)     Monitor for Signs of Aspiration: yes, notify SLP if any concerns (25 110)  Recommended  Diagnostics: reassess via FEES (01/20/25 1105)  Swallow Criteria for Skilled Therapeutic Interventions Met: demonstrates skilled criteria (01/20/25 1105)  Anticipated Discharge Disposition (SLP): inpatient rehabilitation facility (01/20/25 1105)  Rehab Potential/Prognosis, Swallowing: good, to achieve stated therapy goals (01/20/25 1105)     Predicted Duration Therapy Intervention (Days): 1 week (01/20/25 1105)  Oral Care Recommendations: Oral Care BID/PRN, Suction toothbrush (01/20/25 1105)                                               SWALLOW EVALUATION (Last 72 Hours)       SLP Adult Swallow Evaluation       Row Name 01/20/25 1105                   Rehab Evaluation    Document Type evaluation  -        Subjective Information no complaints  -        Patient Observations alert;cooperative  -        Patient/Family/Caregiver Comments/Observations Family present  -        Patient Effort good  -        Symptoms Noted During/After Treatment none  -           General Information    Patient Profile Reviewed yes  -        Pertinent History Of Current Problem Adm w/ MRSA PNA &  worsening renal failure, requiring CRRT. Intubated 1/1-1/17. Hx: HLD, hypothyroidism, a-fib, PE/DVT, Factor V Leiden mutation, CVA. CXR: improving vascular congestion & persistent L basilar atelectasis w/ small L pleural effusion  -        Current Method of Nutrition NPO;nasogastric feedings  -        Precautions/Limitations, Vision WFL;for purposes of eval  -        Precautions/Limitations, Hearing WFL;for purposes of eval  -        Prior Level of Function-Communication unknown  -        Prior Level of Function-Swallowing other (see comments)  per chart, pt w/ remote hx pharyngeal dysphagia in 2022  -        Plans/Goals Discussed with patient;family;agreed upon  -        Barriers to Rehab medically complex  -        Patient's Goals for Discharge patient did not state  -        Family Goals for Discharge family did not  state  -           Pain    Additional Documentation Pain Scale: FACES Pre/Post-Treatment (Group)  -           Pain Scale: FACES Pre/Post-Treatment    Pain: FACES Scale, Pretreatment 0-->no hurt  -        Posttreatment Pain Rating 0-->no hurt  -           Oral Motor Structure and Function    Secretion Management WNL/WFL  -        Mucosal Quality moist, healthy  -           Oral Musculature and Cranial Nerve Assessment    Oral Motor General Assessment vocal impairment  -        Vocal Impairment, Detail. Cranial Nerve X (Vagus) other (see comments)  Hoarse, decreased vocal intensity  -           General Eating/Swallowing Observations    Respiratory Support Currently in Use room air  -        Eating/Swallowing Skills self-fed;fed by SLP  -        Positioning During Eating upright 90 degree;upright in bed  -        Utensils Used spoon;cup;straw  -        Consistencies Trialed ice chips;thin liquids;nectar/syrup-thick liquids;pureed  -           Respiratory    Date of Intubation 1/1-1/17  -           Clinical Swallow Eval    Pharyngeal Phase suspected pharyngeal impairment  -        Clinical Swallow Evaluation Summary Pt w/ overt s/s of aspiration c/b intermittent cough w/ thin liquid trials. No glaring s/s of aspiration w/ NTL or pureed trials. Although no overt s/s of aspiration w/ NTL or pureed trials, pt felt to be @ risk of aspiration given prolonged intubation/ prior respiratory status.  (MBS x2 completed @ OSH in 2022, pt initially w/ moderate pharyngeal dysphagia. MBS completed later in adm revealed mild pharyngeal dysphagia w/ recs for regular diet w/ thin liquids) Recommend cont NPO w/ NG. Ok for 3-4 ice chips/hr after oral care. Plan for FEES this PM  -           Pharyngeal Phase Concerns    Pharyngeal Phase Concerns cough  -        Cough thin  -           SLP Evaluation Clinical Impression    SLP Swallowing Diagnosis suspected pharyngeal dysphagia  -        Functional  Impact risk of aspiration/pneumonia  -        Rehab Potential/Prognosis, Swallowing good, to achieve stated therapy goals  -        Swallow Criteria for Skilled Therapeutic Interventions Met demonstrates skilled criteria  -           Recommendations    Predicted Duration Therapy Intervention (Days) 1 week  -        SLP Diet Recommendation NPO;temporary alternate methods of nutrition/hydration;other (see comments)  Ok for 3-4 ice chips/hr after oral care as tolerated  -        Recommended Diagnostics reassess via FEES  -        Recommended Precautions and Strategies general aspiration precautions  -        Oral Care Recommendations Oral Care BID/PRN;Suction toothbrush  -        SLP Rec. for Method of Medication Administration meds via alternate route  -        Monitor for Signs of Aspiration yes;notify SLP if any concerns  -        Anticipated Discharge Disposition (SLP) inpatient rehabilitation facility  -                  User Key  (r) = Recorded By, (t) = Taken By, (c) = Cosigned By      Initials Name Effective Dates     Kassy Whitehead MS CCC-SLP 05/12/23 -                     EDUCATION  The patient has been educated in the following areas:   Dysphagia (Swallowing Impairment) Oral Care/Hydration NPO rationale.                Time Calculation:    Time Calculation- SLP       Row Name 01/20/25 1340             Time Calculation- [facility]    SLP Start Time 1105  -      SLP Received On 01/20/25  -         Untimed Charges    52187-HX Eval Oral Pharyng Swallow Minutes 60  -         Total Minutes    Untimed Charges Total Minutes 60  -       Total Minutes 60  -                User Key  (r) = Recorded By, (t) = Taken By, (c) = Cosigned By      Initials Name Provider Type     Kassy Whitehead, MS CCC-SLP Speech and Language Pathologist                    Therapy Charges for Today       Code Description Service Date Service Provider Modifiers Qty    [id number]  ST EVAL ORAL PHARYNG  SWALLOW 4 1/20/2025 Kassy Whitehead, MS [id number] GN 1                 MS ZENA Alonzo  1/20/2025   Cardiac

## 2025-07-07 ENCOUNTER — INPATIENT (INPATIENT)
Facility: HOSPITAL | Age: 85
LOS: 3 days | Discharge: HOME HEALTH | End: 2025-07-11
Attending: STUDENT IN AN ORGANIZED HEALTH CARE EDUCATION/TRAINING PROGRAM | Admitting: STUDENT IN AN ORGANIZED HEALTH CARE EDUCATION/TRAINING PROGRAM
Payer: MEDICARE

## 2025-07-07 VITALS — HEART RATE: 91 BPM | OXYGEN SATURATION: 92 %

## 2025-07-07 DIAGNOSIS — Z29.9 ENCOUNTER FOR PROPHYLACTIC MEASURES, UNSPECIFIED: ICD-10-CM

## 2025-07-07 DIAGNOSIS — I10 ESSENTIAL (PRIMARY) HYPERTENSION: ICD-10-CM

## 2025-07-07 DIAGNOSIS — Z87.898 PERSONAL HISTORY OF OTHER SPECIFIED CONDITIONS: ICD-10-CM

## 2025-07-07 DIAGNOSIS — N18.6 END STAGE RENAL DISEASE: ICD-10-CM

## 2025-07-07 DIAGNOSIS — E11.9 TYPE 2 DIABETES MELLITUS WITHOUT COMPLICATIONS: ICD-10-CM

## 2025-07-07 DIAGNOSIS — R10.9 UNSPECIFIED ABDOMINAL PAIN: ICD-10-CM

## 2025-07-07 DIAGNOSIS — K21.9 GASTRO-ESOPHAGEAL REFLUX DISEASE WITHOUT ESOPHAGITIS: ICD-10-CM

## 2025-07-07 DIAGNOSIS — I50.33 ACUTE ON CHRONIC DIASTOLIC (CONGESTIVE) HEART FAILURE: ICD-10-CM

## 2025-07-07 DIAGNOSIS — Z98.49 CATARACT EXTRACTION STATUS, UNSPECIFIED EYE: Chronic | ICD-10-CM

## 2025-07-07 LAB
ALBUMIN SERPL ELPH-MCNC: 3.6 G/DL — SIGNIFICANT CHANGE UP (ref 3.3–5)
ALP SERPL-CCNC: 74 U/L — SIGNIFICANT CHANGE UP (ref 40–120)
ALT FLD-CCNC: 16 U/L — SIGNIFICANT CHANGE UP (ref 12–78)
ANION GAP SERPL CALC-SCNC: 9 MMOL/L — SIGNIFICANT CHANGE UP (ref 5–17)
APTT BLD: 33.3 SEC — SIGNIFICANT CHANGE UP (ref 26.1–36.8)
AST SERPL-CCNC: 24 U/L — SIGNIFICANT CHANGE UP (ref 15–37)
BASOPHILS # BLD AUTO: 0.1 K/UL — SIGNIFICANT CHANGE UP (ref 0–0.2)
BASOPHILS NFR BLD AUTO: 1.2 % — SIGNIFICANT CHANGE UP (ref 0–2)
BILIRUB SERPL-MCNC: 1.1 MG/DL — SIGNIFICANT CHANGE UP (ref 0.2–1.2)
BUN SERPL-MCNC: 27 MG/DL — HIGH (ref 7–23)
CALCIUM SERPL-MCNC: 9.3 MG/DL — SIGNIFICANT CHANGE UP (ref 8.5–10.1)
CHLORIDE SERPL-SCNC: 93 MMOL/L — LOW (ref 96–108)
CO2 SERPL-SCNC: 31 MMOL/L — SIGNIFICANT CHANGE UP (ref 22–31)
CREAT SERPL-MCNC: 5.81 MG/DL — HIGH (ref 0.5–1.3)
EGFR: 7 ML/MIN/1.73M2 — LOW
EGFR: 7 ML/MIN/1.73M2 — LOW
EOSINOPHIL # BLD AUTO: 0.81 K/UL — HIGH (ref 0–0.5)
EOSINOPHIL NFR BLD AUTO: 9.5 % — HIGH (ref 0–6)
GLUCOSE BLDC GLUCOMTR-MCNC: 110 MG/DL — HIGH (ref 70–99)
GLUCOSE SERPL-MCNC: 114 MG/DL — HIGH (ref 70–99)
HCT VFR BLD CALC: 31.5 % — LOW (ref 34.5–45)
HGB BLD-MCNC: 10.7 G/DL — LOW (ref 11.5–15.5)
IMM GRANULOCYTES NFR BLD AUTO: 0.7 % — SIGNIFICANT CHANGE UP (ref 0–0.9)
INR BLD: 1.08 RATIO — SIGNIFICANT CHANGE UP (ref 0.85–1.16)
LIDOCAIN IGE QN: 41 U/L — SIGNIFICANT CHANGE UP (ref 13–75)
LYMPHOCYTES # BLD AUTO: 2.2 K/UL — SIGNIFICANT CHANGE UP (ref 1–3.3)
LYMPHOCYTES # BLD AUTO: 25.9 % — SIGNIFICANT CHANGE UP (ref 13–44)
MCHC RBC-ENTMCNC: 26.6 PG — LOW (ref 27–34)
MCHC RBC-ENTMCNC: 34 G/DL — SIGNIFICANT CHANGE UP (ref 32–36)
MCV RBC AUTO: 78.2 FL — LOW (ref 80–100)
MONOCYTES # BLD AUTO: 0.71 K/UL — SIGNIFICANT CHANGE UP (ref 0–0.9)
MONOCYTES NFR BLD AUTO: 8.4 % — SIGNIFICANT CHANGE UP (ref 2–14)
NEUTROPHILS # BLD AUTO: 4.61 K/UL — SIGNIFICANT CHANGE UP (ref 1.8–7.4)
NEUTROPHILS NFR BLD AUTO: 54.3 % — SIGNIFICANT CHANGE UP (ref 43–77)
NRBC BLD AUTO-RTO: 0 /100 WBCS — SIGNIFICANT CHANGE UP (ref 0–0)
PLATELET # BLD AUTO: 168 K/UL — SIGNIFICANT CHANGE UP (ref 150–400)
POTASSIUM SERPL-MCNC: 4.9 MMOL/L — SIGNIFICANT CHANGE UP (ref 3.5–5.3)
POTASSIUM SERPL-SCNC: 4.9 MMOL/L — SIGNIFICANT CHANGE UP (ref 3.5–5.3)
PROT SERPL-MCNC: 7.1 GM/DL — SIGNIFICANT CHANGE UP (ref 6–8.3)
PROTHROM AB SERPL-ACNC: 12.5 SEC — SIGNIFICANT CHANGE UP (ref 9.9–13.4)
RBC # BLD: 4.03 M/UL — SIGNIFICANT CHANGE UP (ref 3.8–5.2)
RBC # FLD: 16.9 % — HIGH (ref 10.3–14.5)
SODIUM SERPL-SCNC: 133 MMOL/L — LOW (ref 135–145)
WBC # BLD: 8.49 K/UL — SIGNIFICANT CHANGE UP (ref 3.8–10.5)
WBC # FLD AUTO: 8.49 K/UL — SIGNIFICANT CHANGE UP (ref 3.8–10.5)

## 2025-07-07 PROCEDURE — 76705 ECHO EXAM OF ABDOMEN: CPT | Mod: 26

## 2025-07-07 PROCEDURE — 99285 EMERGENCY DEPT VISIT HI MDM: CPT

## 2025-07-07 PROCEDURE — 99223 1ST HOSP IP/OBS HIGH 75: CPT

## 2025-07-07 PROCEDURE — 99284 EMERGENCY DEPT VISIT MOD MDM: CPT

## 2025-07-07 PROCEDURE — 74176 CT ABD & PELVIS W/O CONTRAST: CPT | Mod: 26

## 2025-07-07 PROCEDURE — 93010 ELECTROCARDIOGRAM REPORT: CPT

## 2025-07-07 PROCEDURE — 71045 X-RAY EXAM CHEST 1 VIEW: CPT | Mod: 26

## 2025-07-07 RX ORDER — SEVELAMER HYDROCHLORIDE 800 MG/1
1600 TABLET ORAL
Refills: 0 | Status: DISCONTINUED | OUTPATIENT
Start: 2025-07-07 | End: 2025-07-11

## 2025-07-07 RX ORDER — FERROUS SULFATE 137(45) MG
325 TABLET, EXTENDED RELEASE ORAL DAILY
Refills: 0 | Status: DISCONTINUED | OUTPATIENT
Start: 2025-07-07 | End: 2025-07-11

## 2025-07-07 RX ORDER — FUROSEMIDE 10 MG/ML
40 INJECTION INTRAMUSCULAR; INTRAVENOUS ONCE
Refills: 0 | Status: COMPLETED | OUTPATIENT
Start: 2025-07-07 | End: 2025-07-07

## 2025-07-07 RX ORDER — MIRTAZAPINE 30 MG/1
7.5 TABLET, FILM COATED ORAL AT BEDTIME
Refills: 0 | Status: DISCONTINUED | OUTPATIENT
Start: 2025-07-07 | End: 2025-07-11

## 2025-07-07 RX ORDER — LABETALOL HYDROCHLORIDE 200 MG/1
10 TABLET, FILM COATED ORAL EVERY 8 HOURS
Refills: 0 | Status: DISCONTINUED | OUTPATIENT
Start: 2025-07-07 | End: 2025-07-11

## 2025-07-07 RX ORDER — DEXTROSE 50 % IN WATER 50 %
50 SYRINGE (ML) INTRAVENOUS ONCE
Refills: 0 | Status: DISCONTINUED | OUTPATIENT
Start: 2025-07-07 | End: 2025-07-11

## 2025-07-07 RX ORDER — ONDANSETRON HCL/PF 4 MG/2 ML
4 VIAL (ML) INJECTION EVERY 8 HOURS
Refills: 0 | Status: DISCONTINUED | OUTPATIENT
Start: 2025-07-07 | End: 2025-07-11

## 2025-07-07 RX ORDER — MAGNESIUM, ALUMINUM HYDROXIDE 200-200 MG
30 TABLET,CHEWABLE ORAL EVERY 4 HOURS
Refills: 0 | Status: DISCONTINUED | OUTPATIENT
Start: 2025-07-07 | End: 2025-07-11

## 2025-07-07 RX ORDER — AMLODIPINE BESYLATE 10 MG/1
10 TABLET ORAL DAILY
Refills: 0 | Status: DISCONTINUED | OUTPATIENT
Start: 2025-07-08 | End: 2025-07-11

## 2025-07-07 RX ORDER — ACETAMINOPHEN 500 MG/5ML
650 LIQUID (ML) ORAL EVERY 6 HOURS
Refills: 0 | Status: DISCONTINUED | OUTPATIENT
Start: 2025-07-07 | End: 2025-07-11

## 2025-07-07 RX ORDER — OXYCODONE HYDROCHLORIDE 30 MG/1
2.5 TABLET ORAL EVERY 6 HOURS
Refills: 0 | Status: DISCONTINUED | OUTPATIENT
Start: 2025-07-07 | End: 2025-07-09

## 2025-07-07 RX ORDER — CARVEDILOL 3.12 MG/1
12.5 TABLET, FILM COATED ORAL EVERY 12 HOURS
Refills: 0 | Status: DISCONTINUED | OUTPATIENT
Start: 2025-07-07 | End: 2025-07-11

## 2025-07-07 RX ORDER — HYDROMORPHONE/SOD CHLOR,ISO/PF 2 MG/10 ML
0.2 SYRINGE (ML) INJECTION EVERY 4 HOURS
Refills: 0 | Status: DISCONTINUED | OUTPATIENT
Start: 2025-07-07 | End: 2025-07-09

## 2025-07-07 RX ORDER — LABETALOL HYDROCHLORIDE 200 MG/1
5 TABLET, FILM COATED ORAL ONCE
Refills: 0 | Status: COMPLETED | OUTPATIENT
Start: 2025-07-07 | End: 2025-07-07

## 2025-07-07 RX ORDER — MELATONIN 5 MG
3 TABLET ORAL AT BEDTIME
Refills: 0 | Status: DISCONTINUED | OUTPATIENT
Start: 2025-07-07 | End: 2025-07-11

## 2025-07-07 RX ORDER — LOSARTAN POTASSIUM 100 MG/1
100 TABLET, FILM COATED ORAL DAILY
Refills: 0 | Status: DISCONTINUED | OUTPATIENT
Start: 2025-07-08 | End: 2025-07-11

## 2025-07-07 RX ORDER — INSULIN LISPRO 100 U/ML
INJECTION, SOLUTION INTRAVENOUS; SUBCUTANEOUS AT BEDTIME
Refills: 0 | Status: DISCONTINUED | OUTPATIENT
Start: 2025-07-07 | End: 2025-07-11

## 2025-07-07 RX ORDER — GLUCAGON 3 MG/1
1 POWDER NASAL ONCE
Refills: 0 | Status: DISCONTINUED | OUTPATIENT
Start: 2025-07-07 | End: 2025-07-11

## 2025-07-07 RX ORDER — FUROSEMIDE 10 MG/ML
40 INJECTION INTRAMUSCULAR; INTRAVENOUS
Refills: 0 | Status: DISCONTINUED | OUTPATIENT
Start: 2025-07-07 | End: 2025-07-10

## 2025-07-07 RX ORDER — INSULIN LISPRO 100 U/ML
INJECTION, SOLUTION INTRAVENOUS; SUBCUTANEOUS
Refills: 0 | Status: DISCONTINUED | OUTPATIENT
Start: 2025-07-07 | End: 2025-07-11

## 2025-07-07 RX ADMIN — MIRTAZAPINE 7.5 MILLIGRAM(S): 30 TABLET, FILM COATED ORAL at 22:53

## 2025-07-07 RX ADMIN — Medication 50 MILLIGRAM(S): at 22:53

## 2025-07-07 RX ADMIN — Medication 20 MILLIGRAM(S): at 16:47

## 2025-07-07 RX ADMIN — Medication 10 MILLIGRAM(S): at 18:45

## 2025-07-07 RX ADMIN — FUROSEMIDE 40 MILLIGRAM(S): 10 INJECTION INTRAMUSCULAR; INTRAVENOUS at 18:45

## 2025-07-07 RX ADMIN — CARVEDILOL 12.5 MILLIGRAM(S): 3.12 TABLET, FILM COATED ORAL at 22:54

## 2025-07-07 NOTE — ED PROVIDER NOTE - CLINICAL SUMMARY MEDICAL DECISION MAKING FREE TEXT BOX
Patient presenting to the ED reporting upper abdominal pain shortness of breath worse on exertion cough productive of brown sputum.  Also complaining of worsening swelling in the legs and stomach.  No chest pain.  Last dialysis session was on Saturday.  She normally gets dialysis only 2 days a week Saturdays and Tuesdays.  Not sure if she is on a diuretic.  Still makes small amounts of urine and endorses dysuria.  Lives at home with family.  As per chart review she has a history of ESRD, hypertension, diabetes, anemia, seizure on Keppra, liver failure, recurrent pneumonia, recent hepatitis C that was treated, recurrent pancreatitis.    Her vitals are normal.  She is well-appearing in no distress she is neuro vastly intact moving all 4 extremity airway intact no respiratory distress positive rales bibasilarly.  Positive for positive ST there is epigastric and minimal right upper quadrant tenderness palpation.  There is no distention rigidity guarding or rebound tenderness.  There is no lower abdominal tenderness.  Her neuroexam is nonfocal skin is normal no jaundice.  WWP x 4.  There is positive lower extremity peripheral pitting edema.    Plan to obtain:  -Labs.  CT abdomen pelvis evaluate for pancreatitis.  Lipase.  Right upper quadrant ultrasound.  UA.  Urine culture.  Chest x-ray.  proBNP.  .  Reassess.

## 2025-07-07 NOTE — H&P ADULT - PROBLEM SELECTOR PLAN 3
- P/w abd pain, vomiting(chronic)  - Had acute cholecystitis in the past, managed conservatively  - CT abd/pelvis = Question mild pancreatitis. Cholelithiasis. Moderate pleural effusions. Small pericardial effusion. Indeterminate nodular opacity in the right middle lobe; recommend a 1 month follow-up chest CT.  - No leukocytosis; no fever  - Pain control with tylenol, oxycodone, IV dilaudid  - Monitor for respiratory depression or oversedation while on opioids  - NPO except meds for now  - Trend pt's pain  - Surgery consulted = no acute surgical intervention.

## 2025-07-07 NOTE — CONSULT NOTE ADULT - SUBJECTIVE AND OBJECTIVE BOX
Patient is a 84y old  Female who presents with a chief complaint of     HPI: 84 y/o F PMHx of DM, ESRD on HD (TTS), CAD (s/p stent), HTN, Hep c, liver failure, HLD, pancreatitis, hx of acute cholecystitis tx conservatively presented to ed with epigastric abd pain int x 4-5 weeks acutely worse since yesterday associated with n/v. pt also complaining of cough. denies cp, sob, fevers, chills. pshx bilateral salpingectomy.       PAST MEDICAL & SURGICAL HISTORY:  HTN (hypertension)      HLD (hyperlipidemia)      DM type 2 (diabetes mellitus, type 2)      CKD (chronic kidney disease)      Asthma      H/O vitamin D deficiency      GERD (gastroesophageal reflux disease)      ESRD on dialysis      HLD (hyperlipidemia)      DM (diabetes mellitus)      HTN (hypertension)      History of cataract surgery          Review of Systems:  Negative except  as above in HPI    MEDICATIONS  (STANDING):  furosemide   Injectable 40 milliGRAM(s) IV Push Once    MEDICATIONS  (PRN):      Allergies    No Known Allergies    Intolerances        SOCIAL HISTORY lives with partner and home health aide, nonsmoker, no etoh use                Vital Signs Last 24 Hrs  T(C): 36.6 (07 Jul 2025 16:15), Max: 36.6 (07 Jul 2025 16:15)  T(F): 97.8 (07 Jul 2025 16:15), Max: 97.8 (07 Jul 2025 16:15)  HR: 78 (07 Jul 2025 16:15) (78 - 91)  BP: 178/75 (07 Jul 2025 16:15) (178/75 - 178/75)  BP(mean): 109 (07 Jul 2025 16:15) (109 - 109)  RR: 16 (07 Jul 2025 16:15) (16 - 16)  SpO2: 100% (07 Jul 2025 16:15) (92% - 100%)    Parameters below as of 07 Jul 2025 16:15  Patient On (Oxygen Delivery Method): room air        Physical Exam:  General:  Appears stated age, well-groomed,  no distress  Eyes: EOMI, conjunctiva clear  Chest: no respiratory distress, no accessory muscle use  Abdomen:  soft, nondistended, +epigastric tenderness, neg Butler's sign. nbo rebound or guarding   Extremities:  no pedal edema or calf tenderness noted. LUE AVF palpable thrill   Skin:  No rash  Musculoskeletal:  normal strength  Neuro/Psych:  Alert, oriented to time, place and person     LABS:                        10.7   8.49  )-----------( 168      ( 07 Jul 2025 16:09 )             31.5     07-07    133[L]  |  93[L]  |  27[H]  ----------------------------<  114[H]  4.9   |  31  |  5.81[H]    Ca    9.3      07 Jul 2025 16:09    TPro  7.1  /  Alb  3.6  /  TBili  1.1  /  DBili  0.3  /  AST  24  /  ALT  16  /  AlkPhos  74  07-07    PT/INR - ( 07 Jul 2025 16:09 )   PT: 12.5 sec;   INR: 1.08 ratio         PTT - ( 07 Jul 2025 16:09 )  PTT:33.3 sec  Urinalysis Basic - ( 07 Jul 2025 16:09 )    Color: x / Appearance: x / SG: x / pH: x  Gluc: 114 mg/dL / Ketone: x  / Bili: x / Urobili: x   Blood: x / Protein: x / Nitrite: x   Leuk Esterase: x / RBC: x / WBC x   Sq Epi: x / Non Sq Epi: x / Bacteria: x          RADIOLOGY & ADDITIONAL STUDIES:  < from: CT Abdomen and Pelvis No Cont (07.07.25 @ 14:21) >  INTERPRETATION:  CLINICAL INFORMATION: eval for pancreatitis FCT    COMPARISON: 8.31.24.    CONTRAST/COMPLICATIONS:  IV Contrast: NONE  Oral Contrast: NONE.    PROCEDURE:  CT of the Abdomen and Pelvis was performed.  Sagittal and coronal reformats were performed.    FINDINGS:    LOWER CHEST: Cardiomegaly.  Moderate pleural effusions. Small pericardial   effusion. Nodular opacity right middle lobe.    LIVER: Within normal limits.  BILE DUCTS: Normal caliber.  GALLBLADDER: Cholelithiasis.  SPLEEN: Within normal limits.  PANCREAS: Mild inflammation around the pancreas.  ADRENALS: Within normal limits.  KIDNEYS/URETERS: Atrophic kidneys.    BLADDER: Within normal limits.  REPRODUCTIVE ORGANS: Within normal limits.    BOWEL: No bowel obstruction.  PERITONEUM/RETROPERITONEUM: Mild ascites.  VESSELS:  Within normal limits.  LYMPH NODES: Within normal limits.  ABDOMINAL WALL: Withinnormal limits.  BONES: Chronic right rib fractures. Chronic L1 compression fracture.    IMPRESSION: Question mild pancreatitis.    Cholelithiasis.    Moderate pleural effusions. Small pericardial effusion.    Indeterminate nodular opacity in the right middle lobe; recommend a 1   month follow-up chest CT.        --- End of Report ---    < end of copied text >  < from: US Abdomen Upper Quadrant Right (07.07.25 @ 15:24) >    ACC: [id number] EXAM:  US ABDOMEN RT UPR QUADRANT   ORDERED BY: MULU MOE     PROCEDURE DATE:  07/07/2025          INTERPRETATION:  CLINICAL INFORMATION: 84-year-old female with epigastric   pain    COMPARISON: CT 07/07/2025    TECHNIQUE: Sonography of the right upper quadrant.    FINDINGS:  Liver: Nodular contour suggesting cirrhosis.  Bile ducts: Normal caliber. Common bile duct measures 6 mm.  Gallbladder: Gallstones. Questionable focal tenderness  Pancreas: Visualized portions are within normal limits.  Right kidney: 6.4 cm. No hydronephrosis. Atrophic  Ascites: Small. IVC: Distended suggesting congestive heart failure.  Right pleural effusion.    IMPRESSION:  Findings suggesting right-sided congestive heart failure.  Cirrhotic morphology.  Gallstones. Questionable focal tenderness. Suggest further evaluation   with cholescintigraphy        --- End of Report ---    < end of copied text >      A/P: : 84 y/o F PMHx of DM, ESRD on HD (TTS), CAD (s/p stent), HTN, Hep c, liver failure, HLD, pancreatitis, hx of acute cholecystitis tx conservatively presented to ed with epigastric abd pain. CT and US with gallstones. lfts wnl. neg butler sign. afebrile w/o leukocytosis   -no acute surgical intervention   -continue care/work up per ED     Patient is a 84y old  Female who presents with a chief complaint of     HPI: 84 y/o F PMHx of DM, ESRD on HD (TTS), CAD (s/p stent), HTN, Hep c, liver failure, HLD, pancreatitis, hx of acute cholecystitis tx conservatively presented to ed with epigastric abd pain int x 4-5 weeks acutely worse since yesterday associated with n/v. pt also complaining of cough. denies cp, sob, fevers, chills. pshx bilateral salpingectomy.       PAST MEDICAL & SURGICAL HISTORY:  HTN (hypertension)      HLD (hyperlipidemia)      DM type 2 (diabetes mellitus, type 2)      CKD (chronic kidney disease)      Asthma      H/O vitamin D deficiency      GERD (gastroesophageal reflux disease)      ESRD on dialysis      HLD (hyperlipidemia)      DM (diabetes mellitus)      HTN (hypertension)      History of cataract surgery          Review of Systems:  Negative except  as above in HPI    MEDICATIONS  (STANDING):  furosemide   Injectable 40 milliGRAM(s) IV Push Once    MEDICATIONS  (PRN):      Allergies    No Known Allergies    Intolerances        SOCIAL HISTORY lives with partner and home health aide, nonsmoker, no etoh use                Vital Signs Last 24 Hrs  T(C): 36.6 (07 Jul 2025 16:15), Max: 36.6 (07 Jul 2025 16:15)  T(F): 97.8 (07 Jul 2025 16:15), Max: 97.8 (07 Jul 2025 16:15)  HR: 78 (07 Jul 2025 16:15) (78 - 91)  BP: 178/75 (07 Jul 2025 16:15) (178/75 - 178/75)  BP(mean): 109 (07 Jul 2025 16:15) (109 - 109)  RR: 16 (07 Jul 2025 16:15) (16 - 16)  SpO2: 100% (07 Jul 2025 16:15) (92% - 100%)    Parameters below as of 07 Jul 2025 16:15  Patient On (Oxygen Delivery Method): room air        Physical Exam:  General:  Appears stated age, well-groomed,  no distress  Eyes: EOMI, conjunctiva clear  Chest: no respiratory distress, no accessory muscle use  Abdomen:  soft, nondistended, +epigastric tenderness, neg Butler's sign. nbo rebound or guarding   Extremities:  no pedal edema or calf tenderness noted. LUE AVF palpable thrill   Skin:  No rash  Musculoskeletal:  normal strength  Neuro/Psych:  Alert, oriented to time, place and person     LABS:                        10.7   8.49  )-----------( 168      ( 07 Jul 2025 16:09 )             31.5     07-07    133[L]  |  93[L]  |  27[H]  ----------------------------<  114[H]  4.9   |  31  |  5.81[H]    Ca    9.3      07 Jul 2025 16:09    TPro  7.1  /  Alb  3.6  /  TBili  1.1  /  DBili  0.3  /  AST  24  /  ALT  16  /  AlkPhos  74  07-07    PT/INR - ( 07 Jul 2025 16:09 )   PT: 12.5 sec;   INR: 1.08 ratio         PTT - ( 07 Jul 2025 16:09 )  PTT:33.3 sec  Urinalysis Basic - ( 07 Jul 2025 16:09 )    Color: x / Appearance: x / SG: x / pH: x  Gluc: 114 mg/dL / Ketone: x  / Bili: x / Urobili: x   Blood: x / Protein: x / Nitrite: x   Leuk Esterase: x / RBC: x / WBC x   Sq Epi: x / Non Sq Epi: x / Bacteria: x          RADIOLOGY & ADDITIONAL STUDIES:  < from: CT Abdomen and Pelvis No Cont (07.07.25 @ 14:21) >  INTERPRETATION:  CLINICAL INFORMATION: eval for pancreatitis FCT    COMPARISON: 8.31.24.    CONTRAST/COMPLICATIONS:  IV Contrast: NONE  Oral Contrast: NONE.    PROCEDURE:  CT of the Abdomen and Pelvis was performed.  Sagittal and coronal reformats were performed.    FINDINGS:    LOWER CHEST: Cardiomegaly.  Moderate pleural effusions. Small pericardial   effusion. Nodular opacity right middle lobe.    LIVER: Within normal limits.  BILE DUCTS: Normal caliber.  GALLBLADDER: Cholelithiasis.  SPLEEN: Within normal limits.  PANCREAS: Mild inflammation around the pancreas.  ADRENALS: Within normal limits.  KIDNEYS/URETERS: Atrophic kidneys.    BLADDER: Within normal limits.  REPRODUCTIVE ORGANS: Within normal limits.    BOWEL: No bowel obstruction.  PERITONEUM/RETROPERITONEUM: Mild ascites.  VESSELS:  Within normal limits.  LYMPH NODES: Within normal limits.  ABDOMINAL WALL: Withinnormal limits.  BONES: Chronic right rib fractures. Chronic L1 compression fracture.    IMPRESSION: Question mild pancreatitis.    Cholelithiasis.    Moderate pleural effusions. Small pericardial effusion.    Indeterminate nodular opacity in the right middle lobe; recommend a 1   month follow-up chest CT.        --- End of Report ---    < end of copied text >  < from: US Abdomen Upper Quadrant Right (07.07.25 @ 15:24) >    ACC: [id number] EXAM:  US ABDOMEN RT UPR QUADRANT   ORDERED BY: MULU MOE     PROCEDURE DATE:  07/07/2025          INTERPRETATION:  CLINICAL INFORMATION: 84-year-old female with epigastric   pain    COMPARISON: CT 07/07/2025    TECHNIQUE: Sonography of the right upper quadrant.    FINDINGS:  Liver: Nodular contour suggesting cirrhosis.  Bile ducts: Normal caliber. Common bile duct measures 6 mm.  Gallbladder: Gallstones. Questionable focal tenderness  Pancreas: Visualized portions are within normal limits.  Right kidney: 6.4 cm. No hydronephrosis. Atrophic  Ascites: Small. IVC: Distended suggesting congestive heart failure.  Right pleural effusion.    IMPRESSION:  Findings suggesting right-sided congestive heart failure.  Cirrhotic morphology.  Gallstones. Questionable focal tenderness. Suggest further evaluation   with cholescintigraphy        --- End of Report ---    < end of copied text >      A/P: : 84 y/o F PMHx of DM, ESRD on HD (TTS), CAD (s/p stent), HTN, Hep c, liver failure, HLD, pancreatitis, hx of acute cholecystitis tx conservatively presented to ed with epigastric abd pain. CT and US with gallstones. lfts wnl. neg butler's sign. afebrile w/o leukocytosis   -no acute surgical intervention   -continue care/work up per ED  -case discussed with Dr Lundberg

## 2025-07-07 NOTE — H&P ADULT - PROBLEM SELECTOR PLAN 1
- P/w BL LE swelling  - Endorses chronic cough with clear/white sputum  - CTA chest = Moderate bilateral pleural effusions with associated extensive bilateral lower lobe compressive atelectasis markedly increased as compared to 4/9/2025. Likely mild pulmonary edema. No definite evidence of acute pulmonary embolism.  - Mild to moderate bilateral crackles heard on exam. +1 BL LE edema  - Pro-BNP = >175k  - Diastolic dysfunction seen on TTEs from 2020  - On room air  - Admit to telemetry  - S/p IV Lasix 40mg in the ED (makes urine)  - Start IV Lasix 40mg BID  - Fluid restriction  - Strict I&O, daily weight  - Pt likely needs HD 3x/week rather than 2  - Cardio Dr. Woody ortiz for consult. - P/w BL LE swelling  - Endorses chronic cough with clear/white sputum  - CTA chest = Moderate bilateral pleural effusions with associated extensive bilateral lower lobe compressive atelectasis markedly increased as compared to 4/9/2025. Likely mild pulmonary edema. No definite evidence of acute pulmonary embolism.  - Mild to moderate bilateral crackles heard on exam. +1 BL LE edema  - Pro-BNP = >175k  - Diastolic dysfunction seen on TTEs from 2020  - O2 sat 92% on room air -> 2L NC  - Admit to telemetry  - S/p IV Lasix 40mg in the ED (makes urine)  - Start IV Lasix 40mg BID  - Fluid restriction  - Strict I&O, daily weight  - Pt likely needs HD 3x/week rather than 2  - Cardio Dr. Woody ortiz for consult.

## 2025-07-07 NOTE — ED ADULT TRIAGE NOTE - CHIEF COMPLAINT QUOTE
Patient presents to ED c/o generalized abdominal pain and B/L LE swelling x 3 days. Patient took Tylenol with partial relief.  hx ESRD, HTN

## 2025-07-07 NOTE — H&P ADULT - ASSESSMENT
Patient is a 84F, with PMHx of ESRD on dialysis(Tu/Sat), HFpEF, DM, Anemia,(ANA and ACD), seizure no longer on keppra, liver failure/jaundice, hepatitis C(treated), pancreatitis, who comes in with swelling of legs, abdominal pain, nausea, and vomiting. Admitted for acute on chronic HFpEF.

## 2025-07-07 NOTE — H&P ADULT - NSHPPHYSICALEXAM_GEN_ALL_CORE
GENERAL: NAD  HEAD: Atraumatic, Normocephalic  EYES: EOMI. Conjunctiva and sclera clear  NECK: Supple  CHEST/LUNG: Mild-mod crackles bilaterally; No wheeze  HEART: Regular rate and rhythm; No murmurs  ABDOMEN: Soft, Nontender, Nondistended; Bowel sounds present  EXTREMITIES: +1 BL LE pitting edema  NEURO: AAOx3

## 2025-07-07 NOTE — ED ADULT NURSE NOTE - CHIEF COMPLAINT
KAILEE CALLED TO CANCEL HER APPOINTMENT FOR TODAY, I ASKED IF SHE WANTED TO RESCHEDULE, SHE DECLINED, SHE STATED THEY ARE GOING TO FIND A NEW PROVIDER CLOSER TO [employer]. The patient is a 84y Female complaining of abdominal pain.

## 2025-07-07 NOTE — ED ADULT TRIAGE NOTE - BANDS:
negative Do Not Use Extremity; Regular rate & rhythm, normal S1, S2; no murmurs, gallops or rubs; no S3, S4

## 2025-07-07 NOTE — H&P ADULT - HISTORY OF PRESENT ILLNESS
Patient is a 84F, with PMHx of ESRD on dialysis(Tu/Sat), HFpEF, DM, Anemia,(ANA and ACD), seizure no longer on keppra, liver failure/jaundice, hepatitis C(treated), pancreatitis, who comes in with swelling of legs, abdominal pain, nausea, and vomiting. Patient had been diagnosed with acute cholecystitis in last year and was treated conservatively. Pt abd pain just started acting up. She also has BL leg swelling for the few days. Per family at bedside, patient was diagnosed with heart failure in the past, and had exacerbations in the past. Patient did not take any excess water recently, because she monitors her water intake. Patient gets HD 2 times a week these days (Tues, Sat). Recently, patient had to extend one of her HD session by 10 minutes to get extra fluids out. Pt still makes urine.  Patient's cough is chronic. Vomiting is chronic, especially after HD sessions.

## 2025-07-07 NOTE — ED ADULT NURSE NOTE - OBJECTIVE STATEMENT
Pt presents to ED c/o generalized abdominal pain and B/L LE swelling x 3 days. Patient took Tylenol with partial relief. Pt awake and alert, a&ox2-3. Pt son at bedside. Pt states that they have had this pain for months. PMH of ESRD, HTN.

## 2025-07-08 ENCOUNTER — RESULT REVIEW (OUTPATIENT)
Age: 85
End: 2025-07-08

## 2025-07-08 LAB
ALBUMIN SERPL ELPH-MCNC: 3.3 G/DL — SIGNIFICANT CHANGE UP (ref 3.3–5)
ALP SERPL-CCNC: 70 U/L — SIGNIFICANT CHANGE UP (ref 40–120)
ALT FLD-CCNC: 13 U/L — SIGNIFICANT CHANGE UP (ref 12–78)
ANION GAP SERPL CALC-SCNC: 11 MMOL/L — SIGNIFICANT CHANGE UP (ref 5–17)
AST SERPL-CCNC: 24 U/L — SIGNIFICANT CHANGE UP (ref 15–37)
BILIRUB SERPL-MCNC: 1.2 MG/DL — SIGNIFICANT CHANGE UP (ref 0.2–1.2)
BUN SERPL-MCNC: 31 MG/DL — HIGH (ref 7–23)
CALCIUM SERPL-MCNC: 8.9 MG/DL — SIGNIFICANT CHANGE UP (ref 8.5–10.1)
CHLORIDE SERPL-SCNC: 93 MMOL/L — LOW (ref 96–108)
CO2 SERPL-SCNC: 26 MMOL/L — SIGNIFICANT CHANGE UP (ref 22–31)
CREAT SERPL-MCNC: 6.13 MG/DL — HIGH (ref 0.5–1.3)
EGFR: 6 ML/MIN/1.73M2 — LOW
EGFR: 6 ML/MIN/1.73M2 — LOW
GLUCOSE BLDC GLUCOMTR-MCNC: 109 MG/DL — HIGH (ref 70–99)
GLUCOSE BLDC GLUCOMTR-MCNC: 118 MG/DL — HIGH (ref 70–99)
GLUCOSE BLDC GLUCOMTR-MCNC: 169 MG/DL — HIGH (ref 70–99)
GLUCOSE SERPL-MCNC: 123 MG/DL — HIGH (ref 70–99)
HCT VFR BLD CALC: 31.2 % — LOW (ref 34.5–45)
HGB BLD-MCNC: 10.5 G/DL — LOW (ref 11.5–15.5)
MAGNESIUM SERPL-MCNC: 2.4 MG/DL — SIGNIFICANT CHANGE UP (ref 1.6–2.6)
MCHC RBC-ENTMCNC: 25.9 PG — LOW (ref 27–34)
MCHC RBC-ENTMCNC: 33.7 G/DL — SIGNIFICANT CHANGE UP (ref 32–36)
MCV RBC AUTO: 76.8 FL — LOW (ref 80–100)
NRBC BLD AUTO-RTO: 0 /100 WBCS — SIGNIFICANT CHANGE UP (ref 0–0)
PHOSPHATE SERPL-MCNC: 4.9 MG/DL — HIGH (ref 2.5–4.5)
PLATELET # BLD AUTO: 150 K/UL — SIGNIFICANT CHANGE UP (ref 150–400)
POTASSIUM SERPL-MCNC: 4.4 MMOL/L — SIGNIFICANT CHANGE UP (ref 3.5–5.3)
POTASSIUM SERPL-SCNC: 4.4 MMOL/L — SIGNIFICANT CHANGE UP (ref 3.5–5.3)
PROT SERPL-MCNC: 6.8 GM/DL — SIGNIFICANT CHANGE UP (ref 6–8.3)
RBC # BLD: 4.06 M/UL — SIGNIFICANT CHANGE UP (ref 3.8–5.2)
RBC # FLD: 16.6 % — HIGH (ref 10.3–14.5)
SODIUM SERPL-SCNC: 130 MMOL/L — LOW (ref 135–145)
WBC # BLD: 8.72 K/UL — SIGNIFICANT CHANGE UP (ref 3.8–10.5)
WBC # FLD AUTO: 8.72 K/UL — SIGNIFICANT CHANGE UP (ref 3.8–10.5)

## 2025-07-08 PROCEDURE — 99233 SBSQ HOSP IP/OBS HIGH 50: CPT

## 2025-07-08 PROCEDURE — 99223 1ST HOSP IP/OBS HIGH 75: CPT

## 2025-07-08 PROCEDURE — 93306 TTE W/DOPPLER COMPLETE: CPT | Mod: 26

## 2025-07-08 RX ORDER — ASPIRIN 325 MG
81 TABLET ORAL DAILY
Refills: 0 | Status: DISCONTINUED | OUTPATIENT
Start: 2025-07-08 | End: 2025-07-11

## 2025-07-08 RX ORDER — DEXTROMETHORPHAN HBR, GUAIFENESIN 200 MG/10ML
1200 LIQUID ORAL EVERY 12 HOURS
Refills: 0 | Status: DISCONTINUED | OUTPATIENT
Start: 2025-07-08 | End: 2025-07-11

## 2025-07-08 RX ORDER — HEPARIN SODIUM 1000 [USP'U]/ML
5000 INJECTION INTRAVENOUS; SUBCUTANEOUS EVERY 12 HOURS
Refills: 0 | Status: DISCONTINUED | OUTPATIENT
Start: 2025-07-08 | End: 2025-07-11

## 2025-07-08 RX ADMIN — Medication 325 MILLIGRAM(S): at 11:44

## 2025-07-08 RX ADMIN — Medication 50 MILLIGRAM(S): at 06:40

## 2025-07-08 RX ADMIN — MIRTAZAPINE 7.5 MILLIGRAM(S): 30 TABLET, FILM COATED ORAL at 21:27

## 2025-07-08 RX ADMIN — SEVELAMER HYDROCHLORIDE 1600 MILLIGRAM(S): 800 TABLET ORAL at 08:58

## 2025-07-08 RX ADMIN — HEPARIN SODIUM 5000 UNIT(S): 1000 INJECTION INTRAVENOUS; SUBCUTANEOUS at 20:20

## 2025-07-08 RX ADMIN — AMLODIPINE BESYLATE 10 MILLIGRAM(S): 10 TABLET ORAL at 06:40

## 2025-07-08 RX ADMIN — DEXTROMETHORPHAN HBR, GUAIFENESIN 1200 MILLIGRAM(S): 200 LIQUID ORAL at 02:00

## 2025-07-08 RX ADMIN — Medication 40 MILLIGRAM(S): at 06:41

## 2025-07-08 RX ADMIN — Medication 50 MILLIGRAM(S): at 14:23

## 2025-07-08 RX ADMIN — LOSARTAN POTASSIUM 100 MILLIGRAM(S): 100 TABLET, FILM COATED ORAL at 06:41

## 2025-07-08 RX ADMIN — FUROSEMIDE 40 MILLIGRAM(S): 10 INJECTION INTRAMUSCULAR; INTRAVENOUS at 14:23

## 2025-07-08 RX ADMIN — CARVEDILOL 12.5 MILLIGRAM(S): 3.12 TABLET, FILM COATED ORAL at 06:41

## 2025-07-08 RX ADMIN — LABETALOL HYDROCHLORIDE 5 MILLIGRAM(S): 200 TABLET, FILM COATED ORAL at 00:50

## 2025-07-08 RX ADMIN — CARVEDILOL 12.5 MILLIGRAM(S): 3.12 TABLET, FILM COATED ORAL at 20:20

## 2025-07-08 RX ADMIN — SEVELAMER HYDROCHLORIDE 1600 MILLIGRAM(S): 800 TABLET ORAL at 11:43

## 2025-07-08 RX ADMIN — FUROSEMIDE 40 MILLIGRAM(S): 10 INJECTION INTRAMUSCULAR; INTRAVENOUS at 06:40

## 2025-07-08 RX ADMIN — Medication 10 MILLIGRAM(S): at 00:58

## 2025-07-08 RX ADMIN — Medication 50 MILLIGRAM(S): at 21:27

## 2025-07-08 NOTE — CONSULT NOTE ADULT - SUBJECTIVE AND OBJECTIVE BOX
Cardiology Initial Consult    [facility] - [facility] [place] [address], [place] [address]  Office: [phone]  Fax: [phone]    CHIEF COMPLAINT:  abd pain    HISTORY OF PRESENT ILLNESS:  84F DM, HFpEF, CAD s/p KOSTA 6/17/2020, ESRD on HD TuTh, anemia, hx of HCV, seizure disorder who presented with several days of progressive abd pain, LE edema      Allergies    No Known Allergies    Intolerances    	    MEDICATIONS:  amLODIPine   Tablet 10 milliGRAM(s) Oral daily  carvedilol 12.5 milliGRAM(s) Oral every 12 hours  furosemide   Injectable 40 milliGRAM(s) IV Push <User Schedule>  hydrALAZINE 50 milliGRAM(s) Oral every 8 hours  hydrALAZINE Injectable 10 milliGRAM(s) IV Push every 4 hours PRN  labetalol Injectable 10 milliGRAM(s) IV Push every 8 hours PRN  losartan 100 milliGRAM(s) Oral daily  guaiFENesin ER 1200 milliGRAM(s) Oral every 12 hours PRN  acetaminophen     Tablet .. 650 milliGRAM(s) Oral every 6 hours PRN  HYDROmorphone  Injectable 0.2 milliGRAM(s) IV Push every 4 hours PRN  melatonin 3 milliGRAM(s) Oral at bedtime PRN  mirtazapine 7.5 milliGRAM(s) Oral at bedtime  ondansetron Injectable 4 milliGRAM(s) IV Push every 8 hours PRN  oxyCODONE    IR 2.5 milliGRAM(s) Oral every 6 hours PRN  aluminum hydroxide/magnesium hydroxide/simethicone Suspension 30 milliLiter(s) Oral every 4 hours PRN  pantoprazole    Tablet 40 milliGRAM(s) Oral before breakfast  dextrose 50% Injectable 50 Gram(s) IV Push once  glucagon  Injectable 1 milliGRAM(s) IntraMuscular once  insulin lispro (ADMELOG) corrective regimen sliding scale   SubCutaneous three times a day before meals  insulin lispro (ADMELOG) corrective regimen sliding scale   SubCutaneous at bedtime  ferrous    sulfate 325 milliGRAM(s) Oral daily    PAST MEDICAL & SURGICAL HISTORY:  HTN (hypertension)  HLD (hyperlipidemia)  DM type 2 (diabetes mellitus, type 2)  CKD (chronic kidney disease)  Asthma  H/O vitamin D deficiency  GERD (gastroesophageal reflux disease)  ESRD on dialysis  HLD (hyperlipidemia)  DM (diabetes mellitus)  HTN (hypertension)  History of cataract surgery      FAMILY HISTORY:      SOCIAL HISTORY:    [x] Non-smoker  [ ] Smoker  [ ] Alcohol    Review of Systems:  Constitutional: [ -] Fever [ -] Chills [ ] Fatigue [ ] Weight change   HEENT: [ ] Blurred vision [ ] Eye pain [- ] Headache [ ] Runny nose [ ] Sore throat   Respiratory: [ ] Cough [ ] Wheezing [x ] Shortness of breath  Cardiovascular: [ -] Chest Pain [ ] Palpitations [ ] HORN [ ] PND [ ] Orthopnea  Gastrointestinal: [ x] Abdominal Pain [ ] Diarrhea [ ] Constipation [ ] Hemorrhoids [ ] Nausea [ ] Vomiting  Genitourinary: [ ] Nocturia [- ] Dysuria [ ] Incontinence  Extremities: [ x] Swelling [ ] Joint Pain  Neurologic: [ ] Focal deficit [ ] Paresthesias [- ] Syncope  Skin: [ -] Rash [ ] Ecchymoses [ ] Wounds [ ] Lesions  Psychiatry: [ -] Depression [ ] Suicidal/Homicidal ideation [ ] Anxiety [ ] Sleep disturbances  [x ] 10 point review of systems is otherwise negative except as mentioned above            [ ]Unable to obtain    PHYSICAL EXAM:  T(C): 36.3 (07-08-25 @ 10:21), Max: 36.8 (07-07-25 @ 19:53)  HR: 70 (07-08-25 @ 10:21) (60 - 80)  BP: 136/54 (07-08-25 @ 10:21) (136/54 - 195/73)  RR: 18 (07-08-25 @ 10:21) (16 - 18)  SpO2: 93% (07-08-25 @ 10:21) (92% - 100%)  Wt(kg): --  I&O's Summary      Appearance: No acute distress  HEENT:   mmm  Cardiovascular: Normal S1 diminished S2, + elevated JVP, 3/6 RUSB, 2/6 LLSB, 1+ LE edema  Respiratory: bibasilar crackles	, good air movement  Psychiatry: A & O x 3, Mood & affect appropriate  Gastrointestinal:  soft 	  Skin: no cyanosis	  Neurologic: grossly non-focal  Vascular: Peripheral pulses palpable bilaterally    LABS:	 	  CBC Full  -  ( 08 Jul 2025 06:33 )  WBC Count : 8.72 K/uL  Hemoglobin : 10.5 g/dL  Hematocrit : 31.2 %  Platelet Count - Automated : 150 K/uL    07-08  130[L]  |  93[L]  |  31[H]  ----------------------------<  123[H]  4.4   |  26  |  6.13[H]  07-07    133[L]  |  93[L]  |  27[H]  ----------------------------<  114[H]  4.9   |  31  |  5.81[H]    Ca    8.9      08 Jul 2025 06:33  Ca    9.3      07 Jul 2025 16:09  Phos  4.9     07-08  Mg     2.4     07-08    TPro  6.8  /  Alb  3.3  /  TBili  1.2  /  DBili  x   /  AST  24  /  ALT  13  /  AlkPhos  70  07-08  TPro  7.1  /  Alb  3.6  /  TBili  1.1  /  DBili  0.3  /  AST  24  /  ALT  16  /  AlkPhos  74  07-07      proBNP:   Lipid Profile:   HgA1c:   TSH:     CARDIAC MARKERS:      TELEMETRY: 	  SR  ECG:  	SR, poor R wave progression  RADIOLOGY:  OTHER: 	    PREVIOUS DIAGNOSTIC TESTING:    [x] Echocardiogram: < from: Transthoracic Echocardiogram (08.05.20 @ 11:53) >  1. Mitral annular calcification. Mild-moderate mitral  regurgitation.  2. Calcified aortic valve. Peak transaortic valve gradient  equals 10 mm Hg, mean transaortic valve gradient equals 6  mm Hg, estimated aortic valve area equals 1.1 sqcm (by  continuity equation), aortic valve velocity time integral  equals 35 cm, consistent with moderateaortic stenosis. No  aortic valve regurgitation seen.  3. Moderate segmental left ventricular systolic  dysfunction. The mid anterior septum, the apical inferior  wall, the apical septum, the apical lateral wall, the mid  inferoseptum, and the apicalcap are hypokinetic.  4. Mild diastolic dysfunction (Stage I).  5. Normal right ventricular size and function.  6. Normal pericardium with trace pericardial effusion.    < end of copied text >      [x] Catheterization:< from: Cardiac Cath Lab - Adult (06.17.20 @ 15:17) >  CORONARY VESSELS: The coronary circulation is right dominant.  LM:   --  LM: Normal.  LAD:   --  Mid LAD: There was a 95 % stenosis. The lesion was heavily  calcified.  CX:   --  Circumflex: Angiography showed minor luminal irregularities with  no flow limiting lesions.  RCA:   --  Mid RCA: There was a 40 % stenosis.    < end of copied text >  < from: Cardiac Cath Lab - Adult (06.17.20 @ 15:17) >  DIAGNOSTIC IMPRESSIONS: Initial NSTEMI presentation.  Severe calcified stenosis of the proximal-mid LAD.  Pt s/p successful rotational athertectomy followed by KOSTA placement to the  LAD.    < end of copied text >    [ ] Stress Test:

## 2025-07-08 NOTE — PROGRESS NOTE ADULT - PROBLEM SELECTOR PLAN 1
- P/w BL LE swelling  - Endorses chronic cough with clear/white sputum  - CTA chest = Moderate bilateral pleural effusions with associated extensive bilateral lower lobe compressive atelectasis markedly increased as compared to 4/9/2025. Likely mild pulmonary edema. No definite evidence of acute pulmonary embolism.  - Pro-BNP = >175k  - O2 sat 92% on room air -> 2L NC  - Admit to telemetry  - Start IV Lasix 40mg BID  - Cards c/s placed - P/w BL LE swelling  - Endorses chronic cough with clear/white sputum  - CTA chest = Moderate bilateral pleural effusions with associated extensive bilateral lower lobe compressive atelectasis markedly increased as compared to 4/9/2025. Likely mild pulmonary edema. No definite evidence of acute pulmonary embolism.  - Pro-BNP = >175k  - O2 sat 92% on room air -> 2L NC  - Admit to telemetry  - Start IV Lasix 40mg BID  - Cards c/s placed- started ASA. TTE pending

## 2025-07-08 NOTE — PATIENT PROFILE ADULT - NSPROMEDSBROUGHTTOHOSP_GEN_A_NUR
Pt presents with fish hook stuck in left hand and pinkie.  3 way fish hook.  bleeding controlled PTA. no

## 2025-07-08 NOTE — CONSULT NOTE ADULT - ASSESSMENT
84F DM, HFpEF, CAD s/p KOSTA 6/17/2020, ESRD on HD TuTh, anemia, hx of HCV, seizure disorder who presented with progressive volume overload. On supplemental O2    volume status as per renal through HD and/or IV diuretics  exam concerning for aortic stenosis  check TTE  abd u/s c/f cirrhosis and RV failure  should be on antiplt given hx of stent  expect BP to improve with volume management

## 2025-07-08 NOTE — CONSULT NOTE ADULT - SUBJECTIVE AND OBJECTIVE BOX
Patient chart reviewed, full consult to follow.     Thank you for the courtesy of this consultation. [facility] NEPHROLOGY SERVICES, [facility]  NEPHROLOGY AND HYPERTENSION  [address]  [address]  [place] [address]  [phone]    MD AISHA FLOWER MD YELENA ROSENBERG, MD BINNY KOSHY, MD CHRISTOPHER CAPUTO, MD EDWARD BOVER, MD      Information from chart:  "Patient is a 84y old  Female who presents with a chief complaint of Acute on chronic HFpEF (2025 14:54)    HPI:  Patient is a 84F, with PMHx of ESRD on dialysis(/Sat), HFpEF, DM, Anemia,(ANA and ACD), seizure no longer on keppra, liver failure/jaundice, hepatitis C(treated), pancreatitis, who comes in with swelling of legs, abdominal pain, nausea, and vomiting. Patient had been diagnosed with acute cholecystitis in last year and was treated conservatively. Pt abd pain just started acting up. She also has BL leg swelling for the few days. Per family at bedside, patient was diagnosed with heart failure in the past, and had exacerbations in the past. Patient did not take any excess water recently, because she monitors her water intake. Patient gets HD 2 times a week these days (Tues, Sat). Recently, patient had to extend one of her HD session by 10 minutes to get extra fluids out. Pt still makes urine.  Patient's cough is chronic. Vomiting is chronic, especially after HD sessions. (2025 17:58)   "    PAST MEDICAL & SURGICAL HISTORY:  HTN (hypertension)      HLD (hyperlipidemia)      DM type 2 (diabetes mellitus, type 2)      CKD (chronic kidney disease)      Asthma      H/O vitamin D deficiency      GERD (gastroesophageal reflux disease)      ESRD on dialysis      HLD (hyperlipidemia)      DM (diabetes mellitus)      HTN (hypertension)      History of cataract surgery        FAMILY HISTORY:    Allergies    No Known Allergies    Intolerances      Home Medications:  amLODIPine 10 mg oral tablet: 1 tab(s) orally once a day (2025 21:25)  ferrous sulfate 325 mg (65 mg elemental iron) oral tablet: 1 tab(s) orally once a day (2025 21:25)  Januvia 100 mg oral tablet: 1 tab(s) orally once a day (2025 21:25)  mirtazapine 7.5 mg oral tablet: 1 tab(s) orally once a day (2025 21:25)  pantoprazole 40 mg oral delayed release tablet: 1 tab(s) orally once a day (2025 21:25)  sevelamer carbonate 800 mg oral tablet: 2 tab(s) orally 3 times a day (with meals) (2025 21:25)    MEDICATIONS  (STANDING):  amLODIPine   Tablet 10 milliGRAM(s) Oral daily  aspirin  chewable 81 milliGRAM(s) Oral daily  carvedilol 12.5 milliGRAM(s) Oral every 12 hours  dextrose 50% Injectable 50 Gram(s) IV Push once  ferrous    sulfate 325 milliGRAM(s) Oral daily  furosemide   Injectable 40 milliGRAM(s) IV Push <User Schedule>  glucagon  Injectable 1 milliGRAM(s) IntraMuscular once  heparin   Injectable 5000 Unit(s) SubCutaneous every 12 hours  hydrALAZINE 50 milliGRAM(s) Oral every 8 hours  insulin lispro (ADMELOG) corrective regimen sliding scale   SubCutaneous three times a day before meals  insulin lispro (ADMELOG) corrective regimen sliding scale   SubCutaneous at bedtime  losartan 100 milliGRAM(s) Oral daily  mirtazapine 7.5 milliGRAM(s) Oral at bedtime  pantoprazole    Tablet 40 milliGRAM(s) Oral before breakfast  sevelamer carbonate 1600 milliGRAM(s) Oral three times a day with meals    MEDICATIONS  (PRN):  acetaminophen     Tablet .. 650 milliGRAM(s) Oral every 6 hours PRN Temp greater or equal to 38C (100.4F), Mild Pain (1 - 3)  aluminum hydroxide/magnesium hydroxide/simethicone Suspension 30 milliLiter(s) Oral every 4 hours PRN Dyspepsia  guaiFENesin ER 1200 milliGRAM(s) Oral every 12 hours PRN Cough  hydrALAZINE Injectable 10 milliGRAM(s) IV Push every 4 hours PRN SBP>150, and cannot tolerate PO meds  HYDROmorphone  Injectable 0.2 milliGRAM(s) IV Push every 4 hours PRN Severe Pain (7 - 10)  labetalol Injectable 10 milliGRAM(s) IV Push every 8 hours PRN Systolic blood pressure > 180. Hold if HR<60  melatonin 3 milliGRAM(s) Oral at bedtime PRN Insomnia  ondansetron Injectable 4 milliGRAM(s) IV Push every 8 hours PRN Nausea and/or Vomiting  oxyCODONE    IR 2.5 milliGRAM(s) Oral every 6 hours PRN Moderate Pain (4 - 6)    Vital Signs Last 24 Hrs  T(C): 36.7 (2025 14:20), Max: 36.8 (2025 19:53)  T(F): 98 (2025 14:20), Max: 98.2 (2025 19:53)  HR: 62 (:20) (60 - 80)  BP: 155/61 (2025 14:20) (136/54 - 195/73)  BP(mean): 96 (2025 19:53) (96 - 96)  RR: 17 (:20) (16 - 18)  SpO2: 94% (:20) (92% - 96%)    Parameters below as of 2025 14:20  Patient On (Oxygen Delivery Method): room air        Daily     Daily Weight in k (2025 14:20)    CAPILLARY BLOOD GLUCOSE      POCT Blood Glucose.: 109 mg/dL (2025 11:42)  POCT Blood Glucose.: 118 mg/dL (2025 07:56)  POCT Blood Glucose.: 110 mg/dL (2025 22:50)    PHYSICAL EXAM:      T(C): 36.7 (25 @ 14:20), Max: 36.8 (25 @ 19:53)  HR: 62 (25 @ 14:20) (60 - 80)  BP: 155/61 (25 @ 14:20) (136/54 - 195/73)  RR: 17 (25 @ 14:20) (16 - 18)  SpO2: 94% (25 @ 14:20) (92% - 96%)  Wt(kg): --  Lungs clear decreased BS bases  Heart S1S2  Abd soft NT ND  Extremities:   tr edema RUE + avf                  130[L]  |  93[L]  |  31[H]  ----------------------------<  123[H]  4.4   |  26  |  6.13[H]    Ca    8.9      2025 06:33  Phos  4.9     07-08  Mg     2.4     07-08    TPro  6.8  /  Alb  3.3  /  TBili  1.2  /  DBili  x   /  AST  24  /  ALT  13  /  AlkPhos  70  07-08                          10.5   8.72  )-----------( 150      ( 2025 06:33 )             31.2     Creatinine Trend: 6.13<--, 5.81<--  Urinalysis Basic - ( 2025 06:33 )    Color: x / Appearance: x / SG: x / pH: x  Gluc: 123 mg/dL / Ketone: x  / Bili: x / Urobili: x   Blood: x / Protein: x / Nitrite: x   Leuk Esterase: x / RBC: x / WBC x   Sq Epi: x / Non Sq Epi: x / Bacteria: x            Assessment   ESRD, fluid overload    Plan  HD for today  UF as tolerated  Will follow course    Linden Pastrana MD

## 2025-07-09 DIAGNOSIS — R91.1 SOLITARY PULMONARY NODULE: ICD-10-CM

## 2025-07-09 LAB
ALBUMIN SERPL ELPH-MCNC: 3.1 G/DL — LOW (ref 3.3–5)
ALP SERPL-CCNC: 60 U/L — SIGNIFICANT CHANGE UP (ref 40–120)
ALT FLD-CCNC: 13 U/L — SIGNIFICANT CHANGE UP (ref 12–78)
ANION GAP SERPL CALC-SCNC: 11 MMOL/L — SIGNIFICANT CHANGE UP (ref 5–17)
AST SERPL-CCNC: 21 U/L — SIGNIFICANT CHANGE UP (ref 15–37)
BILIRUB SERPL-MCNC: 1.2 MG/DL — SIGNIFICANT CHANGE UP (ref 0.2–1.2)
BUN SERPL-MCNC: 15 MG/DL — SIGNIFICANT CHANGE UP (ref 7–23)
CALCIUM SERPL-MCNC: 9.2 MG/DL — SIGNIFICANT CHANGE UP (ref 8.5–10.1)
CHLORIDE SERPL-SCNC: 98 MMOL/L — SIGNIFICANT CHANGE UP (ref 96–108)
CO2 SERPL-SCNC: 28 MMOL/L — SIGNIFICANT CHANGE UP (ref 22–31)
CREAT SERPL-MCNC: 3.91 MG/DL — HIGH (ref 0.5–1.3)
EGFR: 11 ML/MIN/1.73M2 — LOW
EGFR: 11 ML/MIN/1.73M2 — LOW
GLUCOSE BLDC GLUCOMTR-MCNC: 121 MG/DL — HIGH (ref 70–99)
GLUCOSE BLDC GLUCOMTR-MCNC: 123 MG/DL — HIGH (ref 70–99)
GLUCOSE BLDC GLUCOMTR-MCNC: 148 MG/DL — HIGH (ref 70–99)
GLUCOSE BLDC GLUCOMTR-MCNC: 94 MG/DL — SIGNIFICANT CHANGE UP (ref 70–99)
GLUCOSE SERPL-MCNC: 103 MG/DL — HIGH (ref 70–99)
HCT VFR BLD CALC: 28.8 % — LOW (ref 34.5–45)
HGB BLD-MCNC: 9.6 G/DL — LOW (ref 11.5–15.5)
MCHC RBC-ENTMCNC: 26.1 PG — LOW (ref 27–34)
MCHC RBC-ENTMCNC: 33.3 G/DL — SIGNIFICANT CHANGE UP (ref 32–36)
MCV RBC AUTO: 78.3 FL — LOW (ref 80–100)
NRBC BLD AUTO-RTO: 0 /100 WBCS — SIGNIFICANT CHANGE UP (ref 0–0)
PLATELET # BLD AUTO: 147 K/UL — LOW (ref 150–400)
POTASSIUM SERPL-MCNC: 3.5 MMOL/L — SIGNIFICANT CHANGE UP (ref 3.5–5.3)
POTASSIUM SERPL-SCNC: 3.5 MMOL/L — SIGNIFICANT CHANGE UP (ref 3.5–5.3)
PROT SERPL-MCNC: 6 GM/DL — SIGNIFICANT CHANGE UP (ref 6–8.3)
RBC # BLD: 3.68 M/UL — LOW (ref 3.8–5.2)
RBC # FLD: 16.7 % — HIGH (ref 10.3–14.5)
SODIUM SERPL-SCNC: 137 MMOL/L — SIGNIFICANT CHANGE UP (ref 135–145)
WBC # BLD: 7.38 K/UL — SIGNIFICANT CHANGE UP (ref 3.8–10.5)
WBC # FLD AUTO: 7.38 K/UL — SIGNIFICANT CHANGE UP (ref 3.8–10.5)

## 2025-07-09 PROCEDURE — 99232 SBSQ HOSP IP/OBS MODERATE 35: CPT

## 2025-07-09 RX ORDER — CIPROFLOXACIN HCL 250 MG
TABLET ORAL
Refills: 0 | Status: DISCONTINUED | OUTPATIENT
Start: 2025-07-09 | End: 2025-07-10

## 2025-07-09 RX ORDER — CIPROFLOXACIN HCL 250 MG
400 TABLET ORAL EVERY 12 HOURS
Refills: 0 | Status: DISCONTINUED | OUTPATIENT
Start: 2025-07-10 | End: 2025-07-10

## 2025-07-09 RX ORDER — CIPROFLOXACIN HCL 250 MG
400 TABLET ORAL ONCE
Refills: 0 | Status: COMPLETED | OUTPATIENT
Start: 2025-07-09 | End: 2025-07-09

## 2025-07-09 RX ADMIN — FUROSEMIDE 40 MILLIGRAM(S): 10 INJECTION INTRAMUSCULAR; INTRAVENOUS at 05:28

## 2025-07-09 RX ADMIN — SEVELAMER HYDROCHLORIDE 1600 MILLIGRAM(S): 800 TABLET ORAL at 08:21

## 2025-07-09 RX ADMIN — SEVELAMER HYDROCHLORIDE 1600 MILLIGRAM(S): 800 TABLET ORAL at 11:42

## 2025-07-09 RX ADMIN — Medication 40 MILLIGRAM(S): at 05:29

## 2025-07-09 RX ADMIN — Medication 50 MILLIGRAM(S): at 05:29

## 2025-07-09 RX ADMIN — Medication 1 APPLICATION(S): at 11:42

## 2025-07-09 RX ADMIN — SEVELAMER HYDROCHLORIDE 1600 MILLIGRAM(S): 800 TABLET ORAL at 18:03

## 2025-07-09 RX ADMIN — FUROSEMIDE 40 MILLIGRAM(S): 10 INJECTION INTRAMUSCULAR; INTRAVENOUS at 13:49

## 2025-07-09 RX ADMIN — Medication 81 MILLIGRAM(S): at 11:41

## 2025-07-09 RX ADMIN — HEPARIN SODIUM 5000 UNIT(S): 1000 INJECTION INTRAVENOUS; SUBCUTANEOUS at 18:03

## 2025-07-09 RX ADMIN — Medication 325 MILLIGRAM(S): at 11:41

## 2025-07-09 RX ADMIN — CARVEDILOL 12.5 MILLIGRAM(S): 3.12 TABLET, FILM COATED ORAL at 05:28

## 2025-07-09 RX ADMIN — AMLODIPINE BESYLATE 10 MILLIGRAM(S): 10 TABLET ORAL at 05:29

## 2025-07-09 RX ADMIN — CARVEDILOL 12.5 MILLIGRAM(S): 3.12 TABLET, FILM COATED ORAL at 18:03

## 2025-07-09 RX ADMIN — Medication 50 MILLIGRAM(S): at 13:48

## 2025-07-09 RX ADMIN — MIRTAZAPINE 7.5 MILLIGRAM(S): 30 TABLET, FILM COATED ORAL at 21:50

## 2025-07-09 RX ADMIN — LOSARTAN POTASSIUM 100 MILLIGRAM(S): 100 TABLET, FILM COATED ORAL at 05:29

## 2025-07-09 RX ADMIN — Medication 50 MILLIGRAM(S): at 21:49

## 2025-07-09 RX ADMIN — HEPARIN SODIUM 5000 UNIT(S): 1000 INJECTION INTRAVENOUS; SUBCUTANEOUS at 05:28

## 2025-07-09 RX ADMIN — Medication 200 MILLIGRAM(S): at 16:40

## 2025-07-09 NOTE — PROGRESS NOTE ADULT - PROBLEM SELECTOR PLAN 1
- P/w BL LE swelling  - Endorses chronic cough with clear/white sputum  - CTA chest = Moderate bilateral pleural effusions with associated extensive bilateral lower lobe compressive atelectasis markedly increased as compared to 4/9/2025. Likely mild pulmonary edema. No definite evidence of acute pulmonary embolism.  - Pro-BNP = >175k  - O2 sat 92% on room air -> 2L NC  - Admit to telemetry  - Start IV Lasix 40mg BID  - Cards c/s placed- started ASA. TTE pending

## 2025-07-09 NOTE — PHYSICAL THERAPY INITIAL EVALUATION ADULT - PERTINENT HX OF CURRENT PROBLEM, REHAB EVAL
84y Female PMHx of DM, ESRD on HD (TTS), CAD (s/p stent), HTN, Hep c, liver failure, HLD, pancreatitis, hx of acute cholecystitis tx conservatively presented to ed with epigastric abd pain, found to have heart failure exacerbation.

## 2025-07-09 NOTE — PROGRESS NOTE ADULT - ASSESSMENT
Impression: 84y Female PMHx of DM, ESRD on HD (TTS), CAD (s/p stent), HTN, Hep c, liver failure, HLD, pancreatitis, hx of acute cholecystitis tx conservatively presented to ed with epigastric abd pain, found to have heart failure exacerbation.   CT and US with cholelithiasis.  Afebrile, without leukocytosis    Plan:  - Patient with recent treatment of acute cholecystitis, now unable to tolerate diet, Recommend restarting Cipro IV to treat. No acute surgical intervention at this time, as patient with many comorbidities and is a poor surgical candidate  - Recommend advance diet as tolerated  - Continue care per primary team  - Discussed with Dr. Lundberg

## 2025-07-09 NOTE — PROGRESS NOTE ADULT - ASSESSMENT
Patient is a 84F, with PMHx of ESRD on dialysis(Tu/Sat), HFpEF, DM, Anemia,(ANA and ACD), seizure no longer on keppra, liver failure/jaundice, hepatitis C(treated), pancreatitis, who comes in with swelling of legs, abdominal pain, nausea, and vomiting. Admitted for acute on chronic HFpEF.    Updated patient's son, Misha on 7/9

## 2025-07-09 NOTE — PHYSICAL THERAPY INITIAL EVALUATION ADULT - AMBULATION SKILLS, REHAB EVAL
Rollator SAC. Pt is carried in w/c on the steps by  transport service staff  when going to dialysis/needs device and assist Rollator SAC. Pt is carried in w/c on the steps by  transport service staff  when going to dialysis . Pt walks minimally in the home  with help from home health aide - approx 5-10 ft./needs device and assist

## 2025-07-09 NOTE — PHYSICAL THERAPY INITIAL EVALUATION ADULT - GAIT TRAINING, PT EVAL
Pt will be able to ambulate independently using assistive device up to 50 ft or more,  safely observing proper gait, posture and prevent falls.

## 2025-07-09 NOTE — PROGRESS NOTE ADULT - ASSESSMENT
84F DM, HFpEF, CAD s/p KOSTA 6/17/2020, ESRD on HD Tu sat, anemia, hx of HCV, seizure disorder who presented with progressive volume overload, dyspnea, abdominal pain, nausea, and vomiting   On supplemental O2  BNP >708606     volume status as per renal through HD and/or IV diuretics. ? additional HD  TTE with preserved, Severe left ventricular hypertrophy, Grade II DD, Left atrium is severely dilated. Mildly enlarged right ventricular cavity size and borderline reduced right ventricular systolic function. Mod-severe MR, mod TR/AS, Small pericardial effusion  abd u/s c/f cirrhosis and RV failure  cont asa 81/d  expect BP to improve with volume management  supplemental O2 as needed  84F DM, HFpEF, CAD s/p KOSTA 6/17/2020, ESRD on HD Tu sat, anemia, hx of HCV, seizure disorder who presented with progressive volume overload, dyspnea, abdominal pain, nausea, and vomiting   On supplemental O2  BNP >241962     volume status as per renal through HD and/or IV diuretics. ? additional HD  TTE with preserved, Severe left ventricular hypertrophy, Grade II DD, Left atrium is severely dilated. Mildly enlarged right ventricular cavity size and borderline reduced right ventricular systolic function. Mod-severe MR, mod TR/AS, Small pericardial effusion  abd u/s c/f cirrhosis and RV failure  cont asa 81/d  expect BP to improve with volume management  supplemental O2 as needed     please reconsult cardiology as needed

## 2025-07-09 NOTE — PHYSICAL THERAPY INITIAL EVALUATION ADULT - ADDITIONAL COMMENTS
Lives in a apt with 6 steps to enter c [facility]. Pt has home health aide 5 hrs x 7 days/week.  Pt uses [facility]. She goes to dialysis in AM and her SO goes to dialysis in PM when home health aide comes from 1pm-6PM.

## 2025-07-10 ENCOUNTER — TRANSCRIPTION ENCOUNTER (OUTPATIENT)
Age: 85
End: 2025-07-10

## 2025-07-10 LAB
ALBUMIN SERPL ELPH-MCNC: 3.1 G/DL — LOW (ref 3.3–5)
ALP SERPL-CCNC: 58 U/L — SIGNIFICANT CHANGE UP (ref 40–120)
ALT FLD-CCNC: 13 U/L — SIGNIFICANT CHANGE UP (ref 12–78)
ANION GAP SERPL CALC-SCNC: 10 MMOL/L — SIGNIFICANT CHANGE UP (ref 5–17)
AST SERPL-CCNC: 16 U/L — SIGNIFICANT CHANGE UP (ref 15–37)
BILIRUB SERPL-MCNC: 1 MG/DL — SIGNIFICANT CHANGE UP (ref 0.2–1.2)
BUN SERPL-MCNC: 20 MG/DL — SIGNIFICANT CHANGE UP (ref 7–23)
CALCIUM SERPL-MCNC: 8.9 MG/DL — SIGNIFICANT CHANGE UP (ref 8.5–10.1)
CHLORIDE SERPL-SCNC: 96 MMOL/L — SIGNIFICANT CHANGE UP (ref 96–108)
CO2 SERPL-SCNC: 28 MMOL/L — SIGNIFICANT CHANGE UP (ref 22–31)
CREAT SERPL-MCNC: 4.78 MG/DL — HIGH (ref 0.5–1.3)
EGFR: 8 ML/MIN/1.73M2 — LOW
EGFR: 8 ML/MIN/1.73M2 — LOW
GLUCOSE BLDC GLUCOMTR-MCNC: 105 MG/DL — HIGH (ref 70–99)
GLUCOSE BLDC GLUCOMTR-MCNC: 116 MG/DL — HIGH (ref 70–99)
GLUCOSE BLDC GLUCOMTR-MCNC: 119 MG/DL — HIGH (ref 70–99)
GLUCOSE BLDC GLUCOMTR-MCNC: 123 MG/DL — HIGH (ref 70–99)
GLUCOSE SERPL-MCNC: 142 MG/DL — HIGH (ref 70–99)
HCT VFR BLD CALC: 29.2 % — LOW (ref 34.5–45)
HGB BLD-MCNC: 9.8 G/DL — LOW (ref 11.5–15.5)
MCHC RBC-ENTMCNC: 26.1 PG — LOW (ref 27–34)
MCHC RBC-ENTMCNC: 33.6 G/DL — SIGNIFICANT CHANGE UP (ref 32–36)
MCV RBC AUTO: 77.9 FL — LOW (ref 80–100)
NRBC BLD AUTO-RTO: 0 /100 WBCS — SIGNIFICANT CHANGE UP (ref 0–0)
PLATELET # BLD AUTO: 137 K/UL — LOW (ref 150–400)
POTASSIUM SERPL-MCNC: 3.7 MMOL/L — SIGNIFICANT CHANGE UP (ref 3.5–5.3)
POTASSIUM SERPL-SCNC: 3.7 MMOL/L — SIGNIFICANT CHANGE UP (ref 3.5–5.3)
PROT SERPL-MCNC: 6.1 GM/DL — SIGNIFICANT CHANGE UP (ref 6–8.3)
RBC # BLD: 3.75 M/UL — LOW (ref 3.8–5.2)
RBC # FLD: 17 % — HIGH (ref 10.3–14.5)
SODIUM SERPL-SCNC: 134 MMOL/L — LOW (ref 135–145)
WBC # BLD: 7.61 K/UL — SIGNIFICANT CHANGE UP (ref 3.8–10.5)
WBC # FLD AUTO: 7.61 K/UL — SIGNIFICANT CHANGE UP (ref 3.8–10.5)

## 2025-07-10 PROCEDURE — 99232 SBSQ HOSP IP/OBS MODERATE 35: CPT

## 2025-07-10 RX ORDER — FUROSEMIDE 10 MG/ML
40 INJECTION INTRAMUSCULAR; INTRAVENOUS DAILY
Refills: 0 | Status: DISCONTINUED | OUTPATIENT
Start: 2025-07-10 | End: 2025-07-11

## 2025-07-10 RX ORDER — CEFPODOXIME PROXETIL 200 MG/1
200 TABLET, FILM COATED ORAL EVERY 24 HOURS
Refills: 0 | Status: DISCONTINUED | OUTPATIENT
Start: 2025-07-11 | End: 2025-07-11

## 2025-07-10 RX ADMIN — Medication 1 APPLICATION(S): at 08:39

## 2025-07-10 RX ADMIN — Medication 325 MILLIGRAM(S): at 18:13

## 2025-07-10 RX ADMIN — MIRTAZAPINE 7.5 MILLIGRAM(S): 30 TABLET, FILM COATED ORAL at 22:03

## 2025-07-10 RX ADMIN — Medication 81 MILLIGRAM(S): at 18:12

## 2025-07-10 RX ADMIN — LOSARTAN POTASSIUM 100 MILLIGRAM(S): 100 TABLET, FILM COATED ORAL at 05:17

## 2025-07-10 RX ADMIN — CARVEDILOL 12.5 MILLIGRAM(S): 3.12 TABLET, FILM COATED ORAL at 05:15

## 2025-07-10 RX ADMIN — Medication 200 MILLIGRAM(S): at 06:10

## 2025-07-10 RX ADMIN — Medication 40 MILLIGRAM(S): at 05:15

## 2025-07-10 RX ADMIN — Medication 50 MILLIGRAM(S): at 22:03

## 2025-07-10 RX ADMIN — AMLODIPINE BESYLATE 10 MILLIGRAM(S): 10 TABLET ORAL at 05:15

## 2025-07-10 RX ADMIN — HEPARIN SODIUM 5000 UNIT(S): 1000 INJECTION INTRAVENOUS; SUBCUTANEOUS at 05:16

## 2025-07-10 RX ADMIN — CARVEDILOL 12.5 MILLIGRAM(S): 3.12 TABLET, FILM COATED ORAL at 18:13

## 2025-07-10 RX ADMIN — SEVELAMER HYDROCHLORIDE 1600 MILLIGRAM(S): 800 TABLET ORAL at 18:12

## 2025-07-10 RX ADMIN — HEPARIN SODIUM 5000 UNIT(S): 1000 INJECTION INTRAVENOUS; SUBCUTANEOUS at 18:13

## 2025-07-10 RX ADMIN — Medication 50 MILLIGRAM(S): at 05:16

## 2025-07-10 RX ADMIN — FUROSEMIDE 40 MILLIGRAM(S): 10 INJECTION INTRAMUSCULAR; INTRAVENOUS at 05:16

## 2025-07-10 NOTE — DISCHARGE NOTE PROVIDER - NSDCCPCAREPLAN_GEN_ALL_CORE_FT
Burke Sanchez is a 58 year old male presenting with right ear irrigation.    Currently is not taking any medications  Denies known Latex allergy or symptoms of Latex sensitivity  Patient would like communication of their results via:     Cell Phone:   Telephone Information:   Mobile [phone]     Okay to leave a message containing results? Yes    Smoking status reviewed       PRINCIPAL DISCHARGE DIAGNOSIS  Diagnosis: Fluid overload  Assessment and Plan of Treatment:      PRINCIPAL DISCHARGE DIAGNOSIS  Diagnosis: Acute on chronic heart failure with preserved ejection fraction (HFpEF)  Assessment and Plan of Treatment:       SECONDARY DISCHARGE DIAGNOSES  Diagnosis: ESRD on hemodialysis  Assessment and Plan of Treatment:     Diagnosis: Pulmonary nodule  Assessment and Plan of Treatment: Please follow  up with outpatient PCP in 1 month for repeat imaging    Diagnosis: Benign essential HTN  Assessment and Plan of Treatment:     Diagnosis: GERD (gastroesophageal reflux disease)  Assessment and Plan of Treatment:     Diagnosis: DM (diabetes mellitus)  Assessment and Plan of Treatment:     Diagnosis: Acute on chronic respiratory failure with hypoxia  Assessment and Plan of Treatment: due to CHF exacerbation

## 2025-07-10 NOTE — DISCHARGE NOTE PROVIDER - ATTENDING DISCHARGE PHYSICAL EXAMINATION:
Vital Signs Last 24 Hrs  T(F): 98.3 (11 Jul 2025 11:00), Max: 98.7 (10 Jul 2025 23:38)  HR: 60 (11 Jul 2025 13:15) (58 - 95)  BP: 139/66 (11 Jul 2025 13:15) (136/58 - 169/52)  RR: 18 (11 Jul 2025 11:00) (17 - 18)  SpO2: 94% (11 Jul 2025 11:00) (92% - 98%)    Physical Exam:  Constitutional: NAD, awake and alert, well-developed  Neck: Soft and supple, No LAD, No JVD  Respiratory: cta b/l no wheezing no rhonchi  Cardiovascular: +s1/s2 no edema b/l le  Gastrointestinal: soft nt nd bs+  Vascular: 2+ peripheral pulses  Neurological: A/O x 3, no focal deficits

## 2025-07-10 NOTE — DISCHARGE NOTE PROVIDER - CARE PROVIDER_API CALL
Linden Pastrana  Nephrology  [address] [facility], [address] [place] [id number]  Phone: [phone]  Fax: [phone]  Follow Up Time:     PCP,   Phone: (   )    -  Fax: (   )    -  Follow Up Time:

## 2025-07-10 NOTE — PROGRESS NOTE ADULT - PROBLEM SELECTOR PLAN 6
- C/w losartan 100mg, amlodipine 10mg, hydralazine 40mg TID, Coreg 12.5mg BID
- C/w losartan 100mg, amlodipine 10mg, hydralazine 40mg TID, Coreg 12.5mg BID
- Home med Januvia  - ISS + FS achs

## 2025-07-10 NOTE — PROGRESS NOTE ADULT - ASSESSMENT
Patient is a 84F, with PMHx of ESRD on dialysis(Tu/Sat), HFpEF, DM, Anemia,(ANA and ACD), seizure no longer on keppra, liver failure/jaundice, hepatitis C(treated), pancreatitis, who comes in with swelling of legs, abdominal pain, nausea, and vomiting. Admitted for acute on chronic HFpEF.    Updated patient's son, Misha  Dispo- Pending home oxygen evaluation  PT manuel LOONEY. Patient wants home w/ home PT.

## 2025-07-10 NOTE — PROGRESS NOTE ADULT - PROBLEM SELECTOR PLAN 1
- P/w BL LE swelling  - Endorses chronic cough with clear/white sputum  - CTA chest = Moderate bilateral pleural effusions with associated extensive bilateral lower lobe compressive atelectasis markedly increased as compared to 4/9/2025. Likely mild pulmonary edema. No definite evidence of acute pulmonary embolism.  - Pro-BNP = >175k  - O2 sat 92% on room air -> 2L NC  - Admit to telemetry  - Switched to PO lasix  - Cards c/s placed- started ASA

## 2025-07-10 NOTE — PHARMACOTHERAPY INTERVENTION NOTE - COMMENTS
Recommended CHG bath initiation since patient receives hemodialysis.
Recommended to switch ciprofloxacin to cefpodoxime due to improved local susceptibilities and fewer adverse effects with cefpodoxime. 
Recommended to discontinue hydromorphone as patient has not required opioid therapy since admission. 
Recommended oxycodone discontinuation since medication hasn't been administered since admission.

## 2025-07-10 NOTE — PROGRESS NOTE ADULT - PROBLEM SELECTOR PLAN 2
- Currently on HD Tues/Sat  - C/w sevelamer  - Nephumu Pastrana consulted by ED
- Currently on HD Tues/Thu/Sat  - C/w sevelamer  - Nephro Dr. Pastrana consulted by ED
- Currently on HD Tues/Thu/Sat  - C/w sevelamer  - Nephro Dr. Pastrana consulted by ED

## 2025-07-10 NOTE — PROGRESS NOTE ADULT - PROBLEM SELECTOR PLAN 5
- C/w losartan 100mg, amlodipine 10mg, hydralazine 40mg TID, Coreg 12.5mg BID
- Titrated off of Keppra, per family
- Titrated off of Keppra, per family

## 2025-07-10 NOTE — DISCHARGE NOTE PROVIDER - NSDCMRMEDTOKEN_GEN_ALL_CORE_FT
amLODIPine 10 mg oral tablet: 1 tab(s) orally once a day  Coreg 12.5 mg oral tablet: 1 tab(s) orally every 12 hours  ferrous sulfate 325 mg (65 mg elemental iron) oral tablet: 1 tab(s) orally once a day  hydrALAZINE 50 mg oral tablet: 1 tab(s) orally every 8 hours  Januvia 100 mg oral tablet: 1 tab(s) orally once a day  losartan 100 mg oral tablet: 1 tab(s) orally once a day  mirtazapine 7.5 mg oral tablet: 1 tab(s) orally once a day  pantoprazole 40 mg oral delayed release tablet: 1 tab(s) orally once a day  sevelamer carbonate 800 mg oral tablet: 2 tab(s) orally 3 times a day (with meals)   amLODIPine 10 mg oral tablet: 1 tab(s) orally once a day  aspirin 81 mg oral tablet, chewable: 1 tab(s) orally once a day  carvedilol 12.5 mg oral tablet: 1 tab(s) orally every 12 hours  cefpodoxime 200 mg oral tablet: 1 tab(s) orally every 24 hours  ferrous sulfate 325 mg (65 mg elemental iron) oral tablet: 1 tab(s) orally once a day  furosemide 40 mg oral tablet: 1 tab(s) orally once a day  hydrALAZINE 50 mg oral tablet: 1 tab(s) orally every 8 hours  Januvia 100 mg oral tablet: 1 tab(s) orally once a day  losartan 100 mg oral tablet: 1 tab(s) orally once a day  mirtazapine 7.5 mg oral tablet: 1 tab(s) orally once a day (at bedtime)  pantoprazole 40 mg oral delayed release tablet: 1 tab(s) orally once a day (before a meal)  sevelamer carbonate 800 mg oral tablet: 2 tab(s) orally 3 times a day (with meals)

## 2025-07-10 NOTE — PROGRESS NOTE ADULT - PROBLEM SELECTOR PLAN 3
- CT chest w/ indeterminate nodular opacity in the right middle lobe. Results discussed with patient and her son Misha  - patient will need 1 month follow-up chest CT and f/u with PCP
- CT chest w/ indeterminate nodular opacity in the right middle lobe. Results discussed with patient and her son Misha  - patient will need 1 month follow-up chest CT and f/u with PCP
- P/w abd pain, vomiting(chronic)  - Had acute cholecystitis in the past, managed conservatively  - CT abd/pelvis = Question mild pancreatitis. Cholelithiasis. Moderate pleural effusions. Small pericardial effusion. Indeterminate nodular opacity in the right middle lobe; recommend a 1 month follow-up chest CT.  - No leukocytosis; no fever  - Pain control with tylenol, oxycodone, IV dilaudid  - Monitor for respiratory depression or oversedation while on opioids  - NPO except meds for now  - Trend pt's pain  - Surgery consulted = no acute surgical intervention.

## 2025-07-10 NOTE — PROGRESS NOTE ADULT - ASSESSMENT
84y Female PMHx of DM, ESRD on HD (TTS), CAD (s/p stent), HTN, Hep c, liver failure, HLD, pancreatitis, hx of acute cholecystitis tx conservatively presented to ed with epigastric abd pain, found to have heart failure exacerbation.   CT and US with cholelithiasis.  Afebrile, without leukocytosis    Plan:  No acute surgical intervention at this time, as patient with many comorbidities and is a poor surgical candidate.   If tolerating diet may discharge home on PO abx  - Recommend advance diet as tolerated  - Continue care per primary team  - Discussed with Dr. Lundberg

## 2025-07-10 NOTE — DISCHARGE NOTE PROVIDER - HOSPITAL COURSE
Patient is a 84F, with PMHx of ESRD on dialysis(Tu/Sat), HFpEF, DM, Anemia,(ANA and ACD), seizure no longer on keppra, liver failure/jaundice, hepatitis C(treated), pancreatitis, who comes in with swelling of legs, abdominal pain, nausea, and vomiting. Admitted for acute on chronic HFpEF.    Acute on chronic heart failure with preserved ejection fraction (HFpEF).   ESRD on hemodialysis.   Pulmonary nodule.   Abdominal pain.   History of seizures.   Benign essential HTN.   DM (diabetes mellitus).     On 7/10/25 this case was reviewed with Dr. Enriquez, the patient is medically stable and optimized for discharge.

## 2025-07-10 NOTE — PROGRESS NOTE ADULT - PROBLEM SELECTOR PLAN 4
- Titrated off of Keppra, per family
- P/w abd pain, vomiting(chronic)  - Had acute cholecystitis in the past, managed conservatively  - CT abd/pelvis = Question mild pancreatitis. Cholelithiasis. Moderate pleural effusions. Small pericardial effusion. Indeterminate nodular opacity in the right middle lobe; recommend a 1 month follow-up chest CT.  - Trend pt's pain  - Surgery consulted = no acute surgical intervention.  - C/w PO vantin till 7/21
- P/w abd pain, vomiting(chronic)  - Had acute cholecystitis in the past, managed conservatively  - CT abd/pelvis = Question mild pancreatitis. Cholelithiasis. Moderate pleural effusions. Small pericardial effusion. Indeterminate nodular opacity in the right middle lobe; recommend a 1 month follow-up chest CT.  - Trend pt's pain  - Surgery consulted = no acute surgical intervention.

## 2025-07-11 ENCOUNTER — TRANSCRIPTION ENCOUNTER (OUTPATIENT)
Age: 85
End: 2025-07-11

## 2025-07-11 VITALS
SYSTOLIC BLOOD PRESSURE: 150 MMHG | TEMPERATURE: 98 F | RESPIRATION RATE: 18 BRPM | HEART RATE: 62 BPM | OXYGEN SATURATION: 95 % | DIASTOLIC BLOOD PRESSURE: 76 MMHG

## 2025-07-11 LAB
ANION GAP SERPL CALC-SCNC: 9 MMOL/L — SIGNIFICANT CHANGE UP (ref 5–17)
BUN SERPL-MCNC: 10 MG/DL — SIGNIFICANT CHANGE UP (ref 7–23)
CALCIUM SERPL-MCNC: 8.5 MG/DL — SIGNIFICANT CHANGE UP (ref 8.5–10.1)
CHLORIDE SERPL-SCNC: 100 MMOL/L — SIGNIFICANT CHANGE UP (ref 96–108)
CO2 SERPL-SCNC: 27 MMOL/L — SIGNIFICANT CHANGE UP (ref 22–31)
CREAT SERPL-MCNC: 3.17 MG/DL — HIGH (ref 0.5–1.3)
EGFR: 14 ML/MIN/1.73M2 — LOW
EGFR: 14 ML/MIN/1.73M2 — LOW
GLUCOSE BLDC GLUCOMTR-MCNC: 118 MG/DL — HIGH (ref 70–99)
GLUCOSE BLDC GLUCOMTR-MCNC: 148 MG/DL — HIGH (ref 70–99)
GLUCOSE BLDC GLUCOMTR-MCNC: 155 MG/DL — HIGH (ref 70–99)
GLUCOSE SERPL-MCNC: 108 MG/DL — HIGH (ref 70–99)
POTASSIUM SERPL-MCNC: 3.8 MMOL/L — SIGNIFICANT CHANGE UP (ref 3.5–5.3)
POTASSIUM SERPL-SCNC: 3.8 MMOL/L — SIGNIFICANT CHANGE UP (ref 3.5–5.3)
SODIUM SERPL-SCNC: 136 MMOL/L — SIGNIFICANT CHANGE UP (ref 135–145)

## 2025-07-11 PROCEDURE — 99239 HOSP IP/OBS DSCHRG MGMT >30: CPT

## 2025-07-11 RX ORDER — SEVELAMER HYDROCHLORIDE 800 MG/1
2 TABLET ORAL
Qty: 0 | Refills: 0 | DISCHARGE
Start: 2025-07-11

## 2025-07-11 RX ORDER — CARVEDILOL 3.12 MG/1
1 TABLET, FILM COATED ORAL
Qty: 60 | Refills: 0
Start: 2025-07-11 | End: 2025-08-09

## 2025-07-11 RX ORDER — MIRTAZAPINE 30 MG/1
1 TABLET, FILM COATED ORAL
Qty: 0 | Refills: 0 | DISCHARGE
Start: 2025-07-11

## 2025-07-11 RX ORDER — AMLODIPINE BESYLATE 10 MG/1
1 TABLET ORAL
Qty: 0 | Refills: 0 | DISCHARGE
Start: 2025-07-11

## 2025-07-11 RX ORDER — FERROUS SULFATE 137(45) MG
1 TABLET, EXTENDED RELEASE ORAL
Qty: 0 | Refills: 0 | DISCHARGE
Start: 2025-07-11

## 2025-07-11 RX ORDER — LOSARTAN POTASSIUM 100 MG/1
1 TABLET, FILM COATED ORAL
Qty: 0 | Refills: 0 | DISCHARGE
Start: 2025-07-11

## 2025-07-11 RX ORDER — ASPIRIN 325 MG
1 TABLET ORAL
Qty: 30 | Refills: 0
Start: 2025-07-11 | End: 2025-08-09

## 2025-07-11 RX ORDER — CEFPODOXIME PROXETIL 200 MG/1
1 TABLET, FILM COATED ORAL
Qty: 3 | Refills: 0
Start: 2025-07-11 | End: 2025-07-13

## 2025-07-11 RX ORDER — FUROSEMIDE 10 MG/ML
1 INJECTION INTRAMUSCULAR; INTRAVENOUS
Qty: 30 | Refills: 0
Start: 2025-07-11 | End: 2025-08-09

## 2025-07-11 RX ADMIN — FUROSEMIDE 40 MILLIGRAM(S): 10 INJECTION INTRAMUSCULAR; INTRAVENOUS at 05:47

## 2025-07-11 RX ADMIN — AMLODIPINE BESYLATE 10 MILLIGRAM(S): 10 TABLET ORAL at 05:48

## 2025-07-11 RX ADMIN — SEVELAMER HYDROCHLORIDE 1600 MILLIGRAM(S): 800 TABLET ORAL at 18:21

## 2025-07-11 RX ADMIN — Medication 50 MILLIGRAM(S): at 05:46

## 2025-07-11 RX ADMIN — Medication 81 MILLIGRAM(S): at 13:11

## 2025-07-11 RX ADMIN — CARVEDILOL 12.5 MILLIGRAM(S): 3.12 TABLET, FILM COATED ORAL at 05:47

## 2025-07-11 RX ADMIN — Medication 40 MILLIGRAM(S): at 05:47

## 2025-07-11 RX ADMIN — Medication 50 MILLIGRAM(S): at 13:12

## 2025-07-11 RX ADMIN — HEPARIN SODIUM 5000 UNIT(S): 1000 INJECTION INTRAVENOUS; SUBCUTANEOUS at 05:46

## 2025-07-11 RX ADMIN — Medication 325 MILLIGRAM(S): at 13:12

## 2025-07-11 RX ADMIN — SEVELAMER HYDROCHLORIDE 1600 MILLIGRAM(S): 800 TABLET ORAL at 13:11

## 2025-07-11 RX ADMIN — CEFPODOXIME PROXETIL 200 MILLIGRAM(S): 200 TABLET, FILM COATED ORAL at 05:46

## 2025-07-11 RX ADMIN — CARVEDILOL 12.5 MILLIGRAM(S): 3.12 TABLET, FILM COATED ORAL at 18:21

## 2025-07-11 RX ADMIN — INSULIN LISPRO 1: 100 INJECTION, SOLUTION INTRAVENOUS; SUBCUTANEOUS at 12:29

## 2025-07-11 RX ADMIN — Medication 1 APPLICATION(S): at 13:13

## 2025-07-11 RX ADMIN — LOSARTAN POTASSIUM 100 MILLIGRAM(S): 100 TABLET, FILM COATED ORAL at 05:48

## 2025-07-11 RX ADMIN — SEVELAMER HYDROCHLORIDE 1600 MILLIGRAM(S): 800 TABLET ORAL at 09:18

## 2025-07-11 NOTE — PROGRESS NOTE ADULT - SUBJECTIVE AND OBJECTIVE BOX
Patient is a 84y old  Female who presents with sob, swelling of legs, abdominal pain, nausea, and vomiting    PAST MEDICAL & SURGICAL HISTORY:  CAD s/p PCI    CKD (chronic kidney disease)    Asthma    vitamin D deficiency    GERD (gastroesophageal reflux disease)    ESRD on dialysis    HLD (hyperlipidemia)    DM (diabetes mellitus)    HTN (hypertension)    Anemia     Seizure disorder     HCV    History of cataract surgery    INTERVAL HISTORY: +dyspnea, abd discomfort, poor appetite   	  MEDICATIONS:  MEDICATIONS  (STANDING):  amLODIPine   Tablet 10 milliGRAM(s) Oral daily  aspirin  chewable 81 milliGRAM(s) Oral daily  carvedilol 12.5 milliGRAM(s) Oral every 12 hours  chlorhexidine 2% Cloths 1 Application(s) Topical daily  ferrous    sulfate 325 milliGRAM(s) Oral daily  furosemide   Injectable 40 milliGRAM(s) IV Push <User Schedule>  heparin   Injectable 5000 Unit(s) SubCutaneous every 12 hours  hydrALAZINE 50 milliGRAM(s) Oral every 8 hours  insulin lispro (ADMELOG) corrective regimen sliding scale   SubCutaneous three times a day before meals  insulin lispro (ADMELOG) corrective regimen sliding scale   SubCutaneous at bedtime  losartan 100 milliGRAM(s) Oral daily  mirtazapine 7.5 milliGRAM(s) Oral at bedtime  pantoprazole    Tablet 40 milliGRAM(s) Oral before breakfast  sevelamer carbonate 1600 milliGRAM(s) Oral three times a day with meals    MEDICATIONS  (PRN):  acetaminophen     Tablet .. 650 milliGRAM(s) Oral every 6 hours PRN Temp greater or equal to 38C (100.4F), Mild Pain (1 - 3)  aluminum hydroxide/magnesium hydroxide/simethicone Suspension 30 milliLiter(s) Oral every 4 hours PRN Dyspepsia  guaiFENesin ER 1200 milliGRAM(s) Oral every 12 hours PRN Cough  hydrALAZINE Injectable 10 milliGRAM(s) IV Push every 4 hours PRN SBP>150, and cannot tolerate PO meds  HYDROmorphone  Injectable 0.2 milliGRAM(s) IV Push every 4 hours PRN Severe Pain (7 - 10)  labetalol Injectable 10 milliGRAM(s) IV Push every 8 hours PRN Systolic blood pressure > 180. Hold if HR<60  melatonin 3 milliGRAM(s) Oral at bedtime PRN Insomnia  ondansetron Injectable 4 milliGRAM(s) IV Push every 8 hours PRN Nausea and/or Vomiting  oxyCODONE    IR 2.5 milliGRAM(s) Oral every 6 hours PRN Moderate Pain (4 - 6)    Vitals:  T(F): 97.6 (07-09-25 @ 10:39), Max: 98 (07-08-25 @ 14:20)  HR: 80 (07-09-25 @ 10:39) (59 - 80)  BP: 152/63 (07-09-25 @ 10:39) (145/51 - 168/66)  RR: 18 (07-09-25 @ 10:39) (16 - 18)  SpO2: 98% (07-09-25 @ 10:39) (94% - 98%)    07-08 @ 07:01  -  07-09 @ 07:00  --------------------------------------------------------  IN:    Other (mL): 800 mL  Total IN: 800 mL    OUT:    Other (mL): 2800 mL  Total OUT: 2800 mL    Total NET: -2000 mL    PHYSICAL EXAM:  Neuro: Awake, responsive  CV: S1 S2 RRR +SM  Lungs: b/l rales   GI: Soft, BS +, ND, mild tenderness   Extremities: Trace LE edema    TELEMETRY: sinus     RADIOLOGY: < from: Xray Chest 1 View- PORTABLE-Urgent (07.07.25 @ 13:47) >    IMPRESSION: Marked cardiomegaly similar to prior. Coronary artery stent.   Moderate left pleural effusion. No focal consolidation. No pneumothorax.    < end of copied text >  < from: CT Abdomen and Pelvis No Cont (07.07.25 @ 14:21) >    LOWER CHEST: Cardiomegaly.  Moderate pleural effusions. Small pericardial   effusion. Nodular opacity right middle lobe.    LIVER: Within normal limits.  BILE DUCTS: Normal caliber.  GALLBLADDER: Cholelithiasis.  SPLEEN: Within normal limits.  PANCREAS: Mild inflammation around the pancreas.  ADRENALS: Within normal limits.  KIDNEYS/URETERS: Atrophic kidneys.    BLADDER: Within normal limits.  REPRODUCTIVE ORGANS: Within normal limits.    BOWEL: No bowel obstruction.  PERITONEUM/RETROPERITONEUM: Mild ascites.  VESSELS:  Within normal limits.  LYMPH NODES: Within normal limits.  ABDOMINAL WALL: Withinnormal limits.  BONES: Chronic right rib fractures. Chronic L1 compression fracture.    IMPRESSION: Question mild pancreatitis.    Cholelithiasis.    Moderate pleural effusions. Small pericardial effusion.    Indeterminate nodular opacity in the right middle lobe; recommend a 1   month follow-up chest CT.    < end of copied text >  < from: US Abdomen Upper Quadrant Right (07.07.25 @ 15:24) >  Findings suggesting right-sided congestive heart failure.  Cirrhotic morphology.  Gallstones. Questionable focal tenderness. Suggest further evaluation   with cholescintigraphy    < end of copied text >    DIAGNOSTIC TESTING:    [x ] Echocardiogram: < from: TTE Echo Complete w/o Contrast w/ Doppler (07.08.25 @ 18:22) >   1. Left ventricular cavity is mildly dilated. Left ventricular systolic   function is normal with an ejection fraction of 59 % by Ramirez's method   of disks.   2. Severe left ventricular hypertrophy.   3. There is moderate (grade 2) left ventricular diastolic dysfunction.   4. Mildly enlarged right ventricular cavity size and borderline reduced   right ventricular systolic function.   5. Left atrium is severely dilated.   6. Moderate to severe mitral regurgitation at a blood pressure of 155/61   mmHg. The mitral regurgitant jet is eccentrically directed.   7. Moderate tricuspid regurgitation.   8. Estimated pulmonary artery systolic pressure is 45 mmHg, consistent   with mild pulmonary hypertension.   9. The peak transaortic velocity is 3.27 m/s, peak transaortic gradient   is 42.8 mmHg and mean transaortic gradient is 25.0 mmHg with an   LVOT/aortic valve VTI ratio of 0.30. The effective orifice area is   estimated at 0.94 cm² by the continuity equation and indexed at 0.58   cm²/m².  10. Trileaflet aortic valve with reduced systolic excursion. There is   moderate calcification of the aortic valve leaflets. Moderate aortic   stenosis.  11. Small left pleural effusion noted.  12. Small pericardial effusion noted adjacent to the posterolateral left   ventricle with no echocardiographic evidence of tamponade physiology.  13. Compared to the transthoracic echocardiogram performed on 8/5/2020,   there is interval improvement in left ventricular function, there is now   severe left ventricular hypertrophy and the degree of mitral   regurgitation has increased.    < end of copied text >    [x ] Cardiac Catheterization: < from: Cardiac Cath Lab - Adult (06.17.20 @ 15:17) >  CORONARY VESSELS: The coronary circulation is right dominant.  LM:   --  LM: Normal.  LAD:   --  Mid LAD: There was a 95 % stenosis. The lesion was heavily  calcified.  CX:   --  Circumflex: Angiography showed minor luminal irregularities with  no flow limiting lesions.  RCA:   --  Mid RCA: There was a 40 % stenosis.  COMPLICATIONS: There were no complications.  DIAGNOSTIC IMPRESSIONS: Initial NSTEMI presentation.  Severe calcified stenosis of the proximal-mid LAD.  Pt s/p successful rotational athertectomy followed by KOSTA placement to the  LAD.    LABS:	 	    09 Jul 2025 06:05    137    |  98     |  15     ----------------------------<  103    3.5     |  28     |  3.91   08 Jul 2025 06:33    130    |  93     |  31     ----------------------------<  123    4.4     |  26     |  6.13   07 Jul 2025 16:09    133    |  93     |  27     ----------------------------<  114    4.9     |  31     |  5.81     Ca    9.2        09 Jul 2025 06:05  Phos  4.9       08 Jul 2025 06:33  Mg     2.4       08 Jul 2025 06:33    TPro  6.0    /  Alb  3.1    /  TBili  1.2    /  DBili  x      /  AST  21     /  ALT  13     /  AlkPhos  60     09 Jul 2025 06:05                       9.6    7.38  )-----------( 147      ( 09 Jul 2025 06:05 )             28.8 ,                       10.5   8.72  )-----------( 150      ( 08 Jul 2025 06:33 )             31.2 ,                       10.7   8.49  )-----------( 168      ( 07 Jul 2025 16:09 )             31.5   pro-BNP: Pro-Brain Natriuretic Peptide: >522458 pg/mL (07.07.25 @ 16:09)    INR: 1.08 ratio (07-07 @ 16:09)          
Patient seen and examined bedside resting comfortably.  No complaints offered, patient reports feeling "much better"  Denies abdominal pain.  Denies nausea and vomiting. Endorses hunger.   Denies chest pain, dyspnea, cough.    T(F): 97.4 (07-08-25 @ 04:34), Max: 98.2 (07-07-25 @ 19:53)  HR: 60 (07-08-25 @ 04:34) (60 - 91)  BP: 166/66 (07-08-25 @ 04:34) (161/64 - 195/73)  RR: 18 (07-08-25 @ 04:34) (16 - 18)  SpO2: 93% (07-08-25 @ 04:34) (92% - 100%)  Wt(kg): --  CAPILLARY BLOOD GLUCOSE      POCT Blood Glucose.: 118 mg/dL (08 Jul 2025 07:56)  POCT Blood Glucose.: 110 mg/dL (07 Jul 2025 22:50)      PHYSICAL EXAM:  General: NAD  Neuro:  Alert & awake  HEENT: NCAT, EOMI, conjunctiva clear  CV: +S1+S2 regular rate and rhythm  Lung: respirations nonlabored, good inspiratory effort. On NC.   Abdomen: soft, nondistended, nontender to palpation with negative Butler's sign  Extremities: no pedal edema or calf tenderness noted     LABS:                        10.5   8.72  )-----------( 150      ( 08 Jul 2025 06:33 )             31.2     07-08    130[L]  |  93[L]  |  31[H]  ----------------------------<  123[H]  4.4   |  26  |  6.13[H]    Ca    8.9      08 Jul 2025 06:33  Phos  4.9     07-08  Mg     2.4     07-08    TPro  6.8  /  Alb  3.3  /  TBili  1.2  /  DBili  x   /  AST  24  /  ALT  13  /  AlkPhos  70  07-08    PT/INR - ( 07 Jul 2025 16:09 )   PT: 12.5 sec;   INR: 1.08 ratio         PTT - ( 07 Jul 2025 16:09 )  PTT:33.3 sec    I&O:         Impression: 84y Female PMHx of DM, ESRD on HD (TTS), CAD (s/p stent), HTN, Hep c, liver failure, HLD, pancreatitis, hx of acute cholecystitis tx conservatively presented to ed with epigastric abd pain, found to have heart failure exacerbation.   CT and US with cholelithiasis.  Afebrile, without leukocytosis off antibiotics  Currently NPO per medicine    Plan:  - No acute surgical intervention  - Recommend advance diet as tolerated  - Continue care per primary team  - Will discuss with Dr. Lundberg 
[facility] NEPHROLOGY SERVICES, [facility]  NEPHROLOGY AND HYPERTENSION  [address]  [address]  [place] [address]  [phone]    MD AISHA FLOWER MD YELENA ROSENBERG, MD BINNY KOSHY, MD CHRISTOPHER CAPUTO, MD EDWARD BOVER, MD          Patient events noted    MEDICATIONS  (STANDING):  amLODIPine   Tablet 10 milliGRAM(s) Oral daily  aspirin  chewable 81 milliGRAM(s) Oral daily  carvedilol 12.5 milliGRAM(s) Oral every 12 hours  cefpodoxime 200 milliGRAM(s) Oral every 24 hours  chlorhexidine 2% Cloths 1 Application(s) Topical daily  dextrose 50% Injectable 50 Gram(s) IV Push once  ferrous    sulfate 325 milliGRAM(s) Oral daily  furosemide    Tablet 40 milliGRAM(s) Oral daily  glucagon  Injectable 1 milliGRAM(s) IntraMuscular once  heparin   Injectable 5000 Unit(s) SubCutaneous every 12 hours  hydrALAZINE 50 milliGRAM(s) Oral every 8 hours  insulin lispro (ADMELOG) corrective regimen sliding scale   SubCutaneous three times a day before meals  insulin lispro (ADMELOG) corrective regimen sliding scale   SubCutaneous at bedtime  losartan 100 milliGRAM(s) Oral daily  mirtazapine 7.5 milliGRAM(s) Oral at bedtime  pantoprazole    Tablet 40 milliGRAM(s) Oral before breakfast  sevelamer carbonate 1600 milliGRAM(s) Oral three times a day with meals    MEDICATIONS  (PRN):  acetaminophen     Tablet .. 650 milliGRAM(s) Oral every 6 hours PRN Temp greater or equal to 38C (100.4F), Mild Pain (1 - 3)  aluminum hydroxide/magnesium hydroxide/simethicone Suspension 30 milliLiter(s) Oral every 4 hours PRN Dyspepsia  guaiFENesin ER 1200 milliGRAM(s) Oral every 12 hours PRN Cough  hydrALAZINE Injectable 10 milliGRAM(s) IV Push every 4 hours PRN SBP>150, and cannot tolerate PO meds  labetalol Injectable 10 milliGRAM(s) IV Push every 8 hours PRN Systolic blood pressure > 180. Hold if HR<60  melatonin 3 milliGRAM(s) Oral at bedtime PRN Insomnia  ondansetron Injectable 4 milliGRAM(s) IV Push every 8 hours PRN Nausea and/or Vomiting      07-10-25 @ 07:01  -  07-11-25 @ 07:00  --------------------------------------------------------  IN: 0 mL / OUT: 2000 mL / NET: -2000 mL      PHYSICAL EXAM:      T(C): 36.5 (07-11-25 @ 16:14), Max: 37.1 (07-10-25 @ 23:38)  HR: 62 (07-11-25 @ 16:14) (59 - 95)  BP: 150/76 (07-11-25 @ 16:14) (136/58 - 169/52)  RR: 18 (07-11-25 @ 16:14) (17 - 18)  SpO2: 95% (07-11-25 @ 16:14) (92% - 98%)  Wt(kg): --  Lungs clear  Heart S1S2  Abd soft NT ND  Extremities:   tr edema                                    9.8    7.61  )-----------( 137      ( 10 Jul 2025 06:08 )             29.2     07-11    136  |  100  |  10  ----------------------------<  108[H]  3.8   |  27  |  3.17[H]    Ca    8.5      11 Jul 2025 07:46    TPro  6.1  /  Alb  3.1[L]  /  TBili  1.0  /  DBili  x   /  AST  16  /  ALT  13  /  AlkPhos  58  07-10      LIVER FUNCTIONS - ( 10 Jul 2025 06:08 )  Alb: 3.1 g/dL / Pro: 6.1 gm/dL / ALK PHOS: 58 U/L / ALT: 13 U/L / AST: 16 U/L / GGT: x           Creatinine Trend: 3.17<--, 4.78<--, 3.91<--, 6.13<--, 5.81<--      Assessment   ESRD, fluid overload    Plan  HD for tomorrow  UF as tolerated  Will follow course      Linden Pastrana MD
[facility] NEPHROLOGY SERVICES, [facility]  NEPHROLOGY AND HYPERTENSION  [address]  [address]  [place] [address]  [phone]    MD AISHA FLOWER MD YELENA ROSENBERG, MD BINNY KOSHY, MD CHRISTOPHER CAPUTO, MD EDWARD BOVER, MD          Patient events noted  No distress    MEDICATIONS  (STANDING):  amLODIPine   Tablet 10 milliGRAM(s) Oral daily  aspirin  chewable 81 milliGRAM(s) Oral daily  carvedilol 12.5 milliGRAM(s) Oral every 12 hours  chlorhexidine 2% Cloths 1 Application(s) Topical daily  dextrose 50% Injectable 50 Gram(s) IV Push once  ferrous    sulfate 325 milliGRAM(s) Oral daily  furosemide    Tablet 40 milliGRAM(s) Oral daily  glucagon  Injectable 1 milliGRAM(s) IntraMuscular once  heparin   Injectable 5000 Unit(s) SubCutaneous every 12 hours  hydrALAZINE 50 milliGRAM(s) Oral every 8 hours  insulin lispro (ADMELOG) corrective regimen sliding scale   SubCutaneous three times a day before meals  insulin lispro (ADMELOG) corrective regimen sliding scale   SubCutaneous at bedtime  losartan 100 milliGRAM(s) Oral daily  mirtazapine 7.5 milliGRAM(s) Oral at bedtime  pantoprazole    Tablet 40 milliGRAM(s) Oral before breakfast  sevelamer carbonate 1600 milliGRAM(s) Oral three times a day with meals    MEDICATIONS  (PRN):  acetaminophen     Tablet .. 650 milliGRAM(s) Oral every 6 hours PRN Temp greater or equal to 38C (100.4F), Mild Pain (1 - 3)  aluminum hydroxide/magnesium hydroxide/simethicone Suspension 30 milliLiter(s) Oral every 4 hours PRN Dyspepsia  guaiFENesin ER 1200 milliGRAM(s) Oral every 12 hours PRN Cough  hydrALAZINE Injectable 10 milliGRAM(s) IV Push every 4 hours PRN SBP>150, and cannot tolerate PO meds  labetalol Injectable 10 milliGRAM(s) IV Push every 8 hours PRN Systolic blood pressure > 180. Hold if HR<60  melatonin 3 milliGRAM(s) Oral at bedtime PRN Insomnia  ondansetron Injectable 4 milliGRAM(s) IV Push every 8 hours PRN Nausea and/or Vomiting      07-10-25 @ 07:01  -  07-10-25 @ 17:37  --------------------------------------------------------  IN: 0 mL / OUT: 2000 mL / NET: -2000 mL      PHYSICAL EXAM:      T(C): 36.7 (07-10-25 @ 16:59), Max: 36.7 (07-10-25 @ 16:59)  HR: 95 (07-10-25 @ 16:59) (51 - 95)  BP: 160/61 (07-10-25 @ 16:59) (129/56 - 160/61)  RR: 17 (07-10-25 @ 16:59) (16 - 18)  SpO2: 98% (07-10-25 @ 16:59) (94% - 98%)  Wt(kg): --  Lungs clear ant decreased BS bases  Heart S1S2  Abd soft NT ND  Extremities:   tr edema                                    9.8    7.61  )-----------( 137      ( 10 Jul 2025 06:08 )             29.2     07-10    134[L]  |  96  |  20  ----------------------------<  142[H]  3.7   |  28  |  4.78[H]    Ca    8.9      10 Jul 2025 06:08    TPro  6.1  /  Alb  3.1[L]  /  TBili  1.0  /  DBili  x   /  AST  16  /  ALT  13  /  AlkPhos  58  07-10      LIVER FUNCTIONS - ( 10 Jul 2025 06:08 )  Alb: 3.1 g/dL / Pro: 6.1 gm/dL / ALK PHOS: 58 U/L / ALT: 13 U/L / AST: 16 U/L / GGT: x           Creatinine Trend: 4.78<--, 3.91<--, 6.13<--, 5.81<--        Assessment   ESRD, fluid overload    Plan  HD for tomorrow  UF as tolerated  Will follow course      Linden Pastrana MD
SURGERY PA NOTE ON BEHALF OF DR. HARRINGTON:    S: Patient seen and examined at bedside.   patient notes she vomited earlier today.  States passing flatus, had BM yesterday.  Denies abdominal pain at this time.      MEDICATIONS:  acetaminophen     Tablet .. 650 milliGRAM(s) Oral every 6 hours PRN  aluminum hydroxide/magnesium hydroxide/simethicone Suspension 30 milliLiter(s) Oral every 4 hours PRN  amLODIPine   Tablet 10 milliGRAM(s) Oral daily  aspirin  chewable 81 milliGRAM(s) Oral daily  carvedilol 12.5 milliGRAM(s) Oral every 12 hours  cefpodoxime 200 milliGRAM(s) Oral every 24 hours  chlorhexidine 2% Cloths 1 Application(s) Topical daily  dextrose 50% Injectable 50 Gram(s) IV Push once  ferrous    sulfate 325 milliGRAM(s) Oral daily  furosemide    Tablet 40 milliGRAM(s) Oral daily  glucagon  Injectable 1 milliGRAM(s) IntraMuscular once  guaiFENesin ER 1200 milliGRAM(s) Oral every 12 hours PRN  heparin   Injectable 5000 Unit(s) SubCutaneous every 12 hours  hydrALAZINE 50 milliGRAM(s) Oral every 8 hours  hydrALAZINE Injectable 10 milliGRAM(s) IV Push every 4 hours PRN  insulin lispro (ADMELOG) corrective regimen sliding scale   SubCutaneous three times a day before meals  insulin lispro (ADMELOG) corrective regimen sliding scale   SubCutaneous at bedtime  labetalol Injectable 10 milliGRAM(s) IV Push every 8 hours PRN  losartan 100 milliGRAM(s) Oral daily  melatonin 3 milliGRAM(s) Oral at bedtime PRN  mirtazapine 7.5 milliGRAM(s) Oral at bedtime  ondansetron Injectable 4 milliGRAM(s) IV Push every 8 hours PRN  pantoprazole    Tablet 40 milliGRAM(s) Oral before breakfast  sevelamer carbonate 1600 milliGRAM(s) Oral three times a day with meals      O:  Vital Signs Last 24 Hrs  T(C): 36.7 (11 Jul 2025 05:01), Max: 37.1 (10 Jul 2025 23:38)  T(F): 98.1 (11 Jul 2025 05:01), Max: 98.7 (10 Jul 2025 23:38)  HR: 63 (11 Jul 2025 05:01) (51 - 95)  BP: 169/52 (11 Jul 2025 05:01) (136/58 - 169/52)  BP(mean): --  RR: 18 (11 Jul 2025 05:01) (16 - 18)  SpO2: 98% (11 Jul 2025 05:01) (92% - 98%)    Parameters below as of 11 Jul 2025 05:01  Patient On (Oxygen Delivery Method): room air        I&O SUMMARY:    07-10-25 @ 07:01  -  07-11-25 @ 07:00  --------------------------------------------------------  IN: 0 mL / OUT: 2000 mL / NET: -2000 mL        PHYSICAL EXAM:  Lungs: CTA bilat without W/R/R  Card: S1S2  Abd: Soft, NT, ND.  +BS x 4.  No rebound/guarding.    Ext: Calves soft, NT, without edema bilat    LABS:                        9.8    7.61  )-----------( 137      ( 10 Jul 2025 06:08 )             29.2     07-11    136  |  100  |  10  ----------------------------<  108[H]  3.8   |  27  |  3.17[H]    Ca    8.5      11 Jul 2025 07:46    TPro  6.1  /  Alb  3.1[L]  /  TBili  1.0  /  DBili  x   /  AST  16  /  ALT  13  /  AlkPhos  58  07-10                  
[facility] NEPHROLOGY SERVICES, [facility]  NEPHROLOGY AND HYPERTENSION  [address]  [address]  [place] [address]  [phone]    MD AISHA FLOWER MD YELENA ROSENBERG, MD BINNY KOSHY, MD CHRISTOPHER CAPUTO, MD EDWARD BOVER, MD          Patient events noted    MEDICATIONS  (STANDING):  amLODIPine   Tablet 10 milliGRAM(s) Oral daily  aspirin  chewable 81 milliGRAM(s) Oral daily  carvedilol 12.5 milliGRAM(s) Oral every 12 hours  chlorhexidine 2% Cloths 1 Application(s) Topical daily  ciprofloxacin   IVPB      dextrose 50% Injectable 50 Gram(s) IV Push once  ferrous    sulfate 325 milliGRAM(s) Oral daily  furosemide   Injectable 40 milliGRAM(s) IV Push <User Schedule>  glucagon  Injectable 1 milliGRAM(s) IntraMuscular once  heparin   Injectable 5000 Unit(s) SubCutaneous every 12 hours  hydrALAZINE 50 milliGRAM(s) Oral every 8 hours  insulin lispro (ADMELOG) corrective regimen sliding scale   SubCutaneous three times a day before meals  insulin lispro (ADMELOG) corrective regimen sliding scale   SubCutaneous at bedtime  losartan 100 milliGRAM(s) Oral daily  mirtazapine 7.5 milliGRAM(s) Oral at bedtime  pantoprazole    Tablet 40 milliGRAM(s) Oral before breakfast  sevelamer carbonate 1600 milliGRAM(s) Oral three times a day with meals    MEDICATIONS  (PRN):  acetaminophen     Tablet .. 650 milliGRAM(s) Oral every 6 hours PRN Temp greater or equal to 38C (100.4F), Mild Pain (1 - 3)  aluminum hydroxide/magnesium hydroxide/simethicone Suspension 30 milliLiter(s) Oral every 4 hours PRN Dyspepsia  guaiFENesin ER 1200 milliGRAM(s) Oral every 12 hours PRN Cough  hydrALAZINE Injectable 10 milliGRAM(s) IV Push every 4 hours PRN SBP>150, and cannot tolerate PO meds  labetalol Injectable 10 milliGRAM(s) IV Push every 8 hours PRN Systolic blood pressure > 180. Hold if HR<60  melatonin 3 milliGRAM(s) Oral at bedtime PRN Insomnia  ondansetron Injectable 4 milliGRAM(s) IV Push every 8 hours PRN Nausea and/or Vomiting      07-08-25 @ 07:01  -  07-09-25 @ 07:00  --------------------------------------------------------  IN: 800 mL / OUT: 2800 mL / NET: -2000 mL      PHYSICAL EXAM:      T(C): 36.5 (07-09-25 @ 17:00), Max: 36.5 (07-08-25 @ 23:25)  HR: 67 (07-09-25 @ 21:16) (59 - 80)  BP: 158/70 (07-09-25 @ 21:16) (135/57 - 163/70)  RR: 17 (07-09-25 @ 17:00) (17 - 18)  SpO2: 97% (07-09-25 @ 17:00) (95% - 98%)  Wt(kg): --  Lungs clear  Heart S1S2  Abd soft NT ND  Extremities:   tr edema                                    9.6    7.38  )-----------( 147      ( 09 Jul 2025 06:05 )             28.8     07-09    137  |  98  |  15  ----------------------------<  103[H]  3.5   |  28  |  3.91[H]    Ca    9.2      09 Jul 2025 06:05  Phos  4.9     07-08  Mg     2.4     07-08    TPro  6.0  /  Alb  3.1[L]  /  TBili  1.2  /  DBili  x   /  AST  21  /  ALT  13  /  AlkPhos  60  07-09      LIVER FUNCTIONS - ( 09 Jul 2025 06:05 )  Alb: 3.1 g/dL / Pro: 6.0 gm/dL / ALK PHOS: 60 U/L / ALT: 13 U/L / AST: 21 U/L / GGT: x           Creatinine Trend: 3.91<--, 6.13<--, 5.81<--        Assessment   ESRD, fluid overload    Plan  HD for tomorrow  UF as tolerated  Will follow course    Linden Pastrana MD
Surgery NP note  Patient seen and examined bedside resting comfortably.  No complaints offered. No Abdominal pain is well controlled.  Denies nausea and vomiting. Tolerating diet.  Normal flatus/BM.     T(F): 97.7 (07-10-25 @ 04:53), Max: 97.7 (07-09-25 @ 17:00)  HR: 61 (07-10-25 @ 04:53) (57 - 69)  BP: 143/59 (07-10-25 @ 04:53) (129/56 - 158/70)  RR: 17 (07-10-25 @ 04:53) (17 - 17)  SpO2: 94% (07-10-25 @ 04:53) (94% - 97%)  Wt(kg): --  CAPILLARY BLOOD GLUCOSE      POCT Blood Glucose.: 119 mg/dL (10 Jul 2025 07:38)  POCT Blood Glucose.: 94 mg/dL (09 Jul 2025 21:45)  POCT Blood Glucose.: 148 mg/dL (09 Jul 2025 17:02)      PHYSICAL EXAM:  General: NAD, WDWN.   Neuro:  Alert & responsive  HEENT: NCAT, EOMI, conjunctiva clear  CV: +S1+S2 regular rate and rhythm  Lung: clear to ausculation bilaterally, respirations nonlabored, good inspiratory effort  Abdomen: soft, Non tender, No Distension. Normal active BS  Extremities: no pedal edema or calf tenderness noted     LABS:                        9.8    7.61  )-----------( 137      ( 10 Jul 2025 06:08 )             29.2     07-10    134[L]  |  96  |  20  ----------------------------<  142[H]  3.7   |  28  |  4.78[H]    Ca    8.9      10 Jul 2025 06:08    TPro  6.1  /  Alb  3.1[L]  /  TBili  1.0  /  DBili  x   /  AST  16  /  ALT  13  /  AlkPhos  58  07-10    LIVER FUNCTIONS - ( 10 Jul 2025 06:08 )  Alb: 3.1 g/dL / Pro: 6.1 gm/dL / ALK PHOS: 58 U/L / ALT: 13 U/L / AST: 16 U/L / GGT: x             I&O's Detail    
Patient seen and examined bedside resting comfortably.  complains of overall not feeling well, Did not eat tray of food provided, states she has no apetite  No further vomiting      MEDICATIONS  (STANDING):  amLODIPine   Tablet 10 milliGRAM(s) Oral daily  aspirin  chewable 81 milliGRAM(s) Oral daily  carvedilol 12.5 milliGRAM(s) Oral every 12 hours  chlorhexidine 2% Cloths 1 Application(s) Topical daily  ciprofloxacin   IVPB      ciprofloxacin   IVPB 400 milliGRAM(s) IV Intermittent once  dextrose 50% Injectable 50 Gram(s) IV Push once  ferrous    sulfate 325 milliGRAM(s) Oral daily  furosemide   Injectable 40 milliGRAM(s) IV Push <User Schedule>  glucagon  Injectable 1 milliGRAM(s) IntraMuscular once  heparin   Injectable 5000 Unit(s) SubCutaneous every 12 hours  hydrALAZINE 50 milliGRAM(s) Oral every 8 hours  insulin lispro (ADMELOG) corrective regimen sliding scale   SubCutaneous three times a day before meals  insulin lispro (ADMELOG) corrective regimen sliding scale   SubCutaneous at bedtime  losartan 100 milliGRAM(s) Oral daily  mirtazapine 7.5 milliGRAM(s) Oral at bedtime  pantoprazole    Tablet 40 milliGRAM(s) Oral before breakfast  sevelamer carbonate 1600 milliGRAM(s) Oral three times a day with meals    MEDICATIONS  (PRN):  acetaminophen     Tablet .. 650 milliGRAM(s) Oral every 6 hours PRN Temp greater or equal to 38C (100.4F), Mild Pain (1 - 3)  aluminum hydroxide/magnesium hydroxide/simethicone Suspension 30 milliLiter(s) Oral every 4 hours PRN Dyspepsia  guaiFENesin ER 1200 milliGRAM(s) Oral every 12 hours PRN Cough  hydrALAZINE Injectable 10 milliGRAM(s) IV Push every 4 hours PRN SBP>150, and cannot tolerate PO meds  labetalol Injectable 10 milliGRAM(s) IV Push every 8 hours PRN Systolic blood pressure > 180. Hold if HR<60  melatonin 3 milliGRAM(s) Oral at bedtime PRN Insomnia  ondansetron Injectable 4 milliGRAM(s) IV Push every 8 hours PRN Nausea and/or Vomiting      Vital Signs Last 24 Hrs  T(C): 36.4 (09 Jul 2025 10:39), Max: 36.5 (08 Jul 2025 23:25)  T(F): 97.6 (09 Jul 2025 10:39), Max: 97.7 (08 Jul 2025 23:25)  HR: 69 (09 Jul 2025 13:37) (59 - 80)  BP: 151/73 (09 Jul 2025 13:37) (145/51 - 168/66)  RR: 18 (09 Jul 2025 10:39) (16 - 18)  SpO2: 98% (09 Jul 2025 10:39) (95% - 98%)    Parameters below as of 08 Jul 2025 20:01  Patient On (Oxygen Delivery Method): room air    PHYSICAL EXAM:  General: NAD  Neuro:  Alert & awake  HEENT: NCAT, EOMI, conjunctiva clear  CV: +S1+S2 regular rate and rhythm  Lung: respirations nonlabored, good inspiratory effort. On NC.   Abdomen: soft, nondistended, nontender to palpation with negative Butler's sign  Extremities: no pedal edema or calf tenderness noted         LABS:                        9.6    7.38  )-----------( 147      ( 09 Jul 2025 06:05 )             28.8     07-09    137  |  98  |  15  ----------------------------<  103[H]  3.5   |  28  |  3.91[H]    Ca    9.2      09 Jul 2025 06:05  Phos  4.9     07-08  Mg     2.4     07-08    TPro  6.0  /  Alb  3.1[L]  /  TBili  1.2  /  DBili  x   /  AST  21  /  ALT  13  /  AlkPhos  60  07-09    PT/INR - ( 07 Jul 2025 16:09 )   PT: 12.5 sec;   INR: 1.08 ratio         PTT - ( 07 Jul 2025 16:09 )  PTT:33.3 sec  Urinalysis Basic - ( 09 Jul 2025 06:05 )    Color: x / Appearance: x / SG: x / pH: x  Gluc: 103 mg/dL / Ketone: x  / Bili: x / Urobili: x   Blood: x / Protein: x / Nitrite: x   Leuk Esterase: x / RBC: x / WBC x   Sq Epi: x / Non Sq Epi: x / Bacteria: x      
Patient is a 84y old  Female who presents with a chief complaint of Acute on chronic HFpEF (08 Jul 2025 14:54)    INTERVAL HPI/OVERNIGHT EVENTS: No acute events overnight. HD stable.     MEDICATIONS  (STANDING):  amLODIPine   Tablet 10 milliGRAM(s) Oral daily  aspirin  chewable 81 milliGRAM(s) Oral daily  carvedilol 12.5 milliGRAM(s) Oral every 12 hours  chlorhexidine 2% Cloths 1 Application(s) Topical daily  dextrose 50% Injectable 50 Gram(s) IV Push once  ferrous    sulfate 325 milliGRAM(s) Oral daily  furosemide   Injectable 40 milliGRAM(s) IV Push <User Schedule>  glucagon  Injectable 1 milliGRAM(s) IntraMuscular once  heparin   Injectable 5000 Unit(s) SubCutaneous every 12 hours  hydrALAZINE 50 milliGRAM(s) Oral every 8 hours  insulin lispro (ADMELOG) corrective regimen sliding scale   SubCutaneous three times a day before meals  insulin lispro (ADMELOG) corrective regimen sliding scale   SubCutaneous at bedtime  losartan 100 milliGRAM(s) Oral daily  mirtazapine 7.5 milliGRAM(s) Oral at bedtime  pantoprazole    Tablet 40 milliGRAM(s) Oral before breakfast  sevelamer carbonate 1600 milliGRAM(s) Oral three times a day with meals    MEDICATIONS  (PRN):  acetaminophen     Tablet .. 650 milliGRAM(s) Oral every 6 hours PRN Temp greater or equal to 38C (100.4F), Mild Pain (1 - 3)  aluminum hydroxide/magnesium hydroxide/simethicone Suspension 30 milliLiter(s) Oral every 4 hours PRN Dyspepsia  guaiFENesin ER 1200 milliGRAM(s) Oral every 12 hours PRN Cough  hydrALAZINE Injectable 10 milliGRAM(s) IV Push every 4 hours PRN SBP>150, and cannot tolerate PO meds  HYDROmorphone  Injectable 0.2 milliGRAM(s) IV Push every 4 hours PRN Severe Pain (7 - 10)  labetalol Injectable 10 milliGRAM(s) IV Push every 8 hours PRN Systolic blood pressure > 180. Hold if HR<60  melatonin 3 milliGRAM(s) Oral at bedtime PRN Insomnia  ondansetron Injectable 4 milliGRAM(s) IV Push every 8 hours PRN Nausea and/or Vomiting  oxyCODONE    IR 2.5 milliGRAM(s) Oral every 6 hours PRN Moderate Pain (4 - 6)      Allergies    No Known Allergies    Intolerances        REVIEW OF SYSTEMS: all negative with exception of above    Vital Signs Last 24 Hrs  T(C): 36.4 (09 Jul 2025 10:39), Max: 36.7 (08 Jul 2025 14:20)  T(F): 97.6 (09 Jul 2025 10:39), Max: 98 (08 Jul 2025 14:20)  HR: 80 (09 Jul 2025 10:39) (59 - 80)  BP: 152/63 (09 Jul 2025 10:39) (145/51 - 168/66)  BP(mean): --  RR: 18 (09 Jul 2025 10:39) (16 - 18)  SpO2: 98% (09 Jul 2025 10:39) (94% - 98%)    Parameters below as of 08 Jul 2025 20:01  Patient On (Oxygen Delivery Method): room air    PHYSICAL EXAM:  GENERAL: NAD, well-groomed  NERVOUS SYSTEM:  Alert & Oriented X3, Good concentration; Motor Strength 5/5 B/L upper and lower extremities; DTRs 2+ intact and symmetric  CHEST/LUNG: Clear to percussion bilaterally; No rales, rhonchi, wheezing, or rubs  HEART: Regular rate and rhythm; No murmurs, rubs, or gallops  ABDOMEN: Soft, Nontender, Nondistended; Bowel sounds present  EXTREMITIES:  2+ Peripheral Pulses, No clubbing, cyanosis, or edema    LABS:                        9.6    7.38  )-----------( 147      ( 09 Jul 2025 06:05 )             28.8     07-09    137  |  98  |  15  ----------------------------<  103[H]  3.5   |  28  |  3.91[H]    Ca    9.2      09 Jul 2025 06:05  Phos  4.9     07-08  Mg     2.4     07-08    TPro  6.0  /  Alb  3.1[L]  /  TBili  1.2  /  DBili  x   /  AST  21  /  ALT  13  /  AlkPhos  60  07-09    PT/INR - ( 07 Jul 2025 16:09 )   PT: 12.5 sec;   INR: 1.08 ratio         PTT - ( 07 Jul 2025 16:09 )  PTT:33.3 sec  Urinalysis Basic - ( 09 Jul 2025 06:05 )    Color: x / Appearance: x / SG: x / pH: x  Gluc: 103 mg/dL / Ketone: x  / Bili: x / Urobili: x   Blood: x / Protein: x / Nitrite: x   Leuk Esterase: x / RBC: x / WBC x   Sq Epi: x / Non Sq Epi: x / Bacteria: x      CAPILLARY BLOOD GLUCOSE      POCT Blood Glucose.: 123 mg/dL (09 Jul 2025 11:18)  POCT Blood Glucose.: 121 mg/dL (09 Jul 2025 07:53)  POCT Blood Glucose.: 169 mg/dL (08 Jul 2025 21:06)      RADIOLOGY & ADDITIONAL TESTS:    Imaging Personally Reviewed:  [ ] YES  [ ] NO  < from: CT Abdomen and Pelvis No Cont (07.07.25 @ 14:21) >  IMPRESSION: Question mild pancreatitis.    Cholelithiasis.    Moderate pleural effusions. Small pericardial effusion.    Indeterminate nodular opacity in the right middle lobe; recommend a 1   month follow-up chest CT.        --- End of Report ---            FLORIN DENNISON MD  This document has been electronically signed. Jul 7 2025  3:00PM    < end of copied text >        Consultant(s) Notes Reviewed:  [ ] YES  [ ] NO    Care Discussed with Consultants/Other Providers [ ] YES  [ ] NO
Patient is a 84y old  Female who presents with a chief complaint of Acute on chronic HFpEF (08 Jul 2025 13:52)    INTERVAL HPI/OVERNIGHT EVENTS: No acute events overnight. HD stable.     MEDICATIONS  (STANDING):  amLODIPine   Tablet 10 milliGRAM(s) Oral daily  carvedilol 12.5 milliGRAM(s) Oral every 12 hours  dextrose 50% Injectable 50 Gram(s) IV Push once  ferrous    sulfate 325 milliGRAM(s) Oral daily  furosemide   Injectable 40 milliGRAM(s) IV Push <User Schedule>  glucagon  Injectable 1 milliGRAM(s) IntraMuscular once  hydrALAZINE 50 milliGRAM(s) Oral every 8 hours  insulin lispro (ADMELOG) corrective regimen sliding scale   SubCutaneous three times a day before meals  insulin lispro (ADMELOG) corrective regimen sliding scale   SubCutaneous at bedtime  losartan 100 milliGRAM(s) Oral daily  mirtazapine 7.5 milliGRAM(s) Oral at bedtime  pantoprazole    Tablet 40 milliGRAM(s) Oral before breakfast  sevelamer carbonate 1600 milliGRAM(s) Oral three times a day with meals    MEDICATIONS  (PRN):  acetaminophen     Tablet .. 650 milliGRAM(s) Oral every 6 hours PRN Temp greater or equal to 38C (100.4F), Mild Pain (1 - 3)  aluminum hydroxide/magnesium hydroxide/simethicone Suspension 30 milliLiter(s) Oral every 4 hours PRN Dyspepsia  guaiFENesin ER 1200 milliGRAM(s) Oral every 12 hours PRN Cough  hydrALAZINE Injectable 10 milliGRAM(s) IV Push every 4 hours PRN SBP>150, and cannot tolerate PO meds  HYDROmorphone  Injectable 0.2 milliGRAM(s) IV Push every 4 hours PRN Severe Pain (7 - 10)  labetalol Injectable 10 milliGRAM(s) IV Push every 8 hours PRN Systolic blood pressure > 180. Hold if HR<60  melatonin 3 milliGRAM(s) Oral at bedtime PRN Insomnia  ondansetron Injectable 4 milliGRAM(s) IV Push every 8 hours PRN Nausea and/or Vomiting  oxyCODONE    IR 2.5 milliGRAM(s) Oral every 6 hours PRN Moderate Pain (4 - 6)      Allergies    No Known Allergies    Intolerances        REVIEW OF SYSTEMS: all negative with exception of above    Vital Signs Last 24 Hrs  T(C): 36.3 (08 Jul 2025 14:13), Max: 36.8 (07 Jul 2025 19:53)  T(F): 97.3 (08 Jul 2025 14:13), Max: 98.2 (07 Jul 2025 19:53)  HR: 61 (08 Jul 2025 14:13) (60 - 80)  BP: 158/56 (08 Jul 2025 14:13) (136/54 - 195/73)  BP(mean): 96 (07 Jul 2025 19:53) (96 - 109)  RR: 16 (08 Jul 2025 14:13) (16 - 18)  SpO2: 94% (08 Jul 2025 14:13) (92% - 100%)    Parameters below as of 08 Jul 2025 14:13  Patient On (Oxygen Delivery Method): room air    PHYSICAL EXAM:  GENERAL: NAD, well-groomed  NERVOUS SYSTEM:  Alert & Oriented X3, Good concentration; Motor Strength 5/5 B/L upper and lower extremities; DTRs 2+ intact and symmetric  CHEST/LUNG: Clear to percussion bilaterally; No rales, rhonchi, wheezing, or rubs  HEART: Regular rate and rhythm; No murmurs, rubs, or gallops  ABDOMEN: Soft, Nontender, Nondistended; Bowel sounds present  EXTREMITIES:  2+ Peripheral Pulses, No clubbing, cyanosis, or edema    LABS:                        10.5   8.72  )-----------( 150      ( 08 Jul 2025 06:33 )             31.2     07-08    130[L]  |  93[L]  |  31[H]  ----------------------------<  123[H]  4.4   |  26  |  6.13[H]    Ca    8.9      08 Jul 2025 06:33  Phos  4.9     07-08  Mg     2.4     07-08    TPro  6.8  /  Alb  3.3  /  TBili  1.2  /  DBili  x   /  AST  24  /  ALT  13  /  AlkPhos  70  07-08    PT/INR - ( 07 Jul 2025 16:09 )   PT: 12.5 sec;   INR: 1.08 ratio         PTT - ( 07 Jul 2025 16:09 )  PTT:33.3 sec  Urinalysis Basic - ( 08 Jul 2025 06:33 )    Color: x / Appearance: x / SG: x / pH: x  Gluc: 123 mg/dL / Ketone: x  / Bili: x / Urobili: x   Blood: x / Protein: x / Nitrite: x   Leuk Esterase: x / RBC: x / WBC x   Sq Epi: x / Non Sq Epi: x / Bacteria: x      CAPILLARY BLOOD GLUCOSE      POCT Blood Glucose.: 109 mg/dL (08 Jul 2025 11:42)  POCT Blood Glucose.: 118 mg/dL (08 Jul 2025 07:56)  POCT Blood Glucose.: 110 mg/dL (07 Jul 2025 22:50)      RADIOLOGY & ADDITIONAL TESTS:    Imaging Personally Reviewed:  [ ] YES  [ ] NO  < from: US Abdomen Upper Quadrant Right (07.07.25 @ 15:24) >  IMPRESSION:  Findings suggesting right-sided congestive heart failure.  Cirrhotic morphology.  Gallstones. Questionable focal tenderness. Suggest further evaluation   with cholescintigraphy        --- End of Report ---            DAMIR MENJIVAR MD  This document has been electronically signed. Jul 7 2025  4:01PM    < end of copied text >  < from: CT Abdomen and Pelvis No Cont (07.07.25 @ 14:21) >  IMPRESSION: Question mild pancreatitis.    Cholelithiasis.    Moderate pleural effusions. Small pericardial effusion.    Indeterminate nodular opacity in the right middle lobe; recommend a 1   month follow-up chest CT.        --- End of Report ---            FLORIN DENNISON MD  This document has been electronically signed. Jul 7 2025  3:00PM    < end of copied text >        Consultant(s) Notes Reviewed:  [ ] YES  [ ] NO    Care Discussed with Consultants/Other Providers [ ] YES  [ ] NO
Patient is a 84y old  Female who presents with a chief complaint of Acute on chronic HFpEF (10 Jul 2025 10:50)    INTERVAL HPI/OVERNIGHT EVENTS: No acute events overnight. HD stable.     MEDICATIONS  (STANDING):  amLODIPine   Tablet 10 milliGRAM(s) Oral daily  aspirin  chewable 81 milliGRAM(s) Oral daily  carvedilol 12.5 milliGRAM(s) Oral every 12 hours  chlorhexidine 2% Cloths 1 Application(s) Topical daily  dextrose 50% Injectable 50 Gram(s) IV Push once  ferrous    sulfate 325 milliGRAM(s) Oral daily  furosemide   Injectable 40 milliGRAM(s) IV Push <User Schedule>  glucagon  Injectable 1 milliGRAM(s) IntraMuscular once  heparin   Injectable 5000 Unit(s) SubCutaneous every 12 hours  hydrALAZINE 50 milliGRAM(s) Oral every 8 hours  insulin lispro (ADMELOG) corrective regimen sliding scale   SubCutaneous three times a day before meals  insulin lispro (ADMELOG) corrective regimen sliding scale   SubCutaneous at bedtime  losartan 100 milliGRAM(s) Oral daily  mirtazapine 7.5 milliGRAM(s) Oral at bedtime  pantoprazole    Tablet 40 milliGRAM(s) Oral before breakfast  sevelamer carbonate 1600 milliGRAM(s) Oral three times a day with meals    MEDICATIONS  (PRN):  acetaminophen     Tablet .. 650 milliGRAM(s) Oral every 6 hours PRN Temp greater or equal to 38C (100.4F), Mild Pain (1 - 3)  aluminum hydroxide/magnesium hydroxide/simethicone Suspension 30 milliLiter(s) Oral every 4 hours PRN Dyspepsia  guaiFENesin ER 1200 milliGRAM(s) Oral every 12 hours PRN Cough  hydrALAZINE Injectable 10 milliGRAM(s) IV Push every 4 hours PRN SBP>150, and cannot tolerate PO meds  labetalol Injectable 10 milliGRAM(s) IV Push every 8 hours PRN Systolic blood pressure > 180. Hold if HR<60  melatonin 3 milliGRAM(s) Oral at bedtime PRN Insomnia  ondansetron Injectable 4 milliGRAM(s) IV Push every 8 hours PRN Nausea and/or Vomiting      Allergies    No Known Allergies    Intolerances        REVIEW OF SYSTEMS: all negative with exception of above    Vital Signs Last 24 Hrs  T(C): 36.5 (10 Jul 2025 04:53), Max: 36.5 (09 Jul 2025 17:00)  T(F): 97.7 (10 Jul 2025 04:53), Max: 97.7 (09 Jul 2025 17:00)  HR: 61 (10 Jul 2025 04:53) (57 - 71)  BP: 143/59 (10 Jul 2025 04:53) (129/56 - 158/70)  BP(mean): --  RR: 17 (10 Jul 2025 04:53) (17 - 17)  SpO2: 94% (10 Jul 2025 04:53) (94% - 97%)    Parameters below as of 09 Jul 2025 15:55  Patient On (Oxygen Delivery Method): room air    PHYSICAL EXAM:  GENERAL: NAD, well-groomed  NERVOUS SYSTEM:  Alert & Oriented X3, Good concentration; Motor Strength 5/5 B/L upper and lower extremities; DTRs 2+ intact and symmetric  CHEST/LUNG: Clear to percussion bilaterally; No rales, rhonchi, wheezing, or rubs  HEART: Regular rate and rhythm; No murmurs, rubs, or gallops  ABDOMEN: Soft, Nontender, Nondistended; Bowel sounds present  EXTREMITIES:  2+ Peripheral Pulses, No clubbing, cyanosis, or edema    LABS:                        9.8    7.61  )-----------( 137      ( 10 Jul 2025 06:08 )             29.2     07-10    134[L]  |  96  |  20  ----------------------------<  142[H]  3.7   |  28  |  4.78[H]    Ca    8.9      10 Jul 2025 06:08    TPro  6.1  /  Alb  3.1[L]  /  TBili  1.0  /  DBili  x   /  AST  16  /  ALT  13  /  AlkPhos  58  07-10      Urinalysis Basic - ( 10 Jul 2025 06:08 )    Color: x / Appearance: x / SG: x / pH: x  Gluc: 142 mg/dL / Ketone: x  / Bili: x / Urobili: x   Blood: x / Protein: x / Nitrite: x   Leuk Esterase: x / RBC: x / WBC x   Sq Epi: x / Non Sq Epi: x / Bacteria: x      CAPILLARY BLOOD GLUCOSE      POCT Blood Glucose.: 119 mg/dL (10 Jul 2025 07:38)  POCT Blood Glucose.: 94 mg/dL (09 Jul 2025 21:45)  POCT Blood Glucose.: 148 mg/dL (09 Jul 2025 17:02)      RADIOLOGY & ADDITIONAL TESTS:    Imaging Personally Reviewed:  [ ] YES  [ ] NO    Consultant(s) Notes Reviewed:  [ ] YES  [ ] NO    Care Discussed with Consultants/Other Providers [ ] YES  [ ] NO

## 2025-07-11 NOTE — DISCHARGE NOTE NURSING/CASE MANAGEMENT/SOCIAL WORK - FINANCIAL ASSISTANCE
[facility] provides services at a reduced cost to those who are determined to be eligible through [facility]’s financial assistance program. Information regarding [facility]’s financial assistance program can be found by going to [email or web address] or by calling [phone].

## 2025-07-11 NOTE — PROGRESS NOTE ADULT - REASON FOR ADMISSION
Acute on chronic HFpEF

## 2025-07-11 NOTE — PROGRESS NOTE ADULT - PROVIDER SPECIALTY LIST ADULT
Hospitalist
Nephrology
Surgery
Nephrology
Surgery
Surgery
Cardiology
Nephrology
Surgery
Hospitalist
Hospitalist

## 2025-07-11 NOTE — PROGRESS NOTE ADULT - ASSESSMENT
84y Female PMHx of DM, ESRD on HD (TTS), CAD (s/p stent), HTN, Hep c, liver failure, HLD, pancreatitis, hx of acute cholecystitis tx conservatively presented to ed with epigastric abd pain, found to have heart failure exacerbation.   CT and US with cholelithiasis.  Afebrile, without leukocytosis    Plan:  No acute surgical intervention at this time, as patient with many comorbidities and is a poor surgical candidate.   If tolerating diet may discharge home on PO abx  - Recommend advance diet as tolerated  - Continue care per primary team  - to discuss with Dr. Lundberg   84y Female PMHx of DM, ESRD on HD (TTS), CAD (s/p stent), HTN, Hep c, liver failure, HLD, pancreatitis, hx of acute cholecystitis tx conservatively presented to ed with epigastric abd pain, found to have heart failure exacerbation.   CT and US with cholelithiasis.  Afebrile, without leukocytosis    Plan:  No acute surgical intervention at this time, as patient with many comorbidities and is a poor surgical candidate.   If tolerating diet may discharge home on PO abx  - Recommend advance diet as tolerated  - Continue care per primary team  - surgery team to sign off, please re-call if any changes  - to discuss with Dr. Lundberg

## 2025-07-11 NOTE — DISCHARGE NOTE NURSING/CASE MANAGEMENT/SOCIAL WORK - PATIENT PORTAL LINK FT
You can access the FollowMyHealth Patient Portal offered by [facility] by registering at the following website: [email or web address]. By joining [facility]’s FollowMyHealth portal, you will also be able to view your health information using other applications (apps) compatible with our system.

## 2025-07-18 DIAGNOSIS — D63.1 ANEMIA IN CHRONIC KIDNEY DISEASE: ICD-10-CM

## 2025-07-18 DIAGNOSIS — K80.20 CALCULUS OF GALLBLADDER WITHOUT CHOLECYSTITIS WITHOUT OBSTRUCTION: ICD-10-CM

## 2025-07-18 DIAGNOSIS — E78.5 HYPERLIPIDEMIA, UNSPECIFIED: ICD-10-CM

## 2025-07-18 DIAGNOSIS — I50.33 ACUTE ON CHRONIC DIASTOLIC (CONGESTIVE) HEART FAILURE: ICD-10-CM

## 2025-07-18 DIAGNOSIS — Z99.2 DEPENDENCE ON RENAL DIALYSIS: ICD-10-CM

## 2025-07-18 DIAGNOSIS — K21.9 GASTRO-ESOPHAGEAL REFLUX DISEASE WITHOUT ESOPHAGITIS: ICD-10-CM

## 2025-07-18 DIAGNOSIS — N18.6 END STAGE RENAL DISEASE: ICD-10-CM

## 2025-07-18 DIAGNOSIS — E11.22 TYPE 2 DIABETES MELLITUS WITH DIABETIC CHRONIC KIDNEY DISEASE: ICD-10-CM

## 2025-07-18 DIAGNOSIS — R91.1 SOLITARY PULMONARY NODULE: ICD-10-CM

## 2025-07-18 DIAGNOSIS — Z79.84 LONG TERM (CURRENT) USE OF ORAL HYPOGLYCEMIC DRUGS: ICD-10-CM

## 2025-07-18 DIAGNOSIS — J96.21 ACUTE AND CHRONIC RESPIRATORY FAILURE WITH HYPOXIA: ICD-10-CM

## 2025-07-18 DIAGNOSIS — I13.2 HYPERTENSIVE HEART AND CHRONIC KIDNEY DISEASE WITH HEART FAILURE AND WITH STAGE 5 CHRONIC KIDNEY DISEASE, OR END STAGE RENAL DISEASE: ICD-10-CM

## (undated) DEVICE — PACK IV START WITH CHG

## (undated) DEVICE — SOL INJ NS 0.9% 500ML 1-PORT

## (undated) DEVICE — VENODYNE/SCD SLEEVE CALF MEDIUM

## (undated) DEVICE — TUBING IV SET GRAVITY 1Y 78" MACRO

## (undated) DEVICE — NDL ACQUIRE EUS FNB 19G FLEX

## (undated) DEVICE — FORMALIN CUPS 10% BUFFERED

## (undated) DEVICE — DENTURE CUP PINK

## (undated) DEVICE — DRSG CURITY GAUZE SPONGE 4 X 4" 12-PLY NON-STERILE

## (undated) DEVICE — CONTAINER FORMALIN 10% 20ML

## (undated) DEVICE — BITE BLOCK ADULT 20 X 27MM (GREEN)

## (undated) DEVICE — SOLIDIFIER ISOLYZER 2000 CC

## (undated) DEVICE — GOWN LG

## (undated) DEVICE — CATH IV SAFE BC 22G X 1" (BLUE)

## (undated) DEVICE — NDL HYPO SAFE 22G X 1" (BLACK)

## (undated) DEVICE — UNDERPAD LINEN SAVER 17 X 24"

## (undated) DEVICE — TUBING MEDI-VAC W MAXIGRIP CONNECTORS 1/4"X6'

## (undated) DEVICE — SALIVA EJECTOR (BLUE)

## (undated) DEVICE — LABELS BLANK W PEN

## (undated) DEVICE — SOL IRR POUR NS 0.9% 500ML

## (undated) DEVICE — SYR LUER LOK 3CC

## (undated) DEVICE — ANESTHESIA CIRCUIT ADULT HMEF

## (undated) DEVICE — WARMING BLANKET FULL ADULT

## (undated) DEVICE — DRSG 2X2